# Patient Record
Sex: FEMALE | Race: WHITE | NOT HISPANIC OR LATINO | Employment: OTHER | URBAN - METROPOLITAN AREA
[De-identification: names, ages, dates, MRNs, and addresses within clinical notes are randomized per-mention and may not be internally consistent; named-entity substitution may affect disease eponyms.]

---

## 2017-01-11 ENCOUNTER — ALLSCRIPTS OFFICE VISIT (OUTPATIENT)
Dept: OTHER | Facility: OTHER | Age: 62
End: 2017-01-11

## 2017-01-23 ENCOUNTER — TRANSCRIBE ORDERS (OUTPATIENT)
Dept: ADMINISTRATIVE | Facility: HOSPITAL | Age: 62
End: 2017-01-23

## 2017-01-23 ENCOUNTER — APPOINTMENT (OUTPATIENT)
Dept: LAB | Facility: HOSPITAL | Age: 62
End: 2017-01-23
Attending: INTERNAL MEDICINE
Payer: MEDICARE

## 2017-01-23 DIAGNOSIS — D64.9 ANEMIA, UNSPECIFIED: ICD-10-CM

## 2017-01-23 DIAGNOSIS — Z12.31 SCREENING MAMMOGRAM FOR HIGH-RISK PATIENT: ICD-10-CM

## 2017-01-23 DIAGNOSIS — Z78.0 POSTMENOPAUSAL: Primary | ICD-10-CM

## 2017-01-23 DIAGNOSIS — M85.80 OSTEOPENIA: ICD-10-CM

## 2017-01-23 DIAGNOSIS — D64.9 ANEMIA, UNSPECIFIED: Primary | ICD-10-CM

## 2017-01-23 DIAGNOSIS — I10 ESSENTIAL HYPERTENSION, MALIGNANT: ICD-10-CM

## 2017-01-23 LAB
ALBUMIN SERPL BCP-MCNC: 3.6 G/DL (ref 3.5–5)
ALP SERPL-CCNC: 128 U/L (ref 46–116)
ALT SERPL W P-5'-P-CCNC: 56 U/L (ref 12–78)
ANION GAP SERPL CALCULATED.3IONS-SCNC: 11 MMOL/L (ref 4–13)
AST SERPL W P-5'-P-CCNC: 67 U/L (ref 5–45)
BASOPHILS # BLD AUTO: 0.1 THOUSANDS/ΜL (ref 0–0.1)
BASOPHILS NFR BLD AUTO: 1 % (ref 0–1)
BILIRUB SERPL-MCNC: 0.3 MG/DL (ref 0.2–1)
BUN SERPL-MCNC: 14 MG/DL (ref 5–25)
CALCIUM SERPL-MCNC: 9.5 MG/DL (ref 8.3–10.1)
CHLORIDE SERPL-SCNC: 101 MMOL/L (ref 100–108)
CO2 SERPL-SCNC: 25 MMOL/L (ref 21–32)
CREAT SERPL-MCNC: 0.83 MG/DL (ref 0.6–1.3)
EOSINOPHIL # BLD AUTO: 0.3 THOUSAND/ΜL (ref 0–0.61)
EOSINOPHIL NFR BLD AUTO: 3 % (ref 0–6)
ERYTHROCYTE [DISTWIDTH] IN BLOOD BY AUTOMATED COUNT: 17.6 % (ref 11.6–15.1)
GFR SERPL CREATININE-BSD FRML MDRD: >60 ML/MIN/1.73SQ M
GLUCOSE SERPL-MCNC: 116 MG/DL (ref 65–140)
HCT VFR BLD AUTO: 40.8 % (ref 37–47)
HGB BLD-MCNC: 12.7 G/DL (ref 12–16)
LYMPHOCYTES # BLD AUTO: 3.2 THOUSANDS/ΜL (ref 0.6–4.47)
LYMPHOCYTES NFR BLD AUTO: 31 % (ref 14–44)
MCH RBC QN AUTO: 23.3 PG (ref 27–31)
MCHC RBC AUTO-ENTMCNC: 31 G/DL (ref 31.4–37.4)
MCV RBC AUTO: 75 FL (ref 82–98)
MONOCYTES # BLD AUTO: 0.7 THOUSAND/ΜL (ref 0.17–1.22)
MONOCYTES NFR BLD AUTO: 7 % (ref 4–12)
NEUTROPHILS # BLD AUTO: 6.1 THOUSANDS/ΜL (ref 1.85–7.62)
NEUTS SEG NFR BLD AUTO: 59 % (ref 43–75)
NRBC BLD AUTO-RTO: 0 /100 WBCS
PLATELET # BLD AUTO: 347 THOUSANDS/UL (ref 130–400)
PMV BLD AUTO: 8.9 FL (ref 8.9–12.7)
POTASSIUM SERPL-SCNC: 4.7 MMOL/L (ref 3.5–5.3)
PROT SERPL-MCNC: 7.9 G/DL (ref 6.4–8.2)
RBC # BLD AUTO: 5.44 MILLION/UL (ref 4.2–5.4)
SODIUM SERPL-SCNC: 137 MMOL/L (ref 136–145)
WBC # BLD AUTO: 10.3 THOUSAND/UL (ref 4.8–10.8)

## 2017-01-23 PROCEDURE — 36415 COLL VENOUS BLD VENIPUNCTURE: CPT | Performed by: INTERNAL MEDICINE

## 2017-01-23 PROCEDURE — 85025 COMPLETE CBC W/AUTO DIFF WBC: CPT

## 2017-01-23 PROCEDURE — 80053 COMPREHEN METABOLIC PANEL: CPT | Performed by: INTERNAL MEDICINE

## 2017-01-30 ENCOUNTER — ALLSCRIPTS OFFICE VISIT (OUTPATIENT)
Dept: OTHER | Facility: OTHER | Age: 62
End: 2017-01-30

## 2017-01-30 DIAGNOSIS — L90.5 SCAR CONDITIONS AND FIBROSIS OF SKIN: ICD-10-CM

## 2017-01-30 DIAGNOSIS — M79.641 PAIN OF RIGHT HAND: ICD-10-CM

## 2017-02-02 ENCOUNTER — HOSPITAL ENCOUNTER (OUTPATIENT)
Dept: RADIOLOGY | Facility: HOSPITAL | Age: 62
Discharge: HOME | End: 2017-02-02
Attending: INTERNAL MEDICINE
Payer: MEDICARE

## 2017-02-02 DIAGNOSIS — Z78.0 POSTMENOPAUSAL: ICD-10-CM

## 2017-02-02 DIAGNOSIS — M85.80 OSTEOPENIA: ICD-10-CM

## 2017-02-02 PROCEDURE — 77080 DXA BONE DENSITY AXIAL: CPT

## 2017-02-07 ENCOUNTER — APPOINTMENT (OUTPATIENT)
Dept: PREADMISSION TESTING | Facility: HOSPITAL | Age: 62
End: 2017-02-07
Payer: MEDICARE

## 2017-02-07 ENCOUNTER — ANESTHESIA EVENT (OUTPATIENT)
Dept: GASTROENTEROLOGY | Facility: AMBULARY SURGERY CENTER | Age: 62
End: 2017-02-07
Payer: MEDICARE

## 2017-02-07 DIAGNOSIS — Z01.818 PRE-OP TESTING: Primary | ICD-10-CM

## 2017-02-07 LAB
ANION GAP SERPL CALCULATED.3IONS-SCNC: 7 MMOL/L (ref 4–13)
ATRIAL RATE: 61 BPM
BASOPHILS # BLD AUTO: 0 THOUSANDS/ΜL (ref 0–0.1)
BASOPHILS NFR BLD AUTO: 1 % (ref 0–1)
BUN SERPL-MCNC: 11 MG/DL (ref 5–25)
CALCIUM SERPL-MCNC: 9.1 MG/DL (ref 8.3–10.1)
CHLORIDE SERPL-SCNC: 104 MMOL/L (ref 100–108)
CO2 SERPL-SCNC: 28 MMOL/L (ref 21–32)
CREAT SERPL-MCNC: 0.82 MG/DL (ref 0.6–1.3)
EOSINOPHIL # BLD AUTO: 0.2 THOUSAND/ΜL (ref 0–0.61)
EOSINOPHIL NFR BLD AUTO: 3 % (ref 0–6)
ERYTHROCYTE [DISTWIDTH] IN BLOOD BY AUTOMATED COUNT: 17.3 % (ref 11.6–15.1)
GFR SERPL CREATININE-BSD FRML MDRD: >60 ML/MIN/1.73SQ M
GLUCOSE SERPL-MCNC: 105 MG/DL (ref 65–140)
HCT VFR BLD AUTO: 37.4 % (ref 37–47)
HGB BLD-MCNC: 12.1 G/DL (ref 12–16)
LYMPHOCYTES # BLD AUTO: 2.3 THOUSANDS/ΜL (ref 0.6–4.47)
LYMPHOCYTES NFR BLD AUTO: 30 % (ref 14–44)
MCH RBC QN AUTO: 23.7 PG (ref 27–31)
MCHC RBC AUTO-ENTMCNC: 32.5 G/DL (ref 31.4–37.4)
MCV RBC AUTO: 73 FL (ref 82–98)
MONOCYTES # BLD AUTO: 0.5 THOUSAND/ΜL (ref 0.17–1.22)
MONOCYTES NFR BLD AUTO: 7 % (ref 4–12)
NEUTROPHILS # BLD AUTO: 4.6 THOUSANDS/ΜL (ref 1.85–7.62)
NEUTS SEG NFR BLD AUTO: 60 % (ref 43–75)
NRBC BLD AUTO-RTO: 0 /100 WBCS
P AXIS: 71 DEGREES
PLATELET # BLD AUTO: 316 THOUSANDS/UL (ref 130–400)
PMV BLD AUTO: 8.3 FL (ref 8.9–12.7)
POTASSIUM SERPL-SCNC: 4.1 MMOL/L (ref 3.5–5.3)
PR INTERVAL: 116 MS
QRS AXIS: 34 DEGREES
QRSD INTERVAL: 80 MS
QT INTERVAL: 456 MS
QTC INTERVAL: 459 MS
RBC # BLD AUTO: 5.12 MILLION/UL (ref 4.2–5.4)
SODIUM SERPL-SCNC: 139 MMOL/L (ref 136–145)
T WAVE AXIS: 43 DEGREES
VENTRICULAR RATE: 61 BPM
WBC # BLD AUTO: 7.7 THOUSAND/UL (ref 4.8–10.8)

## 2017-02-07 PROCEDURE — 85025 COMPLETE CBC W/AUTO DIFF WBC: CPT

## 2017-02-07 PROCEDURE — 93005 ELECTROCARDIOGRAM TRACING: CPT

## 2017-02-07 PROCEDURE — 80048 BASIC METABOLIC PNL TOTAL CA: CPT

## 2017-02-07 PROCEDURE — 36415 COLL VENOUS BLD VENIPUNCTURE: CPT

## 2017-02-07 RX ORDER — FLUOXETINE HYDROCHLORIDE 40 MG/1
80 CAPSULE ORAL EVERY MORNING
COMMUNITY
End: 2019-04-09

## 2017-02-07 RX ORDER — OMEPRAZOLE 40 MG/1
40 CAPSULE, DELAYED RELEASE ORAL EVERY MORNING
COMMUNITY
End: 2018-09-26 | Stop reason: SDUPTHER

## 2017-02-08 ENCOUNTER — HOSPITAL ENCOUNTER (OUTPATIENT)
Facility: AMBULARY SURGERY CENTER | Age: 62
Setting detail: OUTPATIENT SURGERY
Discharge: HOME | End: 2017-02-08
Attending: INTERNAL MEDICINE | Admitting: INTERNAL MEDICINE
Payer: MEDICARE

## 2017-02-08 ENCOUNTER — GENERIC CONVERSION - ENCOUNTER (OUTPATIENT)
Dept: OTHER | Facility: OTHER | Age: 62
End: 2017-02-08

## 2017-02-08 ENCOUNTER — ANESTHESIA (OUTPATIENT)
Dept: GASTROENTEROLOGY | Facility: AMBULARY SURGERY CENTER | Age: 62
End: 2017-02-08
Payer: MEDICARE

## 2017-02-08 VITALS
HEART RATE: 64 BPM | OXYGEN SATURATION: 97 % | RESPIRATION RATE: 20 BRPM | TEMPERATURE: 98.6 F | DIASTOLIC BLOOD PRESSURE: 72 MMHG | SYSTOLIC BLOOD PRESSURE: 148 MMHG

## 2017-02-08 DIAGNOSIS — R19.5 OTHER FECAL ABNORMALITIES: ICD-10-CM

## 2017-02-08 DIAGNOSIS — D64.9 ANEMIA: ICD-10-CM

## 2017-02-08 DIAGNOSIS — K21.9 GASTRO-ESOPHAGEAL REFLUX DISEASE WITHOUT ESOPHAGITIS: ICD-10-CM

## 2017-02-08 LAB — GLUCOSE SERPL-MCNC: 95 MG/DL (ref 65–140)

## 2017-02-08 PROCEDURE — 88305 TISSUE EXAM BY PATHOLOGIST: CPT | Performed by: INTERNAL MEDICINE

## 2017-02-08 PROCEDURE — 82948 REAGENT STRIP/BLOOD GLUCOSE: CPT

## 2017-02-08 RX ORDER — SODIUM CHLORIDE, SODIUM LACTATE, POTASSIUM CHLORIDE, CALCIUM CHLORIDE 600; 310; 30; 20 MG/100ML; MG/100ML; MG/100ML; MG/100ML
125 INJECTION, SOLUTION INTRAVENOUS CONTINUOUS
Status: DISCONTINUED | OUTPATIENT
Start: 2017-02-08 | End: 2017-02-08 | Stop reason: HOSPADM

## 2017-02-08 RX ORDER — PROPOFOL 10 MG/ML
INJECTION, EMULSION INTRAVENOUS AS NEEDED
Status: DISCONTINUED | OUTPATIENT
Start: 2017-02-08 | End: 2017-02-08 | Stop reason: SURG

## 2017-02-08 RX ADMIN — PROPOFOL 50 MG: 10 INJECTION, EMULSION INTRAVENOUS at 10:48

## 2017-02-08 RX ADMIN — PROPOFOL 120 MG: 10 INJECTION, EMULSION INTRAVENOUS at 10:32

## 2017-02-08 RX ADMIN — LIDOCAINE HYDROCHLORIDE 40 MG: 20 INJECTION, SOLUTION INTRAVENOUS at 10:32

## 2017-02-08 RX ADMIN — SODIUM CHLORIDE, SODIUM LACTATE, POTASSIUM CHLORIDE, AND CALCIUM CHLORIDE: .6; .31; .03; .02 INJECTION, SOLUTION INTRAVENOUS at 10:25

## 2017-02-08 RX ADMIN — PROPOFOL 100 MG: 10 INJECTION, EMULSION INTRAVENOUS at 10:40

## 2017-02-10 ENCOUNTER — ANESTHESIA EVENT (OUTPATIENT)
Dept: PERIOP | Facility: AMBULARY SURGERY CENTER | Age: 62
End: 2017-02-10
Payer: MEDICARE

## 2017-02-13 ENCOUNTER — HOSPITAL ENCOUNTER (OUTPATIENT)
Facility: AMBULARY SURGERY CENTER | Age: 62
Setting detail: OUTPATIENT SURGERY
Discharge: HOME | End: 2017-02-13
Attending: ORTHOPAEDIC SURGERY | Admitting: ORTHOPAEDIC SURGERY
Payer: MEDICARE

## 2017-02-13 ENCOUNTER — ANESTHESIA (OUTPATIENT)
Dept: PERIOP | Facility: AMBULARY SURGERY CENTER | Age: 62
End: 2017-02-13
Payer: MEDICARE

## 2017-02-13 VITALS
DIASTOLIC BLOOD PRESSURE: 52 MMHG | SYSTOLIC BLOOD PRESSURE: 110 MMHG | OXYGEN SATURATION: 96 % | RESPIRATION RATE: 20 BRPM | TEMPERATURE: 98 F | HEART RATE: 76 BPM

## 2017-02-13 RX ORDER — FENTANYL CITRATE 50 UG/ML
INJECTION, SOLUTION INTRAMUSCULAR; INTRAVENOUS AS NEEDED
Status: DISCONTINUED | OUTPATIENT
Start: 2017-02-13 | End: 2017-02-13 | Stop reason: SURG

## 2017-02-13 RX ORDER — CLINDAMYCIN PHOSPHATE 900 MG/50ML
900 INJECTION INTRAVENOUS ONCE
Status: DISCONTINUED | OUTPATIENT
Start: 2017-02-13 | End: 2017-02-13 | Stop reason: HOSPADM

## 2017-02-13 RX ORDER — BUPIVACAINE HYDROCHLORIDE 2.5 MG/ML
INJECTION, SOLUTION INFILTRATION; PERINEURAL AS NEEDED
Status: DISCONTINUED | OUTPATIENT
Start: 2017-02-13 | End: 2017-02-13 | Stop reason: HOSPADM

## 2017-02-13 RX ORDER — MIDAZOLAM HYDROCHLORIDE 1 MG/ML
INJECTION INTRAMUSCULAR; INTRAVENOUS AS NEEDED
Status: DISCONTINUED | OUTPATIENT
Start: 2017-02-13 | End: 2017-02-13 | Stop reason: SURG

## 2017-02-13 RX ORDER — HYDROCODONE BITARTRATE AND ACETAMINOPHEN 5; 325 MG/1; MG/1
1 TABLET ORAL EVERY 8 HOURS PRN
Qty: 30 TABLET | Refills: 0 | Status: SHIPPED | OUTPATIENT
Start: 2017-02-13 | End: 2017-02-20

## 2017-02-13 RX ORDER — CLINDAMYCIN PHOSPHATE 150 MG/ML
INJECTION, SOLUTION INTRAVENOUS AS NEEDED
Status: DISCONTINUED | OUTPATIENT
Start: 2017-02-13 | End: 2017-02-13 | Stop reason: SURG

## 2017-02-13 RX ORDER — PROPOFOL 10 MG/ML
INJECTION, EMULSION INTRAVENOUS CONTINUOUS PRN
Status: DISCONTINUED | OUTPATIENT
Start: 2017-02-13 | End: 2017-02-13 | Stop reason: SURG

## 2017-02-13 RX ORDER — SODIUM CHLORIDE, SODIUM LACTATE, POTASSIUM CHLORIDE, CALCIUM CHLORIDE 600; 310; 30; 20 MG/100ML; MG/100ML; MG/100ML; MG/100ML
INJECTION, SOLUTION INTRAVENOUS CONTINUOUS PRN
Status: DISCONTINUED | OUTPATIENT
Start: 2017-02-13 | End: 2017-02-13 | Stop reason: SURG

## 2017-02-13 RX ORDER — PROPOFOL 10 MG/ML
INJECTION, EMULSION INTRAVENOUS AS NEEDED
Status: DISCONTINUED | OUTPATIENT
Start: 2017-02-13 | End: 2017-02-13 | Stop reason: SURG

## 2017-02-13 RX ORDER — QUETIAPINE FUMARATE 50 MG/1
50 TABLET, FILM COATED ORAL
COMMUNITY
End: 2019-04-09

## 2017-02-13 RX ORDER — SODIUM CHLORIDE, SODIUM LACTATE, POTASSIUM CHLORIDE, CALCIUM CHLORIDE 600; 310; 30; 20 MG/100ML; MG/100ML; MG/100ML; MG/100ML
75 INJECTION, SOLUTION INTRAVENOUS CONTINUOUS
Status: DISCONTINUED | OUTPATIENT
Start: 2017-02-13 | End: 2017-02-13 | Stop reason: HOSPADM

## 2017-02-13 RX ORDER — SODIUM CHLORIDE, SODIUM LACTATE, POTASSIUM CHLORIDE, CALCIUM CHLORIDE 600; 310; 30; 20 MG/100ML; MG/100ML; MG/100ML; MG/100ML
50 INJECTION, SOLUTION INTRAVENOUS CONTINUOUS
Status: DISCONTINUED | OUTPATIENT
Start: 2017-02-13 | End: 2017-02-13 | Stop reason: HOSPADM

## 2017-02-13 RX ORDER — FENTANYL CITRATE/PF 50 MCG/ML
25 SYRINGE (ML) INJECTION
Status: DISCONTINUED | OUTPATIENT
Start: 2017-02-13 | End: 2017-02-13 | Stop reason: HOSPADM

## 2017-02-13 RX ADMIN — PROPOFOL 30 MG: 10 INJECTION, EMULSION INTRAVENOUS at 08:40

## 2017-02-13 RX ADMIN — PROPOFOL 80 MG: 10 INJECTION, EMULSION INTRAVENOUS at 08:32

## 2017-02-13 RX ADMIN — SODIUM CHLORIDE, SODIUM LACTATE, POTASSIUM CHLORIDE, AND CALCIUM CHLORIDE 75 ML/HR: .6; .31; .03; .02 INJECTION, SOLUTION INTRAVENOUS at 08:15

## 2017-02-13 RX ADMIN — SODIUM CHLORIDE, SODIUM LACTATE, POTASSIUM CHLORIDE, AND CALCIUM CHLORIDE: .6; .31; .03; .02 INJECTION, SOLUTION INTRAVENOUS at 08:15

## 2017-02-13 RX ADMIN — FENTANYL CITRATE 50 MCG: 50 INJECTION, SOLUTION INTRAMUSCULAR; INTRAVENOUS at 08:32

## 2017-02-13 RX ADMIN — CLINDAMYCIN PHOSPHATE 900 MG: 150 INJECTION, SOLUTION INTRAMUSCULAR; INTRAVENOUS at 08:17

## 2017-02-13 RX ADMIN — FENTANYL CITRATE 25 MCG: 50 INJECTION, SOLUTION INTRAMUSCULAR; INTRAVENOUS at 08:28

## 2017-02-13 RX ADMIN — PROPOFOL 80 MCG/KG/MIN: 10 INJECTION, EMULSION INTRAVENOUS at 08:32

## 2017-02-13 RX ADMIN — MIDAZOLAM HYDROCHLORIDE 2 MG: 1 INJECTION, SOLUTION INTRAMUSCULAR; INTRAVENOUS at 08:28

## 2017-02-13 RX ADMIN — FENTANYL CITRATE 25 MCG: 50 INJECTION, SOLUTION INTRAMUSCULAR; INTRAVENOUS at 08:45

## 2017-02-14 ENCOUNTER — GENERIC CONVERSION - ENCOUNTER (OUTPATIENT)
Dept: OTHER | Facility: OTHER | Age: 62
End: 2017-02-14

## 2017-02-28 ENCOUNTER — ALLSCRIPTS OFFICE VISIT (OUTPATIENT)
Dept: OTHER | Facility: OTHER | Age: 62
End: 2017-02-28

## 2017-03-01 ENCOUNTER — HOSPITAL ENCOUNTER (OUTPATIENT)
Dept: RADIOLOGY | Facility: HOSPITAL | Age: 62
Discharge: HOME | End: 2017-03-01
Attending: INTERNAL MEDICINE
Payer: MEDICARE

## 2017-03-01 DIAGNOSIS — Z12.31 SCREENING MAMMOGRAM FOR HIGH-RISK PATIENT: ICD-10-CM

## 2017-03-01 PROCEDURE — G0202 SCR MAMMO BI INCL CAD: HCPCS

## 2017-03-06 ENCOUNTER — ALLSCRIPTS OFFICE VISIT (OUTPATIENT)
Dept: OTHER | Facility: OTHER | Age: 62
End: 2017-03-06

## 2017-03-06 DIAGNOSIS — Z47.89 ENCOUNTER FOR OTHER ORTHOPEDIC AFTERCARE: ICD-10-CM

## 2017-03-06 DIAGNOSIS — L90.5 SCAR CONDITIONS AND FIBROSIS OF SKIN: ICD-10-CM

## 2017-03-09 ENCOUNTER — APPOINTMENT (OUTPATIENT)
Dept: OCCUPATIONAL THERAPY | Facility: CLINIC | Age: 62
End: 2017-03-09
Payer: MEDICARE

## 2017-03-09 DIAGNOSIS — Z47.89 ENCOUNTER FOR OTHER ORTHOPEDIC AFTERCARE: ICD-10-CM

## 2017-03-09 PROCEDURE — 97760 ORTHOTIC MGMT&TRAING 1ST ENC: CPT

## 2017-03-09 PROCEDURE — G8984 CARRY CURRENT STATUS: HCPCS

## 2017-03-09 PROCEDURE — 97165 OT EVAL LOW COMPLEX 30 MIN: CPT

## 2017-03-09 PROCEDURE — G8985 CARRY GOAL STATUS: HCPCS

## 2017-03-15 ENCOUNTER — APPOINTMENT (EMERGENCY)
Dept: RADIOLOGY | Facility: HOSPITAL | Age: 62
End: 2017-03-15
Payer: MEDICARE

## 2017-03-15 ENCOUNTER — APPOINTMENT (OUTPATIENT)
Dept: OCCUPATIONAL THERAPY | Facility: CLINIC | Age: 62
End: 2017-03-15
Payer: MEDICARE

## 2017-03-15 ENCOUNTER — HOSPITAL ENCOUNTER (EMERGENCY)
Facility: HOSPITAL | Age: 62
Discharge: HOME | End: 2017-03-15
Attending: EMERGENCY MEDICINE | Admitting: EMERGENCY MEDICINE
Payer: MEDICARE

## 2017-03-15 VITALS
HEART RATE: 67 BPM | WEIGHT: 138 LBS | TEMPERATURE: 98.9 F | RESPIRATION RATE: 18 BRPM | DIASTOLIC BLOOD PRESSURE: 60 MMHG | OXYGEN SATURATION: 97 % | SYSTOLIC BLOOD PRESSURE: 121 MMHG

## 2017-03-15 DIAGNOSIS — S93.602A: Primary | ICD-10-CM

## 2017-03-15 PROCEDURE — 97140 MANUAL THERAPY 1/> REGIONS: CPT

## 2017-03-15 PROCEDURE — 73630 X-RAY EXAM OF FOOT: CPT

## 2017-03-15 PROCEDURE — 99283 EMERGENCY DEPT VISIT LOW MDM: CPT

## 2017-03-15 PROCEDURE — 97110 THERAPEUTIC EXERCISES: CPT

## 2017-03-15 RX ORDER — IBUPROFEN 400 MG/1
400 TABLET ORAL ONCE
Status: COMPLETED | OUTPATIENT
Start: 2017-03-15 | End: 2017-03-15

## 2017-03-15 RX ORDER — HYDROCODONE BITARTRATE AND ACETAMINOPHEN 5; 325 MG/1; MG/1
1 TABLET ORAL EVERY 6 HOURS PRN
Qty: 15 TABLET | Refills: 0 | Status: SHIPPED | OUTPATIENT
Start: 2017-03-15 | End: 2017-03-22

## 2017-03-15 RX ADMIN — IBUPROFEN 400 MG: 400 TABLET ORAL at 05:37

## 2017-03-17 ENCOUNTER — APPOINTMENT (OUTPATIENT)
Dept: OCCUPATIONAL THERAPY | Facility: CLINIC | Age: 62
End: 2017-03-17
Payer: MEDICARE

## 2017-03-17 PROCEDURE — 97110 THERAPEUTIC EXERCISES: CPT

## 2017-03-17 PROCEDURE — 97140 MANUAL THERAPY 1/> REGIONS: CPT

## 2017-03-21 ENCOUNTER — APPOINTMENT (OUTPATIENT)
Dept: OCCUPATIONAL THERAPY | Facility: CLINIC | Age: 62
End: 2017-03-21
Payer: MEDICARE

## 2017-03-21 PROCEDURE — 97110 THERAPEUTIC EXERCISES: CPT

## 2017-03-21 PROCEDURE — 97140 MANUAL THERAPY 1/> REGIONS: CPT

## 2017-03-24 ENCOUNTER — APPOINTMENT (OUTPATIENT)
Dept: OCCUPATIONAL THERAPY | Facility: CLINIC | Age: 62
End: 2017-03-24
Payer: MEDICARE

## 2017-03-24 PROCEDURE — 97140 MANUAL THERAPY 1/> REGIONS: CPT

## 2017-03-24 PROCEDURE — 97110 THERAPEUTIC EXERCISES: CPT

## 2017-03-27 ENCOUNTER — APPOINTMENT (OUTPATIENT)
Dept: OCCUPATIONAL THERAPY | Facility: CLINIC | Age: 62
End: 2017-03-27
Payer: MEDICARE

## 2017-03-27 PROCEDURE — 97110 THERAPEUTIC EXERCISES: CPT

## 2017-03-27 PROCEDURE — 97140 MANUAL THERAPY 1/> REGIONS: CPT

## 2017-03-29 ENCOUNTER — APPOINTMENT (OUTPATIENT)
Dept: OCCUPATIONAL THERAPY | Facility: CLINIC | Age: 62
End: 2017-03-29
Payer: MEDICARE

## 2017-03-29 PROCEDURE — 97110 THERAPEUTIC EXERCISES: CPT

## 2017-03-29 PROCEDURE — 97140 MANUAL THERAPY 1/> REGIONS: CPT

## 2017-03-29 PROCEDURE — G8984 CARRY CURRENT STATUS: HCPCS

## 2017-03-29 PROCEDURE — G8985 CARRY GOAL STATUS: HCPCS

## 2017-04-03 ENCOUNTER — ALLSCRIPTS OFFICE VISIT (OUTPATIENT)
Dept: OTHER | Facility: OTHER | Age: 62
End: 2017-04-03

## 2017-04-03 ENCOUNTER — APPOINTMENT (OUTPATIENT)
Dept: OCCUPATIONAL THERAPY | Facility: CLINIC | Age: 62
End: 2017-04-03
Payer: MEDICARE

## 2017-04-03 PROCEDURE — 97140 MANUAL THERAPY 1/> REGIONS: CPT

## 2017-04-03 PROCEDURE — 97110 THERAPEUTIC EXERCISES: CPT

## 2017-04-05 ENCOUNTER — APPOINTMENT (OUTPATIENT)
Dept: OCCUPATIONAL THERAPY | Facility: CLINIC | Age: 62
End: 2017-04-05
Payer: MEDICARE

## 2017-04-05 PROCEDURE — 97140 MANUAL THERAPY 1/> REGIONS: CPT

## 2017-04-05 PROCEDURE — 97110 THERAPEUTIC EXERCISES: CPT

## 2017-04-10 ENCOUNTER — APPOINTMENT (OUTPATIENT)
Dept: OCCUPATIONAL THERAPY | Facility: CLINIC | Age: 62
End: 2017-04-10
Payer: MEDICARE

## 2017-04-10 PROCEDURE — 97140 MANUAL THERAPY 1/> REGIONS: CPT

## 2017-04-10 PROCEDURE — 97110 THERAPEUTIC EXERCISES: CPT

## 2017-04-12 ENCOUNTER — APPOINTMENT (OUTPATIENT)
Dept: OCCUPATIONAL THERAPY | Facility: CLINIC | Age: 62
End: 2017-04-12
Payer: MEDICARE

## 2017-04-12 PROCEDURE — 97140 MANUAL THERAPY 1/> REGIONS: CPT

## 2017-04-12 PROCEDURE — 97110 THERAPEUTIC EXERCISES: CPT

## 2017-04-17 ENCOUNTER — APPOINTMENT (OUTPATIENT)
Dept: OCCUPATIONAL THERAPY | Facility: CLINIC | Age: 62
End: 2017-04-17
Payer: MEDICARE

## 2017-04-17 PROCEDURE — 97140 MANUAL THERAPY 1/> REGIONS: CPT

## 2017-04-17 PROCEDURE — 97110 THERAPEUTIC EXERCISES: CPT

## 2017-04-19 ENCOUNTER — APPOINTMENT (OUTPATIENT)
Dept: OCCUPATIONAL THERAPY | Facility: CLINIC | Age: 62
End: 2017-04-19
Payer: MEDICARE

## 2017-04-19 PROCEDURE — 97140 MANUAL THERAPY 1/> REGIONS: CPT

## 2017-04-19 PROCEDURE — 97110 THERAPEUTIC EXERCISES: CPT

## 2017-04-21 ENCOUNTER — APPOINTMENT (OUTPATIENT)
Dept: LAB | Facility: HOSPITAL | Age: 62
End: 2017-04-21
Attending: INTERNAL MEDICINE
Payer: MEDICARE

## 2017-04-21 ENCOUNTER — TRANSCRIBE ORDERS (OUTPATIENT)
Dept: ADMINISTRATIVE | Facility: HOSPITAL | Age: 62
End: 2017-04-21

## 2017-04-21 DIAGNOSIS — D64.9 ANEMIA, UNSPECIFIED: ICD-10-CM

## 2017-04-21 DIAGNOSIS — E11.9 DIABETES MELLITUS WITHOUT COMPLICATION (HCC): ICD-10-CM

## 2017-04-21 DIAGNOSIS — N39.0 URINARY TRACT INFECTION, SITE NOT SPECIFIED: ICD-10-CM

## 2017-04-21 DIAGNOSIS — D64.9 ANEMIA, UNSPECIFIED: Primary | ICD-10-CM

## 2017-04-21 DIAGNOSIS — D52.9 ANEMIA DUE TO FOLIC ACID DEFICIENCY, UNSPECIFIED DEFICIENCY TYPE: ICD-10-CM

## 2017-04-21 DIAGNOSIS — E03.9 UNSPECIFIED HYPOTHYROIDISM: ICD-10-CM

## 2017-04-21 DIAGNOSIS — D50.9 IRON DEFICIENCY ANEMIA, UNSPECIFIED: ICD-10-CM

## 2017-04-21 DIAGNOSIS — D51.9 ANEMIA DUE TO VITAMIN B12 DEFICIENCY, UNSPECIFIED B12 DEFICIENCY TYPE: ICD-10-CM

## 2017-04-21 DIAGNOSIS — E78.00 PURE HYPERCHOLESTEROLEMIA: ICD-10-CM

## 2017-04-21 DIAGNOSIS — I10 ESSENTIAL HYPERTENSION, MALIGNANT: ICD-10-CM

## 2017-04-21 DIAGNOSIS — J44.9 CHRONIC OBSTRUCTIVE PULMONARY DISEASE, UNSPECIFIED COPD TYPE (HCC): ICD-10-CM

## 2017-04-21 LAB
ALBUMIN SERPL BCP-MCNC: 3.4 G/DL (ref 3.5–5)
ALP SERPL-CCNC: 123 U/L (ref 46–116)
ALT SERPL W P-5'-P-CCNC: 73 U/L (ref 12–78)
ANION GAP SERPL CALCULATED.3IONS-SCNC: 12 MMOL/L (ref 4–13)
AST SERPL W P-5'-P-CCNC: 61 U/L (ref 5–45)
BACTERIA UR QL AUTO: ABNORMAL /HPF
BASOPHILS # BLD AUTO: 0 THOUSANDS/ΜL (ref 0–0.1)
BASOPHILS NFR BLD AUTO: 0 % (ref 0–1)
BILIRUB SERPL-MCNC: 0.3 MG/DL (ref 0.2–1)
BILIRUB UR QL STRIP: ABNORMAL
BUN SERPL-MCNC: 12 MG/DL (ref 5–25)
CALCIUM SERPL-MCNC: 9.2 MG/DL (ref 8.3–10.1)
CHLORIDE SERPL-SCNC: 104 MMOL/L (ref 100–108)
CHOLEST SERPL-MCNC: 179 MG/DL (ref 50–200)
CLARITY UR: CLEAR
CO2 SERPL-SCNC: 25 MMOL/L (ref 21–32)
COLOR UR: YELLOW
CREAT SERPL-MCNC: 0.76 MG/DL (ref 0.6–1.3)
EOSINOPHIL # BLD AUTO: 0.2 THOUSAND/ΜL (ref 0–0.61)
EOSINOPHIL NFR BLD AUTO: 2 % (ref 0–6)
ERYTHROCYTE [DISTWIDTH] IN BLOOD BY AUTOMATED COUNT: 18.2 % (ref 11.6–15.1)
EST. AVERAGE GLUCOSE BLD GHB EST-MCNC: 123 MG/DL
FERRITIN SERPL-MCNC: 23 NG/ML (ref 8–388)
GFR SERPL CREATININE-BSD FRML MDRD: >60 ML/MIN/1.73SQ M
GLUCOSE P FAST SERPL-MCNC: 104 MG/DL (ref 65–99)
GLUCOSE UR STRIP-MCNC: NEGATIVE MG/DL
HBA1C MFR BLD: 5.9 % (ref 4.2–6.3)
HCT VFR BLD AUTO: 38 % (ref 37–47)
HDLC SERPL-MCNC: 35 MG/DL (ref 40–60)
HGB BLD-MCNC: 12.2 G/DL (ref 12–16)
HGB UR QL STRIP.AUTO: NEGATIVE
IRON SATN MFR SERPL: 9 %
IRON SERPL-MCNC: 41 UG/DL (ref 50–170)
KETONES UR STRIP-MCNC: NEGATIVE MG/DL
LDLC SERPL CALC-MCNC: 118 MG/DL (ref 0–100)
LEUKOCYTE ESTERASE UR QL STRIP: NEGATIVE
LYMPHOCYTES # BLD AUTO: 2 THOUSANDS/ΜL (ref 0.6–4.47)
LYMPHOCYTES NFR BLD AUTO: 26 % (ref 14–44)
MCH RBC QN AUTO: 23.9 PG (ref 27–31)
MCHC RBC AUTO-ENTMCNC: 32.1 G/DL (ref 31.4–37.4)
MCV RBC AUTO: 74 FL (ref 82–98)
MONOCYTES # BLD AUTO: 0.6 THOUSAND/ΜL (ref 0.17–1.22)
MONOCYTES NFR BLD AUTO: 7 % (ref 4–12)
MUCOUS THREADS UR QL AUTO: ABNORMAL
NEUTROPHILS # BLD AUTO: 5.1 THOUSANDS/ΜL (ref 1.85–7.62)
NEUTS SEG NFR BLD AUTO: 64 % (ref 43–75)
NITRITE UR QL STRIP: NEGATIVE
NON-SQ EPI CELLS URNS QL MICRO: ABNORMAL /HPF
NRBC BLD AUTO-RTO: 0 /100 WBCS
PH UR STRIP.AUTO: 6 [PH] (ref 5–9)
PLATELET # BLD AUTO: 300 THOUSANDS/UL (ref 130–400)
PMV BLD AUTO: 9.1 FL (ref 8.9–12.7)
POTASSIUM SERPL-SCNC: 4 MMOL/L (ref 3.5–5.3)
PROT SERPL-MCNC: 6.8 G/DL (ref 6.4–8.2)
PROT UR STRIP-MCNC: ABNORMAL MG/DL
RBC # BLD AUTO: 5.1 MILLION/UL (ref 4.2–5.4)
RBC #/AREA URNS AUTO: ABNORMAL /HPF
SODIUM SERPL-SCNC: 141 MMOL/L (ref 136–145)
SP GR UR STRIP.AUTO: 1.02 (ref 1–1.03)
TIBC SERPL-MCNC: 480 UG/DL (ref 250–450)
TRIGL SERPL-MCNC: 128 MG/DL
TSH SERPL DL<=0.05 MIU/L-ACNC: 2.35 UIU/ML (ref 0.36–3.74)
UROBILINOGEN UR QL STRIP.AUTO: 1 E.U./DL
VIT B12 SERPL-MCNC: 569 PG/ML (ref 100–900)
WBC # BLD AUTO: 7.9 THOUSAND/UL (ref 4.8–10.8)
WBC #/AREA URNS AUTO: ABNORMAL /HPF

## 2017-04-21 PROCEDURE — 85025 COMPLETE CBC W/AUTO DIFF WBC: CPT

## 2017-04-21 PROCEDURE — 80053 COMPREHEN METABOLIC PANEL: CPT

## 2017-04-21 PROCEDURE — 80061 LIPID PANEL: CPT

## 2017-04-21 PROCEDURE — 83540 ASSAY OF IRON: CPT

## 2017-04-21 PROCEDURE — 81001 URINALYSIS AUTO W/SCOPE: CPT | Performed by: INTERNAL MEDICINE

## 2017-04-21 PROCEDURE — 82607 VITAMIN B-12: CPT

## 2017-04-21 PROCEDURE — 83550 IRON BINDING TEST: CPT

## 2017-04-21 PROCEDURE — 82728 ASSAY OF FERRITIN: CPT

## 2017-04-21 PROCEDURE — 83036 HEMOGLOBIN GLYCOSYLATED A1C: CPT

## 2017-04-21 PROCEDURE — 84443 ASSAY THYROID STIM HORMONE: CPT

## 2017-04-21 PROCEDURE — 36415 COLL VENOUS BLD VENIPUNCTURE: CPT

## 2017-04-24 ENCOUNTER — APPOINTMENT (OUTPATIENT)
Dept: OCCUPATIONAL THERAPY | Facility: CLINIC | Age: 62
End: 2017-04-24
Payer: MEDICARE

## 2017-04-24 PROCEDURE — 97110 THERAPEUTIC EXERCISES: CPT

## 2017-04-24 PROCEDURE — 97140 MANUAL THERAPY 1/> REGIONS: CPT

## 2017-04-26 ENCOUNTER — APPOINTMENT (OUTPATIENT)
Dept: OCCUPATIONAL THERAPY | Facility: CLINIC | Age: 62
End: 2017-04-26
Payer: MEDICARE

## 2017-04-26 PROCEDURE — 97140 MANUAL THERAPY 1/> REGIONS: CPT

## 2017-04-26 PROCEDURE — 97110 THERAPEUTIC EXERCISES: CPT

## 2017-05-01 ENCOUNTER — APPOINTMENT (OUTPATIENT)
Dept: OCCUPATIONAL THERAPY | Facility: CLINIC | Age: 62
End: 2017-05-01
Payer: MEDICARE

## 2017-05-01 PROCEDURE — 97110 THERAPEUTIC EXERCISES: CPT

## 2017-05-01 PROCEDURE — 97140 MANUAL THERAPY 1/> REGIONS: CPT

## 2017-05-03 ENCOUNTER — APPOINTMENT (OUTPATIENT)
Dept: OCCUPATIONAL THERAPY | Facility: CLINIC | Age: 62
End: 2017-05-03
Payer: MEDICARE

## 2017-05-03 PROCEDURE — 97140 MANUAL THERAPY 1/> REGIONS: CPT

## 2017-05-03 PROCEDURE — G8984 CARRY CURRENT STATUS: HCPCS

## 2017-05-03 PROCEDURE — 97110 THERAPEUTIC EXERCISES: CPT

## 2017-05-03 PROCEDURE — G8985 CARRY GOAL STATUS: HCPCS

## 2017-05-10 ENCOUNTER — HOSPITAL ENCOUNTER (OUTPATIENT)
Dept: RADIOLOGY | Facility: HOSPITAL | Age: 62
Discharge: HOME | End: 2017-05-10
Attending: INTERNAL MEDICINE
Payer: MEDICARE

## 2017-05-10 DIAGNOSIS — J44.9 CHRONIC OBSTRUCTIVE PULMONARY DISEASE, UNSPECIFIED COPD TYPE (HCC): ICD-10-CM

## 2017-05-10 PROCEDURE — 71020 HB CHEST X-RAY 2VW FRONTAL&LATL: CPT

## 2017-05-15 ENCOUNTER — ALLSCRIPTS OFFICE VISIT (OUTPATIENT)
Dept: OTHER | Facility: OTHER | Age: 62
End: 2017-05-15

## 2017-05-15 ENCOUNTER — HOSPITAL ENCOUNTER (OUTPATIENT)
Dept: RADIOLOGY | Facility: CLINIC | Age: 62
Discharge: HOME | End: 2017-05-15
Payer: MEDICARE

## 2017-05-15 DIAGNOSIS — M79.641 PAIN OF RIGHT HAND: ICD-10-CM

## 2017-05-15 DIAGNOSIS — M89.9 DISORDER OF BONE: ICD-10-CM

## 2017-05-15 PROCEDURE — 73130 X-RAY EXAM OF HAND: CPT

## 2017-05-22 ENCOUNTER — HOSPITAL ENCOUNTER (OUTPATIENT)
Dept: RADIOLOGY | Facility: HOSPITAL | Age: 62
Discharge: HOME | End: 2017-05-22
Attending: ORTHOPAEDIC SURGERY
Payer: MEDICARE

## 2017-05-22 ENCOUNTER — APPOINTMENT (OUTPATIENT)
Dept: OCCUPATIONAL THERAPY | Facility: CLINIC | Age: 62
End: 2017-05-22
Payer: MEDICARE

## 2017-05-22 DIAGNOSIS — M89.9 DISORDER OF BONE: ICD-10-CM

## 2017-05-22 PROCEDURE — A9585 GADOBUTROL INJECTION: HCPCS | Performed by: ORTHOPAEDIC SURGERY

## 2017-05-22 PROCEDURE — 73220 MRI UPPR EXTREMITY W/O&W/DYE: CPT

## 2017-05-22 RX ADMIN — GADOBUTROL 6 ML: 604.72 INJECTION INTRAVENOUS at 16:16

## 2017-05-24 ENCOUNTER — APPOINTMENT (OUTPATIENT)
Dept: OCCUPATIONAL THERAPY | Facility: CLINIC | Age: 62
End: 2017-05-24
Payer: MEDICARE

## 2017-05-24 PROCEDURE — 97110 THERAPEUTIC EXERCISES: CPT

## 2017-05-24 PROCEDURE — 97140 MANUAL THERAPY 1/> REGIONS: CPT

## 2017-05-31 ENCOUNTER — APPOINTMENT (OUTPATIENT)
Dept: OCCUPATIONAL THERAPY | Facility: CLINIC | Age: 62
End: 2017-05-31
Payer: MEDICARE

## 2017-05-31 PROCEDURE — 97140 MANUAL THERAPY 1/> REGIONS: CPT

## 2017-05-31 PROCEDURE — 97110 THERAPEUTIC EXERCISES: CPT

## 2017-06-05 ENCOUNTER — APPOINTMENT (OUTPATIENT)
Dept: OCCUPATIONAL THERAPY | Facility: CLINIC | Age: 62
End: 2017-06-05
Payer: MEDICARE

## 2017-06-05 PROCEDURE — 97110 THERAPEUTIC EXERCISES: CPT

## 2017-06-05 PROCEDURE — 97140 MANUAL THERAPY 1/> REGIONS: CPT

## 2017-06-07 ENCOUNTER — APPOINTMENT (OUTPATIENT)
Dept: OCCUPATIONAL THERAPY | Facility: CLINIC | Age: 62
End: 2017-06-07
Payer: MEDICARE

## 2017-06-07 PROCEDURE — G8986 CARRY D/C STATUS: HCPCS

## 2017-06-07 PROCEDURE — G8985 CARRY GOAL STATUS: HCPCS

## 2017-06-07 PROCEDURE — 97110 THERAPEUTIC EXERCISES: CPT

## 2017-06-07 PROCEDURE — 97140 MANUAL THERAPY 1/> REGIONS: CPT

## 2017-06-12 ENCOUNTER — ALLSCRIPTS OFFICE VISIT (OUTPATIENT)
Dept: OTHER | Facility: OTHER | Age: 62
End: 2017-06-12

## 2017-06-20 ENCOUNTER — HOSPITAL ENCOUNTER (EMERGENCY)
Facility: HOSPITAL | Age: 62
Discharge: HOME | End: 2017-06-20
Attending: EMERGENCY MEDICINE
Payer: MEDICARE

## 2017-06-20 ENCOUNTER — APPOINTMENT (EMERGENCY)
Dept: RADIOLOGY | Facility: HOSPITAL | Age: 62
End: 2017-06-20
Payer: MEDICARE

## 2017-06-20 VITALS
TEMPERATURE: 98.2 F | OXYGEN SATURATION: 97 % | HEIGHT: 65 IN | HEART RATE: 66 BPM | SYSTOLIC BLOOD PRESSURE: 113 MMHG | DIASTOLIC BLOOD PRESSURE: 55 MMHG | WEIGHT: 135 LBS | BODY MASS INDEX: 22.49 KG/M2 | RESPIRATION RATE: 20 BRPM

## 2017-06-20 DIAGNOSIS — S20.211A CONTUSION OF RIB ON RIGHT SIDE, INITIAL ENCOUNTER: Primary | ICD-10-CM

## 2017-06-20 PROCEDURE — 99283 EMERGENCY DEPT VISIT LOW MDM: CPT

## 2017-06-20 PROCEDURE — 71101 X-RAY EXAM UNILAT RIBS/CHEST: CPT

## 2017-09-20 ENCOUNTER — HOSPITAL ENCOUNTER (EMERGENCY)
Facility: HOSPITAL | Age: 62
Discharge: HOME | End: 2017-09-20
Attending: EMERGENCY MEDICINE
Payer: MEDICARE

## 2017-09-20 ENCOUNTER — APPOINTMENT (EMERGENCY)
Dept: RADIOLOGY | Facility: HOSPITAL | Age: 62
End: 2017-09-20
Payer: MEDICARE

## 2017-09-20 VITALS
RESPIRATION RATE: 18 BRPM | TEMPERATURE: 100 F | HEIGHT: 66 IN | BODY MASS INDEX: 21.69 KG/M2 | HEART RATE: 76 BPM | DIASTOLIC BLOOD PRESSURE: 58 MMHG | WEIGHT: 135 LBS | SYSTOLIC BLOOD PRESSURE: 126 MMHG | OXYGEN SATURATION: 98 %

## 2017-09-20 DIAGNOSIS — S50.00XA ELBOW CONTUSION: Primary | ICD-10-CM

## 2017-09-20 PROCEDURE — 73110 X-RAY EXAM OF WRIST: CPT

## 2017-09-20 PROCEDURE — 73080 X-RAY EXAM OF ELBOW: CPT

## 2017-09-20 PROCEDURE — 99283 EMERGENCY DEPT VISIT LOW MDM: CPT

## 2017-09-27 ENCOUNTER — ALLSCRIPTS OFFICE VISIT (OUTPATIENT)
Dept: OTHER | Facility: OTHER | Age: 62
End: 2017-09-27

## 2017-09-27 ENCOUNTER — APPOINTMENT (OUTPATIENT)
Dept: RADIOLOGY | Facility: CLINIC | Age: 62
End: 2017-09-27
Payer: MEDICARE

## 2017-09-27 DIAGNOSIS — M79.603 PAIN OF UPPER EXTREMITY: ICD-10-CM

## 2017-09-27 DIAGNOSIS — S52.134A CLOSED NONDISPLACED FRACTURE OF NECK OF RIGHT RADIUS: ICD-10-CM

## 2017-09-27 PROCEDURE — 73090 X-RAY EXAM OF FOREARM: CPT

## 2017-09-29 ENCOUNTER — APPOINTMENT (OUTPATIENT)
Dept: RADIOLOGY | Facility: CLINIC | Age: 62
End: 2017-09-29
Payer: MEDICARE

## 2017-09-29 ENCOUNTER — GENERIC CONVERSION - ENCOUNTER (OUTPATIENT)
Dept: OTHER | Facility: OTHER | Age: 62
End: 2017-09-29

## 2017-09-29 DIAGNOSIS — S52.134A CLOSED NONDISPLACED FRACTURE OF NECK OF RIGHT RADIUS: ICD-10-CM

## 2017-09-29 PROCEDURE — 73070 X-RAY EXAM OF ELBOW: CPT

## 2017-10-25 ENCOUNTER — GENERIC CONVERSION - ENCOUNTER (OUTPATIENT)
Dept: OTHER | Facility: OTHER | Age: 62
End: 2017-10-25

## 2017-10-25 ENCOUNTER — APPOINTMENT (OUTPATIENT)
Dept: RADIOLOGY | Facility: CLINIC | Age: 62
End: 2017-10-25
Payer: MEDICARE

## 2017-10-25 DIAGNOSIS — S52.134A CLOSED NONDISPLACED FRACTURE OF NECK OF RIGHT RADIUS: ICD-10-CM

## 2017-10-25 PROCEDURE — 73070 X-RAY EXAM OF ELBOW: CPT

## 2017-10-31 ENCOUNTER — APPOINTMENT (OUTPATIENT)
Dept: PREADMISSION TESTING | Facility: HOSPITAL | Age: 62
End: 2017-10-31
Payer: MEDICARE

## 2017-10-31 ENCOUNTER — TRANSCRIBE ORDERS (OUTPATIENT)
Dept: ADMINISTRATIVE | Facility: HOSPITAL | Age: 62
End: 2017-10-31

## 2017-10-31 ENCOUNTER — APPOINTMENT (OUTPATIENT)
Dept: LAB | Facility: HOSPITAL | Age: 62
End: 2017-10-31
Attending: ORTHOPAEDIC SURGERY
Payer: MEDICARE

## 2017-10-31 ENCOUNTER — LAB CONVERSION - ENCOUNTER (OUTPATIENT)
Dept: OTHER | Facility: OTHER | Age: 62
End: 2017-10-31

## 2017-10-31 VITALS — WEIGHT: 136 LBS | BODY MASS INDEX: 22.29 KG/M2

## 2017-10-31 DIAGNOSIS — Z01.818 PREOP EXAMINATION: Primary | ICD-10-CM

## 2017-10-31 DIAGNOSIS — Z01.818 PREOP EXAMINATION: ICD-10-CM

## 2017-10-31 LAB
ANION GAP SERPL CALCULATED.3IONS-SCNC: 11 MMOL/L (ref 4–13)
APTT PPP: 25 SECONDS (ref 24–33)
BASOPHILS # BLD AUTO: 0 THOUSANDS/ΜL (ref 0–0.1)
BASOPHILS NFR BLD AUTO: 0 % (ref 0–1)
BUN SERPL-MCNC: 15 MG/DL (ref 5–25)
CALCIUM SERPL-MCNC: 9.5 MG/DL (ref 8.3–10.1)
CHLORIDE SERPL-SCNC: 101 MMOL/L (ref 100–108)
CO2 SERPL-SCNC: 28 MMOL/L (ref 21–32)
CREAT SERPL-MCNC: 0.77 MG/DL (ref 0.6–1.3)
EOSINOPHIL # BLD AUTO: 0.1 THOUSAND/ΜL (ref 0–0.61)
EOSINOPHIL NFR BLD AUTO: 2 % (ref 0–6)
ERYTHROCYTE [DISTWIDTH] IN BLOOD BY AUTOMATED COUNT: 17.3 % (ref 11.6–15.1)
GFR SERPL CREATININE-BSD FRML MDRD: 84 ML/MIN/1.73SQ M
GLUCOSE P FAST SERPL-MCNC: 86 MG/DL (ref 65–99)
HCT VFR BLD AUTO: 35.7 % (ref 37–47)
HGB BLD-MCNC: 11.3 G/DL (ref 12–16)
INR PPP: 1.08 (ref 0.86–1.16)
LYMPHOCYTES # BLD AUTO: 2.3 THOUSANDS/ΜL (ref 0.6–4.47)
LYMPHOCYTES NFR BLD AUTO: 27 % (ref 14–44)
MCH RBC QN AUTO: 24.1 PG (ref 27–31)
MCHC RBC AUTO-ENTMCNC: 31.6 G/DL (ref 31.4–37.4)
MCV RBC AUTO: 76 FL (ref 82–98)
MONOCYTES # BLD AUTO: 0.6 THOUSAND/ΜL (ref 0.17–1.22)
MONOCYTES NFR BLD AUTO: 7 % (ref 4–12)
NEUTROPHILS # BLD AUTO: 5.3 THOUSANDS/ΜL (ref 1.85–7.62)
NEUTS SEG NFR BLD AUTO: 64 % (ref 43–75)
NRBC BLD AUTO-RTO: 0 /100 WBCS
PLATELET # BLD AUTO: 333 THOUSANDS/UL (ref 130–400)
PMV BLD AUTO: 8.5 FL (ref 8.9–12.7)
POTASSIUM SERPL-SCNC: 4 MMOL/L (ref 3.5–5.3)
PROTHROMBIN TIME: 11.4 SECONDS (ref 9.4–11.7)
RBC # BLD AUTO: 4.67 MILLION/UL (ref 4.2–5.4)
SODIUM SERPL-SCNC: 140 MMOL/L (ref 136–145)
WBC # BLD AUTO: 8.4 THOUSAND/UL (ref 4.8–10.8)

## 2017-10-31 PROCEDURE — 80048 BASIC METABOLIC PNL TOTAL CA: CPT

## 2017-10-31 PROCEDURE — 85025 COMPLETE CBC W/AUTO DIFF WBC: CPT | Performed by: ORTHOPAEDIC SURGERY

## 2017-10-31 PROCEDURE — 85610 PROTHROMBIN TIME: CPT

## 2017-10-31 PROCEDURE — 93005 ELECTROCARDIOGRAM TRACING: CPT

## 2017-10-31 PROCEDURE — 85730 THROMBOPLASTIN TIME PARTIAL: CPT

## 2017-10-31 PROCEDURE — 36415 COLL VENOUS BLD VENIPUNCTURE: CPT

## 2017-10-31 RX ORDER — MULTIVIT-MIN/IRON/FOLIC ACID/K 18-600-40
1000 CAPSULE ORAL EVERY MORNING
COMMUNITY
End: 2019-04-09

## 2017-10-31 NOTE — PRE-PROCEDURE INSTRUCTIONS
My Surgical Experience    The following information was developed to assist you to prepare for your operation  What do I need to do before coming to the hospital?   Arrange for a responsible person to drive you to and from the hospital    Arrange care for your children at home  Children are not allowed in the recovery areas of the hospital   Plan to wear clothing that is easy to put on and take off  If you are having shoulder surgery, wear a shirt that buttons or zippers in the front  Bathing  o Shower the evening before and the morning of your surgery with an antibacterial soap  Please refer to the Pre Op Showering Instructions for Surgery Patients Sheet   o Remove nail polish and all body piercing jewelry  o Do not shave any body part for at least 24 hours before surgery-this includes face, arms, legs and upper body  Food  o Nothing to eat or drink after midnight the night before your surgery  This includes candy and chewing gum  o Exception: If your surgery is after 12:00pm (noon), you may have clear liquids such as 7-Up®, ginger ale, apple or cranberry juice, Jell-O®, water, or clear broth until 8:00 am  o Do not drink milk or juice with pulp on the morning before surgery  o Do not drink alcohol 24 hours before surgery  Medicine  o Follow instructions you received from your surgeon about which medicines you may take on the day of surgery  o If instructed to take medicine on the morning of surgery, take pills with just a small sip of water  Call your prescribing doctor for specific infroamtion on what to do if you take insulin    What should I bring to the hospital?    Bring:  Brittaney Dimas or a walker, if you have them, for foot or knee surgery   A list of the daily medicines, vitamins, minerals, herbals and nutritional supplements you take   Include the dosages of medicines and the time you take them each day   Glasses, dentures or hearing aids   Minimal clothing; you will be wearing hospital sleepwear   Photo ID; required to verify your identity   If you have a Living Will or Power of , bring a copy of the documents   If you have an ostomy, bring an extra pouch and any supplies you use    Do not bring   Medicines or inhalers   Money, valuables or jewelry    What other information should I know about the day of surgery?  Notify your surgeons if you develop a cold, sore throat, cough, fever, rash or any other illness   Report to the Ambulatory Surgical/Same Day Surgery Unit   You will be instructed to stop at Registration only if you have not been pre-registered   Inform your  fi they do not stay that they will be asked by the staff to leave a phone number where they can be reached   Be available to be reached before surgery  In the event the operating room schedule changes, you may be asked to come in earlier or later than expected    *It is important to tell your doctor and others involved in your health care if you are taking or have been taking any non-prescription drugs, vitamins, minerals, herbals or other nutritional supplements  Any of these may interact with some food or medicines and cause a reaction      Pre-Surgery Instructions:   Medication Instructions    carbidopa-levodopa (SINEMET)  mg per tablet Instructed patient per Anesthesia Guidelines   Cholecalciferol (VITAMIN D) 2000 units CAPS Instructed patient per Anesthesia Guidelines   FLUoxetine (PROzac) 40 MG capsule Instructed patient per Anesthesia Guidelines   lamoTRIgine (LAMICTAL) 200 MG tablet Instructed patient per Anesthesia Guidelines   levothyroxine 75 mcg tablet Instructed patient per Anesthesia Guidelines   lisinopril (ZESTRIL) 2 5 mg tablet Instructed patient per Anesthesia Guidelines   metFORMIN (GLUCOPHAGE) 1000 MG tablet Instructed patient per Anesthesia Guidelines   metFORMIN (GLUCOPHAGE) 500 mg tablet Instructed patient per Anesthesia Guidelines      omeprazole (PriLOSEC) 40 MG capsule Instructed patient per Anesthesia Guidelines   pravastatin (PRAVACHOL) 40 mg tablet Instructed patient per Anesthesia Guidelines   QUEtiapine (SEROquel) 50 mg tablet Instructed patient per Anesthesia Guidelines  To take prozac, synthroid, prilosec, sinemet, and lisinopril a m   Of surgery

## 2017-11-01 ENCOUNTER — TRANSCRIBE ORDERS (OUTPATIENT)
Dept: ADMINISTRATIVE | Facility: HOSPITAL | Age: 62
End: 2017-11-01

## 2017-11-01 ENCOUNTER — HOSPITAL ENCOUNTER (OUTPATIENT)
Dept: RADIOLOGY | Facility: HOSPITAL | Age: 62
Discharge: HOME | End: 2017-11-01
Attending: INTERNAL MEDICINE
Payer: MEDICARE

## 2017-11-01 DIAGNOSIS — R05.9 COUGH: Primary | ICD-10-CM

## 2017-11-01 DIAGNOSIS — J44.9 CHRONIC OBSTRUCTIVE PULMONARY DISEASE, UNSPECIFIED COPD TYPE (HCC): ICD-10-CM

## 2017-11-01 DIAGNOSIS — J44.9 CHRONIC OBSTRUCTIVE PULMONARY DISEASE, UNSPECIFIED COPD TYPE (HCC): Primary | ICD-10-CM

## 2017-11-01 PROCEDURE — 71020 HB CHEST X-RAY 2VW FRONTAL&LATL: CPT

## 2017-11-02 LAB
ATRIAL RATE: 64 BPM
P AXIS: 70 DEGREES
PR INTERVAL: 118 MS
QRS AXIS: 28 DEGREES
QRSD INTERVAL: 82 MS
QT INTERVAL: 444 MS
QTC INTERVAL: 458 MS
T WAVE AXIS: 49 DEGREES
VENTRICULAR RATE: 64 BPM

## 2017-11-09 ENCOUNTER — GENERIC CONVERSION - ENCOUNTER (OUTPATIENT)
Dept: OTHER | Facility: OTHER | Age: 62
End: 2017-11-09

## 2017-11-09 ENCOUNTER — HOSPITAL ENCOUNTER (OUTPATIENT)
Dept: PULMONOLOGY | Facility: HOSPITAL | Age: 62
Discharge: HOME | End: 2017-11-09
Attending: INTERNAL MEDICINE
Payer: MEDICARE

## 2017-11-09 DIAGNOSIS — R05.9 COUGH: ICD-10-CM

## 2017-11-09 PROCEDURE — 94726 PLETHYSMOGRAPHY LUNG VOLUMES: CPT

## 2017-11-09 PROCEDURE — 94760 N-INVAS EAR/PLS OXIMETRY 1: CPT

## 2017-11-09 PROCEDURE — 94060 EVALUATION OF WHEEZING: CPT

## 2017-11-09 PROCEDURE — 94729 DIFFUSING CAPACITY: CPT

## 2017-11-09 RX ORDER — ALBUTEROL SULFATE 2.5 MG/3ML
2.5 SOLUTION RESPIRATORY (INHALATION) ONCE
Status: COMPLETED | OUTPATIENT
Start: 2017-11-09 | End: 2017-11-09

## 2017-11-09 RX ADMIN — ALBUTEROL SULFATE 2.5 MG: 2.5 SOLUTION RESPIRATORY (INHALATION) at 12:09

## 2017-11-15 ENCOUNTER — ANESTHESIA EVENT (OUTPATIENT)
Dept: PERIOP | Facility: AMBULARY SURGERY CENTER | Age: 62
End: 2017-11-15
Payer: MEDICARE

## 2017-11-15 NOTE — ANESTHESIA PREPROCEDURE EVALUATION
Review of Systems/Medical History  Patient summary reviewed  Chart reviewed  No history of anesthetic complications     Cardiovascular  Hyperlipidemia,    Pulmonary  Smoker cigarette smoker , COPD , ,        GI/Hepatic    GERD ,   Comment: S/p appy;  S/p chuy     Chronic kidney disease ,        Endo/Other  Diabetes , History of thyroid disease , hypothyroidism, Arthritis     GYN       Hematology    Coagulation disorder (DVT in 1992 right leg) ,    Musculoskeletal    Comment: S/p spinal fusion L5-S1      Neurology    Neuromuscular disease (parkinson's disease) ,    Psychology   Depression , bipolar disorder,                 Anesthesia Plan  ASA Score- 3       Anesthesia Type- general with ASA Monitors  Additional Monitors:   Airway Plan: LMA  Comment: May require ETT to stop tremor - clinical correlation required          Induction- intravenous  Informed Consent- Anesthetic plan and risks discussed with patient  I personally reviewed this patient with the CRNA  Discussed and agreed on the Anesthesia Plan with the CRNA  Keila Reardon

## 2017-11-16 ENCOUNTER — HOSPITAL ENCOUNTER (OUTPATIENT)
Dept: RADIOLOGY | Facility: HOSPITAL | Age: 62
Setting detail: OUTPATIENT SURGERY
Discharge: HOME | End: 2017-11-16
Payer: MEDICARE

## 2017-11-16 ENCOUNTER — HOSPITAL ENCOUNTER (OUTPATIENT)
Facility: AMBULARY SURGERY CENTER | Age: 62
Setting detail: OUTPATIENT SURGERY
Discharge: HOME | End: 2017-11-16
Attending: ORTHOPAEDIC SURGERY | Admitting: ORTHOPAEDIC SURGERY
Payer: MEDICARE

## 2017-11-16 ENCOUNTER — ANESTHESIA (OUTPATIENT)
Dept: PERIOP | Facility: AMBULARY SURGERY CENTER | Age: 62
End: 2017-11-16
Payer: MEDICARE

## 2017-11-16 VITALS
TEMPERATURE: 97.4 F | HEART RATE: 73 BPM | DIASTOLIC BLOOD PRESSURE: 68 MMHG | SYSTOLIC BLOOD PRESSURE: 144 MMHG | RESPIRATION RATE: 18 BRPM | OXYGEN SATURATION: 95 %

## 2017-11-16 DIAGNOSIS — S52.121P CLOSED DISPLACED FRACTURE OF HEAD OF RIGHT RADIUS WITH MALUNION, SUBSEQUENT ENCOUNTER: Primary | ICD-10-CM

## 2017-11-16 DIAGNOSIS — S52.121A CLOSED DISPLACED FRACTURE OF HEAD OF RIGHT RADIUS: ICD-10-CM

## 2017-11-16 PROCEDURE — 73080 X-RAY EXAM OF ELBOW: CPT

## 2017-11-16 RX ORDER — FENTANYL CITRATE 50 UG/ML
INJECTION, SOLUTION INTRAMUSCULAR; INTRAVENOUS AS NEEDED
Status: DISCONTINUED | OUTPATIENT
Start: 2017-11-16 | End: 2017-11-16 | Stop reason: SURG

## 2017-11-16 RX ORDER — SODIUM CHLORIDE 9 MG/ML
50 INJECTION, SOLUTION INTRAVENOUS CONTINUOUS
Status: CANCELLED | OUTPATIENT
Start: 2017-11-16

## 2017-11-16 RX ORDER — LIDOCAINE HYDROCHLORIDE 10 MG/ML
INJECTION, SOLUTION INFILTRATION; PERINEURAL AS NEEDED
Status: DISCONTINUED | OUTPATIENT
Start: 2017-11-16 | End: 2017-11-16 | Stop reason: SURG

## 2017-11-16 RX ORDER — PROPOFOL 10 MG/ML
INJECTION, EMULSION INTRAVENOUS AS NEEDED
Status: DISCONTINUED | OUTPATIENT
Start: 2017-11-16 | End: 2017-11-16 | Stop reason: SURG

## 2017-11-16 RX ORDER — GLYCOPYRROLATE 0.2 MG/ML
INJECTION INTRAMUSCULAR; INTRAVENOUS AS NEEDED
Status: DISCONTINUED | OUTPATIENT
Start: 2017-11-16 | End: 2017-11-16 | Stop reason: SURG

## 2017-11-16 RX ORDER — CLINDAMYCIN PHOSPHATE 900 MG/50ML
900 INJECTION INTRAVENOUS ONCE
Status: COMPLETED | OUTPATIENT
Start: 2017-11-16 | End: 2017-11-16

## 2017-11-16 RX ORDER — SODIUM CHLORIDE 9 MG/ML
75 INJECTION, SOLUTION INTRAVENOUS CONTINUOUS
Status: DISCONTINUED | OUTPATIENT
Start: 2017-11-16 | End: 2017-11-16 | Stop reason: HOSPADM

## 2017-11-16 RX ORDER — ONDANSETRON 2 MG/ML
INJECTION INTRAMUSCULAR; INTRAVENOUS AS NEEDED
Status: DISCONTINUED | OUTPATIENT
Start: 2017-11-16 | End: 2017-11-16 | Stop reason: SURG

## 2017-11-16 RX ORDER — MAGNESIUM HYDROXIDE 1200 MG/15ML
LIQUID ORAL AS NEEDED
Status: DISCONTINUED | OUTPATIENT
Start: 2017-11-16 | End: 2017-11-16 | Stop reason: HOSPADM

## 2017-11-16 RX ORDER — EPHEDRINE SULFATE 50 MG/ML
INJECTION, SOLUTION INTRAVENOUS AS NEEDED
Status: DISCONTINUED | OUTPATIENT
Start: 2017-11-16 | End: 2017-11-16 | Stop reason: SURG

## 2017-11-16 RX ORDER — BUPIVACAINE HYDROCHLORIDE AND EPINEPHRINE 5; 5 MG/ML; UG/ML
INJECTION, SOLUTION EPIDURAL; INTRACAUDAL; PERINEURAL AS NEEDED
Status: DISCONTINUED | OUTPATIENT
Start: 2017-11-16 | End: 2017-11-16 | Stop reason: HOSPADM

## 2017-11-16 RX ORDER — FENTANYL CITRATE/PF 50 MCG/ML
25 SYRINGE (ML) INJECTION
Status: CANCELLED | OUTPATIENT
Start: 2017-11-16

## 2017-11-16 RX ADMIN — CLINDAMYCIN PHOSPHATE 900 MG: 18 INJECTION, SOLUTION INTRAMUSCULAR; INTRAVENOUS at 13:55

## 2017-11-16 RX ADMIN — FENTANYL CITRATE 50 MCG: 50 INJECTION, SOLUTION INTRAMUSCULAR; INTRAVENOUS at 14:35

## 2017-11-16 RX ADMIN — PROPOFOL 200 MG: 10 INJECTION, EMULSION INTRAVENOUS at 13:56

## 2017-11-16 RX ADMIN — GLYCOPYRROLATE 0.2 MG: 0.2 INJECTION, SOLUTION INTRAMUSCULAR; INTRAVENOUS at 13:56

## 2017-11-16 RX ADMIN — LIDOCAINE HYDROCHLORIDE 50 MG: 10 INJECTION, SOLUTION INFILTRATION; PERINEURAL at 13:56

## 2017-11-16 RX ADMIN — PROPOFOL 40 MG: 10 INJECTION, EMULSION INTRAVENOUS at 14:17

## 2017-11-16 RX ADMIN — EPHEDRINE SULFATE 20 MG: 50 INJECTION, SOLUTION INTRAMUSCULAR; INTRAVENOUS; SUBCUTANEOUS at 14:05

## 2017-11-16 RX ADMIN — FENTANYL CITRATE 100 MCG: 50 INJECTION, SOLUTION INTRAMUSCULAR; INTRAVENOUS at 13:56

## 2017-11-16 RX ADMIN — SODIUM CHLORIDE 75 ML/HR: 0.9 INJECTION, SOLUTION INTRAVENOUS at 12:41

## 2017-11-16 RX ADMIN — ONDANSETRON 4 MG: 2 INJECTION INTRAMUSCULAR; INTRAVENOUS at 14:02

## 2017-11-16 RX ADMIN — FENTANYL CITRATE 50 MCG: 50 INJECTION, SOLUTION INTRAMUSCULAR; INTRAVENOUS at 14:15

## 2017-11-16 NOTE — ANESTHESIA POSTPROCEDURE EVALUATION
Post-Op Assessment Note      CV Status:  Stable    Mental Status:  Alert and awake    Hydration Status:  Euvolemic and stable    PONV Controlled:  Controlled    Airway Patency:  Patent and adequate    Post Op Vitals Reviewed: Yes          Staff: CRNA           BP      Temp      Pulse     Resp      SpO2

## 2017-11-16 NOTE — DISCHARGE INSTRUCTIONS
INSTRUCTIONS FOLLOWING YOUR ELBOW SURGERY     First follow-up visit after surgery   Call the office the day after your surgery & make an appointment for 7 days after surgery to see Dr Esther Bradford  At that appointment, your wound will be evaluated for appropriate healing  Dressing & Wound Care   Keep the original dressing on until you are seen by Dr Esther Bradford  Keep your dressings as clean and dry as possible  Do not get your dressing wet  You may take a bath by covering your arm in a garbage bag or other waterproof bag, tie it securely with rubber bands and duct tape  Swelling and Discoloration   You will notice some swelling of your hand - this is normal  Elevate your hand above the level of your heart as much as possible for the first week  When you sleep, place your arm on several pillows  You may notice a bluish discoloration of your fingers  This is also normal  This discoloration may change from blue to purple to green to yellow, then will slowly go away over a few weeks time  Discomfort   You will experience some pain and discomfort after surgery  By the third day, this pain usually decreases significantly  You will be given a prescription for pain medication  Take the medication as prescribed  If the discomfort is mild, you can take 1 or 2 Tylenol every 4 - 6 hours as needed, INSTEAD of the prescribed pain medication  Temperature   A temperature of up to 101  0ºF is common for the first 2 days after surgery  If your temperature is elevated above 101 5º F, call the office  Exercise   You may gently open and close your fingers  Do not perform any exercises that cause you to sweat  Sweating may increase complications with your incision  We hope this answers many of your questions regarding your surgery  These are only guidelines however  If you have any other concerns or questions at any time, do not hesitate to call the office [phone]

## 2017-11-16 NOTE — OP NOTE
OPERATIVE REPORT  PATIENT NAME: Nanette Santiago    :  1955  MRN: [id number]  Pt Location: [facility] OR ROOM 02    SURGERY DATE: 2017    Surgeon(s) and Role:     * Sudarshan Lopez MD - Primary     * Luna Olmstead PA-C - Assisting necessary for the procedure for assistance with safe exposure and excision of the radial head    Preop Diagnosis:  Closed displaced fracture of head of right radius [S52 121A]    Post-Op Diagnosis Codes:     * Closed displaced fracture of head of right radius [S52 121A]    Procedure(s) (LRB):  RADIAL HEAD EXCISION, ELBOW (Right)    Specimen(s):  * No specimens in log *    Estimated Blood Loss:   Minimal    Drains:       Anesthesia Type:   General/LMA    Operative Indications:  Closed displaced fracture of head of right radius Mireya Hilton is a 52-year-old female who has been suffering with right elbow pain after she suffered a right elbow radial neck fracture  It was extra-articular but unfortunately was unstable and continued to move as time progressed  Her range of motion was impeded by the pain and due to the fracture and she had clicking occurring and significant pain and thus would like to have a right elbow radial head excision  She had no wrist pain and no obvious instability of the distal radial ulnar joint and thus a radial head replacement was not appropriate  She understood the risks and benefits of this procedure wished to go ahead  The risks are inclusive of but not limited to infection, stiffness, failure of the operation, nerve injury causing numbness pain weakness, persistent pain, worsening of symptoms, and need for further surgery  Operative Findings:  Right elbow with a radial neck fracture that showed very little signs of healing  It was actually very easy to simply place a Reese elevator at the radial neck fracture and remove the radial head piece  Thus, it did not appear to be uniting and there was malpositioning of the radial head    It was extra-articular and no obvious articular injury was found in the radial head or in the capitellum  We did have a very nice stable appearance to the elbow with full range of motion from 0-130 degrees the end of the procedure as well as full forearm pronation supination  We did not have to use significant retraction as we had very nice exposure in this patient with a rather thin arm  Complications:   None    Procedure and Technique:  Stephanie Peng is taken to the operating room and placed supine on the OR table  She was given preoperative IV antibiotics  She had safe induction of general anesthesia  The right upper extremity was prepped and draped in the usual sterile fashion  A surgical time-out was taken  We did exsanguinate and inflated the tourniquet  Total tourniquet time was less than 45 minutes  We did utilized loupe magnification for the procedure and Gallo Reed longitudinal incision that did extend from just distal to the lateral epicondyle to over the radial head and neck region  This was just through skin  A portion of the previous lateral extensor tendon repair incision was utilized for this operation  We did not have to disrupt the repair however as we did not go significantly proximal into that wound area  We were able to then utilized a deep 15 blade for a longitudinal split of the common extensor tendon  We readily came upon the radial head and did demonstrate very little healing that had occurred at the radial neck fracture  It also appeared to be laterally displaced  We thus utilized a [place] elevator and easily freed up the radial head and removed it  We found very little articular cartilage damage on the radial head and capitellum  We then irrigated thoroughly and did utilize a rongeur for some small bony pieces that were present  We took x-rays in flexion and extension and found no signs of instability  The wrist also appeared to be stable   We then irrigated further and closed with 0 Vicryl the tendon layer and then 2-0 Vicryl and 4-0 Vicryl and skin glue for the skin  She tolerated the procedure well as local anesthetic was placed into the wound and the joint  We had performed very little retraction in order to get good exposure of the radial head  Certainly we did not put retractors around the radial neck so as to decrease the risk for any injury to the posterior interosseous nerve  Dry, sterile dressings were applied and the tourniquet was let down  She tolerated the procedure well and transferred to recovery room in stable condition  She will follow up with me in 1 week for wound check and can be range of motion as tolerated     I was present for the entire procedure and A qualified resident physician was not available    Patient Disposition:  PACU     SIGNATURE: Aubrey Gonsalez MD  DATE: November 16, 2017  TIME: 2:43 PM

## 2017-11-27 ENCOUNTER — APPOINTMENT (OUTPATIENT)
Dept: RADIOLOGY | Facility: CLINIC | Age: 62
End: 2017-11-27
Payer: MEDICARE

## 2017-11-27 ENCOUNTER — ALLSCRIPTS OFFICE VISIT (OUTPATIENT)
Dept: OTHER | Facility: OTHER | Age: 62
End: 2017-11-27

## 2017-11-27 DIAGNOSIS — S52.134A CLOSED NONDISPLACED FRACTURE OF NECK OF RIGHT RADIUS: ICD-10-CM

## 2017-11-27 DIAGNOSIS — S52.121A CLOSED DISPLACED FRACTURE OF HEAD OF RIGHT RADIUS: ICD-10-CM

## 2017-11-27 PROCEDURE — 73070 X-RAY EXAM OF ELBOW: CPT

## 2017-11-28 NOTE — PROGRESS NOTES
Plan  Closed nondisplaced fracture of neck of right radius, initial encounter    · XR ELBOW 2 VIEW RIGHT; Status:Active - Retrospective By Protocol Authorization; Requested for:27Nov2017;     Chief Complaint    1  Elbow Pain    Post-Op  Post-Op Elbow [facility]:  Milady Hernandez is status post of the right elbow  Radial head excision  HPI: The patient reports no excessive pain,-- no swelling,-- no fever-- and-- no shortness of breath  PE: The surgical incision site was clean, dry and intact  The elbow demonstrates no warmth,-- no induration,-- no erythema,-- no ecchymosis,-- no swelling-- and-- no tenderness  ROM as expected given post-op status  The patient is progressing well with ROM  Strength as expected given post-op status  The patient is progressing well with strength  Peripheral neurovascular exam reveals intact sensation to light touch-- and-- intact gross motor function  Assessment: Post-op, the patient is doing well,-- has excellent pain control-- and-- is showing no signs of infection  Plan: Activity Restrictions: advance as tolerated  Done this visit: xray  PT Referral:  I hereby certify that these services are medically necessary  Follow up: 4 weeks  Active Problems    1  Abnormal glucose (790 29) (R73 09)  2  Aftercare following other surgery of musculoskeletal system (V58 78) (Z47 89)  3  Allergic rhinitis (477 9) (J30 9)  4  Anemia (285 9) (D64 9)  5  Arm pain (729 5) (M79 603)  6  Arthritis (716 90) (M19 90)  7  Blood in the stool (578 1) (K92 1)  8  Bone lesion (733 90) (M89 9)  9  Bursitis, subacromial, right (726 19) (M75 51)  10  Cardiomegaly (429 3) (I51 7)  11  Change in bowel habits (787 99) (R19 4)  12  Chronic obstructive pulmonary disease (496) (J44 9)  13  Closed nondisplaced fracture of neck of right radius, initial encounter (813 06)  (S52 134A)  14  Constipation (564 00) (K59 00)  15  Cough (786 2) (R05)  16  Depression with anxiety (300 4) (F41 8)  17   Diarrhea (787 91) (R19 7)  18  Encounter for screening colonoscopy (V76 51) (Z12 11)  19  Encounter for screening mammogram for malignant neoplasm of breast (V76 12)  (Z12 31)  20  Esophageal reflux (530 81) (K21 9)  21  Fatigue (780 79) (R53 83)  22  Fecal occult blood test positive (792 1) (R19 5)  23  Hemorrhoids (455 6) (K64 9)  24  History of colon polyps (V12 72) (Z86 010)  25  History of falling (V15 88) (Z91 81)  26  Hypercholesterolemia (272 0) (E78 00)  27  Hyperlipidemia (272 4) (E78 5)  28  Hypomagnesemia (275 2) (E83 42)  29  Hypothyroidism (244 9) (E03 9)  30  Injury Of The Ulnar Nerve (955 2)  31  Injury Of The Ulnar Nerve At Wrist And Hand Level (955 2)  32  Limb pain (729 5) (M79 609)  33  Limb swelling (729 81) (M79 89)  34  Nicotine dependence (305 1) (F17 200)  35  Pain of right hand (729 5) (M79 641)  36  Post-operative state (V45 89) (Z98 890)  37  Recent weight loss (783 21) (R63 4)  38  Right radial head fracture (813 05) (S52 121A)  39  Right shoulder pain (719 41) (M25 511)  40  Scar contracture (709 2) (L90 5)  41  Thrombocytosis (238 71) (D47 3)  42  Traumatic rupture of tendon of elbow or forearm (841 9) (S56 919A)  43  Trigger finger (727 03) (M65 30)  44  Type 2 diabetes mellitus (250 00) (E11 9)  45  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)    Social History     · Denied: History of Alcohol Use (History)   · Current Every Day Smoker (305 1)   · Has smoked one pack of cigars per day for 35 years   · Daily Cola Consumption (___ Cans/Day)   · Daily Tea Consumption (___ Cups/Day)   · No alcohol use    Current Meds  1  Benzonatate 100 MG Oral Capsule; TAKE 1 CAPSULE 3 TIMES DAILY AS NEEDED; Therapy: 92PXK1501 to (Evaluate:10Oct2017)  Requested for: 30PSZ9872; Last Rx:03Oct2017 Ordered  2  Benzonatate 100 MG Oral Capsule; TAKE 1 CAPSULE 3 TIMES DAILY AS NEEDED;  Therapy: 44KNN7092 to (Evaluate:19Www8654)  Requested for: 88RPT0742; Last Rx:24Jan2017; Status[facility] Or[facility]  3  FLUoxetine HCl TABS; Therapy: (Recorded:54Hil1222) to Recorded  4  LaMICtal 200 MG Oral Tablet (LamoTRIgine); TAKE 2 TABLETS DAILY; Therapy: (Recorded:01Rfp9190) to Recorded  5  Levothyroxine Sodium 75 MCG Oral Tablet; Take 1 tablet daily Recorded  6  Lisinopril 2 5 MG Oral Tablet; TAKE 1 TABLET DAILY; Therapy: 86Zej9539 to Recorded  7  MetFORMIN HCl - 500 MG Oral Tablet; Therapy: (Recorded:41Pwb4152) to Recorded  8  MetFORMIN HCl ER (MOD) 1000 MG Oral Tablet Extended Release 24 Hour; Therapy: (Recorded:82Noa6310) to Recorded  9  NexIUM 40 MG Oral Capsule Delayed Release (Esomeprazole Magnesium); one daily; Therapy: 02HJA1367 to  Requested for: 88GGI7961 Recorded  10  Pravachol 80 MG Oral Tablet (Pravastatin Sodium); TAKE 1 TABLET DAILY; Therapy: 10WDN5893 to Recorded  11  ProAir  (90 Base) MCG/ACT Inhalation Aerosol Solution; INHALE 2 PUFFS  EVERY 4 HOURS AS NEEDED; Therapy: 31Dja2507 to (Last Rx:17Sep2015)  Requested for: 45Wca4413 Ordered  12  SEROquel 100 MG Oral Tablet (QUEtiapine Fumarate); Therapy: (Recorded:07Jlc4854) to Recorded  13  Sinemet  MG Oral Tablet (Carbidopa-Levodopa); 5 tabs; Therapy: 90IVI3544 to Recorded  14  Suprep Bowel Prep Kit 17 5-3 13-1 6 GM/180ML Oral Solution; USE AS DIRECTED; Therapy: 85VRX0324 to (Last Rx:11Jan2017)  Requested for: 21VZQ0226; Status: ACTIVE  - Transmit to [facility] Verification Ordered  15  Vitamin D 1000 UNIT Oral Tablet; TWO  A  DAY; Therapy: 38DJO4853 to  Requested for: 66JBH5595 Recorded    Allergies  1  Bactrim TABS  2  CIPRO  3  Codeine Derivatives  4  DEPO-Medrol SUSP  5  Latex Gloves MISC  6  Lipitor TABS  7  Medrol (Umesh) TABS  8  Penicillins  9  Percocet TABS  10  TEGretol TABS  11  Vioxx TABS  12  Wellbutrin SR TB12  13  Zetia TABS  14  Zocor TABS    15  ARISTOCORT  16  Latex    Results/Data  I personally reviewed the films/images/results in the office today  My interpretation follows  [address]  [facility]  Right elbow x-ray demonstrates a radial head excision that appears very nice on both AP and lateral views  Some very small bony particulate matter noted but nothing that would appear to articulate with the capitellum  1         1 Amended By: Michelle Gore; Nov 27 2017 12:31 PM EST    Future Appointments    Date/Time Provider Specialty Site   01/04/2018 03:00 PM BIJU Soriano   Pulmonary Medicine [facility]       Signatures   Electronically signed by : Mikael Fabry, M D ; Nov 27 2017 12:31PM EST                       (Author)

## 2017-12-06 ENCOUNTER — APPOINTMENT (OUTPATIENT)
Dept: OCCUPATIONAL THERAPY | Facility: CLINIC | Age: 62
End: 2017-12-06
Payer: MEDICARE

## 2017-12-06 ENCOUNTER — GENERIC CONVERSION - ENCOUNTER (OUTPATIENT)
Dept: OTHER | Facility: OTHER | Age: 62
End: 2017-12-06

## 2017-12-06 PROCEDURE — 97165 OT EVAL LOW COMPLEX 30 MIN: CPT

## 2017-12-06 PROCEDURE — G8985 CARRY GOAL STATUS: HCPCS

## 2017-12-06 PROCEDURE — G8984 CARRY CURRENT STATUS: HCPCS

## 2017-12-11 ENCOUNTER — APPOINTMENT (OUTPATIENT)
Dept: OCCUPATIONAL THERAPY | Facility: CLINIC | Age: 62
End: 2017-12-11
Payer: MEDICARE

## 2017-12-11 PROCEDURE — 97110 THERAPEUTIC EXERCISES: CPT

## 2017-12-11 PROCEDURE — 97140 MANUAL THERAPY 1/> REGIONS: CPT

## 2017-12-13 ENCOUNTER — APPOINTMENT (OUTPATIENT)
Dept: OCCUPATIONAL THERAPY | Facility: CLINIC | Age: 62
End: 2017-12-13
Payer: MEDICARE

## 2017-12-13 PROCEDURE — 97140 MANUAL THERAPY 1/> REGIONS: CPT

## 2017-12-13 PROCEDURE — 97110 THERAPEUTIC EXERCISES: CPT

## 2017-12-20 ENCOUNTER — ALLSCRIPTS OFFICE VISIT (OUTPATIENT)
Dept: OTHER | Facility: OTHER | Age: 62
End: 2017-12-20

## 2017-12-20 ENCOUNTER — APPOINTMENT (OUTPATIENT)
Dept: RADIOLOGY | Facility: CLINIC | Age: 62
End: 2017-12-20
Payer: MEDICARE

## 2017-12-20 DIAGNOSIS — S52.134A CLOSED NONDISPLACED FRACTURE OF NECK OF RIGHT RADIUS: ICD-10-CM

## 2017-12-20 PROCEDURE — 73070 X-RAY EXAM OF ELBOW: CPT

## 2017-12-27 ENCOUNTER — APPOINTMENT (OUTPATIENT)
Dept: OCCUPATIONAL THERAPY | Facility: CLINIC | Age: 62
End: 2017-12-27
Payer: MEDICARE

## 2018-01-12 VITALS
BODY MASS INDEX: 22.74 KG/M2 | HEART RATE: 66 BPM | WEIGHT: 136.5 LBS | HEIGHT: 65 IN | SYSTOLIC BLOOD PRESSURE: 145 MMHG | DIASTOLIC BLOOD PRESSURE: 86 MMHG

## 2018-01-12 VITALS
HEART RATE: 74 BPM | DIASTOLIC BLOOD PRESSURE: 79 MMHG | BODY MASS INDEX: 23.2 KG/M2 | HEIGHT: 65 IN | WEIGHT: 139.25 LBS | SYSTOLIC BLOOD PRESSURE: 149 MMHG

## 2018-01-12 VITALS
HEIGHT: 65 IN | SYSTOLIC BLOOD PRESSURE: 110 MMHG | HEART RATE: 73 BPM | BODY MASS INDEX: 22.66 KG/M2 | WEIGHT: 136 LBS | DIASTOLIC BLOOD PRESSURE: 71 MMHG

## 2018-01-13 VITALS
HEART RATE: 66 BPM | HEIGHT: 65 IN | BODY MASS INDEX: 23.06 KG/M2 | DIASTOLIC BLOOD PRESSURE: 77 MMHG | WEIGHT: 138.38 LBS | SYSTOLIC BLOOD PRESSURE: 154 MMHG

## 2018-01-13 VITALS
BODY MASS INDEX: 22.74 KG/M2 | DIASTOLIC BLOOD PRESSURE: 66 MMHG | HEIGHT: 65 IN | WEIGHT: 136.5 LBS | HEART RATE: 76 BPM | SYSTOLIC BLOOD PRESSURE: 110 MMHG

## 2018-01-13 VITALS
WEIGHT: 143 LBS | TEMPERATURE: 98.3 F | RESPIRATION RATE: 16 BRPM | SYSTOLIC BLOOD PRESSURE: 130 MMHG | OXYGEN SATURATION: 98 % | HEIGHT: 65 IN | DIASTOLIC BLOOD PRESSURE: 80 MMHG | BODY MASS INDEX: 23.82 KG/M2 | HEART RATE: 59 BPM

## 2018-01-13 NOTE — CONSULTS
Future Appointments    Signatures   Electronically signed by : PORFIRIO Corado ; Nov 27 2017 12:30PM EST                       (Author)    Electronically signed by : PORFIRIO Corado ; Nov 27 2017 12:31PM EST                       (Author)

## 2018-01-14 VITALS
WEIGHT: 135.38 LBS | HEART RATE: 68 BPM | DIASTOLIC BLOOD PRESSURE: 70 MMHG | BODY MASS INDEX: 22.56 KG/M2 | HEIGHT: 65 IN | SYSTOLIC BLOOD PRESSURE: 116 MMHG

## 2018-01-14 VITALS
SYSTOLIC BLOOD PRESSURE: 108 MMHG | HEART RATE: 84 BPM | WEIGHT: 133 LBS | DIASTOLIC BLOOD PRESSURE: 70 MMHG | BODY MASS INDEX: 22.16 KG/M2 | HEIGHT: 65 IN

## 2018-01-14 NOTE — PROGRESS NOTES
Assessment    1  Bursitis, subacromial, right (221 60) (J66 20)    Plan   Wayne Osborne has a right shoulder with subacromial bursitis and tendinosis of the rotator cuff  She is not a candidate for surgery at this point  She had previous right shoulder decompression surgery by another surgeon in number of years ago  I did give her an injection today which she tolerated well  This is her second injection for this shoulder  I told her that I would only give her 1 more injection if this were to fail  She was offered physical therapy but would rather not  She will do home exercises  I will see her back as needed  Discussion/Summary  The patient was counseled regarding diagnostic results, instructions for management, prognosis, patient and family education, impressions, risks and benefits of treatment options, importance of compliance with treatment  Chief Complaint    1  Shoulder Pain    History of Present Illness   Wayne Osborne has a right shoulder which is still bothering her  Her pain is sharp and moderate and intermittent and worse with abduction and somewhat better with rest  She had an MRI demonstrating bursitis and tendinosis but no signs of rotator cuff tear  Her pain does radiate down the right arm  Review of Systems    Constitutional: No fever, no chills, feels well, no tiredness, no recent weight gain or loss  Eyes: No complaints of eyesight problems, no red eyes  ENT: no loss of hearing, no nosebleeds, no sore throat  Cardiovascular: No complaints of chest pain, no palpitations, no leg claudication or lower extremity edema  Respiratory: no compliants of shortness of breath, no wheezing, no cough  Gastrointestinal: no complaints of abdominal pain, no constipation, no nausea or diarrhea, no vomiting, no bloody stools  Genitourinary: no complaints of dysuria, no incontinence  Musculoskeletal: as noted in HPI     Integumentary: no complaints of skin rash or lesion, no itching or dry skin, no skin wounds  Neurological: no complaints of headache, no confusion, no numbness or tingling, no dizziness  Endocrine: No complaints of muscle weakness, no feelings of weakness, no frequent urination, no excessive thirst    Psychiatric: bipolar  Active Problems    1  Abnormal glucose (790 29) (R73 09)   2  Acute bronchitis (466 0) (J20 9)   3  Allergic rhinitis (477 9) (J30 9)   4  Anemia (285 9) (D64 9)   5  Arthritis (716 90) (M19 90)   6  Cardiomegaly (429 3) (I51 7)   7  Chest pain (786 50) (R07 9)   8  Chronic obstructive pulmonary disease (496) (J44 9)   9  Closed Colles' Fracture Of The Left Wrist (813 41)   10  Closed Fracture Of One Rib (807 01)   11  Closed Fracture Of The Distal End Of The Radius And Ulna (813 44)   12  Constipation (564 00) (K59 00)   13  Cough (786 2) (R05)   14  Depression with anxiety (300 4) (F41 8)   15  Encounter for screening mammogram for malignant neoplasm of breast (V76 12)    (Z12 31)   16  Esophageal reflux (530 81) (K21 9)   17  Fatigue (780 79) (R53 83)   18  History of falling (V15 88) (Z91 81)   19  Hypercholesterolemia (272 0) (E78 0)   20  Hyperlipidemia (272 4) (E78 5)   21  Hypomagnesemia (275 2) (E83 42)   22  Hypothyroidism (244 9) (E03 9)   23  Injury Of The Ulnar Nerve (955 2)   24  Injury Of The Ulnar Nerve At Wrist And Hand Level (955 2)   25  Limb pain (729 5) (M79 609)   26  Limb swelling (729 81) (M79 89)   27  Nicotine dependence (305 1) (F17 200)   28  Right shoulder pain (719 41) (M25 511)   29  Thrombocytosis (238 71) (D47 3)   30  Traumatic rupture of tendon of elbow or forearm (841 9) (S56 919A)   31  Trigger finger (727 03) (M65 30)   32  Type 2 diabetes mellitus (250 00) (E11 9)   33  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)    Past Medical History    The active problems and past medical history were reviewed and updated today  Surgical History    · History of Treatment Of Wrist Fracture    The surgical history was reviewed and updated today  Family History    · Family history of dementia (V17 2) (Z81 8)   · Family history of diabetes mellitus (V18 0) (Z83 3)   · Family history of Parkinson's disease (V17 2) (Z82 0)    · Family history of Arthritis (V17 7)   · Family history of Malignant Lymphoma (V16 7)   · Family history of Osteoporosis (V17 81)    The family history was reviewed and updated today  Social History    · Denied: History of Alcohol Use (History)   · Current Every Day Smoker (305 1)   · Daily Cola Consumption (___ Cans/Day)   · Daily Tea Consumption (___ Cups/Day)  The social history was reviewed and updated today  Current Meds   1  Arnuity Ellipta 200 MCG/ACT Inhalation Aerosol Powder Breath Activated; USE 1   INHALATION ONCE DAILY; Therapy: 53Hfw4753 to (Evaluate:16Nov2015); Last Rx:98Loi3860 Ordered   2  Azithromycin 250 MG Oral Tablet; TAKE 2 TABLETS ON DAY 1 THEN TAKE 1 TABLET A   DAY FOR 4 DAYS; Therapy: 20Vnf1958 to (Evaluate:02Wtr7617)  Requested for: 84Xgq9178; Last   Rx:64Zkd6774 Ordered   3  Benzonatate 100 MG Oral Capsule; TAKE 1 CAPSULE 3 TIMES DAILY AS NEEDED; Therapy: 59Kzj9567 to (Evaluate:17Oct2015)  Requested for: 32Xrv7758; Last   Rx:29Gzf2487 Ordered   4  Doxycycline Hyclate 100 MG Oral Capsule; TAKE 1 CAPSULE EVERY 12 HOURS DAILY    Requested for: 78TRO1973; Last Rx:50Hlo5252 Ordered   5  FLUoxetine HCl TABS; Therapy: (Recorded:97Yba3738) to Recorded   6  Ipratropium-Albuterol 0 5-2 5 (3) MG/3ML Inhalation Solution; USE 1 UNIT DOSE IN   NEBULIZER 4 TIMES DAILY; Therapy: 26Ild9608 to (Evaluate:15Mar2016)  Requested for: 33Xwb1475; Last   Rx:54Zbj5621 Ordered   7  LaMICtal 200 MG Oral Tablet (LamoTRIgine); TAKE 2 TABLETS DAILY; Therapy: (Recorded:70Hxs4482) to Recorded   8  Levothyroxine Sodium 75 MCG Oral Tablet; Take 1 tablet daily Recorded   9  Lisinopril 2 5 MG Oral Tablet; TAKE 1 TABLET DAILY; Therapy: 33Ruo4687 to Recorded   10  MetFORMIN HCl - 500 MG Oral Tablet;     Therapy: (Recorded:06Onn3965) to Recorded   11  MetFORMIN HCl 1000 MG (MOD) TB24;    Therapy: (Recorded:26Dno3760) to Recorded   12  NexIUM 40 MG Oral Capsule Delayed Release (Esomeprazole Magnesium); one daily; Therapy: 17IRM3084 to  Requested for: 94NTH1253 Recorded   13  Nitrofurantoin Macrocrystal 100 MG Oral Capsule; 1 Every Morning; Therapy: 71LET3464 to  Requested for: 57KFL9012 Recorded   14  Pravachol 40 MG Oral Tablet (Pravastatin Sodium); Therapy: (Recorded:63Zku4322) to Recorded   15  PredniSONE 10 MG Oral Tablet; 3 tab daily x 3 days then 2 tabs daily x 2 days and then    1 tab daiy x 2 days; Therapy: 34Nry2077 to (Evaluate:75Wec6700)  Requested for: 91Iup6234; Last    Rx:42Pnp4125 Ordered   16  PredniSONE 10 MG Oral Tablet; 4 tabs daily x 4 days then 3 tabs daily x 4 days then 2    tabs daily x 4 days then 1 tab daily x 4 days then 1/2 tab daily x 4 days; Therapy: 13XZM8049 to (Evaluate:69Kpl8542)  Requested for: 01OJB4604; Last    Rx:88Odc5759 Ordered   17  ProAir  (90 Base) MCG/ACT Inhalation Aerosol Solution; INHALE 2 PUFFS    EVERY 4 HOURS AS NEEDED; Therapy: 41Bak9165 to (Last Rx:78Tlq7754)  Requested for: 52Mzm6592 Ordered   18  SEROquel 100 MG Oral Tablet (QUEtiapine Fumarate); Therapy: (Recorded:58Nmv9884) to Recorded   19  Sinemet  MG Oral Tablet (Carbidopa-Levodopa); 5 tabs; Therapy: 99GIR5091 to Recorded   20  Vitamin D 1000 UNIT Oral Tablet; TWO  A  DAY; Therapy: 58QMW9405 to  Requested for: 34WDH1408 Recorded    The medication list was reviewed and updated today  Allergies    1  Bactrim TABS   2  CIPRO   3  Codeine Derivatives   4  Depo-Medrol SUSP   5  Latex Gloves MISC   6  Lipitor TABS   7  Medrol (Umesh) TABS   8  Penicillins   9  Percocet TABS   10  TEGretol TABS   11  Vioxx TABS   12  Wellbutrin SR TB12   13  Zetia TABS   14  Zocor TABS    15  ARISTOCORT   16  Latex    Physical Exam    Right Shoulder: Appearance: Normal  Tenderness: None   Motor: 4/5 abduction, but 5/5 forward flexion, 5/5 internal rotation and 5/5 external rotation  Forward flexion: painful normal AROM  Abduction: painful normal AROM  Internal rotation: painful normal AROM  Special Tests: positive Painful Arc, positive Neer test and positive Empty Can test, but negative Laird test, negative Drop Arm test and negative Belly Press test    Constitutional - General appearance: Normal    Musculoskeletal - Gait and station: Normal  Digits and nails: Normal  Muscle strength/tone: Normal  Upper extremity compartments: Normal    Cardiovascular - Pulses: Normal  Examination of extremities for edema and/or varicosities: Normal    Lymphatic - Palpation of lymph nodes in other areas: Normal  no right epitrochlear node enlargement  Skin - Skin and subcutaneous tissue: Normal    Neurologic - Sensation: Normal  Upper extremity peripheral neuro exam: Normal    Psychiatric - Orientation to person, place, and time: Normal  Mood and affect: Normal    Eyes   Conjunctiva and lids: Normal     Pupils and irises: Normal        Results/Data  I personally reviewed the films/images/results in the office today  My interpretation follows  MRI Review right shoulder with subacromial bursitis and tendinosis of supraspinatus without tear  Procedure    Procedure: Injection of the right subacromial bursa  Indication:  inflammation  Risk, benefits and alternatives were discussed with the patient  Verbal consent was obtained prior to the procedure  Betadine was used to prep the area  ethyl chloride spray was used as a topical anesthetic  Using sterile technique, the aspiration/injection needle was then directed from a posterior aspect  A 22-gauge was used to inject 4cc of 1% Lidocaine and 1mL of 4 mg/mL dexamethasone  A bandage was applied  the patient tolerated the procedure well  Complications: none        Future Appointments    Date/Time Provider Specialty Site   03/17/2016 03:30 PM BIJU Marshall  Pulmonary Medicine [facility] [address]   03/15/2016 01:00 PM PORFIRIO Koehler   Neurology [facility]UR[facility]   Electronically signed by : PORFIRIO Sidhu ; Jan 29 2016  1:35PM EST                       (Author)

## 2018-01-15 ENCOUNTER — APPOINTMENT (OUTPATIENT)
Dept: LAB | Facility: HOSPITAL | Age: 63
End: 2018-01-15
Payer: MEDICARE

## 2018-01-15 ENCOUNTER — TRANSCRIBE ORDERS (OUTPATIENT)
Dept: ADMINISTRATIVE | Facility: HOSPITAL | Age: 63
End: 2018-01-15

## 2018-01-15 ENCOUNTER — APPOINTMENT (OUTPATIENT)
Dept: LAB | Facility: HOSPITAL | Age: 63
End: 2018-01-15
Attending: INTERNAL MEDICINE
Payer: MEDICARE

## 2018-01-15 DIAGNOSIS — D51.9 ANEMIA DUE TO VITAMIN B12 DEFICIENCY, UNSPECIFIED B12 DEFICIENCY TYPE: ICD-10-CM

## 2018-01-15 DIAGNOSIS — D50.9 NORMOCYTIC HYPOCHROMIC ANEMIA: ICD-10-CM

## 2018-01-15 DIAGNOSIS — F31.32 MODERATE DEPRESSED BIPOLAR I DISORDER (HCC): ICD-10-CM

## 2018-01-15 DIAGNOSIS — I10 ESSENTIAL HYPERTENSION, MALIGNANT: ICD-10-CM

## 2018-01-15 DIAGNOSIS — N39.0 URINARY TRACT INFECTION WITHOUT HEMATURIA, SITE UNSPECIFIED: ICD-10-CM

## 2018-01-15 DIAGNOSIS — F31.32 MODERATE DEPRESSED BIPOLAR I DISORDER (HCC): Primary | ICD-10-CM

## 2018-01-15 DIAGNOSIS — E78.00 PURE HYPERCHOLESTEROLEMIA: ICD-10-CM

## 2018-01-15 DIAGNOSIS — E13.8 DIABETES MELLITUS OF OTHER TYPE WITH COMPLICATION, UNSPECIFIED LONG TERM INSULIN USE STATUS: ICD-10-CM

## 2018-01-15 DIAGNOSIS — Z79.899 DRUG THERAPY: ICD-10-CM

## 2018-01-15 DIAGNOSIS — D64.9 ANEMIA, UNSPECIFIED TYPE: Primary | ICD-10-CM

## 2018-01-15 DIAGNOSIS — D64.9 ANEMIA, UNSPECIFIED TYPE: ICD-10-CM

## 2018-01-15 DIAGNOSIS — E55.9 VITAMIN D DEFICIENCY DISEASE: ICD-10-CM

## 2018-01-15 LAB
25(OH)D3 SERPL-MCNC: 44.3 NG/ML (ref 30–100)
ALBUMIN SERPL BCP-MCNC: 3.2 G/DL (ref 3.5–5)
ALP SERPL-CCNC: 135 U/L (ref 46–116)
ALT SERPL W P-5'-P-CCNC: 31 U/L (ref 12–78)
ANION GAP SERPL CALCULATED.3IONS-SCNC: 8 MMOL/L (ref 4–13)
AST SERPL W P-5'-P-CCNC: 72 U/L (ref 5–45)
BACTERIA UR QL AUTO: ABNORMAL /HPF
BASOPHILS # BLD AUTO: 0 THOUSANDS/ΜL (ref 0–0.1)
BASOPHILS NFR BLD AUTO: 0 % (ref 0–1)
BILIRUB SERPL-MCNC: 0.2 MG/DL (ref 0.2–1)
BILIRUB UR QL STRIP: NEGATIVE
BUN SERPL-MCNC: 10 MG/DL (ref 5–25)
CALCIUM SERPL-MCNC: 9.5 MG/DL (ref 8.3–10.1)
CHLORIDE SERPL-SCNC: 104 MMOL/L (ref 100–108)
CHOLEST SERPL-MCNC: 198 MG/DL (ref 50–200)
CLARITY UR: CLEAR
CO2 SERPL-SCNC: 30 MMOL/L (ref 21–32)
COLOR UR: YELLOW
CREAT SERPL-MCNC: 0.62 MG/DL (ref 0.6–1.3)
EOSINOPHIL # BLD AUTO: 0.2 THOUSAND/ΜL (ref 0–0.61)
EOSINOPHIL NFR BLD AUTO: 2 % (ref 0–6)
ERYTHROCYTE [DISTWIDTH] IN BLOOD BY AUTOMATED COUNT: 16.2 % (ref 11.6–15.1)
EST. AVERAGE GLUCOSE BLD GHB EST-MCNC: 126 MG/DL
FERRITIN SERPL-MCNC: 22 NG/ML (ref 8–388)
GFR SERPL CREATININE-BSD FRML MDRD: 98 ML/MIN/1.73SQ M
GLUCOSE P FAST SERPL-MCNC: 102 MG/DL (ref 65–99)
GLUCOSE UR STRIP-MCNC: NEGATIVE MG/DL
HBA1C MFR BLD: 6 % (ref 4.2–6.3)
HCT VFR BLD AUTO: 35.9 % (ref 37–47)
HDLC SERPL-MCNC: 38 MG/DL (ref 40–60)
HGB BLD-MCNC: 11.4 G/DL (ref 12–16)
HGB UR QL STRIP.AUTO: NEGATIVE
IRON SATN MFR SERPL: 6 %
IRON SERPL-MCNC: 28 UG/DL (ref 50–170)
KETONES UR STRIP-MCNC: NEGATIVE MG/DL
LDLC SERPL CALC-MCNC: 135 MG/DL (ref 0–100)
LEUKOCYTE ESTERASE UR QL STRIP: NEGATIVE
LYMPHOCYTES # BLD AUTO: 2.2 THOUSANDS/ΜL (ref 0.6–4.47)
LYMPHOCYTES NFR BLD AUTO: 29 % (ref 14–44)
MCH RBC QN AUTO: 23.4 PG (ref 27–31)
MCHC RBC AUTO-ENTMCNC: 31.9 G/DL (ref 31.4–37.4)
MCV RBC AUTO: 73 FL (ref 82–98)
MONOCYTES # BLD AUTO: 0.4 THOUSAND/ΜL (ref 0.17–1.22)
MONOCYTES NFR BLD AUTO: 6 % (ref 4–12)
MUCOUS THREADS UR QL AUTO: ABNORMAL
NEUTROPHILS # BLD AUTO: 4.7 THOUSANDS/ΜL (ref 1.85–7.62)
NEUTS SEG NFR BLD AUTO: 63 % (ref 43–75)
NITRITE UR QL STRIP: NEGATIVE
NON-SQ EPI CELLS URNS QL MICRO: ABNORMAL /HPF
NRBC BLD AUTO-RTO: 0 /100 WBCS
PH UR STRIP.AUTO: 7 [PH] (ref 5–9)
PLATELET # BLD AUTO: 295 THOUSANDS/UL (ref 130–400)
PMV BLD AUTO: 8.8 FL (ref 8.9–12.7)
POTASSIUM SERPL-SCNC: 4.1 MMOL/L (ref 3.5–5.3)
PROT SERPL-MCNC: 7.1 G/DL (ref 6.4–8.2)
PROT UR STRIP-MCNC: ABNORMAL MG/DL
RBC # BLD AUTO: 4.89 MILLION/UL (ref 4.2–5.4)
RBC #/AREA URNS AUTO: ABNORMAL /HPF
SODIUM SERPL-SCNC: 142 MMOL/L (ref 136–145)
SP GR UR STRIP.AUTO: 1.02 (ref 1–1.03)
TIBC SERPL-MCNC: 505 UG/DL (ref 250–450)
TRIGL SERPL-MCNC: 124 MG/DL
TSH SERPL DL<=0.05 MIU/L-ACNC: 4.46 UIU/ML (ref 0.36–3.74)
UROBILINOGEN UR QL STRIP.AUTO: 0.2 E.U./DL
VIT B12 SERPL-MCNC: 512 PG/ML (ref 100–900)
WBC # BLD AUTO: 7.5 THOUSAND/UL (ref 4.8–10.8)
WBC #/AREA URNS AUTO: ABNORMAL /HPF

## 2018-01-15 PROCEDURE — 36415 COLL VENOUS BLD VENIPUNCTURE: CPT

## 2018-01-15 PROCEDURE — 82306 VITAMIN D 25 HYDROXY: CPT

## 2018-01-15 PROCEDURE — 81001 URINALYSIS AUTO W/SCOPE: CPT | Performed by: INTERNAL MEDICINE

## 2018-01-15 PROCEDURE — 84443 ASSAY THYROID STIM HORMONE: CPT

## 2018-01-15 PROCEDURE — 80061 LIPID PANEL: CPT

## 2018-01-15 PROCEDURE — 83540 ASSAY OF IRON: CPT

## 2018-01-15 PROCEDURE — 80053 COMPREHEN METABOLIC PANEL: CPT

## 2018-01-15 PROCEDURE — 82728 ASSAY OF FERRITIN: CPT

## 2018-01-15 PROCEDURE — 83036 HEMOGLOBIN GLYCOSYLATED A1C: CPT

## 2018-01-15 PROCEDURE — 83550 IRON BINDING TEST: CPT

## 2018-01-15 PROCEDURE — 82607 VITAMIN B-12: CPT

## 2018-01-15 PROCEDURE — 85025 COMPLETE CBC W/AUTO DIFF WBC: CPT

## 2018-01-15 PROCEDURE — 87086 URINE CULTURE/COLONY COUNT: CPT

## 2018-01-16 LAB — BACTERIA UR CULT: NORMAL

## 2018-01-18 NOTE — RESULT NOTES
Message      Colon polyps removed came back as precancerous tubular adenoma  Next colonoscopy in 2 years  Patient to call our office for any GI symptoms  Left message to call back  CS    Pt called back, discussed results  Reminder set  CS             Verified Results  (1) TISSUE EXAM [id number] 10:36AM Dallin Andrews     Test Name Result Flag Reference   LAB AP CASE REPORT (Report)     Surgical Pathology Report             Case: [id number]                   Authorizing Provider: Fortunato Lucas MD     Collected:      02/08/2017 1036        Ordering Location:   [facility]  Received:      02/08/2017 [address]                                     Pathologist:      Thalia Marcano MD                                Specimens:  A) - Stomach, Gastric body cold bx                                   B) - Large Intestine, Cecum, Cecal polyp hot snare, ileocecal valve                  C) - Large Intestine, Splenic Flexure, Splenic flexure hot snare polyp                 D) - Large Intestine, Sigmoid Colon, Cold snare polyp                         E) - Rectum, Rectal cold bx polyps x2   LAB AP FINAL DIAGNOSIS (Report)     A  Stomach, Gastric body cold Biopsy[de-identified]  Benign gastric- body type mucosa with no histopathological changes   -No evidence of Paneth cell metaplasia or atrophic gastritis   -No evidence of dysplasia or malignancy  B  Large Intestine, Cecum, Cecal polyp hot snare, ileocecal valve:  -Fragments of tubular adenoma   -No evidence of high grade dysplasia or malignancy    C  Large Intestine, Splenic Flexure, Splenic flexure hot snare polyp:  -Tubular adenoma  -No evidence of high grade dysplasia or malignancy seen  D  Large Intestine, Sigmoid Colon, Cold snare polyp:  -Tubular adenoma  -No evidence of high grade dysplasia or malignancy seen  E  Rectum, Rectal cold bx polyps x2:  -Hyperplastic polyps  -No evidence of adenomatous change or malignancy      Electronically signed by Benito Ennis MD on 2/10/2017 at 11:33 AM   LAB AP SURGICAL ADDITIONAL INFORMATION (Report)     These tests were developed and their performance characteristics   determined by [facility] or [facility]  They may not be cleared or approved by the [place]  Food and   Drug Administration  The FDA has determined that such clearance or   approval is not necessary  These tests are used for clinical purposes  They should not be regarded as investigational or for research  This   laboratory has been approved by [facility], designated as a high-complexity   laboratory and is qualified to perform these tests  LAB AP GROSS DESCRIPTION (Report)     A  The specimen is received in formalin, labeled with the patient's name   and hospital number, and is designated gastric body biopsy  The   specimen consists of 2 tan soft tissue fragments measuring 0 3 and 0 6 cm  Entirely submitted  One cassette  Note: The estimated total formalin fixation time based upon information   provided by the submitting clinician and the standard processing schedule   is 18 0 hours  B  The specimen is received in formalin, labeled with the patient's name   and hospital number, and is designated cecal polyp  The specimen   consists of multiple tan soft tissue fragments measuring in aggregate 0 5   x 0 5 x 0 1 cm  Entirely submitted  One cassette  C  The specimen is received in formalin, labeled with the patient's name   and hospital number, and is designated splenic flexure polyp  The   specimen consists of a single tan-pink soft tissue fragment measuring 0 5   cm  Entirely submitted  One cassette  Note: The estimated total formalin fixation time based upon information   provided by the submitting clinician and the standard processing schedule   is 17 75 hours  D  The specimen is received in formalin, labeled with the patient's name   and hospital number, and is designated sigmoid colon polyp  The   specimen consists of a single tan soft tissue fragment measuring 0 5 cm  Entirely submitted  One cassette  E  The specimen is received in formalin, labeled with the patient's name   and hospital number, and is designated rectal biopsy polyps ? ?2  The   specimen consists of 2 tan soft tissue fragments measuring 0 4 and 0 5 cm  Entirely submitted  One cassette  Note: The estimated total formalin fixation time based upon information   provided by the submitting clinician and the standard processing schedule   is 17 5 hours      MAC

## 2018-01-22 VITALS
HEIGHT: 65 IN | WEIGHT: 135 LBS | HEART RATE: 68 BPM | BODY MASS INDEX: 22.49 KG/M2 | SYSTOLIC BLOOD PRESSURE: 112 MMHG | DIASTOLIC BLOOD PRESSURE: 80 MMHG

## 2018-01-22 VITALS — BODY MASS INDEX: 22.91 KG/M2 | HEIGHT: 65 IN | WEIGHT: 137.5 LBS

## 2018-01-23 NOTE — CONSULTS
Future Appointments    Signatures   Electronically signed by : PORFIRIO Priest ; Dec 26 2017  7:08AM EST                       (Author)

## 2018-03-18 ENCOUNTER — APPOINTMENT (EMERGENCY)
Dept: RADIOLOGY | Facility: HOSPITAL | Age: 63
End: 2018-03-18
Payer: MEDICARE

## 2018-03-18 ENCOUNTER — HOSPITAL ENCOUNTER (EMERGENCY)
Facility: HOSPITAL | Age: 63
Discharge: HOME | End: 2018-03-18
Attending: EMERGENCY MEDICINE | Admitting: EMERGENCY MEDICINE
Payer: MEDICARE

## 2018-03-18 VITALS
WEIGHT: 136 LBS | TEMPERATURE: 99.3 F | HEART RATE: 78 BPM | SYSTOLIC BLOOD PRESSURE: 102 MMHG | OXYGEN SATURATION: 95 % | RESPIRATION RATE: 20 BRPM | DIASTOLIC BLOOD PRESSURE: 58 MMHG | HEIGHT: 65 IN | BODY MASS INDEX: 22.66 KG/M2

## 2018-03-18 DIAGNOSIS — J40 BRONCHITIS: Primary | ICD-10-CM

## 2018-03-18 PROCEDURE — 94640 AIRWAY INHALATION TREATMENT: CPT

## 2018-03-18 PROCEDURE — 99284 EMERGENCY DEPT VISIT MOD MDM: CPT

## 2018-03-18 PROCEDURE — 71045 X-RAY EXAM CHEST 1 VIEW: CPT

## 2018-03-18 RX ORDER — IPRATROPIUM BROMIDE AND ALBUTEROL SULFATE 2.5; .5 MG/3ML; MG/3ML
3 SOLUTION RESPIRATORY (INHALATION) ONCE
Status: COMPLETED | OUTPATIENT
Start: 2018-03-18 | End: 2018-03-18

## 2018-03-18 RX ORDER — ALBUTEROL SULFATE 90 UG/1
2 AEROSOL, METERED RESPIRATORY (INHALATION) EVERY 4 HOURS PRN
Qty: 1 INHALER | Refills: 0 | Status: SHIPPED | OUTPATIENT
Start: 2018-03-18 | End: 2018-07-26

## 2018-03-18 RX ORDER — PREDNISONE 20 MG/1
40 TABLET ORAL DAILY
Qty: 10 TABLET | Refills: 0 | Status: SHIPPED | OUTPATIENT
Start: 2018-03-18 | End: 2018-03-23

## 2018-03-18 RX ORDER — AZITHROMYCIN 250 MG/1
250 TABLET, FILM COATED ORAL DAILY
Qty: 6 TABLET | Refills: 0 | Status: SHIPPED | OUTPATIENT
Start: 2018-03-18 | End: 2018-03-23

## 2018-03-18 RX ADMIN — IPRATROPIUM BROMIDE AND ALBUTEROL SULFATE 3 ML: .5; 3 SOLUTION RESPIRATORY (INHALATION) at 04:07

## 2018-03-18 NOTE — DISCHARGE INSTRUCTIONS
Acute Bronchitis   WHAT YOU NEED TO KNOW:   Acute bronchitis is swelling and irritation in the air passages of your lungs  This irritation may cause you to cough or have other breathing problems  Acute bronchitis often starts because of another illness, such as a cold or the flu  The illness spreads from your nose and throat to your windpipe and airways  Bronchitis is often called a chest cold  Acute bronchitis lasts about 3 to 6 weeks and is usually not a serious illness  Your cough can last for several weeks  DISCHARGE INSTRUCTIONS:   Return to the emergency department if:   · You cough up blood  · Your lips or fingernails turn blue  · You feel like you are not getting enough air when you breathe  Contact your healthcare provider if:   · You have a fever  · Your breathing problems do not go away or get worse  · Your cough does not get better within 4 weeks  · You have questions or concerns about your condition or care  Self-care:   · Get more rest   Rest helps your body to heal  Slowly start to do more each day  Rest when you feel it is needed  · Avoid irritants in the air  Avoid chemicals, fumes, and dust  Wear a face mask if you must work around dust or fumes  Stay inside on days when air pollution levels are high  If you have allergies, stay inside when pollen counts are high  Do not use aerosol products, such as spray-on deodorant, bug spray, and hair spray  · Do not smoke or be around others who smoke  Nicotine and other chemicals in cigarettes and cigars damages the cilia that move mucus out of your lungs  Ask your healthcare provider for information if you currently smoke and need help to quit  E-cigarettes or smokeless tobacco still contain nicotine  Talk to your healthcare provider before you use these products  · Drink liquids as directed  Liquids help keep your air passages moist and help you cough up mucus   You may need to drink more liquids when you have acute bronchitis  Ask how much liquid to drink each day and which liquids are best for you  · Use a humidifier or vaporizer  Use a cool mist humidifier or a vaporizer to increase air moisture in your home  This may make it easier for you to breathe and help decrease your cough  Decrease risk for acute bronchitis:   · Get the vaccinations you need  Ask your healthcare provider if you should get vaccinated against the flu or pneumonia  · Prevent the spread of germs  You can decrease your risk of acute bronchitis and other illnesses by doing the following:     [facility] AUTHORITY your hands often with soap and water  Carry germ-killing hand lotion or gel with you  You can use the lotion or gel to clean your hands when soap and water are not available  ¨ Do not touch your eyes, nose, or mouth unless you have washed your hands first     ¨ Always cover your mouth when you cough to prevent the spread of germs  It is best to cough into a tissue or your shirt sleeve instead of into your hand  Ask those around you cover their mouths when they cough  ¨ Try to avoid people who have a cold or the flu  If you are sick, stay away from others as much as possible  Medicines: Your healthcare provider may  give you any of the following:  · Ibuprofen or acetaminophen  are medicines that help lower your fever  They are available without a doctor's order  Ask your healthcare provider which medicine is right for you  Ask how much to take and how often to take it  Follow directions  These medicines can cause stomach bleeding if not taken correctly  Ibuprofen can cause kidney damage  Do not take ibuprofen if you have kidney disease, an ulcer, or allergies to aspirin  Acetaminophen can cause liver damage  Do not take more than 4,000 milligrams in 24 hours  · Decongestants  help loosen mucus in your lungs and make it easier to cough up  This can help you breathe easier  · Cough suppressants  decrease your urge to cough   If your cough produces mucus, do not take a cough suppressant unless your healthcare provider tells you to  Your healthcare provider may suggest that you take a cough suppressant at night so you can rest     · Inhalers  may be given  Your healthcare provider may give you one or more inhalers to help you breathe easier and cough less  An inhaler gives your medicine to open your airways  Ask your healthcare provider to show you how to use your inhaler correctly  · Take your medicine as directed  Contact your healthcare provider if you think your medicine is not helping or if you have side effects  Tell him of her if you are allergic to any medicine  Keep a list of the medicines, vitamins, and herbs you take  Include the amounts, and when and why you take them  Bring the list or the pill bottles to follow-up visits  Carry your medicine list with you in case of an emergency  Follow up with your healthcare provider as directed:  Write down questions you have so you will remember to ask them during your follow-up visits  © 2017 [employer] Information is for End User's use only and may not be sold, redistributed or otherwise used for commercial purposes  All illustrations and images included in CareNotes® are the copyrighted property of [employer]  or [employer]ron Astudillo  The above information is an  only  It is not intended as medical advice for individual conditions or treatments  Talk to your doctor, nurse or pharmacist before following any medical regimen to see if it is safe and effective for you

## 2018-03-19 NOTE — ED PROVIDER NOTES
History  Chief Complaint   Patient presents with    Cough     Pt c/o SOB, cough for weeks and fever with SOB increasing over past few days   Shortness of Breath     Patient presents for evaluation of cough and shortness of breath  Patient states for 1-2 weeks, but started with fever yesterday and today 101 at home  Took Ibuprofen prior to arrival          History provided by:  Patient   used: No    Cough   Associated symptoms: fever and shortness of breath    Associated symptoms: no chest pain    Shortness of Breath   Associated symptoms: cough and fever    Associated symptoms: no chest pain        Prior to Admission Medications   Prescriptions Last Dose Informant Patient Reported? Taking? Cholecalciferol (VITAMIN D) 2000 units CAPS   Yes No   Sig: Take 1,000 Units by mouth every morning   FLUoxetine (PROzac) 40 MG capsule   Yes No   Sig: Take 80 mg by mouth every morning Takes two tabs = 80mg each a m dose     QUEtiapine (SEROquel) 50 mg tablet  Self Yes No   Sig: Take 50 mg by mouth daily at bedtime   carbidopa-levodopa (SINEMET)  mg per tablet   Yes No   Sig: Take 1 tablet by mouth 2 (two) times a day   lamoTRIgine (LAMICTAL) 200 MG tablet   Yes No   Sig: LaMICtal 200 MG Oral Tablet  TAKE 2 TABLETS DAILY  Refills: 0    Active   levothyroxine 75 mcg tablet   Yes No   Sig: Levothyroxine Sodium 75 MCG Oral Tablet  Take 1 tablet daily   Quantity: 0;  Refills: 0       Brandy Red ; Active   lisinopril (ZESTRIL) 2 5 mg tablet   Yes No   Sig: Lisinopril 2 5 MG Oral Tablet  TAKE 1 TABLET DAILY     Refills: 0     Started 20-Feb-2014  Active   metFORMIN (GLUCOPHAGE) 1000 MG tablet  Self Yes No   Sig: MetFORMIN HCl 1000 MG (MOD) TB24   Refills: 0    Active   takes 500mg a m  and 1000 mg with dinner   metFORMIN (GLUCOPHAGE) 500 mg tablet   Yes No   Sig: MetFORMIN HCl - 500 MG Oral Tablet   Refills: 0    Active   omeprazole (PriLOSEC) 40 MG capsule   Yes No   Sig: Take 40 mg by mouth every morning   pravastatin (PRAVACHOL) 40 mg tablet   Yes No   Si mg every evening        Facility-Administered Medications: None       Past Medical History:   Diagnosis Date    Arthritis     osteo    Bipolar disorder ([place] [address] )     Chronic kidney disease     COPD (chronic obstructive pulmonary disease) ([place] [address] )     Depression     Diabetes mellitus ([place] [address] )     Disease of thyroid gland     DVT (deep vein thrombosis) in pregnancy ([place] [address] )     right leg    Elbow disorder     fx right elbow    Enlarged heart     GERD (gastroesophageal reflux disease)     Hyperlipidemia     Parkinson disease ([place] [address] )     Parkinsons disease ([place] [address] )     Pneumonia     x3    Psychiatric disorder     Wears partial dentures     upper       Past Surgical History:   Procedure Laterality Date    ABDOMINAL SURGERY      adhesions x3 surgeries    APPENDECTOMY      BACK SURGERY      fusion L5-S1 with hardware    BLADDER SUSPENSION      BREAST SURGERY Bilateral     reduction    CHOLECYSTECTOMY      lap    COLONOSCOPY N/A 2017    Procedure: COLONOSCOPY;  Surgeon: Margaret Vega MD;  Location: [place] [address] GI LAB; Service:     ELBOW ARTHROSCOPY Right     tendon repair    ESOPHAGOGASTRODUODENOSCOPY N/A 2017    Procedure: ESOPHAGOGASTRODUODENOSCOPY (EGD); Surgeon: Margaret Vega MD;  Location: [facility]; Service:     FASCIECTOMY Right 2017    Procedure:  MIDDLE FINGER SCAR REVISION AND Z-PLASTY;  Surgeon: Sangeetha Chong MD;  Location: [facility] MAIN OR;  Service:     FOOT SURGERY Right     metatarsal repair    FOOT SURGERY Left     metatarsal repair, bunionectomy    FRACTURE SURGERY Left     post trauma, fusion, hardware    HAND SURGERY Right     ganglion    HAND SURGERY Right     trigger finger    HAND SURGERY Right     tendon release/dupuytrens    KNEE ARTHROSCOPY W/ DEBRIDEMENT Left     meniscus repair    NASAL SEPTUM SURGERY      NASAL SEPTUM SURGERY      [place] REMOVAL OF HEAD OF RADIUS Right 2017 Procedure: RADIAL HEAD EXCISION, ELBOW;  Surgeon: Paras Ross MD;  Location: [facility] MAIN OR;  Service: Orthopedics    SHOULDER ARTHROSCOPY Right     SPINAL FUSION         Family History   Problem Relation Age of Onset    Cancer Mother      non hodgkins lymphoma    Heart disease Father     Cancer Brother      colon    Cancer Maternal Grandmother      breast    Cancer Maternal Aunt      breast     I have reviewed and agree with the history as documented  Social History   Substance Use Topics    Smoking status: Current Every Day Smoker     Packs/day: 1 00     Years: 39 00    Smokeless tobacco: Never Used    Alcohol use No        Review of Systems   Constitutional: Positive for fever  Respiratory: Positive for cough and shortness of breath  Cardiovascular: Negative for chest pain  All other systems reviewed and are negative  Physical Exam  ED Triage Vitals [03/18/18 0352]   Temperature Pulse Respirations Blood Pressure SpO2   97 6 °F (36 4 °C) 77 20 135/63 95 %      Temp Source Heart Rate Source Patient Position - Orthostatic VS BP Location FiO2 (%)   Tympanic Monitor Sitting Right arm --      Pain Score       No Pain           Orthostatic Vital Signs  Vitals:    03/18/18 0352 03/18/18 0459 03/18/18 0513   BP: 135/63  102/58   Pulse: 77 74 78   Patient Position - Orthostatic VS: Sitting  Sitting       Physical Exam   Constitutional: She is oriented to person, place, and time  No distress  HENT:   Mouth/Throat: Oropharynx is clear and moist    Eyes: Pupils are equal, round, and reactive to light  Neck: Normal range of motion  Cardiovascular: Normal rate, regular rhythm and intact distal pulses  Pulmonary/Chest: No respiratory distress  She has wheezes  Abdominal: Soft  There is no tenderness  Musculoskeletal: Normal range of motion  Neurological: She is alert and oriented to person, place, and time  Skin: Capillary refill takes less than 2 seconds  She is not diaphoretic  Nursing note and vitals reviewed  ED Medications  Medications   ipratropium-albuterol (DUO-NEB) 0 5-2 5 mg/3 mL inhalation solution 3 mL (3 mL Nebulization Given 3/18/18 0407)   ipratropium-albuterol (DUO-NEB) 0 5-2 5 mg/3 mL inhalation solution 3 mL (3 mL Nebulization Given 3/18/18 0407)       Diagnostic Studies  Results Reviewed     None                 XR chest 1 view portable   Final Result by Kiko Gonzalez MD (03/18 1016)      No acute cardiopulmonary disease, stable  Workstation performed: [id number]                    Procedures  Procedures       Phone Contacts  ED Phone Contact    ED Course  ED Course                                MDM  Number of Diagnoses or Management Options  Bronchitis:   Diagnosis management comments: Pulse ox 95% on RA indicating adequate oxygenation  CXR: NAD as read by me    Patient felt better after breathing treatment  Amount and/or Complexity of Data Reviewed  Tests in the radiology section of CPT®: ordered and reviewed  Decide to obtain previous medical records or to obtain history from someone other than the patient: yes  Review and summarize past medical records: yes  Independent visualization of images, tracings, or specimens: yes    Patient Progress  Patient progress: improved    CritCare Time    Disposition  Final diagnoses:   Bronchitis     Time reflects when diagnosis was documented in both MDM as applicable and the Disposition within this note     Time User Action Codes Description Comment    3/18/2018  5:10 AM Parvez, [address] [place] Bronchitis       ED Disposition     ED Disposition Condition Comment    Discharge  [place] discharge to home/self care      Condition at discharge: stable        Follow-up Information     Follow up With Specialties Details Why Contact Info    Francisco Weir MD Internal Medicine In 2 days  [address] [facility]  [phone]          Discharge Medication List as of 3/18/2018  5:11 AM      START taking these medications    Details   albuterol (PROVENTIL HFA,VENTOLIN HFA) 90 mcg/act inhaler Inhale 2 puffs every 4 (four) hours as needed for wheezing or shortness of breath, Starting Sun 3/18/2018, Print      azithromycin (ZITHROMAX) 250 mg tablet Take 1 tablet (250 mg total) by mouth daily for 5 days Take first 2 tablets together, then 1 every day until finished , Starting Sun 3/18/2018, Until Fri 3/23/2018, Print         CONTINUE these medications which have NOT CHANGED    Details   carbidopa-levodopa (SINEMET)  mg per tablet Take 1 tablet by mouth 2 (two) times a day, Historical Med      Cholecalciferol (VITAMIN D) 2000 units CAPS Take 1,000 Units by mouth every morning, Historical Med      FLUoxetine (PROzac) 40 MG capsule Take 80 mg by mouth every morning Takes two tabs = 80mg each a m dose  , Historical Med      lamoTRIgine (LAMICTAL) 200 MG tablet LaMICtal 200 MG Oral Tablet  TAKE 2 TABLETS DAILY  Refills: 0    Active, Historical Med      levothyroxine 75 mcg tablet Levothyroxine Sodium 75 MCG Oral Tablet  Take 1 tablet daily   Quantity: 0;  Refills: 0       Ace García ; Active, Historical Med      lisinopril (ZESTRIL) 2 5 mg tablet Lisinopril 2 5 MG Oral Tablet  TAKE 1 TABLET DAILY     Refills: 0     Started 20-Feb-2014  Active, Historical Med      !! metFORMIN (GLUCOPHAGE) 1000 MG tablet MetFORMIN HCl 1000 MG (MOD) TB24   Refills: 0    Active   takes 500mg a m  and 1000 mg with dinner, Historical Med      !! metFORMIN (GLUCOPHAGE) 500 mg tablet MetFORMIN HCl - 500 MG Oral Tablet   Refills: 0    Active, Historical Med      omeprazole (PriLOSEC) 40 MG capsule Take 40 mg by mouth every morning, Until Discontinued, Historical Med      pravastatin (PRAVACHOL) 40 mg tablet 80 mg every evening  , Until Discontinued, Historical Med      QUEtiapine (SEROquel) 50 mg tablet Take 50 mg by mouth daily at bedtime, Until Discontinued, Historical Med       !! - Potential duplicate medications found  Please discuss with provider  No discharge procedures on file      ED Provider  Electronically Signed by           Miguel Gallagher DO  03/19/18 8861

## 2018-03-22 ENCOUNTER — APPOINTMENT (OUTPATIENT)
Dept: LAB | Facility: HOSPITAL | Age: 63
End: 2018-03-22
Attending: INTERNAL MEDICINE
Payer: MEDICARE

## 2018-03-22 ENCOUNTER — TRANSCRIBE ORDERS (OUTPATIENT)
Dept: ADMINISTRATIVE | Facility: HOSPITAL | Age: 63
End: 2018-03-22

## 2018-03-22 DIAGNOSIS — D50.8 IRON DEFICIENCY ANEMIA SECONDARY TO INADEQUATE DIETARY IRON INTAKE: ICD-10-CM

## 2018-03-22 DIAGNOSIS — D51.8 OTHER VITAMIN B12 DEFICIENCY ANEMIA: ICD-10-CM

## 2018-03-22 DIAGNOSIS — E03.9 MYXEDEMA HEART DISEASE: ICD-10-CM

## 2018-03-22 DIAGNOSIS — I51.9 MYXEDEMA HEART DISEASE: ICD-10-CM

## 2018-03-22 DIAGNOSIS — D50.9 IRON DEFICIENCY ANEMIA, UNSPECIFIED IRON DEFICIENCY ANEMIA TYPE: ICD-10-CM

## 2018-03-22 DIAGNOSIS — D50.9 IRON DEFICIENCY ANEMIA, UNSPECIFIED IRON DEFICIENCY ANEMIA TYPE: Primary | ICD-10-CM

## 2018-03-22 LAB
BASOPHILS # BLD AUTO: 0 THOUSANDS/ΜL (ref 0–0.1)
BASOPHILS NFR BLD AUTO: 0 % (ref 0–1)
EOSINOPHIL # BLD AUTO: 0 THOUSAND/ΜL (ref 0–0.61)
EOSINOPHIL NFR BLD AUTO: 0 % (ref 0–6)
ERYTHROCYTE [DISTWIDTH] IN BLOOD BY AUTOMATED COUNT: 19.9 % (ref 11.6–15.1)
HCT VFR BLD AUTO: 39.8 % (ref 37–47)
HGB BLD-MCNC: 12.5 G/DL (ref 12–16)
IRON SERPL-MCNC: 32 UG/DL (ref 50–170)
LYMPHOCYTES # BLD AUTO: 2.4 THOUSANDS/ΜL (ref 0.6–4.47)
LYMPHOCYTES NFR BLD AUTO: 20 % (ref 14–44)
MCH RBC QN AUTO: 24.1 PG (ref 27–31)
MCHC RBC AUTO-ENTMCNC: 31.4 G/DL (ref 31.4–37.4)
MCV RBC AUTO: 77 FL (ref 82–98)
MONOCYTES # BLD AUTO: 0.6 THOUSAND/ΜL (ref 0.17–1.22)
MONOCYTES NFR BLD AUTO: 5 % (ref 4–12)
NEUTROPHILS # BLD AUTO: 8.7 THOUSANDS/ΜL (ref 1.85–7.62)
NEUTS SEG NFR BLD AUTO: 74 % (ref 43–75)
NRBC BLD AUTO-RTO: 0 /100 WBCS
PLATELET # BLD AUTO: 430 THOUSANDS/UL (ref 130–400)
PMV BLD AUTO: 8.3 FL (ref 8.9–12.7)
RBC # BLD AUTO: 5.19 MILLION/UL (ref 4.2–5.4)
TIBC SERPL-MCNC: 382 UG/DL (ref 250–450)
TSH SERPL DL<=0.05 MIU/L-ACNC: 0.98 UIU/ML (ref 0.36–3.74)
WBC # BLD AUTO: 11.7 THOUSAND/UL (ref 4.8–10.8)

## 2018-03-22 PROCEDURE — 83540 ASSAY OF IRON: CPT

## 2018-03-22 PROCEDURE — 36415 COLL VENOUS BLD VENIPUNCTURE: CPT | Performed by: INTERNAL MEDICINE

## 2018-03-22 PROCEDURE — 83550 IRON BINDING TEST: CPT

## 2018-03-22 PROCEDURE — 84443 ASSAY THYROID STIM HORMONE: CPT

## 2018-03-22 PROCEDURE — 85025 COMPLETE CBC W/AUTO DIFF WBC: CPT | Performed by: INTERNAL MEDICINE

## 2018-05-23 ENCOUNTER — TRANSCRIBE ORDERS (OUTPATIENT)
Dept: ADMINISTRATIVE | Facility: HOSPITAL | Age: 63
End: 2018-05-23

## 2018-05-23 DIAGNOSIS — Z12.31 ENCOUNTER FOR MAMMOGRAM TO ESTABLISH BASELINE MAMMOGRAM: Primary | ICD-10-CM

## 2018-05-29 ENCOUNTER — HOSPITAL ENCOUNTER (OUTPATIENT)
Dept: RADIOLOGY | Facility: HOSPITAL | Age: 63
Discharge: HOME | End: 2018-05-29
Attending: INTERNAL MEDICINE
Payer: MEDICARE

## 2018-05-29 DIAGNOSIS — Z12.31 ENCOUNTER FOR MAMMOGRAM TO ESTABLISH BASELINE MAMMOGRAM: ICD-10-CM

## 2018-05-29 PROCEDURE — 77067 SCR MAMMO BI INCL CAD: CPT

## 2018-05-31 ENCOUNTER — HOSPITAL ENCOUNTER (OUTPATIENT)
Dept: RADIOLOGY | Facility: HOSPITAL | Age: 63
Discharge: HOME | End: 2018-05-31
Attending: INTERNAL MEDICINE
Payer: MEDICARE

## 2018-05-31 ENCOUNTER — TRANSCRIBE ORDERS (OUTPATIENT)
Dept: ADMINISTRATIVE | Facility: HOSPITAL | Age: 63
End: 2018-05-31

## 2018-05-31 DIAGNOSIS — IMO0002 LUMP: Primary | ICD-10-CM

## 2018-05-31 DIAGNOSIS — IMO0002 LUMP: ICD-10-CM

## 2018-05-31 PROCEDURE — 70260 X-RAY EXAM OF SKULL: CPT

## 2018-06-13 ENCOUNTER — OFFICE VISIT (OUTPATIENT)
Dept: NEUROLOGY | Facility: CLINIC | Age: 63
End: 2018-06-13
Payer: MEDICARE

## 2018-06-13 VITALS
SYSTOLIC BLOOD PRESSURE: 128 MMHG | HEIGHT: 65 IN | DIASTOLIC BLOOD PRESSURE: 70 MMHG | WEIGHT: 137 LBS | BODY MASS INDEX: 22.82 KG/M2 | HEART RATE: 64 BPM

## 2018-06-13 DIAGNOSIS — G25.0 TREMOR, ESSENTIAL: Primary | ICD-10-CM

## 2018-06-13 PROBLEM — R25.1 TREMOR: Status: ACTIVE | Noted: 2018-06-13

## 2018-06-13 PROBLEM — R25.1 TREMOR: Status: RESOLVED | Noted: 2018-06-13 | Resolved: 2018-06-13

## 2018-06-13 PROCEDURE — 99214 OFFICE O/P EST MOD 30 MIN: CPT | Performed by: PSYCHIATRY & NEUROLOGY

## 2018-06-13 RX ORDER — PRIMIDONE 50 MG/1
TABLET ORAL
Qty: 60 TABLET | Refills: 2 | Status: SHIPPED | OUTPATIENT
Start: 2018-06-13 | End: 2018-06-13 | Stop reason: SDUPTHER

## 2018-06-13 RX ORDER — PRIMIDONE 50 MG/1
TABLET ORAL
Qty: 60 TABLET | Refills: 2 | Status: SHIPPED | OUTPATIENT
Start: 2018-06-13 | End: 2018-09-26 | Stop reason: SDUPTHER

## 2018-06-13 NOTE — PROGRESS NOTES
Patient ID: Bahman Acharya is a 65 y o  female  Assessment/Plan:    Essential tremor    Plan I have started patient on primidone from 25 mg a week and increase by 25 mg weekly patient reach 100 mg at bedtime having see the patient in 3 months  Subjective:    32-year-old female was seen by me in the past and treated for Parkinson disease essential tremor and restless leg syndrome however patient  Taking the restless leg medication as it is subsided and she was not even taking any Parkinson medicine which she was doing great  Patient was last seen in the office was March 8, 2017  Patient reported that her tremor has started for past 1 year both sometime at rest and sometimes when she is using the hands  Patient cannot determine whether tremors are more upon using or more at rest   She does lose her balance especially she claims that when she bent forward she will fall forward  She does not have heaviness or stiffness in the legs upon walking and she also denies any slowness in daily activity  Patient denies any loss of sense of smell, constipation and smaller handwriting  Patient denies any creepy crawling sensation in lower extremity, denies restlessness and urge to move her legs constantly  Denies any numbness tingling or itchiness or burning pain in lower extremities  Pt denies double vision, blurred vision, transient monocular blindness, vertigo, tinnitus, hearing loss, slurred speech, difficulty expressing and understanding, dysphasia, and focal weakness, numbness, imbalance and incoordination  Pt denies bladder and bowel urgency, frequency and incontinence  Pt denies double vision, blurred vision, transient monocular blindness, vertigo, tinnitus, hearing loss, slurred speech, difficulty expressing and understanding, dysphasia, and focal weakness, numbness, imbalance and incoordination  Pt denies bladder and bowel urgency, frequency and incontinence         Past Medical History:   Diagnosis Date    Arthritis     osteo    Bipolar disorder ([place] [address] )     Chronic kidney disease     COPD (chronic obstructive pulmonary disease) ([place] [address] )     Depression     Diabetes mellitus ([place] [address] )     Disease of thyroid gland     DVT (deep vein thrombosis) in pregnancy ([place] [address] ) 1992    right leg    Elbow disorder     fx right elbow    Enlarged heart     Essential tremor     GERD (gastroesophageal reflux disease)     Hyperlipidemia     Parkinson disease ([place] [address] )     Parkinsons disease ([place] [address] )     Pneumonia     x3    Psychiatric disorder     RLS (restless legs syndrome)     Wears partial dentures     upper       Family History   Problem Relation Age of Onset    Cancer Mother      non hodgkins lymphoma    Heart disease Father     Cancer Brother      colon    Cancer Maternal Grandmother      breast    Cancer Maternal Aunt      breast   ,    Social History     Social History    Marital status: Single     Spouse name: N/A    Number of children: N/A    Years of education: N/A     Occupational History    Not on file  Social History Main Topics    Smoking status: Current Every Day Smoker     Packs/day: 1 00     Years: 39 00    Smokeless tobacco: Never Used    Alcohol use No    Drug use: No    Sexual activity: Not on file     Other Topics Concern    Not on file     Social History Narrative    No narrative on file       Current Outpatient Prescriptions on File Prior to Visit   Medication Sig Dispense Refill    albuterol (PROVENTIL HFA,VENTOLIN HFA) 90 mcg/act inhaler Inhale 2 puffs every 4 (four) hours as needed for wheezing or shortness of breath 1 Inhaler 0    Cholecalciferol (VITAMIN D) 2000 units CAPS Take 1,000 Units by mouth every morning      FLUoxetine (PROzac) 40 MG capsule Take 80 mg by mouth every morning Takes two tabs = 80mg each a m dose        lamoTRIgine (LAMICTAL) 200 MG tablet LaMICtal 200 MG Oral Tablet  TAKE 2 TABLETS DAILY     Refills: 0    Active      levothyroxine 88 mcg tablet Levothyroxine Sodium 75 MCG Oral Tablet  Take 1 tablet daily   Quantity: 0;  Refills: 0       Adam Gallegos ; Active      lisinopril (ZESTRIL) 2 5 mg tablet Lisinopril 2 5 MG Oral Tablet  TAKE 1 TABLET DAILY  Refills: 0     Started 20-Feb-2014  Active      metFORMIN (GLUCOPHAGE) 1000 MG tablet MetFORMIN HCl 1000 MG (MOD) TB24   Refills: 0    Active   takes 500mg a m  and 1000 mg with dinner      metFORMIN (GLUCOPHAGE) 500 mg tablet MetFORMIN HCl - 500 MG Oral Tablet   Refills: 0    Active      omeprazole (PriLOSEC) 40 MG capsule Take 40 mg by mouth every morning      pravastatin (PRAVACHOL) 80 mg tablet 80 mg every evening        QUEtiapine (SEROquel) 50 mg tablet Take 50 mg by mouth daily at bedtime      carbidopa-levodopa (SINEMET)  mg per tablet Take 1 tablet by mouth 2 (two) times a day       No current facility-administered medications on file prior to visit  Objective:    Blood pressure 128/70, pulse 64, height 5' 5" (1 651 m), weight 62 1 kg (137 lb), not currently breastfeeding  Physical Exam   Eyes: EOM are normal  Pupils are equal, round, and reactive to light  Neck: Normal carotid pulses present  Carotid bruit is not present  Neurological: She has normal reflexes  Gait normal    Psychiatric: Her speech is normal        Neurological Exam    Mental Status  The patient is and oriented to person, place, time, and situation  Her recent and remote memory are normal  She has no visuospatial neglect  Her speech is normal  Her language is fluent with no aphasia  She follows multi-step commands  She has a normal fund of knowledge      Cranial Nerves    CN II: The patient's visual acuity and visual fields are normal   CN III, IV, VI: The patient's pupils are equally round and reactive to light and ocular movements are normal   CN V: The patient has normal facial sensation  CN VII:  The patient has symmetric facial movement  CN VIII:  The patient's hearing is normal   CN IX, X: The patient has symmetric palate movement and normal gag reflex  CN XI: The patient's shoulder shrug strength is normal   CN XII: The patient's tongue is midline without atrophy or fasciculations  Motor  The patient has normal muscle bulk throughout  Her overall muscle tone is normal throughout  Her strength is 5/5 in all four extremities except as noted  Rapid movement and rapid alternating movement is normal in both upper extremities  Patient does have a mild amplitude tremor on the outstretched did come and also holding the canister see as a mild amplitude tremor bilaterally  Patient does have a terminal action tremor on finger-to-nose on the right and minimum on the left  Sensory  The patient's sensation is normal in all four extremities to light touch, pinprick and proprioception  She has normal cortical sensation    Reflexes  Deep tendon reflexes are 2+ and symmetric in all four extremities with downgoing toes bilaterally  Gait and Coordination  The patient has normal gait and station and normal casual, toe, heel, and tandem gait  She has normal right heel to shin and normal left heel to shin coordination  She has normal right finger to nose and normal left finger to nose coordination            ROS:    Review of Systems

## 2018-06-25 ENCOUNTER — HOSPITAL ENCOUNTER (EMERGENCY)
Facility: HOSPITAL | Age: 63
Discharge: HOME | End: 2018-06-26
Attending: EMERGENCY MEDICINE | Admitting: EMERGENCY MEDICINE
Payer: MEDICARE

## 2018-06-25 VITALS
OXYGEN SATURATION: 99 % | DIASTOLIC BLOOD PRESSURE: 81 MMHG | SYSTOLIC BLOOD PRESSURE: 176 MMHG | HEART RATE: 67 BPM | RESPIRATION RATE: 18 BRPM

## 2018-06-25 DIAGNOSIS — S92.344A NONDISPLACED FRACTURE OF FOURTH METATARSAL BONE, RIGHT FOOT, INITIAL ENCOUNTER FOR CLOSED FRACTURE: Primary | ICD-10-CM

## 2018-06-26 ENCOUNTER — APPOINTMENT (EMERGENCY)
Dept: RADIOLOGY | Facility: HOSPITAL | Age: 63
End: 2018-06-26
Payer: MEDICARE

## 2018-06-26 PROCEDURE — 99283 EMERGENCY DEPT VISIT LOW MDM: CPT

## 2018-06-26 PROCEDURE — 73630 X-RAY EXAM OF FOOT: CPT

## 2018-06-26 NOTE — DISCHARGE INSTRUCTIONS
Foot Fracture in Adults   WHAT YOU NEED TO KNOW:   A foot fracture is a break in one or more of the bones in your foot  Foot fractures are commonly caused by trauma, falls, or repeated stress injuries  DISCHARGE INSTRUCTIONS:   Medicines:   · Antibiotics: This medicine is given to help treat or prevent an infection caused by bacteria  · NSAIDs:  These medicines decrease swelling and pain  NSAIDs are available without a doctor's order  Ask which medicine is right for you  Ask how much to take and when to take it  Take as directed  NSAIDs can cause stomach bleeding and kidney problems if not taken correctly  · Pain medicine: You may be given a prescription medicine to decrease pain  Do not wait until the pain is severe before you take this medicine  · Take your medicine as directed  Contact your healthcare provider if you think your medicine is not helping or if you have side effects  Tell him of her if you are allergic to any medicine  Keep a list of the medicines, vitamins, and herbs you take  Include the amounts, and when and why you take them  Bring the list or the pill bottles to follow-up visits  Carry your medicine list with you in case of an emergency  Follow up with your healthcare provider or bone specialist as directed: You may need to return to have your splint or stitches removed  You may also need to return for tests to make sure your foot is healing  Write down your questions so you remember to ask them during your visits  Wound care:  Carefully wash the wound with soap and water  Dry the area and put on new, clean bandages as directed  Change your bandages when they get wet or dirty  Self-care:   · Rest:  You may need to rest your foot and avoid activities that cause pain  For stress fractures, you will need to avoid the activity that caused the fracture until it heals  Ask when you can return to your normal activities such as work and sports      · Ice:  Ice helps decrease swelling and pain  Ice may also help prevent tissue damage  Use an ice pack or put crushed ice in a plastic bag  Cover it with a towel, and place it on your foot for 15 to 20 minutes every hour as directed  · Elevate your foot:  Raise your foot at or above the level of your heart as often as you can  This will help decrease swelling and pain  Prop your foot on pillows or blankets to keep it elevated comfortably  · Physical therapy: Once your foot has healed, a physical therapist can teach you exercises to help improve movement and strength, and to decrease pain  Splint care:   · Check the skin around your splint daily for any redness or open areas  · Do not use a sharp or pointed object to scratch your skin under the splint  · Do not remove your splint unless your healthcare provider or orthopedic surgeon says it is okay  Bathing with a splint:  Do not let your splint get wet  Before bathing, cover the splint with a plastic bag  Tape the bag to your skin above the splint to seal out the water  Keep your foot out of the water in case the bag leaks  Ask when it is okay to take a bath or shower  Assistive devices: You may be given a hard-soled shoe to wear while your foot is healing  You also may need to use crutches to help you walk while your foot heals  It is important to use your crutches correctly  Ask for more information about how to use crutches  Contact your healthcare provider or bone specialist if:   · You have a fever  · You have new sores around your boot or splint  · You have new or worsening trouble moving your foot  · You notice a foul smell coming from under your splint  · Your boot or splint gets damaged  · You have questions or concerns about your condition or care  Return to the emergency department if:   · The pain in your injured foot gets worse even after you rest and take pain medicine      · The skin or toes of your foot become numb, swollen, cold, white, or blue     · You have more pain or swelling than you did before the splint was put on  · Your wound is draining fluid or pus  · Blood soaks through your bandage  · Your leg feels warm, tender, and painful  It may look swollen and red  · You suddenly feel lightheaded and short of breath  · You have chest pain when you take a deep breath or cough  You may cough up blood  © 2017 [employer]  Information is for End User's use only and may not be sold, redistributed or otherwise used for commercial purposes  All illustrations and images included in CareNotes® are the copyrighted property of A [employer]  or [employer] Astud[employer]  The above information is an  only  It is not intended as medical advice for individual conditions or treatments  Talk to your doctor, nurse or pharmacist before following any medical regimen to see if it is safe and effective for you

## 2018-06-26 NOTE — ED PROVIDER NOTES
History  Chief Complaint   Patient presents with    Foot Injury     Pt states she kicked door stop and injured R foot  Pt in ER with c/o pain in right foot that began after she kicked a door stop 1wk ago  Pt with persistent pain and swelling since  Pt with a hx of osteopenia  Prior to Admission Medications   Prescriptions Last Dose Informant Patient Reported? Taking? Cholecalciferol (VITAMIN D) 2000 units CAPS   Yes No   Sig: Take 1,000 Units by mouth every morning   FLUoxetine (PROzac) 40 MG capsule   Yes No   Sig: Take 80 mg by mouth every morning Takes two tabs = 80mg each a m dose     QUEtiapine (SEROquel) 50 mg tablet  Self Yes No   Sig: Take 50 mg by mouth daily at bedtime   albuterol (PROVENTIL HFA,VENTOLIN HFA) 90 mcg/act inhaler   No No   Sig: Inhale 2 puffs every 4 (four) hours as needed for wheezing or shortness of breath   carbidopa-levodopa (SINEMET)  mg per tablet   Yes No   Sig: Take 1 tablet by mouth 2 (two) times a day   lamoTRIgine (LAMICTAL) 200 MG tablet   Yes No   Sig: LaMICtal 200 MG Oral Tablet  TAKE 2 TABLETS DAILY  Refills: 0    Active   levothyroxine 88 mcg tablet  Self Yes No   Sig: Levothyroxine Sodium 75 MCG Oral Tablet  Take 1 tablet daily   Quantity: 0;  Refills: 0       Leanord Bon ; Active   lisinopril (ZESTRIL) 2 5 mg tablet   Yes No   Sig: Lisinopril 2 5 MG Oral Tablet  TAKE 1 TABLET DAILY     Refills: 0     Started 2014  Active   metFORMIN (GLUCOPHAGE) 1000 MG tablet  Self Yes No   Sig: MetFORMIN HCl 1000 MG (MOD) TB24   Refills: 0    Active   takes 500mg a m  and 1000 mg with dinner   metFORMIN (GLUCOPHAGE) 500 mg tablet   Yes No   Sig: MetFORMIN HCl - 500 MG Oral Tablet   Refills: 0    Active   omeprazole (PriLOSEC) 40 MG capsule   Yes No   Sig: Take 40 mg by mouth every morning   pravastatin (PRAVACHOL) 80 mg tablet  Self Yes No   Si mg every evening     primidone (MYSOLINE) 50 mg tablet   No No   Sig: Half a tablet at bedtime for a week, 1 tablet at bedtime for a week, 1 and half tablet at bedtime for a week, and then 2 tablets at bedtime to continue      Facility-Administered Medications: None       Past Medical History:   Diagnosis Date    Arthritis     osteo    Bipolar disorder ([place] [address] )     Chronic kidney disease     COPD (chronic obstructive pulmonary disease) ([place] [address] )     Depression     Diabetes mellitus ([place] [address] )     Disease of thyroid gland     DVT (deep vein thrombosis) in pregnancy ([place] [address] ) 1992    right leg    Elbow disorder     fx right elbow    Enlarged heart     Essential tremor     GERD (gastroesophageal reflux disease)     Hyperlipidemia     Parkinson disease ([place] [address] )     Parkinsons disease ([place] [address] )     Pneumonia     x3    Psychiatric disorder     RLS (restless legs syndrome)     Wears partial dentures     upper       Past Surgical History:   Procedure Laterality Date    ABDOMINAL SURGERY      adhesions x3 surgeries    APPENDECTOMY      BACK SURGERY      fusion L5-S1 with hardware    BLADDER SUSPENSION      BREAST SURGERY Bilateral     reduction    CHOLECYSTECTOMY      lap    COLONOSCOPY N/A 2/8/2017    Procedure: COLONOSCOPY;  Surgeon: Christopher Dior MD;  Location: [place] [address] GI LAB; Service:     ELBOW ARTHROSCOPY Right     tendon repair    ESOPHAGOGASTRODUODENOSCOPY N/A 2/8/2017    Procedure: ESOPHAGOGASTRODUODENOSCOPY (EGD); Surgeon: Christopher Dior MD;  Location: [facility] GI LAB; Service:     FASCIECTOMY Right 2/13/2017    Procedure:  MIDDLE FINGER SCAR REVISION AND Z-PLASTY;  Surgeon: Samm Calvillo MD;  Location: [facility] MAIN OR;  Service:     FOOT SURGERY Right 1980    metatarsal repair    FOOT SURGERY Left 1985    metatarsal repair, bunionectomy    FRACTURE SURGERY Left     post trauma, fusion, hardware    HAND SURGERY Right     ganglion    HAND SURGERY Right     trigger finger    HAND SURGERY Right     tendon release/dupuytrens    KNEE ARTHROSCOPY W/ DEBRIDEMENT Left     meniscus repair    NASAL SEPTUM SURGERY      NASAL SEPTUM SURGERY      DC REMOVAL OF HEAD OF RADIUS Right 11/16/2017    Procedure: RADIAL HEAD EXCISION, ELBOW;  Surgeon: Juanpablo Haines MD;  Location: [facility] MAIN OR;  Service: Orthopedics    SHOULDER ARTHROSCOPY Right     SPINAL FUSION         Family History   Problem Relation Age of Onset    Cancer Mother         non hodgkins lymphoma    Heart disease Father     Cancer Brother         colon    Cancer Maternal Grandmother         breast    Cancer Maternal Aunt         breast     I have reviewed and agree with the history as documented  Social History   Substance Use Topics    Smoking status: Current Every Day Smoker     Packs/day: 1 00     Years: 39 00    Smokeless tobacco: Never Used    Alcohol use No        Review of Systems   Constitutional: Negative for chills and fever  Musculoskeletal: Positive for gait problem and joint swelling  All other systems reviewed and are negative  Physical Exam  Physical Exam   Constitutional: She appears well-developed and well-nourished  HENT:   Head: Normocephalic and atraumatic  Musculoskeletal: She exhibits tenderness  She exhibits no deformity  Right shoulder: She exhibits tenderness, bony tenderness and swelling  Right ankle: Normal         Right foot: There is decreased range of motion  Left foot: Normal         Feet:    Nursing note and vitals reviewed        Vital Signs  ED Triage Vitals [06/25/18 2351]   Temp Pulse Respirations Blood Pressure SpO2   -- 67 18 (!) 176/81 99 %      Temp src Heart Rate Source Patient Position - Orthostatic VS BP Location FiO2 (%)   -- Monitor Lying Right arm --      Pain Score       4           Vitals:    06/25/18 2351   BP: (!) 176/81   Pulse: 67   Patient Position - Orthostatic VS: Lying       Visual Acuity      ED Medications  Medications - No data to display    Diagnostic Studies  Results Reviewed     None                 XR foot 3+ views RIGHT   ED Interpretation by Enrique Alonso DO (06/26 0128)   4th MT fracture   ? fx of distal 2nd and 4th phalanges                 Procedures  Procedures       Phone Contacts  ED Phone Contact    ED Course                               MDM  Number of Diagnoses or Management Options  Nondisplaced fracture of fourth metatarsal bone, right foot, initial encounter for closed fracture:   Diagnosis management comments: Pt placed in surgical shoe and crutches with pods f/u       Amount and/or Complexity of Data Reviewed  Tests in the radiology section of CPT®: ordered and reviewed      CritCare Time    Disposition  Final diagnoses:   Nondisplaced fracture of fourth metatarsal bone, right foot, initial encounter for closed fracture     Time reflects when diagnosis was documented in both MDM as applicable and the Disposition within this note     Time User Action Codes Description Comment    6/26/2018  1:25 AM Howard Pac Add [Y50 999A] Nondisplaced fracture of fourth metatarsal bone, right foot, initial encounter for closed fracture       ED Disposition     ED Disposition Condition Comment    Discharge  Skip Prophet discharge to home/self care  Condition at discharge: Stable        Follow-up Information     Follow up With Specialties Details Why [address], [facility] Schedule an appointment as soon as possible for a visit in 1 day for follow up [facility] [place] [address] [phone]            Patient's Medications   Discharge Prescriptions    No medications on file     No discharge procedures on file      ED Provider  Electronically Signed by           Enrique Alonso DO  06/26/18 8416

## 2018-07-12 ENCOUNTER — OFFICE VISIT (OUTPATIENT)
Dept: PODIATRY | Facility: CLINIC | Age: 63
End: 2018-07-12
Payer: MEDICARE

## 2018-07-12 VITALS
DIASTOLIC BLOOD PRESSURE: 72 MMHG | HEART RATE: 64 BPM | WEIGHT: 137 LBS | SYSTOLIC BLOOD PRESSURE: 138 MMHG | HEIGHT: 65 IN | RESPIRATION RATE: 16 BRPM | BODY MASS INDEX: 22.82 KG/M2

## 2018-07-12 DIAGNOSIS — M79.672 PAIN IN BOTH FEET: ICD-10-CM

## 2018-07-12 DIAGNOSIS — M21.961 ACQUIRED DEFORMITY OF RIGHT FOOT: ICD-10-CM

## 2018-07-12 DIAGNOSIS — S92.344A CLOSED NONDISPLACED FRACTURE OF FOURTH METATARSAL BONE OF RIGHT FOOT, INITIAL ENCOUNTER: Primary | ICD-10-CM

## 2018-07-12 DIAGNOSIS — M25.572 ARTHRALGIA OF LEFT FOOT: ICD-10-CM

## 2018-07-12 DIAGNOSIS — M79.671 PAIN IN BOTH FEET: ICD-10-CM

## 2018-07-12 DIAGNOSIS — M21.962 ACQUIRED DEFORMITY OF LEFT FOOT: ICD-10-CM

## 2018-07-12 PROCEDURE — 99203 OFFICE O/P NEW LOW 30 MIN: CPT | Performed by: PODIATRIST

## 2018-07-12 PROCEDURE — 29540 STRAPPING ANKLE &/FOOT: CPT | Performed by: PODIATRIST

## 2018-07-12 PROCEDURE — 73630 X-RAY EXAM OF FOOT: CPT | Performed by: PODIATRIST

## 2018-07-12 NOTE — PROGRESS NOTES
Assessment/Plan:  Metatarsal fracture right foot  Hyperkeratosis  Toe deformity left foot  Plan  X-rays taken  Patient's right arch and foot were bound a low dye arch strapping fashion  Plantar lesion debrided  X-rays had been taken and reviewed with patient  Patient may need surgery for 2nd left toe  She return for follow-up  No problem-specific Assessment & Plan notes found for this encounter  There are no diagnoses linked to this encounter  Subjective:  Patient is seen on referral from the emergency room  Patient has been diagnosed with metatarsal fracture  She kicked an item at home  This cause fracture forefoot right foot  Patient was seen in the emergency room  No treatment rendered  Patient has pain  Patient is also concerned with 2nd right toe  She noticed the toe is deviated over time  It gets caught on her sock  Past Medical History:   Diagnosis Date    Arthritis     osteo    [facility]     Chronic kidney disease     COPD [facility]ch[facility] obstructive [facility]     Depression     Diabetes mellitus [facility]     Disease of thyroid gland     DVT (deep vein thrombosis) in [facility] ) 1992    right leg    Elbow disorder     fx right elbow    Enlarged heart     Essential tremor     GERD (gastroesophageal reflux disease)     Hyperlipidemia     Parkinson disease [facility]     Parkinsons disease ([facility]     Pneumonia     x3    Psychiatric disorder     RLS (restless legs syndrome)     Wears partial dentures     upper       Past Surgical History:   Procedure Laterality Date    ABDOMINAL SURGERY      adhesions x3 surgeries    APPENDECTOMY      BACK SURGERY      fusion L5-S1 with hardware    BLADDER SUSPENSION      BREAST SURGERY Bilateral     reduction    CHOLECYSTECTOMY      lap    COLONOSCOPY N/A 2/8/2017    Procedure: COLONOSCOPY;  Surgeon: Consuelo Garnica MD;  Location: [facility] GI LAB;   Service:     ELBOW ARTHROSCOPY Right     tendon repair   Ursula Rivera ESOPHAGOGASTRODUODENOSCOPY N/A 2/8/2017    Procedure: ESOPHAGOGASTRODUODENOSCOPY (EGD); Surgeon: Orlin Anderson MD;  Location: [facility] GI LAB; Service:     FASCIECTOMY Right 2/13/2017    Procedure: MIDDLE FINGER SCAR REVISION AND Z-PLASTY;  Surgeon: Rohan Masters MD;  Location: [facility] MAIN OR;  Service:     FOOT SURGERY Right 1980    metatarsal repair    FOOT SURGERY Left 1985    metatarsal repair, bunionectomy    FRACTURE SURGERY Left     post trauma, fusion, hardware    HAND SURGERY Right     ganglion    HAND SURGERY Right     trigger finger    HAND SURGERY Right     tendon release/dupuytrens    KNEE ARTHROSCOPY W/ DEBRIDEMENT Left     meniscus repair    NASAL SEPTUM SURGERY      NASAL SEPTUM SURGERY      AK REMOVAL OF HEAD OF RADIUS Right 11/16/2017    Procedure: RADIAL HEAD EXCISION, ELBOW;  Surgeon: Ileana Olsen MD;  [facility] MAIN OR;  Service: Orthopedics    SHOULDER ARTHROSCOPY Right     SPINAL FUSION         Allergies   Allergen Reactions    Aristocort [Triamcinolone] Hives    Bactrim [Sulfamethoxazole-Trimethoprim] Itching    Bupropion Itching    Ciprofloxacin Hives    Codeine GI Intolerance    Latex      Sensitive / rash    Methylprednisolone Hives    Penicillins Hives    Percocet [Oxycodone-Acetaminophen] GI Intolerance    Vioxx [Rofecoxib] Itching    Carbamazepine Rash    Ezetimibe Rash    Lipitor [Atorvastatin] Rash    Zocor [Simvastatin] Rash         Current Outpatient Prescriptions:     carbidopa-levodopa (SINEMET)  mg per tablet, Take 1 tablet by mouth 2 (two) times a day, Disp: , Rfl:     Cholecalciferol (VITAMIN D) 2000 units CAPS, Take 1,000 Units by mouth every morning, Disp: , Rfl:     FLUoxetine (PROzac) 40 MG capsule, Take 80 mg by mouth every morning Takes two tabs = 80mg each a m dose  , Disp: , Rfl:     lamoTRIgine (LAMICTAL) 200 MG tablet, LaMICtal 200 MG Oral Tablet TAKE 2 TABLETS DAILY    Refills: 0  Active, Disp: , Rfl:    levothyroxine 88 mcg tablet, Levothyroxine Sodium 75 MCG Oral Tablet Take 1 tablet daily  Quantity: 0;  Refills: 0    Emily Mckinley ; Active, Disp: , Rfl:     lisinopril (ZESTRIL) 2 5 mg tablet, Lisinopril 2 5 MG Oral Tablet TAKE 1 TABLET DAILY  Refills: 0   Started 20-Feb-2014 Active, Disp: , Rfl:     metFORMIN (GLUCOPHAGE) 1000 MG tablet, MetFORMIN HCl 1000 MG (MOD) TB24  Refills: 0  Active   takes 500mg a m  and 1000 mg with dinner, Disp: , Rfl:     metFORMIN (GLUCOPHAGE) 500 mg tablet, MetFORMIN HCl - 500 MG Oral Tablet  Refills: 0  Active, Disp: , Rfl:     omeprazole (PriLOSEC) 40 MG capsule, Take 40 mg by mouth every morning, Disp: , Rfl:     pravastatin (PRAVACHOL) 80 mg tablet, 80 mg every evening  , Disp: , Rfl:     primidone (MYSOLINE) 50 mg tablet, Half a tablet at bedtime for a week, 1 tablet at bedtime for a week, 1 and half tablet at bedtime for a week, and then 2 tablets at bedtime to continue, Disp: 60 tablet, Rfl: 2    albuterol (PROVENTIL HFA,VENTOLIN HFA) 90 mcg/act inhaler, Inhale 2 puffs every 4 (four) hours as needed for wheezing or shortness of breath, Disp: 1 Inhaler, Rfl: 0    QUEtiapine (SEROquel) 50 mg tablet, Take 50 mg by mouth daily at bedtime, Disp: , Rfl:     Patient Active Problem List   Diagnosis   (none) - all problems resolved or deleted          Patient ID: Jadon Mahmood is a 65 y o  female  HPI    The following portions of the patient's history were reviewed and updated as appropriate: allergies, current medications, past family history, past medical history, past social history, past surgical history and problem list     Review of Systems      Objective:      Foot Exam    General  General Appearance: appears stated age and healthy   Orientation: alert and oriented to person, place, and time   Affect: appropriate   Gait: antalgic       Right Foot/Ankle     Inspection and Palpation  Ecchymosis: dorsum  Tenderness: metatarsals   Swelling: metatarsals   Arch: pes planus  Hammertoes: fifth toe  Hallux valgus: no  Skin Exam: dry skin; Left Foot/Ankle      Inspection and Palpation  Ecchymosis: none  Tenderness: metatarsals   Swelling: metatarsals   Arch: pes planus  Hammertoes: fifth toe  Skin Exam: dry skin; Physical Exam   Constitutional: She appears well-developed  Cardiovascular: Normal rate and regular rhythm  Musculoskeletal:   Pain with palpation 4th metatarsal   Right foot demonstrates dorsal edema and erythema  2nd left toe demonstrates dorsiflexion at the PIPJ  There is skin contracture noted  X-ray demonstrates fracture through the neck of the 4th right metatarsal   It appears to be compression fracture   Feet:   Right Foot:   Skin Integrity: Positive for dry skin  Left Foot:   Skin Integrity: Positive for dry skin

## 2018-07-26 ENCOUNTER — OFFICE VISIT (OUTPATIENT)
Dept: PODIATRY | Facility: CLINIC | Age: 63
End: 2018-07-26
Payer: MEDICARE

## 2018-07-26 ENCOUNTER — HOSPITAL ENCOUNTER (EMERGENCY)
Facility: HOSPITAL | Age: 63
Discharge: HOME | End: 2018-07-26
Attending: EMERGENCY MEDICINE | Admitting: EMERGENCY MEDICINE
Payer: MEDICARE

## 2018-07-26 VITALS
HEART RATE: 73 BPM | HEIGHT: 65 IN | SYSTOLIC BLOOD PRESSURE: 136 MMHG | DIASTOLIC BLOOD PRESSURE: 80 MMHG | BODY MASS INDEX: 22.82 KG/M2 | RESPIRATION RATE: 16 BRPM | WEIGHT: 137 LBS

## 2018-07-26 VITALS
DIASTOLIC BLOOD PRESSURE: 64 MMHG | RESPIRATION RATE: 18 BRPM | BODY MASS INDEX: 22.8 KG/M2 | TEMPERATURE: 97.9 F | SYSTOLIC BLOOD PRESSURE: 143 MMHG | WEIGHT: 137 LBS | OXYGEN SATURATION: 98 % | HEART RATE: 76 BPM

## 2018-07-26 DIAGNOSIS — M79.671 RIGHT FOOT PAIN: ICD-10-CM

## 2018-07-26 DIAGNOSIS — S61.219A FINGER LACERATION: Primary | ICD-10-CM

## 2018-07-26 DIAGNOSIS — S92.344D CLOSED NONDISPLACED FRACTURE OF FOURTH METATARSAL BONE OF RIGHT FOOT WITH ROUTINE HEALING, SUBSEQUENT ENCOUNTER: Primary | ICD-10-CM

## 2018-07-26 DIAGNOSIS — B07.0 PLANTAR WARTS: ICD-10-CM

## 2018-07-26 PROCEDURE — 99282 EMERGENCY DEPT VISIT SF MDM: CPT

## 2018-07-26 PROCEDURE — 99211 OFF/OP EST MAY X REQ PHY/QHP: CPT | Performed by: PODIATRIST

## 2018-07-26 PROCEDURE — 90715 TDAP VACCINE 7 YRS/> IM: CPT | Performed by: PHYSICIAN ASSISTANT

## 2018-07-26 PROCEDURE — 90471 IMMUNIZATION ADMIN: CPT

## 2018-07-26 PROCEDURE — 17110 DESTRUCTION B9 LES UP TO 14: CPT | Performed by: PODIATRIST

## 2018-07-26 RX ADMIN — TETANUS TOXOID, REDUCED DIPHTHERIA TOXOID AND ACELLULAR PERTUSSIS VACCINE, ADSORBED 0.5 ML: 5; 2.5; 8; 8; 2.5 SUSPENSION INTRAMUSCULAR at 17:02

## 2018-07-26 NOTE — PROGRESS NOTES
Assessment/Plan:      Healing fracture of the 4th right metatarsal   Verruca  Pain  Plan  Patient will continue strapping as directed  We will treat verruca today with Cantharone  Lesion debrided  Cantharone applied  There are no diagnoses linked to this encounter  Subjective:  Patient fracture is feeling much better  Her biggest concern now is a spot on the bottom of the right foot  Patient ID: Yani Rodriguez is a 65 y o  female  Past Medical History:   Diagnosis Date    Arthritis     osteo    [facility]     Chronic kidney disease     [facility]     Depression     Diabetes mellitus [facility]     Disease of thyroid gland     DVT (deep vein thrombosis) in pregnancy ([facility] ) 1992    right leg    Elbow disorder     fx right elbow    Enlarged heart     Essential tremor     GERD (gastroesophageal reflux disease)     Hyperlipidemia     Parkinson disease ([facility]     Parkinsons disease ([facility] )     Pneumonia     x3    Psychiatric disorder     RLS (restless legs syndrome)     Wears partial dentures     upper       Past Surgical History:   Procedure Laterality Date    ABDOMINAL SURGERY      adhesions x3 surgeries    APPENDECTOMY      BACK SURGERY      fusion L5-S1 with hardware    BLADDER SUSPENSION      BREAST SURGERY Bilateral     reduction    CHOLECYSTECTOMY      lap    COLONOSCOPY N/A 2/8/2017    Procedure: COLONOSCOPY;  Surgeon: David Spicer MD;  Location: [facility] GI LAB; Service:     ELBOW ARTHROSCOPY Right     tendon repair    ESOPHAGOGASTRODUODENOSCOPY N/A 2/8/2017    Procedure: ESOPHAGOGASTRODUODENOSCOPY (EGD); Surgeon: David Spicer MD;  Location: [facility] GI LAB; Service:     FASCIECTOMY Right 2/13/2017    Procedure:  MIDDLE FINGER SCAR REVISION AND Z-PLASTY;  Surgeon: Katy Weinstein MD;  Location: [facility] MAIN OR;  Service:     FOOT SURGERY Right 1980    metatarsal repair    FOOT SURGERY Left 1985    metatarsal repair, bunionectomy    FRACTURE SURGERY Left     post trauma, fusion, hardware    HAND SURGERY Right     ganglion    HAND SURGERY Right     trigger finger    HAND SURGERY Right     tendon release/dupuytrens    KNEE ARTHROSCOPY W/ DEBRIDEMENT Left     meniscus repair    NASAL SEPTUM SURGERY      NASAL SEPTUM SURGERY      AR REMOVAL OF HEAD OF RADIUS Right 11/16/2017    Procedure: RADIAL HEAD EXCISION, ELBOW;  Surgeon: Nury Harrison MD;  Location: [facility] MAIN OR;  Service: Orthopedics    SHOULDER ARTHROSCOPY Right     SPINAL FUSION         Allergies   Allergen Reactions    Aristocort [Triamcinolone] Hives    Bactrim [Sulfamethoxazole-Trimethoprim] Itching    Bupropion Itching    Ciprofloxacin Hives    Codeine GI Intolerance    Latex      Sensitive / rash    Methylprednisolone Hives    Penicillins Hives    Percocet [Oxycodone-Acetaminophen] GI Intolerance    Vioxx [Rofecoxib] Itching    Carbamazepine Rash    Ezetimibe Rash    Lipitor [Atorvastatin] Rash    Zocor [Simvastatin] Rash         Current Outpatient Prescriptions:     albuterol (PROVENTIL HFA,VENTOLIN HFA) 90 mcg/act inhaler, Inhale 2 puffs every 4 (four) hours as needed for wheezing or shortness of breath, Disp: 1 Inhaler, Rfl: 0    carbidopa-levodopa (SINEMET)  mg per tablet, Take 1 tablet by mouth 2 (two) times a day, Disp: , Rfl:     Cholecalciferol (VITAMIN D) 2000 units CAPS, Take 1,000 Units by mouth every morning, Disp: , Rfl:     FLUoxetine (PROzac) 40 MG capsule, Take 80 mg by mouth every morning Takes two tabs = 80mg each a m dose  , Disp: , Rfl:     lamoTRIgine (LAMICTAL) 200 MG tablet, LaMICtal 200 MG Oral Tablet TAKE 2 TABLETS DAILY  Refills: 0  Active, Disp: , Rfl:     levothyroxine 88 mcg tablet, Levothyroxine Sodium 75 MCG Oral Tablet Take 1 tablet daily  Quantity: 0;  Refills: 0    Javier Carrillo ; Active, Disp: , Rfl:     lisinopril (ZESTRIL) 2 5 mg tablet, Lisinopril 2 5 MG Oral Tablet TAKE 1 TABLET DAILY  Refills: 0   Started 20-Feb-2014 Active, Disp: , Rfl:     metFORMIN (GLUCOPHAGE) 1000 MG tablet, MetFORMIN HCl 1000 MG (MOD) TB24  Refills: 0  Active   takes 500mg a m  and 1000 mg with dinner, Disp: , Rfl:     metFORMIN (GLUCOPHAGE) 500 mg tablet, MetFORMIN HCl - 500 MG Oral Tablet  Refills: 0  Active, Disp: , Rfl:     omeprazole (PriLOSEC) 40 MG capsule, Take 40 mg by mouth every morning, Disp: , Rfl:     pravastatin (PRAVACHOL) 80 mg tablet, 80 mg every evening  , Disp: , Rfl:     primidone (MYSOLINE) 50 mg tablet, Half a tablet at bedtime for a week, 1 tablet at bedtime for a week, 1 and half tablet at bedtime for a week, and then 2 tablets at bedtime to continue, Disp: 60 tablet, Rfl: 2    QUEtiapine (SEROquel) 50 mg tablet, Take 50 mg by mouth daily at bedtime, Disp: , Rfl:     Patient Active Problem List   Diagnosis    Closed nondisplaced fracture of fourth metatarsal bone of right foot    Acquired deformity of left foot    Arthralgia of left foot    Pain in both feet       HPI    The following portions of the patient's history were reviewed and updated as appropriate: allergies, current medications, past family history, past medical history, past social history, past surgical history and problem list     Review of Systems      Historical Information   Past Medical History:   Diagnosis Date    Arthritis     osteo    [facility]     Chronic kidney disease     COPD [facility]chronic obstructive pulmonary disease) ([facility]     Depression     Diabetes mellitus ([facility]     Disease of thyroid gland     DVT (deep vein thrombosis) in pregnancy ([facility] ) 1992    right leg    Elbow disorder     fx right elbow    Enlarged heart     Essential tremor     GERD (gastroesophageal reflux disease)     Hyperlipidemia     Parkinson disease ([facility]     Parkinsons disease ([facility] )     Pneumonia     x3    Psychiatric disorder     RLS (restless legs syndrome)     Wears partial dentures     upper     Past Surgical History:   Procedure Laterality Date    ABDOMINAL SURGERY      adhesions x3 surgeries    APPENDECTOMY      BACK SURGERY      fusion L5-S1 with hardware    BLADDER SUSPENSION      BREAST SURGERY Bilateral     reduction    CHOLECYSTECTOMY      lap    COLONOSCOPY N/A 2/8/2017    Procedure: COLONOSCOPY;  Surgeon: Jac Sotelo MD;  Location: [facility] GI LAB; Service:     ELBOW ARTHROSCOPY Right     tendon repair    ESOPHAGOGASTRODUODENOSCOPY N/A 2/8/2017    Procedure: ESOPHAGOGASTRODUODENOSCOPY (EGD); Surgeon: Jac Sotelo MD;  Location: [facility] GI LAB; Service:     FASCIECTOMY Right 2/13/2017    Procedure:  MIDDLE FINGER SCAR REVISION AND Z-PLASTY;  Surgeon: Steve Sanz MD;  Location: [facility] MAIN OR;  Service:     FOOT SURGERY Right 1980    metatarsal repair    FOOT SURGERY Left 1985    metatarsal repair, bunionectomy    FRACTURE SURGERY Left     post trauma, fusion, hardware    HAND SURGERY Right     ganglion    HAND SURGERY Right     trigger finger    HAND SURGERY Right     tendon release/dupuytrens    KNEE ARTHROSCOPY W/ DEBRIDEMENT Left     meniscus repair    NASAL SEPTUM SURGERY      NASAL SEPTUM SURGERY      AR REMOVAL OF HEAD OF RADIUS Right 11/16/2017    Procedure: RADIAL HEAD EXCISION, ELBOW;  Surgeon: Lino Romo MD;  [facility]IN OR;  Service: Orthopedics    SHOULDER ARTHROSCOPY Right     SPINAL FUSION       Social History   History   Alcohol Use No     History   Drug Use No     Family History:   Family History   Problem Relation Age of Onset    Cancer Mother         non hodgkins lymphoma    Heart disease Father     Cancer Brother         colon    Cancer Maternal Grandmother         breast    Cancer Maternal Aunt         breast       Meds/Allergies   Allergies   Allergen Reactions    Aristocort [Triamcinolone] Hives    Bactrim [Sulfamethoxazole-Trimethoprim] Itching    Bupropion Itching    Ciprofloxacin Hives    Codeine GI Intolerance    Latex Sensitive / rash    Methylprednisolone Hives    Penicillins Hives    Percocet [Oxycodone-Acetaminophen] GI Intolerance    Vioxx [Rofecoxib] Itching    Carbamazepine Rash    Ezetimibe Rash    Lipitor [Atorvastatin] Rash    Zocor [Simvastatin] Rash       Current Outpatient Prescriptions on File Prior to Visit   Medication Sig Dispense Refill    albuterol (PROVENTIL HFA,VENTOLIN HFA) 90 mcg/act inhaler Inhale 2 puffs every 4 (four) hours as needed for wheezing or shortness of breath 1 Inhaler 0    carbidopa-levodopa (SINEMET)  mg per tablet Take 1 tablet by mouth 2 (two) times a day      Cholecalciferol (VITAMIN D) 2000 units CAPS Take 1,000 Units by mouth every morning      FLUoxetine (PROzac) 40 MG capsule Take 80 mg by mouth every morning Takes two tabs = 80mg each a m dose        lamoTRIgine (LAMICTAL) 200 MG tablet LaMICtal 200 MG Oral Tablet  TAKE 2 TABLETS DAILY  Refills: 0    Active      levothyroxine 88 mcg tablet Levothyroxine Sodium 75 MCG Oral Tablet  Take 1 tablet daily   Quantity: 0;  Refills: 0       Dominik Diego ; Active      lisinopril (ZESTRIL) 2 5 mg tablet Lisinopril 2 5 MG Oral Tablet  TAKE 1 TABLET DAILY  Refills: 0     Started 20-Feb-2014  Active      metFORMIN (GLUCOPHAGE) 1000 MG tablet MetFORMIN HCl 1000 MG (MOD) TB24   Refills: 0    Active   takes 500mg a m  and 1000 mg with dinner      metFORMIN (GLUCOPHAGE) 500 mg tablet MetFORMIN HCl - 500 MG Oral Tablet   Refills: 0    Active      omeprazole (PriLOSEC) 40 MG capsule Take 40 mg by mouth every morning      pravastatin (PRAVACHOL) 80 mg tablet 80 mg every evening        primidone (MYSOLINE) 50 mg tablet Half a tablet at bedtime for a week, 1 tablet at bedtime for a week, 1 and half tablet at bedtime for a week, and then 2 tablets at bedtime to continue 60 tablet 2    QUEtiapine (SEROquel) 50 mg tablet Take 50 mg by mouth daily at bedtime       No current facility-administered medications on file prior to visit  Objective:  Patient's shoes and socks removed     Foot ExamPhysical Exam      Objective:      Foot Exam     General  General Appearance: appears stated age and healthy   Orientation: alert and oriented to person, place, and time   Affect: appropriate   Gait: antalgic         Right Foot/Ankle      Inspection and Palpation  Ecchymosis: dorsum  Tenderness: metatarsals   Swelling: metatarsals   Arch: pes planus  Hammertoes: fifth toe  Hallux valgus: no  Skin Exam: dry skin; submetatarsal 3 demonstrates a small lesion consistent with verruca         Left Foot/Ankle       Inspection and Palpation  Ecchymosis: none  Tenderness: metatarsals   Swelling: metatarsals   Arch: pes planus  Hammertoes: fifth toe  Skin Exam: dry skin;

## 2018-07-26 NOTE — ED PROVIDER NOTES
History  Chief Complaint   Patient presents with    Finger Laceration      cut her left 5th finger   unnown tetanus     78-year-old female presenting today with a left 5th pinky laceration that occurred about an hour ago while she was cutting a tomato  Does not recall her last tetanus vaccination  Patient is diabetic  Bleeding controlled  Denies numbness, paresthesias, weakness  Prior to Admission Medications   Prescriptions Last Dose Informant Patient Reported? Taking? Cholecalciferol (VITAMIN D) 2000 units CAPS 2018 at Unknown time  Yes Yes   Sig: Take 1,000 Units by mouth every morning   FLUoxetine (PROzac) 40 MG capsule 2018 at Unknown time  Yes Yes   Sig: Take 80 mg by mouth every morning Takes two tabs = 80mg each a m dose     QUEtiapine (SEROquel) 50 mg tablet 2018 at Unknown time Self Yes Yes   Sig: Take 50 mg by mouth daily at bedtime   lamoTRIgine (LAMICTAL) 200 MG tablet 2018 at Unknown time  Yes Yes   Sig: LaMICtal 200 MG Oral Tablet  TAKE 2 TABLETS DAILY  Refills: 0    Active   levothyroxine 88 mcg tablet 2018 at Unknown time Self Yes Yes   Sig: Levothyroxine Sodium 75 MCG Oral Tablet  Take 1 tablet daily   Quantity: 0;  Refills: 0       Dominik Diego ; Active   lisinopril (ZESTRIL) 2 5 mg tablet 2018 at Unknown time  Yes Yes   Sig: Lisinopril 2 5 MG Oral Tablet  TAKE 1 TABLET DAILY     Refills: 0     Started 2014  Active   metFORMIN (GLUCOPHAGE) 1000 MG tablet 2018 at Unknown time Self Yes Yes   Sig: MetFORMIN HCl 1000 MG (MOD) TB24   Refills: 0    Active   takes 500mg a m  and 1000 mg with dinner   metFORMIN (GLUCOPHAGE) 500 mg tablet 2018 at Unknown time  Yes Yes   Sig: MetFORMIN HCl - 500 MG Oral Tablet   Refills: 0    Active   omeprazole (PriLOSEC) 40 MG capsule 2018 at Unknown time  Yes Yes   Sig: Take 40 mg by mouth every morning   pravastatin (PRAVACHOL) 80 mg tablet 2018 at Unknown time Self Yes Yes   Si mg every evening     primidone (MYSOLINE) 50 mg tablet 7/26/2018 at Unknown time  No Yes   Sig: Half a tablet at bedtime for a week, 1 tablet at bedtime for a week, 1 and half tablet at bedtime for a week, and then 2 tablets at bedtime to continue      Facility-Administered Medications: None       Past Medical History:   Diagnosis Date    Arthritis     osteo    [facility]     Chronic kidney disease     COPD (chronic obstructive pulmonary disease) ([place] [address] )     Depression     Diabetes mellitus ([place] [address] )     Disease of thyroid gland     DVT (deep vein thrombosis) in pregnancy ([place] [address] ) 1992    right leg    Elbow disorder     fx right elbow    Enlarged heart     Essential tremor     GERD (gastroesophageal reflux disease)     Hyperlipidemia     Parkinson disease ([place] [address] )     Parkinsons disease ([place] [address] )     Pneumonia     x3    Psychiatric disorder     RLS (restless legs syndrome)     Wears partial dentures     upper       Past Surgical History:   Procedure Laterality Date    ABDOMINAL SURGERY      adhesions x3 surgeries    APPENDECTOMY      BACK SURGERY      fusion L5-S1 with hardware    BLADDER SUSPENSION      BREAST SURGERY Bilateral     reduction    CHOLECYSTECTOMY      lap    COLONOSCOPY N/A 2/8/2017    Procedure: COLONOSCOPY;  Surgeon: Jeimy Castle MD;  Location: [facility] GI LAB; Service:     ELBOW ARTHROSCOPY Right     tendon repair    ESOPHAGOGASTRODUODENOSCOPY N/A 2/8/2017    Procedure: ESOPHAGOGASTRODUODENOSCOPY (EGD); Surgeon: Jeimy Castle MD;  Location: [facility] GI LAB; Service:     FASCIECTOMY Right 2/13/2017    Procedure:  MIDDLE FINGER SCAR REVISION AND Z-PLASTY;  Surgeon: Mark Shrestha MD;  Location: [facility] MAIN OR;  Service:     FOOT SURGERY Right 1980    metatarsal repair    FOOT SURGERY Left 1985    metatarsal repair, bunionectomy    FRACTURE SURGERY Left     post trauma, fusion, hardware    HAND SURGERY Right     ganglion    HAND SURGERY Right     trigger finger    HAND SURGERY Right     tendon release/dupuytrens    KNEE ARTHROSCOPY W/ DEBRIDEMENT Left     meniscus repair    NASAL SEPTUM SURGERY      NASAL SEPTUM SURGERY      MA REMOVAL OF HEAD OF RADIUS Right 11/16/2017    Procedure: RADIAL HEAD EXCISION, ELBOW;  Surgeon: Antonio Moore MD;  Location: [place] [address] MAIN OR;  Service: Orthopedics    SHOULDER ARTHROSCOPY Right     SPINAL FUSION         Family History   Problem Relation Age of Onset    Cancer Mother         non hodgkins lymphoma    Heart disease Father     Cancer Brother         colon    Cancer Maternal Grandmother         breast    Cancer Maternal Aunt         breast     I have reviewed and agree with the history as documented  Social History   Substance Use Topics    Smoking status: Current Every Day Smoker     Packs/day: 1 00     Years: 39 00    Smokeless tobacco: Never Used    Alcohol use No        Review of Systems   Constitutional: Negative  HENT: Negative  Eyes: Negative  Respiratory: Negative  Cardiovascular: Negative  Gastrointestinal: Negative  Genitourinary: Negative  Musculoskeletal: Negative  Skin: Positive for wound  Negative for color change, pallor and rash  Neurological: Negative  All other systems reviewed and are negative  Physical Exam  Physical Exam   Constitutional: She is oriented to person, place, and time  She appears well-developed and well-nourished  HENT:   Head: Normocephalic  Cardiovascular: Normal rate  Pulmonary/Chest: Effort normal    spo2 is 98% indicating adequate oxygenation  Musculoskeletal: Normal range of motion  Neurological: She is alert and oriented to person, place, and time  Skin: Skin is warm and dry  Capillary refill takes less than 2 seconds  Nursing note and vitals reviewed        Vital Signs  ED Triage Vitals [07/26/18 1614]   Temperature Pulse Respirations Blood Pressure SpO2   97 9 °F (36 6 °C) 76 18 143/64 98 %      Temp src Heart Rate Source Patient Position - Orthostatic VS BP Location FiO2 (%)   -- -- -- -- --      Pain Score       4           Vitals:    07/26/18 1614   BP: 143/64   Pulse: 76       Visual Acuity      ED Medications  Medications   tetanus-diphtheria-acellular pertussis (BOOSTRIX) IM injection 0 5 mL (not administered)       Diagnostic Studies  Results Reviewed     None                 No orders to display              Procedures  Lac Repair  Date/Time: 7/26/2018 4:50 PM  Performed by: Amira Garcia by: Lenin Negron   Consent: Verbal consent obtained  Risks and benefits: risks, benefits and alternatives were discussed  Consent given by: patient  Patient understanding: patient states understanding of the procedure being performed  Patient consent: the patient's understanding of the procedure matches consent given  Procedure consent: procedure consent matches procedure scheduled  Relevant documents: relevant documents present and verified  Test results: test results available and properly labeled  Site marked: the operative site was marked  Radiology Images displayed and confirmed  If images not available, report reviewed: imaging studies available  Required items: required blood products, implants, devices, and special equipment available  Patient identity confirmed: verbally with patient  Time out: Immediately prior to procedure a "time out" was called to verify the correct patient, procedure, equipment, support staff and site/side marked as required  Body area: upper extremity  Location details: left small finger  Laceration length: 0 5 cm  Foreign bodies: no foreign bodies  Tendon involvement: none  Nerve involvement: none  Vascular damage: no    Sedation:  Patient sedated: no    Wound Dehiscence:  Superficial Wound Dehiscence: simple closure      Procedure Details:  Skin closure: Steri-Strips  Number of sutures: 1 steri strip    Approximation: close  Approximation difficulty: simple  Dressing: 4x4 sterile gauze and gauze roll  Patient tolerance: Patient tolerated the procedure well with no immediate complications             Phone Contacts  ED Phone Contact    ED Course                               MDM  Number of Diagnoses or Management Options  Finger laceration:   Diagnosis management comments: Updated on tetanus  Overall very superficial wound  Given education regarding diabetic poor wound healing and she return should any symptoms worsen or signs of infection  Patient verbalizes understanding and agrees with the above assessment and plan  Amount and/or Complexity of Data Reviewed  Review and summarize past medical records: yes  Independent visualization of images, tracings, or specimens: yes      CritCare Time    Disposition  Final diagnoses:   Finger laceration     Time reflects when diagnosis was documented in both MDM as applicable and the Disposition within this note     Time User Action Codes Description Comment    7/26/2018  4:51 PM Cristal Latif [Z31 010L] Finger laceration       ED Disposition     ED Disposition Condition Comment    Discharge  Yani Rodriguez discharge to home/self care  Condition at discharge: Good        Follow-up Information     Follow up With Specialties Details Why Contact Info Additional [address] Emergency Department Emergency Medicine Go to If symptoms worsen [address] [place] [address]  [phone] [facility] ED, Erin Guerrero Shruthi, [address], MD Internal Medicine Schedule an appointment as soon as possible for a visit As needed [address]  [phone]             Patient's Medications   Discharge Prescriptions    No medications on file     No discharge procedures on file      ED Provider  Electronically Signed by           Sultana Ko PA-C  07/26/18 0055

## 2018-07-28 ENCOUNTER — HOSPITAL ENCOUNTER (EMERGENCY)
Facility: HOSPITAL | Age: 63
Discharge: HOME | End: 2018-07-28
Attending: EMERGENCY MEDICINE | Admitting: EMERGENCY MEDICINE
Payer: MEDICARE

## 2018-07-28 VITALS
WEIGHT: 138 LBS | SYSTOLIC BLOOD PRESSURE: 175 MMHG | TEMPERATURE: 98.6 F | OXYGEN SATURATION: 98 % | BODY MASS INDEX: 22.96 KG/M2 | HEART RATE: 63 BPM | DIASTOLIC BLOOD PRESSURE: 94 MMHG | RESPIRATION RATE: 16 BRPM

## 2018-07-28 DIAGNOSIS — S61.219A FINGER LACERATION: Primary | ICD-10-CM

## 2018-07-28 PROCEDURE — 99282 EMERGENCY DEPT VISIT SF MDM: CPT

## 2018-07-28 NOTE — ED PROVIDER NOTES
History  Chief Complaint   Patient presents with    Laceration     Was here on 7/26/18 for a laceration on left 5th finger, came to the ED had glue and steri stripes applied but it opened up today while she was cleaning     Patient has superficial laceration of the pad of her 5th finger repaired here 2 days ago with skin glue and Steri-Strips  The patient initially did quite well and has been returned to performing all her usual activities including washing dishes and working in the yd  She noticed that the Steri-Strips have come off and that the wound opened  She has had no fever no redness and no drainage from the wound  There is no bleeding  There is no new injury            Prior to Admission Medications   Prescriptions Last Dose Informant Patient Reported? Taking? Cholecalciferol (VITAMIN D) 2000 units CAPS   Yes No   Sig: Take 1,000 Units by mouth every morning   FLUoxetine (PROzac) 40 MG capsule   Yes No   Sig: Take 80 mg by mouth every morning Takes two tabs = 80mg each a m dose     QUEtiapine (SEROquel) 50 mg tablet  Self Yes No   Sig: Take 50 mg by mouth daily at bedtime   lamoTRIgine (LAMICTAL) 200 MG tablet   Yes No   Sig: LaMICtal 200 MG Oral Tablet  TAKE 2 TABLETS DAILY  Refills: 0    Active   levothyroxine 88 mcg tablet  Self Yes No   Sig: Levothyroxine Sodium 75 MCG Oral Tablet  Take 1 tablet daily   Quantity: 0;  Refills: 0       Maisha Overcast ; Active   lisinopril (ZESTRIL) 2 5 mg tablet   Yes No   Sig: Lisinopril 2 5 MG Oral Tablet  TAKE 1 TABLET DAILY     Refills: 0     Started 20-Feb-2014  Active   metFORMIN (GLUCOPHAGE) 1000 MG tablet  Self Yes No   Sig: MetFORMIN HCl 1000 MG (MOD) TB24   Refills: 0    Active   takes 500mg a m  and 1000 mg with dinner   metFORMIN (GLUCOPHAGE) 500 mg tablet   Yes No   Sig: MetFORMIN HCl - 500 MG Oral Tablet   Refills: 0    Active   omeprazole (PriLOSEC) 40 MG capsule   Yes No   Sig: Take 40 mg by mouth every morning   pravastatin (PRAVACHOL) 80 mg tablet  Self Yes No   Si mg every evening     primidone (MYSOLINE) 50 mg tablet   No No   Sig: Half a tablet at bedtime for a week, 1 tablet at bedtime for a week, 1 and half tablet at bedtime for a week, and then 2 tablets at bedtime to continue      Facility-Administered Medications: None       Past Medical History:   Diagnosis Date    Arthritis     osteo    Bipolar disorder ([place] [address] )     Chronic kidney disease     COPD (chronic obstructive pulmonary disease) ([place] [address] )     Depression     Diabetes mellitus ([place] [address] )     Disease of thyroid gland     DVT (deep vein thrombosis) in pregnancy ([place] [address] )     right leg    Elbow disorder     fx right elbow    Enlarged heart     Essential tremor     GERD (gastroesophageal reflux disease)     Hyperlipidemia     Parkinson disease ([place] [address] )     Parkinsons disease ([place] [address] )     Pneumonia     x3    Psychiatric disorder     RLS (restless legs syndrome)     Wears partial dentures     upper       Past Surgical History:   Procedure Laterality Date    ABDOMINAL SURGERY      adhesions x3 surgeries    APPENDECTOMY      BACK SURGERY      fusion L5-S1 with hardware    BLADDER SUSPENSION      BREAST SURGERY Bilateral     reduction    CHOLECYSTECTOMY      lap    COLONOSCOPY N/A 2017    Procedure: COLONOSCOPY;  Surgeon: Demetrius Romano MD;  Location: [place] [address] GI LAB; Service:     ELBOW ARTHROSCOPY Right     tendon repair    ESOPHAGOGASTRODUODENOSCOPY N/A 2017    Procedure: ESOPHAGOGASTRODUODENOSCOPY (EGD); Surgeon: Demetrius Romano MD;  Location: [facility] GI LAB; Service:     FASCIECTOMY Right 2017    Procedure:  MIDDLE FINGER SCAR REVISION AND Z-PLASTY;  Surgeon: Trena Trujillo MD;  Location: [facility] MAIN OR;  Service:     FOOT SURGERY Right     metatarsal repair    FOOT SURGERY Left     metatarsal repair, bunionectomy    FRACTURE SURGERY Left     post trauma, fusion, hardware    HAND SURGERY Right     ganglion    HAND SURGERY Right     trigger finger    HAND SURGERY Right     tendon release/dupuytrens    KNEE ARTHROSCOPY W/ DEBRIDEMENT Left     meniscus repair    NASAL SEPTUM SURGERY      NASAL SEPTUM SURGERY      [place] REMOVAL OF HEAD OF RADIUS Right 11/16/2017    Procedure: RADIAL HEAD EXCISION, ELBOW;  Surgeon: Mikael Fabry, MD;  Location: [facility] MAIN OR;  Service: Orthopedics    SHOULDER ARTHROSCOPY Right     SPINAL FUSION         Family History   Problem Relation Age of Onset    Cancer Mother         non hodgkins lymphoma    Heart disease Father     Cancer Brother         colon    Cancer Maternal Grandmother         breast    Cancer Maternal Aunt         breast     I have reviewed and agree with the history as documented  Social History   Substance Use Topics    Smoking status: Current Every Day Smoker     Packs/day: 1 00     Years: 39 00    Smokeless tobacco: Never Used    Alcohol use No        Review of Systems   Constitutional: Negative for chills and fever  Cardiovascular: Negative for chest pain  Gastrointestinal: Negative for abdominal pain  Musculoskeletal: Negative for arthralgias  Skin: Positive for wound  Negative for color change and rash  All other systems reviewed and are negative  Physical Exam  Physical Exam   Constitutional: She appears well-developed and well-nourished  HENT:   Head: Normocephalic and atraumatic  Eyes: Conjunctivae are normal    Neck: Normal range of motion  Cardiovascular: Normal rate and intact distal pulses  Pulmonary/Chest: Effort normal    Abdominal: Soft  There is no tenderness  Musculoskeletal: Normal range of motion  She exhibits no tenderness  Neurological: She is alert  Skin: Skin is warm and dry  There is a superficial flap laceration to the pad of the right 5th digit  There is no sign of infection  No bleeding   Psychiatric: She has a normal mood and affect  Her behavior is normal    Nursing note and vitals reviewed        Vital Signs  ED Triage Vitals [07/28/18 1518]   Temperature Pulse Respirations Blood Pressure SpO2   98 6 °F (37 °C) 63 16 (!) 175/94 98 %      Temp Source Heart Rate Source Patient Position - Orthostatic VS BP Location FiO2 (%)   Tympanic Monitor Sitting Right arm --      Pain Score       2           Vitals:    07/28/18 1518   BP: (!) 175/94   Pulse: 63   Patient Position - Orthostatic VS: Sitting       Visual Acuity      ED Medications  Medications - No data to display    Diagnostic Studies  Results Reviewed     None                 No orders to display              Procedures  Procedures       Phone Contacts  ED Phone Contact    ED Course                               MDM  Number of Diagnoses or Management Options  Finger laceration:   Diagnosis management comments: 2 days after skin closure of wound which has reopened prohibits simple suturing  I prepped the wound and use benzoin and Steri-Strips to close the wound and allow it to heal by 2nd intention  CritCare Time    Disposition  Final diagnoses:   Finger laceration     Time reflects when diagnosis was documented in both MDM as applicable and the Disposition within this note     Time User Action Codes Description Comment    7/28/2018  3:34 PM Monae Ronquillo Add [O94 121D] Finger laceration       ED Disposition     ED Disposition Condition Comment    Discharge  Khoi Salter discharge to home/self care  Condition at discharge: Stable        Follow-up Information     Follow up With Specialties Details Why Contact Info    Nedra Mckinnon MD Internal Medicine Schedule an appointment as soon as possible for a visit As needed [phone] e  [phone]            Patient's Medications   Discharge Prescriptions    No medications on file     No discharge procedures on file      ED Provider  Electronically Signed by           Ladarius Goodman MD  07/28/18 9539

## 2018-07-28 NOTE — DISCHARGE INSTRUCTIONS
Finger Laceration   WHAT YOU NEED TO KNOW:   A finger laceration is a deep cut in your skin  It is often caused by a sharp object, such as a knife, or blunt force to your finger  Your blood vessels, bones, joints, tendons, or nerves may also be injured  DISCHARGE INSTRUCTIONS:   Return to the emergency department if:   · Your wound comes apart  · Blood soaks through your bandage  · You have severe pain in your finger or hand  · Your finger is pale and cold  · You have sudden trouble moving your finger  · Your swelling suddenly gets worse  · You have red streaks on your skin coming from your wound  Contact your healthcare provider or hand specialist if:   · You have new numbness or tingling  · Your finger feels warm, looks swollen or red, and is draining pus  · You have a fever  · You have questions or concerns about your condition or care  Medicines: You may  need any of the following:  · Antibiotics  help prevent a bacterial infection  · Acetaminophen  decreases pain and fever  It is available without a doctor's order  Ask how much to take and how often to take it  Follow directions  Read the labels of all other medicines you are using to see if they also contain acetaminophen, or ask your doctor or pharmacist  Acetaminophen can cause liver damage if not taken correctly  Do not use more than 4 grams (4,000 milligrams) total of acetaminophen in one day  · Prescription pain medicine  may be given  Ask your healthcare provider how to take this medicine safely  Some prescription pain medicines contain acetaminophen  Do not take other medicines that contain acetaminophen without talking to your healthcare provider  Too much acetaminophen may cause liver damage  Prescription pain medicine may cause constipation  Ask your healthcare provider how to prevent or treat constipation  · Take your medicine as directed    Contact your healthcare provider if you think your medicine is not helping or if you have side effects  Tell him or her if you are allergic to any medicine  Keep a list of the medicines, vitamins, and herbs you take  Include the amounts, and when and why you take them  Bring the list or the pill bottles to follow-up visits  Carry your medicine list with you in case of an emergency  Self-care:   · Apply ice  on your finger for 15 to 20 minutes every hour or as directed  Use an ice pack, or put crushed ice in a plastic bag  Cover it with a towel before you apply it to your skin  Ice helps prevent tissue damage and decreases swelling and pain  · Elevate  your hand above the level of your heart as often as you can  This will help decrease swelling and pain  Prop your hand on pillows or blankets to keep it elevated comfortably  · Wear your splint as directed  A splint will decrease movement and stress on your wound  The splint may help your wound heal faster  Ask your healthcare provider how to apply and remove a splint  · Apply ointments to decrease scarring  Do not apply ointments until your healthcare provider says it is okay  You may need to wait until your wound is healed  Ask which ointment to buy and how often to use it  Wound care:   · Do not get your wound wet until your healthcare provider says it is okay  Do not soak your hand in water  Do not go swimming until your healthcare provider says it is okay  When your healthcare provider says it is okay, carefully wash around the wound with soap and water  Let soap and water run over your wound  Gently pat the area dry or allow it to air dry  · Change your bandages when they get wet, dirty, or after washing  Apply new, clean bandages as directed  Do not apply elastic bandages or tape too tightly  Do not put powders or lotions on your wound  · Apply antibiotic ointment as directed  Your healthcare provider may give you antibiotic ointment to put over your wound if you have stitches   If you have Strips-Strips over your wound, let them dry up and fall off on their own  If they do not fall off within 14 days, gently remove them  If you have glue over your wound, do not remove or pick at it  If your glue comes off, do not replace it with glue that you have at home  · Check your wound every day for signs of infection  Signs of infection include swelling, redness, or pus  Follow up with your healthcare provider or hand specialist in 2 days:  Write down your questions so you remember to ask them during your visits  © 2017 [employer]  Information is for End User's use only and may not be sold, redistributed or otherwise used for commercial purposes  All illustrations and images included in CareNotes® are the copyrighted property of [employer]  or Barron Astudillo  The above information is an  only  It is not intended as medical advice for individual conditions or treatments  Talk to your doctor, nurse or pharmacist before following any medical regimen to see if it is safe and effective for you

## 2018-08-09 ENCOUNTER — OFFICE VISIT (OUTPATIENT)
Dept: PODIATRY | Facility: CLINIC | Age: 63
End: 2018-08-09
Payer: MEDICARE

## 2018-08-09 VITALS
SYSTOLIC BLOOD PRESSURE: 152 MMHG | HEART RATE: 71 BPM | HEIGHT: 65 IN | RESPIRATION RATE: 17 BRPM | BODY MASS INDEX: 22.99 KG/M2 | WEIGHT: 138 LBS | DIASTOLIC BLOOD PRESSURE: 92 MMHG

## 2018-08-09 DIAGNOSIS — M21.962 ACQUIRED DEFORMITY OF LEFT FOOT: Primary | ICD-10-CM

## 2018-08-09 DIAGNOSIS — M79.671 PAIN IN BOTH FEET: ICD-10-CM

## 2018-08-09 DIAGNOSIS — M25.572 ARTHRALGIA OF LEFT FOOT: ICD-10-CM

## 2018-08-09 DIAGNOSIS — M79.672 PAIN IN BOTH FEET: ICD-10-CM

## 2018-08-09 PROCEDURE — 73620 X-RAY EXAM OF FOOT: CPT | Performed by: PODIATRIST

## 2018-08-09 PROCEDURE — 99213 OFFICE O/P EST LOW 20 MIN: CPT | Performed by: PODIATRIST

## 2018-08-09 NOTE — PROGRESS NOTES
Assessment/Plan:  Hyperkeratosis of right foot  Deformed 2nd left toe  Metatarsalgia  Plan  Right lesion debrided  X-rays taken  Patient will consider osteotomy of metatarsal right foot  My recommendation for 2nd left toe would be amputation  There are no diagnoses linked to this encounter  Subjective:  Patient presents for follow-up  She is not using splinting as directed  She still has pain in the left foot  Right foot does feel better   Patient ID: Jadon Mahmood is a 65 y o  female  Past Medical History:   Diagnosis Date    Arthritis     osteo    [facility] )     Chronic kidney disease     [facility]     Depression     [facility]     Disease of thyroid gland     DVT (deep vein thrombosis) in pregnancy ([place] [address] ) 1992    right leg    Elbow disorder     fx right elbow    Enlarged heart     Essential tremor     GERD (gastroesophageal reflux disease)     Hyperlipidemia     Parkinson disease ([place] [address] )     Parkinsons disease ([place] [address] )     Pneumonia     x3    Psychiatric disorder     RLS (restless legs syndrome)     Wears partial dentures     upper       Past Surgical History:   Procedure Laterality Date    ABDOMINAL SURGERY      adhesions x3 surgeries    APPENDECTOMY      BACK SURGERY      fusion L5-S1 with hardware    BLADDER SUSPENSION      BREAST SURGERY Bilateral     reduction    CHOLECYSTECTOMY      lap    COLONOSCOPY N/A 2/8/2017    Procedure: COLONOSCOPY;  Surgeon: Reynaldo Egan MD;  Location: [facility] GI LAB; Service:     ELBOW ARTHROSCOPY Right     tendon repair    ESOPHAGOGASTRODUODENOSCOPY N/A 2/8/2017    Procedure: ESOPHAGOGASTRODUODENOSCOPY (EGD); Surgeon: Reynaldo Egan MD;  Location: [facility] GI LAB; Service:     FASCIECTOMY Right 2/13/2017    Procedure:  MIDDLE FINGER SCAR REVISION AND Z-PLASTY;  Surgeon: Quita Vigli MD;  Location: [facility] MAIN OR;  Service:     FOOT SURGERY Right 1980    metatarsal repair    FOOT SURGERY Left 1985    metatarsal repair, bunionectomy    FRACTURE SURGERY Left     post trauma, fusion, hardware    HAND SURGERY Right     ganglion    HAND SURGERY Right     trigger finger    HAND SURGERY Right     tendon release/dupuytrens    KNEE ARTHROSCOPY W/ DEBRIDEMENT Left     meniscus repair    NASAL SEPTUM SURGERY      NASAL SEPTUM SURGERY      [place] REMOVAL OF HEAD OF RADIUS Right 11/16/2017    Procedure: RADIAL HEAD EXCISION, ELBOW;  Surgeon: Mikala Jansen MD;  [facility] OR;  Service: Orthopedics    SHOULDER ARTHROSCOPY Right     SPINAL FUSION         Allergies   Allergen Reactions    Aristocort [Triamcinolone] Hives    Bactrim [Sulfamethoxazole-Trimethoprim] Itching    Bupropion Itching    Ciprofloxacin Hives    Codeine GI Intolerance    Latex      Sensitive / rash    Methylprednisolone Hives    Penicillins Hives    Percocet [Oxycodone-Acetaminophen] GI Intolerance    Vioxx [Rofecoxib] Itching    Carbamazepine Rash    Ezetimibe Rash    Lipitor [Atorvastatin] Rash    Zocor [Simvastatin] Rash         Current Outpatient Prescriptions:     Cholecalciferol (VITAMIN D) 2000 units CAPS, Take 1,000 Units by mouth every morning, Disp: , Rfl:     FLUoxetine (PROzac) 40 MG capsule, Take 80 mg by mouth every morning Takes two tabs = 80mg each a m dose  , Disp: , Rfl:     lamoTRIgine (LAMICTAL) 200 MG tablet, LaMICtal 200 MG Oral Tablet TAKE 2 TABLETS DAILY  Refills: 0  Active, Disp: , Rfl:     levothyroxine 88 mcg tablet, Levothyroxine Sodium 75 MCG Oral Tablet Take 1 tablet daily  Quantity: 0;  Refills: 0    Vivek Garcia ; Active, Disp: , Rfl:     lisinopril (ZESTRIL) 2 5 mg tablet, Lisinopril 2 5 MG Oral Tablet TAKE 1 TABLET DAILY    Refills: 0   Started 20-Feb-2014 Active, Disp: , Rfl:     metFORMIN (GLUCOPHAGE) 1000 MG tablet, MetFORMIN HCl 1000 MG (MOD) TB24  Refills: 0  Active   takes 500mg a m  and 1000 mg with dinner, Disp: , Rfl:     metFORMIN (GLUCOPHAGE) 500 mg tablet, MetFORMIN HCl - 500 MG Oral Tablet  Refills: 0  Active, Disp: , Rfl:     omeprazole (PriLOSEC) 40 MG capsule, Take 40 mg by mouth every morning, Disp: , Rfl:     pravastatin (PRAVACHOL) 80 mg tablet, 80 mg every evening  , Disp: , Rfl:     primidone (MYSOLINE) 50 mg tablet, Half a tablet at bedtime for a week, 1 tablet at bedtime for a week, 1 and half tablet at bedtime for a week, and then 2 tablets at bedtime to continue, Disp: 60 tablet, Rfl: 2    QUEtiapine (SEROquel) 50 mg tablet, Take 50 mg by mouth daily at bedtime, Disp: , Rfl:     Patient Active Problem List   Diagnosis    Closed nondisplaced fracture of fourth metatarsal bone of right foot    Acquired deformity of left foot    Arthralgia of left foot    Pain in both feet    Plantar warts    Right foot pain       HPI    The following portions of the patient's history were reviewed and updated as appropriate: allergies, current medications, past family history, past medical history, past social history, past surgical history and problem list     Review of Systems      Historical Information   Past Medical History:   Diagnosis Date    Arthritis     osteo    [facility]     Chronic kidney disease     COPD [facility]chronic obstructive pulmonary disease) [facility] )     Depression     Diabetes mellitus ([facility] )     Disease of thyroid gland     DVT (deep vein thrombosis) in pregnancy ([facility] ) 1992    right leg    Elbow disorder     fx right elbow    Enlarged heart     Essential tremor     GERD (gastroesophageal reflux disease)     Hyperlipidemia     Parkinson disease ([facility] )     Parkinsons disease ([facility] )     Pneumonia     x3    Psychiatric disorder     RLS (restless legs syndrome)     Wears partial dentures     upper     Past Surgical History:   Procedure Laterality Date    ABDOMINAL SURGERY      adhesions x3 surgeries    APPENDECTOMY      BACK SURGERY      fusion L5-S1 with hardware    BLADDER SUSPENSION      BREAST SURGERY Bilateral     reduction    CHOLECYSTECTOMY      lap    COLONOSCOPY N/A 2/8/2017    Procedure: COLONOSCOPY;  Surgeon: Pk Villalta MD;  Location: [facility] GI LAB; Service:     ELBOW ARTHROSCOPY Right     tendon repair    ESOPHAGOGASTRODUODENOSCOPY N/A 2/8/2017    Procedure: ESOPHAGOGASTRODUODENOSCOPY (EGD); Surgeon: Pk Villalta MD;  Location: [facility] GI LAB; Service:     FASCIECTOMY Right 2/13/2017    Procedure:  MIDDLE FINGER SCAR REVISION AND Z-PLASTY;  Surgeon: Nunu Montiel MD;  Location: [facility] MAIN OR;  Service:     FOOT SURGERY Right 1980    metatarsal repair    FOOT SURGERY Left 1985    metatarsal repair, bunionectomy    FRACTURE SURGERY Left     post trauma, fusion, hardware    HAND SURGERY Right     ganglion    HAND SURGERY Right     trigger finger    HAND SURGERY Right     tendon release/dupuytrens    KNEE ARTHROSCOPY W/ DEBRIDEMENT Left     meniscus repair    NASAL SEPTUM SURGERY      NASAL SEPTUM SURGERY      [place] REMOVAL OF HEAD OF RADIUS Right 11/16/2017    Procedure: RADIAL HEAD EXCISION, ELBOW;  Surgeon: Jeannine Smith MD;  Location: [facility] MAIN OR;  Service: Orthopedics    SHOULDER ARTHROSCOPY Right     SPINAL FUSION       Social History   History   Alcohol Use No     History   Drug Use No     Family History:   Family History   Problem Relation Age of Onset    Cancer Mother         non hodgkins lymphoma    Heart disease Father     Cancer Brother         colon    Cancer Maternal Grandmother         breast    Cancer Maternal Aunt         breast       Meds/Allergies   Allergies   Allergen Reactions    Aristocort [Triamcinolone] Hives    Bactrim [Sulfamethoxazole-Trimethoprim] Itching    Bupropion Itching    Ciprofloxacin Hives    Codeine GI Intolerance    Latex      Sensitive / rash    Methylprednisolone Hives    Penicillins Hives    Percocet [Oxycodone-Acetaminophen] GI Intolerance    Vioxx [Rofecoxib] Itching    Carbamazepine Rash    Ezetimibe Rash    Lipitor [Atorvastatin] Rash    Zocor [Simvastatin] Rash       Current Outpatient Prescriptions on File Prior to Visit   Medication Sig Dispense Refill    Cholecalciferol (VITAMIN D) 2000 units CAPS Take 1,000 Units by mouth every morning      FLUoxetine (PROzac) 40 MG capsule Take 80 mg by mouth every morning Takes two tabs = 80mg each a m dose        lamoTRIgine (LAMICTAL) 200 MG tablet LaMICtal 200 MG Oral Tablet  TAKE 2 TABLETS DAILY  Refills: 0    Active      levothyroxine 88 mcg tablet Levothyroxine Sodium 75 MCG Oral Tablet  Take 1 tablet daily   Quantity: 0;  Refills: 0       Asael Lyon ; Active      lisinopril (ZESTRIL) 2 5 mg tablet Lisinopril 2 5 MG Oral Tablet  TAKE 1 TABLET DAILY  Refills: 0     Started 20-Feb-2014  Active      metFORMIN (GLUCOPHAGE) 1000 MG tablet MetFORMIN HCl 1000 MG (MOD) TB24   Refills: 0    Active   takes 500mg a m  and 1000 mg with dinner      metFORMIN (GLUCOPHAGE) 500 mg tablet MetFORMIN HCl - 500 MG Oral Tablet   Refills: 0    Active      omeprazole (PriLOSEC) 40 MG capsule Take 40 mg by mouth every morning      pravastatin (PRAVACHOL) 80 mg tablet 80 mg every evening        primidone (MYSOLINE) 50 mg tablet Half a tablet at bedtime for a week, 1 tablet at bedtime for a week, 1 and half tablet at bedtime for a week, and then 2 tablets at bedtime to continue 60 tablet 2    QUEtiapine (SEROquel) 50 mg tablet Take 50 mg by mouth daily at bedtime       No current facility-administered medications on file prior to visit  Objective:  Patient's shoes and socks removed     Foot ExamPhysical Exam           Objective:      Foot Exam     General  General Appearance: appears stated age and healthy   Orientation: alert and oriented to person, place, and time   Affect: appropriate   Gait: antalgic         Right Foot/Ankle      Inspection and Palpation  Ecchymosis: dorsum  Tenderness: metatarsals   Swelling: metatarsals   Arch: pes planus  Hammertoes: fifth toe  Hallux valgus: no  Skin Exam: dry skin; submetatarsal 3 demonstrates a small lesion consistent with verruca         Left Foot/Ankle       Inspection and Palpation  Ecchymosis: none  Tenderness: metatarsals   Swelling: metatarsals   Arch: pes planus  Hammertoes: fifth toe  Skin Exam: dry skin;

## 2018-09-26 ENCOUNTER — OFFICE VISIT (OUTPATIENT)
Dept: NEUROLOGY | Facility: CLINIC | Age: 63
End: 2018-09-26
Payer: MEDICARE

## 2018-09-26 VITALS
HEIGHT: 65 IN | HEART RATE: 70 BPM | DIASTOLIC BLOOD PRESSURE: 78 MMHG | SYSTOLIC BLOOD PRESSURE: 133 MMHG | WEIGHT: 140 LBS | BODY MASS INDEX: 23.32 KG/M2

## 2018-09-26 DIAGNOSIS — G25.0 TREMOR, ESSENTIAL: ICD-10-CM

## 2018-09-26 DIAGNOSIS — G25.81 RESTLESS LEG SYNDROME: Primary | ICD-10-CM

## 2018-09-26 PROCEDURE — 99214 OFFICE O/P EST MOD 30 MIN: CPT | Performed by: PSYCHIATRY & NEUROLOGY

## 2018-09-26 RX ORDER — PRAMIPEXOLE DIHYDROCHLORIDE 0.25 MG/1
0.25 TABLET ORAL 2 TIMES DAILY
Qty: 60 TABLET | Refills: 3 | Status: SHIPPED | OUTPATIENT
Start: 2018-09-26 | End: 2019-04-09

## 2018-09-26 RX ORDER — PRIMIDONE 50 MG/1
TABLET ORAL
Qty: 60 TABLET | Refills: 3 | Status: SHIPPED | OUTPATIENT
Start: 2018-09-26 | End: 2019-04-09

## 2018-09-26 RX ORDER — OMEPRAZOLE 20 MG/1
1 CAPSULE, DELAYED RELEASE ORAL 2 TIMES DAILY
COMMUNITY
Start: 2018-09-26 | End: 2019-04-09

## 2018-09-26 NOTE — PROGRESS NOTES
Patient ID: Nanette Santiago is a 65 y o  female  Assessment/Plan:    Essential tremor    Her restless leg syndrome    Plan continue primidone 2 tablets at bedtime in the [employer] is headed 0 25 mg tablet 1 at 6:00 p m  and 11:00 p m  Dianna Taylor Return to office visit in 4 months both prescription has been sent to her pharmacy with refills thank you    Subjective:    58-year-old female followed in the office for essential tremor and restless leg syndrome  Patient reported having no trouble eating and drinking however she does have a trouble writing  Occasionally her hand may shake while drinking  And also when she is working with the hands i e  using the screwdriver  Patient can drink out of the full cup and see can eat soup with a spoon  She does have a restlessness in the legs within 15-30 minutes after she sits down  And it can happen throughout the day  However predominantly starts at night and then when patient goes to sleep  Patient the urge to move the leg along with the creepy crawling sensation  Patient denies any other neurological symptoms such as resting tremor shuffling gait loss of balance  Pt denies double vision, blurred vision, transient monocular blindness, vertigo, tinnitus, hearing loss, slurred speech, difficulty expressing and understanding, dysphasia, and focal weakness, numbness, imbalance and incoordination  Pt denies bladder and bowel urgency, frequency and incontinence  Pt denies double vision, blurred vision, transient monocular blindness, vertigo, tinnitus, hearing loss, slurred speech, difficulty expressing and understanding, dysphasia, and focal weakness, numbness, imbalance and incoordination  Pt denies bladder and bowel urgency, frequency and incontinence         Past Medical History:   Diagnosis Date    Arthritis     osteo    [facility] disorder ([facility] [address] )     Chronic kidney disease     [facility])     Depression     Diabetes mellitus ([place] [id number] )     Disease of thyroid gland     DVT (deep vein thrombosis) in pregnancy ([place] [address] ) 1992    right leg    Elbow disorder     fx right elbow    Enlarged heart     Essential tremor     GERD (gastroesophageal reflux disease)     Hyperlipidemia     Parkinson disease (HCC)     Parkinsons disease ([place] [id number] )     Pneumonia     x3    Psychiatric disorder     RLS (restless legs syndrome)     Wears partial dentures     upper       Family History   Problem Relation Age of Onset    Cancer Mother         non hodgkins lymphoma    Parkinsonism Mother     Heart disease Father     Cancer Brother         colon    Cancer Maternal Grandmother         breast    Cancer Maternal Aunt         breast    Aneurysm Sister    ,    Social History     Social History    Marital status: Single     Spouse name: N/A    Number of children: N/A    Years of education: N/A     Occupational History    Not on file  Social History Main Topics    Smoking status: Current Every Day Smoker     Packs/day: 1 00     Years: 39 00    Smokeless tobacco: Never Used    Alcohol use No    Drug use: No    Sexual activity: Not Currently     Other Topics Concern    Not on file     Social History Narrative    No narrative on file       Current Outpatient Prescriptions on File Prior to Visit   Medication Sig Dispense Refill    Cholecalciferol (VITAMIN D) 2000 units CAPS Take 1,000 Units by mouth every morning      FLUoxetine (PROzac) 40 MG capsule Take 80 mg by mouth every morning Takes two tabs = 80mg each a m dose        lamoTRIgine (LAMICTAL) 200 MG tablet LaMICtal 200 MG Oral Tablet  TAKE 2 TABLETS DAILY  Refills: 0    Active      levothyroxine 88 mcg tablet Levothyroxine Sodium 75 MCG Oral Tablet  Take 1 tablet daily   Quantity: 0;  Refills: 0       Flakita Castrejon ; Active      lisinopril (ZESTRIL) 2 5 mg tablet Lisinopril 2 5 MG Oral Tablet  TAKE 1 TABLET DAILY     Refills: 0     Started 20-Feb-2014  Active      metFORMIN (GLUCOPHAGE) 1000 MG tablet MetFORMIN HCl 1000 MG (MOD) TB24   Refills: 0    Active   takes 500mg a m  and 1000 mg with dinner      metFORMIN (GLUCOPHAGE) 500 mg tablet MetFORMIN HCl - 500 MG Oral Tablet   Refills: 0    Active      pravastatin (PRAVACHOL) 80 mg tablet 80 mg every evening        [DISCONTINUED] primidone (MYSOLINE) 50 mg tablet Half a tablet at bedtime for a week, 1 tablet at bedtime for a week, 1 and half tablet at bedtime for a week, and then 2 tablets at bedtime to continue 60 tablet 2    QUEtiapine (SEROquel) 50 mg tablet Take 50 mg by mouth daily at bedtime      [DISCONTINUED] omeprazole (PriLOSEC) 40 MG capsule Take 40 mg by mouth every morning       No current facility-administered medications on file prior to visit  Objective:    Blood pressure 133/78, pulse 70, height 5' 5" (1 651 m), weight 63 5 kg (140 lb), not currently breastfeeding  Physical Exam   Eyes: EOM are normal  Pupils are equal, round, and reactive to light  Neck: Normal carotid pulses present  Carotid bruit is not present  Neurological: She has normal reflexes  Gait normal    Psychiatric: Her speech is normal        Neurological Exam    Mental Status  The patient is and oriented to person, place, time, and situation  Her recent and remote memory are normal  She has no visuospatial neglect  Her speech is normal  Her language is fluent with no aphasia  She follows multi-step commands  She has a normal fund of knowledge  Cranial Nerves    CN II: The patient's visual acuity and visual fields are normal   CN III, IV, VI: The patient's pupils are equally round and reactive to light and ocular movements are normal   CN V: The patient has normal facial sensation  CN VII:  The patient has symmetric facial movement  CN VIII:  The patient's hearing is normal   CN IX, X: The patient has symmetric palate movement and normal gag reflex    CN XI: The patient's shoulder shrug strength is normal   CN XII: The patient's tongue is midline without atrophy or fasciculations  Motor  The patient has normal muscle bulk throughout  Her overall muscle tone is normal throughout  Her strength is 5/5 in all four extremities except as noted  Rapid movement and rapid alternating movement is normal  Patient is a no cogwheel rigidity or masklike faces  On the outstretched a dime patient has a minimal amplitude tremor however no terminal action tremor on finger-to-nose on either side  Sensory  The patient's sensation is normal in all four extremities to light touch, pinprick and proprioception  She has normal cortical sensation    Reflexes  Deep tendon reflexes are 2+ and symmetric in all four extremities with downgoing toes bilaterally  Gait and Coordination  The patient has normal gait and station and normal casual, toe, heel, and tandem gait  She has normal right heel to shin and normal left heel to shin coordination  She has normal right finger to nose and normal left finger to nose coordination  ROS:    Review of Systems   Constitutional: Negative  HENT: Negative  Eyes: Negative  Respiratory: Negative  Cardiovascular: Negative  Gastrointestinal: Negative  Endocrine: Negative  Genitourinary: Negative  Musculoskeletal: Negative  Skin: Negative  Allergic/Immunologic: Negative  Neurological: Negative  Hematological: Negative  Psychiatric/Behavioral: Negative

## 2018-10-19 ENCOUNTER — TRANSCRIBE ORDERS (OUTPATIENT)
Dept: ADMINISTRATIVE | Facility: HOSPITAL | Age: 63
End: 2018-10-19

## 2018-10-19 ENCOUNTER — HOSPITAL ENCOUNTER (OUTPATIENT)
Dept: RADIOLOGY | Facility: HOSPITAL | Age: 63
Discharge: HOME | End: 2018-10-19
Attending: INTERNAL MEDICINE
Payer: MEDICARE

## 2018-10-19 DIAGNOSIS — R52 PAIN: ICD-10-CM

## 2018-10-19 DIAGNOSIS — R60.9 SWELLING: ICD-10-CM

## 2018-10-19 DIAGNOSIS — R09.81 NASAL SINUS CONGESTION: Primary | ICD-10-CM

## 2018-10-19 PROCEDURE — 93970 EXTREMITY STUDY: CPT

## 2018-10-20 ENCOUNTER — TRANSCRIBE ORDERS (OUTPATIENT)
Dept: ADMINISTRATIVE | Facility: HOSPITAL | Age: 63
End: 2018-10-20

## 2018-10-20 ENCOUNTER — APPOINTMENT (OUTPATIENT)
Dept: LAB | Facility: HOSPITAL | Age: 63
End: 2018-10-20
Attending: INTERNAL MEDICINE
Payer: MEDICARE

## 2018-10-20 DIAGNOSIS — M06.9 RHEUMATOID ARTHRITIS, INVOLVING UNSPECIFIED SITE, UNSPECIFIED RHEUMATOID FACTOR PRESENCE: ICD-10-CM

## 2018-10-20 DIAGNOSIS — M10.9 GOUT, UNSPECIFIED CAUSE, UNSPECIFIED CHRONICITY, UNSPECIFIED SITE: ICD-10-CM

## 2018-10-20 DIAGNOSIS — E78.00 PURE HYPERCHOLESTEROLEMIA: ICD-10-CM

## 2018-10-20 DIAGNOSIS — E55.9 VITAMIN D INSUFFICIENCY: ICD-10-CM

## 2018-10-20 DIAGNOSIS — I10 ESSENTIAL HYPERTENSION, MALIGNANT: ICD-10-CM

## 2018-10-20 DIAGNOSIS — E03.9 HYPOTHYROIDISM, UNSPECIFIED TYPE: ICD-10-CM

## 2018-10-20 DIAGNOSIS — D64.9 ANEMIA, UNSPECIFIED TYPE: ICD-10-CM

## 2018-10-20 DIAGNOSIS — E11.9 DIABETES MELLITUS WITHOUT COMPLICATION (HCC): ICD-10-CM

## 2018-10-20 DIAGNOSIS — N39.0 URINARY TRACT INFECTION WITHOUT HEMATURIA, SITE UNSPECIFIED: ICD-10-CM

## 2018-10-20 DIAGNOSIS — D64.9 ANEMIA, UNSPECIFIED TYPE: Primary | ICD-10-CM

## 2018-10-20 LAB
25(OH)D3 SERPL-MCNC: 53.2 NG/ML (ref 30–100)
ALBUMIN SERPL BCP-MCNC: 3.2 G/DL (ref 3.5–5)
ALP SERPL-CCNC: 118 U/L (ref 46–116)
ALT SERPL W P-5'-P-CCNC: 47 U/L (ref 12–78)
ANION GAP SERPL CALCULATED.3IONS-SCNC: 11 MMOL/L (ref 4–13)
AST SERPL W P-5'-P-CCNC: 43 U/L (ref 5–45)
BACTERIA UR QL AUTO: ABNORMAL /HPF
BASOPHILS # BLD AUTO: 0.05 THOUSANDS/ΜL (ref 0–0.1)
BASOPHILS NFR BLD AUTO: 1 % (ref 0–1)
BILIRUB SERPL-MCNC: 0.2 MG/DL (ref 0.2–1)
BILIRUB UR QL STRIP: NEGATIVE
BUN SERPL-MCNC: 11 MG/DL (ref 5–25)
CALCIUM SERPL-MCNC: 8.7 MG/DL (ref 8.3–10.1)
CAOX CRY URNS QL MICRO: ABNORMAL /HPF
CHLORIDE SERPL-SCNC: 97 MMOL/L (ref 100–108)
CHOLEST SERPL-MCNC: 180 MG/DL (ref 50–200)
CLARITY UR: CLEAR
CO2 SERPL-SCNC: 30 MMOL/L (ref 21–32)
COLOR UR: ABNORMAL
CREAT SERPL-MCNC: 0.71 MG/DL (ref 0.6–1.3)
EOSINOPHIL # BLD AUTO: 0.09 THOUSAND/ΜL (ref 0–0.61)
EOSINOPHIL NFR BLD AUTO: 1 % (ref 0–6)
ERYTHROCYTE [DISTWIDTH] IN BLOOD BY AUTOMATED COUNT: 14.9 % (ref 11.6–15.1)
EST. AVERAGE GLUCOSE BLD GHB EST-MCNC: 120 MG/DL
GFR SERPL CREATININE-BSD FRML MDRD: 92 ML/MIN/1.73SQ M
GLUCOSE P FAST SERPL-MCNC: 171 MG/DL (ref 65–99)
GLUCOSE UR STRIP-MCNC: NEGATIVE MG/DL
HBA1C MFR BLD: 5.8 % (ref 4.2–6.3)
HCT VFR BLD AUTO: 41.2 % (ref 34.8–46.1)
HDLC SERPL-MCNC: 36 MG/DL (ref 40–60)
HGB BLD-MCNC: 12.5 G/DL (ref 11.5–15.4)
HGB UR QL STRIP.AUTO: NEGATIVE
IMM GRANULOCYTES # BLD AUTO: 0.04 THOUSAND/UL (ref 0–0.2)
IMM GRANULOCYTES NFR BLD AUTO: 1 % (ref 0–2)
KETONES UR STRIP-MCNC: NEGATIVE MG/DL
LDLC SERPL CALC-MCNC: 126 MG/DL (ref 0–100)
LEUKOCYTE ESTERASE UR QL STRIP: NEGATIVE
LYMPHOCYTES # BLD AUTO: 1.65 THOUSANDS/ΜL (ref 0.6–4.47)
LYMPHOCYTES NFR BLD AUTO: 22 % (ref 14–44)
MCH RBC QN AUTO: 25 PG (ref 26.8–34.3)
MCHC RBC AUTO-ENTMCNC: 30.3 G/DL (ref 31.4–37.4)
MCV RBC AUTO: 82 FL (ref 82–98)
MONOCYTES # BLD AUTO: 0.47 THOUSAND/ΜL (ref 0.17–1.22)
MONOCYTES NFR BLD AUTO: 6 % (ref 4–12)
MUCOUS THREADS UR QL AUTO: ABNORMAL
NEUTROPHILS # BLD AUTO: 5.33 THOUSANDS/ΜL (ref 1.85–7.62)
NEUTS SEG NFR BLD AUTO: 69 % (ref 43–75)
NITRITE UR QL STRIP: NEGATIVE
NON-SQ EPI CELLS URNS QL MICRO: ABNORMAL /HPF
NONHDLC SERPL-MCNC: 144 MG/DL
NRBC BLD AUTO-RTO: 0 /100 WBCS
PH UR STRIP.AUTO: 7 [PH] (ref 5–9)
PLATELET # BLD AUTO: 316 THOUSANDS/UL (ref 149–390)
PMV BLD AUTO: 10.6 FL (ref 8.9–12.7)
POTASSIUM SERPL-SCNC: 3.4 MMOL/L (ref 3.5–5.3)
PROT SERPL-MCNC: 6.7 G/DL (ref 6.4–8.2)
PROT UR STRIP-MCNC: ABNORMAL MG/DL
RBC # BLD AUTO: 5 MILLION/UL (ref 3.81–5.12)
RBC #/AREA URNS AUTO: ABNORMAL /HPF
SODIUM SERPL-SCNC: 138 MMOL/L (ref 136–145)
SP GR UR STRIP.AUTO: 1.02 (ref 1–1.03)
TRIGL SERPL-MCNC: 91 MG/DL
TSH SERPL DL<=0.05 MIU/L-ACNC: 2.11 UIU/ML (ref 0.36–3.74)
UROBILINOGEN UR QL STRIP.AUTO: 1 E.U./DL
WBC # BLD AUTO: 7.63 THOUSAND/UL (ref 4.31–10.16)
WBC #/AREA URNS AUTO: ABNORMAL /HPF

## 2018-10-20 PROCEDURE — 36415 COLL VENOUS BLD VENIPUNCTURE: CPT

## 2018-10-20 PROCEDURE — 83036 HEMOGLOBIN GLYCOSYLATED A1C: CPT | Performed by: INTERNAL MEDICINE

## 2018-10-20 PROCEDURE — 81001 URINALYSIS AUTO W/SCOPE: CPT | Performed by: INTERNAL MEDICINE

## 2018-10-20 PROCEDURE — 80053 COMPREHEN METABOLIC PANEL: CPT | Performed by: INTERNAL MEDICINE

## 2018-10-20 PROCEDURE — 82306 VITAMIN D 25 HYDROXY: CPT

## 2018-10-20 PROCEDURE — 84443 ASSAY THYROID STIM HORMONE: CPT

## 2018-10-20 PROCEDURE — 85025 COMPLETE CBC W/AUTO DIFF WBC: CPT | Performed by: INTERNAL MEDICINE

## 2018-10-20 PROCEDURE — 80061 LIPID PANEL: CPT | Performed by: INTERNAL MEDICINE

## 2018-10-21 PROCEDURE — 93970 EXTREMITY STUDY: CPT | Performed by: SURGERY

## 2018-10-25 ENCOUNTER — HOSPITAL ENCOUNTER (OUTPATIENT)
Dept: RADIOLOGY | Facility: HOSPITAL | Age: 63
Discharge: HOME | End: 2018-10-25
Attending: INTERNAL MEDICINE
Payer: MEDICARE

## 2018-10-25 DIAGNOSIS — R09.81 NASAL SINUS CONGESTION: ICD-10-CM

## 2018-10-25 PROCEDURE — 70486 CT MAXILLOFACIAL W/O DYE: CPT

## 2018-10-25 PROCEDURE — 70450 CT HEAD/BRAIN W/O DYE: CPT

## 2018-11-19 ENCOUNTER — TRANSCRIBE ORDERS (OUTPATIENT)
Dept: ADMINISTRATIVE | Facility: HOSPITAL | Age: 63
End: 2018-11-19

## 2018-11-19 DIAGNOSIS — I10 HYPERTENSION, UNSPECIFIED TYPE: Primary | ICD-10-CM

## 2018-11-19 DIAGNOSIS — R60.0 EDEMA OF BOTH LEGS: ICD-10-CM

## 2018-11-20 ENCOUNTER — TRANSCRIBE ORDERS (OUTPATIENT)
Dept: ADMINISTRATIVE | Facility: HOSPITAL | Age: 63
End: 2018-11-20

## 2018-11-20 ENCOUNTER — APPOINTMENT (OUTPATIENT)
Dept: LAB | Facility: HOSPITAL | Age: 63
End: 2018-11-20
Attending: INTERNAL MEDICINE
Payer: MEDICARE

## 2018-11-20 ENCOUNTER — HOSPITAL ENCOUNTER (OUTPATIENT)
Dept: NON INVASIVE DIAGNOSTICS | Facility: HOSPITAL | Age: 63
Discharge: HOME | End: 2018-11-20
Attending: INTERNAL MEDICINE
Payer: MEDICARE

## 2018-11-20 DIAGNOSIS — E03.9 HYPOTHYROIDISM, UNSPECIFIED TYPE: ICD-10-CM

## 2018-11-20 DIAGNOSIS — E78.00 PURE HYPERCHOLESTEROLEMIA: ICD-10-CM

## 2018-11-20 DIAGNOSIS — D64.9 ANEMIA, UNSPECIFIED TYPE: ICD-10-CM

## 2018-11-20 DIAGNOSIS — E87.8 DISORDER OF FLUID OR ELECTROLYTE: ICD-10-CM

## 2018-11-20 DIAGNOSIS — E11.9 DIABETES MELLITUS WITHOUT COMPLICATION (HCC): ICD-10-CM

## 2018-11-20 DIAGNOSIS — D64.9 ANEMIA, UNSPECIFIED TYPE: Primary | ICD-10-CM

## 2018-11-20 DIAGNOSIS — I10 ESSENTIAL HYPERTENSION, MALIGNANT: ICD-10-CM

## 2018-11-20 DIAGNOSIS — R60.0 EDEMA OF BOTH LEGS: ICD-10-CM

## 2018-11-20 DIAGNOSIS — I10 HYPERTENSION, UNSPECIFIED TYPE: ICD-10-CM

## 2018-11-20 LAB
ALBUMIN SERPL BCP-MCNC: 3.4 G/DL (ref 3.5–5)
ALP SERPL-CCNC: 133 U/L (ref 46–116)
ALT SERPL W P-5'-P-CCNC: 68 U/L (ref 12–78)
ANION GAP SERPL CALCULATED.3IONS-SCNC: 6 MMOL/L (ref 4–13)
AST SERPL W P-5'-P-CCNC: 83 U/L (ref 5–45)
BACTERIA UR QL AUTO: ABNORMAL /HPF
BILIRUB SERPL-MCNC: 0.3 MG/DL (ref 0.2–1)
BILIRUB UR QL STRIP: NEGATIVE
BUN SERPL-MCNC: 8 MG/DL (ref 5–25)
CALCIUM SERPL-MCNC: 9.7 MG/DL (ref 8.3–10.1)
CHLORIDE SERPL-SCNC: 99 MMOL/L (ref 100–108)
CLARITY UR: CLEAR
CO2 SERPL-SCNC: 32 MMOL/L (ref 21–32)
COLOR UR: YELLOW
CREAT SERPL-MCNC: 0.7 MG/DL (ref 0.6–1.3)
GFR SERPL CREATININE-BSD FRML MDRD: 93 ML/MIN/1.73SQ M
GLUCOSE P FAST SERPL-MCNC: 107 MG/DL (ref 65–99)
GLUCOSE UR STRIP-MCNC: NEGATIVE MG/DL
HGB UR QL STRIP.AUTO: NEGATIVE
KETONES UR STRIP-MCNC: ABNORMAL MG/DL
LEUKOCYTE ESTERASE UR QL STRIP: NEGATIVE
MAGNESIUM SERPL-MCNC: 1.6 MG/DL (ref 1.6–2.6)
NITRITE UR QL STRIP: NEGATIVE
NON-SQ EPI CELLS URNS QL MICRO: ABNORMAL /HPF
PH UR STRIP.AUTO: 7 [PH] (ref 5–9)
POTASSIUM SERPL-SCNC: 4.9 MMOL/L (ref 3.5–5.3)
PROT SERPL-MCNC: 7.2 G/DL (ref 6.4–8.2)
PROT UR STRIP-MCNC: ABNORMAL MG/DL
RBC #/AREA URNS AUTO: ABNORMAL /HPF
SODIUM SERPL-SCNC: 137 MMOL/L (ref 136–145)
SP GR UR STRIP.AUTO: 1.01 (ref 1–1.03)
URINE COMMENT: ABNORMAL
UROBILINOGEN UR QL STRIP.AUTO: 2 E.U./DL
WBC #/AREA URNS AUTO: ABNORMAL /HPF

## 2018-11-20 PROCEDURE — 80053 COMPREHEN METABOLIC PANEL: CPT | Performed by: INTERNAL MEDICINE

## 2018-11-20 PROCEDURE — 36415 COLL VENOUS BLD VENIPUNCTURE: CPT | Performed by: INTERNAL MEDICINE

## 2018-11-20 PROCEDURE — 93306 TTE W/DOPPLER COMPLETE: CPT

## 2018-11-20 PROCEDURE — 93306 TTE W/DOPPLER COMPLETE: CPT | Performed by: INTERNAL MEDICINE

## 2018-11-20 PROCEDURE — 81001 URINALYSIS AUTO W/SCOPE: CPT | Performed by: INTERNAL MEDICINE

## 2018-11-20 PROCEDURE — 87086 URINE CULTURE/COLONY COUNT: CPT

## 2018-11-20 PROCEDURE — 83735 ASSAY OF MAGNESIUM: CPT

## 2018-11-21 LAB — BACTERIA UR CULT: NORMAL

## 2018-12-12 ENCOUNTER — TRANSCRIBE ORDERS (OUTPATIENT)
Dept: ADMINISTRATIVE | Facility: HOSPITAL | Age: 63
End: 2018-12-12

## 2018-12-12 DIAGNOSIS — R79.89 ABNORMAL LFTS: Primary | ICD-10-CM

## 2018-12-12 DIAGNOSIS — R10.9 FLANK PAIN: ICD-10-CM

## 2018-12-14 ENCOUNTER — HOSPITAL ENCOUNTER (EMERGENCY)
Facility: HOSPITAL | Age: 63
Discharge: HOME | End: 2018-12-14
Attending: EMERGENCY MEDICINE | Admitting: EMERGENCY MEDICINE
Payer: MEDICARE

## 2018-12-14 ENCOUNTER — APPOINTMENT (EMERGENCY)
Dept: RADIOLOGY | Facility: HOSPITAL | Age: 63
End: 2018-12-14
Payer: MEDICARE

## 2018-12-14 VITALS
SYSTOLIC BLOOD PRESSURE: 129 MMHG | WEIGHT: 130 LBS | HEART RATE: 72 BPM | TEMPERATURE: 97.8 F | RESPIRATION RATE: 18 BRPM | OXYGEN SATURATION: 98 % | BODY MASS INDEX: 21.66 KG/M2 | DIASTOLIC BLOOD PRESSURE: 56 MMHG | HEIGHT: 65 IN

## 2018-12-14 DIAGNOSIS — M54.9 BACK PAIN: Primary | ICD-10-CM

## 2018-12-14 PROCEDURE — 99283 EMERGENCY DEPT VISIT LOW MDM: CPT

## 2018-12-14 PROCEDURE — 72100 X-RAY EXAM L-S SPINE 2/3 VWS: CPT

## 2018-12-14 PROCEDURE — 72070 X-RAY EXAM THORAC SPINE 2VWS: CPT

## 2018-12-14 PROCEDURE — 96372 THER/PROPH/DIAG INJ SC/IM: CPT

## 2018-12-14 RX ORDER — KETOROLAC TROMETHAMINE 30 MG/ML
30 INJECTION, SOLUTION INTRAMUSCULAR; INTRAVENOUS ONCE
Status: COMPLETED | OUTPATIENT
Start: 2018-12-14 | End: 2018-12-14

## 2018-12-14 RX ORDER — HYDROCODONE BITARTRATE AND ACETAMINOPHEN 5; 325 MG/1; MG/1
1 TABLET ORAL EVERY 6 HOURS PRN
Qty: 12 TABLET | Refills: 0 | Status: SHIPPED | OUTPATIENT
Start: 2018-12-14 | End: 2018-12-17

## 2018-12-14 RX ADMIN — KETOROLAC TROMETHAMINE 30 MG: 30 INJECTION, SOLUTION INTRAMUSCULAR at 19:34

## 2018-12-15 NOTE — DISCHARGE INSTRUCTIONS
Back Pain   WHAT YOU NEED TO KNOW:   Back pain is common  It can be caused by many conditions, such as arthritis or the breakdown of spinal discs  Your risk for back pain is increased by injuries, lack of activity, or repeated bending and twisting  You may feel sore or stiff on one or both sides of your back  The pain may spread to your buttocks or thighs  DISCHARGE INSTRUCTIONS:   Medicines:   · NSAIDs  help decrease swelling and pain  This medicine is available with or without a doctor's order  NSAIDs can cause stomach bleeding or kidney problems in certain people  If you take blood thinner medicine, always ask your healthcare provider if NSAIDs are safe for you  Always read the medicine label and follow directions  · Acetaminophen  decreases pain  It is available without a doctor's order  Ask how much to take and how often to take it  Follow directions  Acetaminophen can cause liver damage if not taken correctly  · Prescription pain medicine  may be given  Ask your healthcare provider how to take this medicine safely  · Take your medicine as directed  Contact your healthcare provider if you think your medicine is not helping or if you have side effects  Tell him or her if you are allergic to any medicine  Keep a list of the medicines, vitamins, and herbs you take  Include the amounts, and when and why you take them  Bring the list or the pill bottles to follow-up visits  Carry your medicine list with you in case of an emergency  Follow up with your healthcare provider in 2 weeks, or as directed:  Write down your questions so you remember to ask them during your visits  How to manage your back pain:   · Apply ice  on your back or affected area for 15 to 20 minutes every hour or as directed  Use an ice pack, or put crushed ice in a plastic bag  Cover it with a towel  Ice helps prevent tissue damage and decreases pain      · Apply heat  on your back or affected area for 20 to 30 minutes every 2 hours for as many days as directed  Heat helps decrease pain and muscle spasms  · Stay active  as much as you can without causing more pain  Bed rest could make your back pain worse  Avoid heavy lifting until your pain is gone  Return to the emergency department if:   · You have pain, numbness, or weakness in one or both legs  · Your pain becomes so severe that you cannot walk  · You cannot control your urine or bowel movements  · You have severe back pain with chest pain  · You have severe back pain, nausea, and vomiting  · You have severe back pain that spreads to your side or genital area  Contact your healthcare provider if:   · You have back pain that does not get better with rest and pain medicine  · You have a fever  · You have pain that worsens when you are on your back or when you rest     · You have pain that worsens when you cough or sneeze  · You lose weight without trying  · You have questions or concerns about your condition or care  © 2017 [employer] Information is for End User's use only and may not be sold, redistributed or otherwise used for commercial purposes  All illustrations and images included in CareNotes® are the copyrighted property of [employer] , [employer]  or [employer] A[employer]  The above information is an  only  It is not intended as medical advice for individual conditions or treatments  Talk to your doctor, nurse or pharmacist before following any medical regimen to see if it is safe and effective for you

## 2018-12-17 ENCOUNTER — HOSPITAL ENCOUNTER (OUTPATIENT)
Dept: RADIOLOGY | Facility: HOSPITAL | Age: 63
Discharge: HOME | End: 2018-12-17
Attending: INTERNAL MEDICINE
Payer: MEDICARE

## 2018-12-17 DIAGNOSIS — R79.89 ABNORMAL LFTS: ICD-10-CM

## 2018-12-17 DIAGNOSIS — R10.9 FLANK PAIN: ICD-10-CM

## 2018-12-17 PROCEDURE — 76700 US EXAM ABDOM COMPLETE: CPT

## 2018-12-18 ENCOUNTER — APPOINTMENT (INPATIENT)
Dept: RADIOLOGY | Facility: HOSPITAL | Age: 63
DRG: 003 | End: 2018-12-18
Payer: MEDICARE

## 2018-12-18 ENCOUNTER — HOSPITAL ENCOUNTER (INPATIENT)
Facility: HOSPITAL | Age: 63
LOS: 16 days | Discharge: NURSING, REHAB OR LONG-TERM CARE | DRG: 003 | End: 2019-01-03
Attending: SURGERY | Admitting: SURGERY
Payer: MEDICARE

## 2018-12-18 DIAGNOSIS — I61.9 INTRAPARENCHYMAL HEMORRHAGE OF BRAIN (HCC): ICD-10-CM

## 2018-12-18 DIAGNOSIS — Z98.890 STATUS POST CRANIECTOMY: ICD-10-CM

## 2018-12-18 DIAGNOSIS — S06.349A INTRAPARENCHYMAL HEMATOMA OF BRAIN, RIGHT, WITH LOSS OF CONSCIOUSNESS, INITIAL ENCOUNTER (HCC): ICD-10-CM

## 2018-12-18 DIAGNOSIS — I60.9 SUBARACHNOID HEMORRHAGE (HCC): ICD-10-CM

## 2018-12-18 DIAGNOSIS — S42.141A GLENOID FRACTURE OF SHOULDER, RIGHT, CLOSED, INITIAL ENCOUNTER: ICD-10-CM

## 2018-12-18 DIAGNOSIS — S27.0XXA TRAUMATIC PNEUMOTHORAX, INITIAL ENCOUNTER: ICD-10-CM

## 2018-12-18 DIAGNOSIS — S92.912A CLOSED NONDISPLACED FRACTURE OF PROXIMAL PHALANX OF TOE OF LEFT FOOT: ICD-10-CM

## 2018-12-18 DIAGNOSIS — S06.9X9A TRAUMATIC BRAIN INJURY WITH LOSS OF CONSCIOUSNESS, INITIAL ENCOUNTER (HCC): ICD-10-CM

## 2018-12-18 DIAGNOSIS — J96.00 ACUTE RESPIRATORY FAILURE (HCC): Primary | ICD-10-CM

## 2018-12-18 DIAGNOSIS — J96.01 ACUTE RESPIRATORY FAILURE WITH HYPOXIA (HCC): ICD-10-CM

## 2018-12-18 DIAGNOSIS — S06.5X9A SUBDURAL HEMATOMA (HCC): ICD-10-CM

## 2018-12-18 DIAGNOSIS — S42.151A GLENOID FRACTURE OF SHOULDER, RIGHT, CLOSED, INITIAL ENCOUNTER: ICD-10-CM

## 2018-12-18 DIAGNOSIS — S22.43XA MULTIPLE FRACTURES OF RIBS, BILATERAL, INITIAL ENCOUNTER FOR CLOSED FRACTURE: ICD-10-CM

## 2018-12-18 DIAGNOSIS — E46 PROTEIN-CALORIE MALNUTRITION, UNSPECIFIED SEVERITY (HCC): ICD-10-CM

## 2018-12-18 LAB
ABO GROUP BLD: NORMAL
ALBUMIN SERPL BCP-MCNC: 3.4 G/DL (ref 3.5–5)
ALP SERPL-CCNC: 138 U/L (ref 46–116)
ALT SERPL W P-5'-P-CCNC: 96 U/L (ref 12–78)
ANION GAP SERPL CALCULATED.3IONS-SCNC: 6 MMOL/L (ref 4–13)
ANION GAP SERPL CALCULATED.3IONS-SCNC: 8 MMOL/L (ref 4–13)
APTT PPP: 25 SECONDS (ref 26–38)
AST SERPL W P-5'-P-CCNC: 130 U/L (ref 5–45)
BASE EXCESS BLDA CALC-SCNC: -0.6 MMOL/L
BASOPHILS # BLD AUTO: 0.02 THOUSANDS/ΜL (ref 0–0.1)
BASOPHILS NFR BLD AUTO: 0 % (ref 0–1)
BILIRUB SERPL-MCNC: 0.42 MG/DL (ref 0.2–1)
BLD GP AB SCN SERPL QL: NEGATIVE
BUN SERPL-MCNC: 7 MG/DL (ref 5–25)
BUN SERPL-MCNC: 9 MG/DL (ref 5–25)
CALCIUM SERPL-MCNC: 7.5 MG/DL (ref 8.3–10.1)
CALCIUM SERPL-MCNC: 8.7 MG/DL (ref 8.3–10.1)
CHLORIDE SERPL-SCNC: 105 MMOL/L (ref 100–108)
CHLORIDE SERPL-SCNC: 88 MMOL/L (ref 100–108)
CK MB SERPL-MCNC: 1.3 % (ref 0–2.5)
CK MB SERPL-MCNC: 9 NG/ML (ref 0–5)
CK SERPL-CCNC: 685 U/L (ref 26–192)
CO2 SERPL-SCNC: 25 MMOL/L (ref 21–32)
CO2 SERPL-SCNC: 26 MMOL/L (ref 21–32)
CREAT SERPL-MCNC: 0.42 MG/DL (ref 0.6–1.3)
CREAT SERPL-MCNC: 0.49 MG/DL (ref 0.6–1.3)
EOSINOPHIL # BLD AUTO: 0.01 THOUSAND/ΜL (ref 0–0.61)
EOSINOPHIL NFR BLD AUTO: 0 % (ref 0–6)
ERYTHROCYTE [DISTWIDTH] IN BLOOD BY AUTOMATED COUNT: 14.7 % (ref 11.6–15.1)
GFR SERPL CREATININE-BSD FRML MDRD: 105 ML/MIN/1.73SQ M
GFR SERPL CREATININE-BSD FRML MDRD: 59 ML/MIN/1.73SQ M
GLUCOSE SERPL-MCNC: 141 MG/DL (ref 65–140)
GLUCOSE SERPL-MCNC: 166 MG/DL (ref 65–140)
HCO3 BLDA-SCNC: 24.2 MMOL/L (ref 22–28)
HCT VFR BLD AUTO: 35.3 % (ref 34.8–46.1)
HGB BLD-MCNC: 11.6 G/DL (ref 11.5–15.4)
IMM GRANULOCYTES # BLD AUTO: 0.07 THOUSAND/UL (ref 0–0.2)
IMM GRANULOCYTES NFR BLD AUTO: 1 % (ref 0–2)
INR PPP: 1.01 (ref 0.86–1.17)
LYMPHOCYTES # BLD AUTO: 0.57 THOUSANDS/ΜL (ref 0.6–4.47)
LYMPHOCYTES NFR BLD AUTO: 4 % (ref 14–44)
MCH RBC QN AUTO: 25.6 PG (ref 26.8–34.3)
MCHC RBC AUTO-ENTMCNC: 32.9 G/DL (ref 31.4–37.4)
MCV RBC AUTO: 78 FL (ref 82–98)
MONOCYTES # BLD AUTO: 0.79 THOUSAND/ΜL (ref 0.17–1.22)
MONOCYTES NFR BLD AUTO: 6 % (ref 4–12)
NEUTROPHILS # BLD AUTO: 11.62 THOUSANDS/ΜL (ref 1.85–7.62)
NEUTS SEG NFR BLD AUTO: 89 % (ref 43–75)
NRBC BLD AUTO-RTO: 0 /100 WBCS
O2 CT BLDA-SCNC: 15.7 ML/DL (ref 16–23)
OXYHGB MFR BLDA: 95.5 % (ref 94–97)
PCO2 BLDA: 40.4 MM HG (ref 36–44)
PH BLDA: 7.4 [PH] (ref 7.35–7.45)
PLATELET # BLD AUTO: 273 THOUSANDS/UL (ref 149–390)
PMV BLD AUTO: 11.4 FL (ref 8.9–12.7)
PO2 BLDA: 90.6 MM HG (ref 75–129)
POTASSIUM SERPL-SCNC: 3.6 MMOL/L (ref 3.5–5.3)
POTASSIUM SERPL-SCNC: 3.7 MMOL/L (ref 3.5–5.3)
PROT SERPL-MCNC: 7 G/DL (ref 6.4–8.2)
PROTHROMBIN TIME: 13.4 SECONDS (ref 11.8–14.2)
RBC # BLD AUTO: 4.54 MILLION/UL (ref 3.81–5.12)
RH BLD: NEGATIVE
SODIUM SERPL-SCNC: 122 MMOL/L (ref 136–145)
SODIUM SERPL-SCNC: 136 MMOL/L (ref 136–145)
SPECIMEN EXPIRATION DATE: NORMAL
SPECIMEN SOURCE: ABNORMAL
WBC # BLD AUTO: 13.08 THOUSAND/UL (ref 4.31–10.16)

## 2018-12-18 PROCEDURE — 80053 COMPREHEN METABOLIC PANEL: CPT | Performed by: SURGERY

## 2018-12-18 PROCEDURE — G0390 TRAUMA RESPONS W/HOSP CRITI: HCPCS

## 2018-12-18 PROCEDURE — 96374 THER/PROPH/DIAG INJ IV PUSH: CPT

## 2018-12-18 PROCEDURE — 85730 THROMBOPLASTIN TIME PARTIAL: CPT | Performed by: SURGERY

## 2018-12-18 PROCEDURE — 85025 COMPLETE CBC W/AUTO DIFF WBC: CPT | Performed by: SURGERY

## 2018-12-18 PROCEDURE — 80048 BASIC METABOLIC PNL TOTAL CA: CPT | Performed by: SURGERY

## 2018-12-18 PROCEDURE — 86850 RBC ANTIBODY SCREEN: CPT | Performed by: SURGERY

## 2018-12-18 PROCEDURE — 82550 ASSAY OF CK (CPK): CPT | Performed by: SURGERY

## 2018-12-18 PROCEDURE — 86920 COMPATIBILITY TEST SPIN: CPT

## 2018-12-18 PROCEDURE — 5A1955Z RESPIRATORY VENTILATION, GREATER THAN 96 CONSECUTIVE HOURS: ICD-10-PCS | Performed by: EMERGENCY MEDICINE

## 2018-12-18 PROCEDURE — 0BH18EZ INSERTION OF ENDOTRACHEAL AIRWAY INTO TRACHEA, VIA NATURAL OR ARTIFICIAL OPENING ENDOSCOPIC: ICD-10-PCS | Performed by: EMERGENCY MEDICINE

## 2018-12-18 PROCEDURE — 36620 INSERTION CATHETER ARTERY: CPT

## 2018-12-18 PROCEDURE — 94760 N-INVAS EAR/PLS OXIMETRY 1: CPT

## 2018-12-18 PROCEDURE — 99291 CRITICAL CARE FIRST HOUR: CPT

## 2018-12-18 PROCEDURE — 36415 COLL VENOUS BLD VENIPUNCTURE: CPT

## 2018-12-18 PROCEDURE — 85610 PROTHROMBIN TIME: CPT | Performed by: SURGERY

## 2018-12-18 PROCEDURE — 82553 CREATINE MB FRACTION: CPT | Performed by: SURGERY

## 2018-12-18 PROCEDURE — 86901 BLOOD TYPING SEROLOGIC RH(D): CPT | Performed by: SURGERY

## 2018-12-18 PROCEDURE — 94640 AIRWAY INHALATION TREATMENT: CPT

## 2018-12-18 PROCEDURE — 82948 REAGENT STRIP/BLOOD GLUCOSE: CPT

## 2018-12-18 PROCEDURE — 84100 ASSAY OF PHOSPHORUS: CPT | Performed by: EMERGENCY MEDICINE

## 2018-12-18 PROCEDURE — 94002 VENT MGMT INPAT INIT DAY: CPT

## 2018-12-18 PROCEDURE — 83735 ASSAY OF MAGNESIUM: CPT | Performed by: EMERGENCY MEDICINE

## 2018-12-18 PROCEDURE — 86900 BLOOD TYPING SEROLOGIC ABO: CPT | Performed by: SURGERY

## 2018-12-18 PROCEDURE — 82805 BLOOD GASES W/O2 SATURATION: CPT | Performed by: EMERGENCY MEDICINE

## 2018-12-18 PROCEDURE — 71045 X-RAY EXAM CHEST 1 VIEW: CPT

## 2018-12-18 RX ORDER — QUETIAPINE FUMARATE 25 MG/1
50 TABLET, FILM COATED ORAL
COMMUNITY

## 2018-12-18 RX ORDER — OMEGA-3S/DHA/EPA/FISH OIL/D3 300MG-1000
400 CAPSULE ORAL DAILY
COMMUNITY
End: 2018-12-18

## 2018-12-18 RX ORDER — CHLORHEXIDINE GLUCONATE 0.12 MG/ML
15 RINSE ORAL EVERY 12 HOURS SCHEDULED
Status: DISCONTINUED | OUTPATIENT
Start: 2018-12-18 | End: 2019-01-03 | Stop reason: HOSPADM

## 2018-12-18 RX ORDER — PROPOFOL 10 MG/ML
5-50 INJECTION, EMULSION INTRAVENOUS
Status: DISCONTINUED | OUTPATIENT
Start: 2018-12-18 | End: 2018-12-26

## 2018-12-18 RX ORDER — FENTANYL CITRATE 50 UG/ML
INJECTION, SOLUTION INTRAMUSCULAR; INTRAVENOUS
Status: COMPLETED
Start: 2018-12-18 | End: 2018-12-18

## 2018-12-18 RX ORDER — SODIUM CHLORIDE 3 G/100ML
250 INJECTION, SOLUTION INTRAVENOUS ONCE
Status: COMPLETED | OUTPATIENT
Start: 2018-12-18 | End: 2018-12-18

## 2018-12-18 RX ORDER — PRAMIPEXOLE DIHYDROCHLORIDE 0.25 MG/1
0.25 TABLET ORAL 3 TIMES DAILY
COMMUNITY

## 2018-12-18 RX ORDER — AMLODIPINE BESYLATE 5 MG/1
5 TABLET ORAL DAILY
COMMUNITY
End: 2018-12-18

## 2018-12-18 RX ORDER — ALBUTEROL SULFATE 2.5 MG/3ML
SOLUTION RESPIRATORY (INHALATION)
Status: COMPLETED
Start: 2018-12-18 | End: 2018-12-18

## 2018-12-18 RX ORDER — DIPHENOXYLATE HYDROCHLORIDE AND ATROPINE SULFATE 2.5; .025 MG/1; MG/1
1 TABLET ORAL DAILY
COMMUNITY
End: 2018-12-18

## 2018-12-18 RX ORDER — SODIUM CHLORIDE, SODIUM GLUCONATE, SODIUM ACETATE, POTASSIUM CHLORIDE, MAGNESIUM CHLORIDE, SODIUM PHOSPHATE, DIBASIC, AND POTASSIUM PHOSPHATE .53; .5; .37; .037; .03; .012; .00082 G/100ML; G/100ML; G/100ML; G/100ML; G/100ML; G/100ML; G/100ML
INJECTION, SOLUTION INTRAVENOUS
Status: COMPLETED | OUTPATIENT
Start: 2018-12-18 | End: 2018-12-18

## 2018-12-18 RX ORDER — POLYETHYLENE GLYCOL 3350 17 G/17G
17 POWDER, FOR SOLUTION ORAL DAILY PRN
COMMUNITY
End: 2018-12-18

## 2018-12-18 RX ORDER — FENTANYL CITRATE 50 UG/ML
50 INJECTION, SOLUTION INTRAMUSCULAR; INTRAVENOUS ONCE
Status: COMPLETED | OUTPATIENT
Start: 2018-12-18 | End: 2018-12-18

## 2018-12-18 RX ORDER — 3% SODIUM CHLORIDE 3 G/100ML
30 INJECTION, SOLUTION INTRAVENOUS CONTINUOUS
Status: DISCONTINUED | OUTPATIENT
Start: 2018-12-18 | End: 2018-12-18

## 2018-12-18 RX ORDER — METFORMIN HYDROCHLORIDE EXTENDED-RELEASE TABLETS 1000 MG/1
1000 TABLET, FILM COATED, EXTENDED RELEASE ORAL
COMMUNITY
End: 2019-04-09

## 2018-12-18 RX ORDER — WARFARIN SODIUM 2.5 MG/1
TABLET ORAL
COMMUNITY
End: 2018-12-18

## 2018-12-18 RX ORDER — SODIUM CHLORIDE, SODIUM GLUCONATE, SODIUM ACETATE, POTASSIUM CHLORIDE, MAGNESIUM CHLORIDE, SODIUM PHOSPHATE, DIBASIC, AND POTASSIUM PHOSPHATE .53; .5; .37; .037; .03; .012; .00082 G/100ML; G/100ML; G/100ML; G/100ML; G/100ML; G/100ML; G/100ML
100 INJECTION, SOLUTION INTRAVENOUS CONTINUOUS
Status: DISCONTINUED | OUTPATIENT
Start: 2018-12-18 | End: 2018-12-19

## 2018-12-18 RX ADMIN — FENTANYL CITRATE 100 MCG/HR: 50 INJECTION, SOLUTION INTRAMUSCULAR; INTRAVENOUS at 21:25

## 2018-12-18 RX ADMIN — FENTANYL CITRATE 50 MCG: 50 INJECTION, SOLUTION INTRAMUSCULAR; INTRAVENOUS at 21:30

## 2018-12-18 RX ADMIN — SODIUM CHLORIDE, SODIUM GLUCONATE, SODIUM ACETATE, POTASSIUM CHLORIDE, MAGNESIUM CHLORIDE, SODIUM PHOSPHATE, DIBASIC, AND POTASSIUM PHOSPHATE 500 ML: .53; .5; .37; .037; .03; .012; .00082 INJECTION, SOLUTION INTRAVENOUS at 20:25

## 2018-12-18 RX ADMIN — SODIUM CHLORIDE, SODIUM GLUCONATE, SODIUM ACETATE, POTASSIUM CHLORIDE, MAGNESIUM CHLORIDE, SODIUM PHOSPHATE, DIBASIC, AND POTASSIUM PHOSPHATE 100 ML/HR: .53; .5; .37; .037; .03; .012; .00082 INJECTION, SOLUTION INTRAVENOUS at 23:10

## 2018-12-18 RX ADMIN — SODIUM CHLORIDE 50 ML/HR: 3 INJECTION, SOLUTION INTRAVENOUS at 21:13

## 2018-12-18 RX ADMIN — FAMOTIDINE 20 MG: 10 INJECTION, SOLUTION INTRAVENOUS at 23:10

## 2018-12-18 RX ADMIN — SODIUM CHLORIDE 250 ML: 3 INJECTION, SOLUTION INTRAVENOUS at 20:45

## 2018-12-18 RX ADMIN — IOHEXOL 100 ML: 350 INJECTION, SOLUTION INTRAVENOUS at 20:52

## 2018-12-18 RX ADMIN — ALBUTEROL SULFATE 2.5 MG: 2.5 SOLUTION RESPIRATORY (INHALATION) at 23:58

## 2018-12-18 RX ADMIN — PROPOFOL 20 MCG/KG/MIN: 10 INJECTION, EMULSION INTRAVENOUS at 20:50

## 2018-12-19 ENCOUNTER — APPOINTMENT (INPATIENT)
Dept: RADIOLOGY | Facility: HOSPITAL | Age: 63
DRG: 003 | End: 2018-12-19
Payer: MEDICARE

## 2018-12-19 PROBLEM — S42.141A GLENOID FRACTURE OF SHOULDER, RIGHT, CLOSED, INITIAL ENCOUNTER: Status: ACTIVE | Noted: 2018-12-19

## 2018-12-19 PROBLEM — S06.5XAA SUBDURAL HEMATOMA: Status: ACTIVE | Noted: 2018-12-19

## 2018-12-19 PROBLEM — S22.009A FRACTURE OF THORACIC TRANSVERSE PROCESS, CLOSED, INITIAL ENCOUNTER (HCC): Status: ACTIVE | Noted: 2018-12-19

## 2018-12-19 PROBLEM — S42.151A GLENOID FRACTURE OF SHOULDER, RIGHT, CLOSED, INITIAL ENCOUNTER: Status: ACTIVE | Noted: 2018-12-19

## 2018-12-19 PROBLEM — S06.5X9A SUBDURAL HEMATOMA (HCC): Status: ACTIVE | Noted: 2018-12-19

## 2018-12-19 PROBLEM — S06.9X9A TRAUMATIC BRAIN INJURY (HCC): Status: ACTIVE | Noted: 2018-12-19

## 2018-12-19 PROBLEM — S22.5XXA FLAIL CHEST, INITIAL ENCOUNTER FOR CLOSED FRACTURE: Status: ACTIVE | Noted: 2018-12-19

## 2018-12-19 PROBLEM — E11.9 DIABETES MELLITUS, TYPE II (HCC): Chronic | Status: ACTIVE | Noted: 2018-12-19

## 2018-12-19 PROBLEM — S27.0XXA TRAUMATIC PNEUMOTHORAX: Status: ACTIVE | Noted: 2018-12-19

## 2018-12-19 PROBLEM — E87.1 HYPONATREMIA: Status: ACTIVE | Noted: 2018-12-19

## 2018-12-19 PROBLEM — S22.43XA MULTIPLE FRACTURES OF RIBS, BILATERAL, INITIAL ENCOUNTER FOR CLOSED FRACTURE: Status: ACTIVE | Noted: 2018-12-19

## 2018-12-19 PROBLEM — S22.41XA MULTIPLE FRACTURES OF RIBS, RIGHT SIDE, INITIAL ENCOUNTER FOR CLOSED FRACTURE: Status: ACTIVE | Noted: 2018-12-19

## 2018-12-19 PROBLEM — I60.9 SUBARACHNOID HEMORRHAGE (HCC): Status: ACTIVE | Noted: 2018-12-19

## 2018-12-19 PROBLEM — S06.9XAA TRAUMATIC BRAIN INJURY: Status: ACTIVE | Noted: 2018-12-19

## 2018-12-19 PROBLEM — I61.9 INTRAPARENCHYMAL HEMORRHAGE OF BRAIN (HCC): Status: ACTIVE | Noted: 2018-12-19

## 2018-12-19 LAB
ANION GAP SERPL CALCULATED.3IONS-SCNC: 5 MMOL/L (ref 4–13)
ANION GAP SERPL CALCULATED.3IONS-SCNC: 6 MMOL/L (ref 4–13)
ANION GAP SERPL CALCULATED.3IONS-SCNC: 7 MMOL/L (ref 4–13)
ANION GAP SERPL CALCULATED.3IONS-SCNC: 7 MMOL/L (ref 4–13)
ANION GAP SERPL CALCULATED.3IONS-SCNC: 8 MMOL/L (ref 4–13)
ANION GAP SERPL CALCULATED.3IONS-SCNC: 8 MMOL/L (ref 4–13)
BASE EXCESS BLDA CALC-SCNC: 0.6 MMOL/L
BASOPHILS # BLD AUTO: 0.01 THOUSANDS/ΜL (ref 0–0.1)
BASOPHILS NFR BLD AUTO: 0 % (ref 0–1)
BUN SERPL-MCNC: 6 MG/DL (ref 5–25)
BUN SERPL-MCNC: 6 MG/DL (ref 5–25)
BUN SERPL-MCNC: 7 MG/DL (ref 5–25)
CA-I BLD-SCNC: 1.07 MMOL/L (ref 1.12–1.32)
CALCIUM SERPL-MCNC: 7.6 MG/DL (ref 8.3–10.1)
CALCIUM SERPL-MCNC: 7.9 MG/DL (ref 8.3–10.1)
CALCIUM SERPL-MCNC: 8 MG/DL (ref 8.3–10.1)
CHLORIDE SERPL-SCNC: 94 MMOL/L (ref 100–108)
CHLORIDE SERPL-SCNC: 95 MMOL/L (ref 100–108)
CHLORIDE SERPL-SCNC: 97 MMOL/L (ref 100–108)
CHLORIDE SERPL-SCNC: 98 MMOL/L (ref 100–108)
CHLORIDE SERPL-SCNC: 98 MMOL/L (ref 100–108)
CHLORIDE SERPL-SCNC: 99 MMOL/L (ref 100–108)
CK MB SERPL-MCNC: 5.1 NG/ML (ref 0–5)
CK MB SERPL-MCNC: <1 % (ref 0–2.5)
CK SERPL-CCNC: 812 U/L (ref 26–192)
CO2 SERPL-SCNC: 24 MMOL/L (ref 21–32)
CO2 SERPL-SCNC: 25 MMOL/L (ref 21–32)
CO2 SERPL-SCNC: 26 MMOL/L (ref 21–32)
CREAT SERPL-MCNC: 0.27 MG/DL (ref 0.6–1.3)
CREAT SERPL-MCNC: 0.3 MG/DL (ref 0.6–1.3)
CREAT SERPL-MCNC: 0.31 MG/DL (ref 0.6–1.3)
CREAT SERPL-MCNC: 0.32 MG/DL (ref 0.6–1.3)
CREAT SERPL-MCNC: 0.32 MG/DL (ref 0.6–1.3)
CREAT SERPL-MCNC: 0.34 MG/DL (ref 0.6–1.3)
EOSINOPHIL # BLD AUTO: 0 THOUSAND/ΜL (ref 0–0.61)
EOSINOPHIL NFR BLD AUTO: 0 % (ref 0–6)
ERYTHROCYTE [DISTWIDTH] IN BLOOD BY AUTOMATED COUNT: 14.8 % (ref 11.6–15.1)
GFR SERPL CREATININE-BSD FRML MDRD: 118 ML/MIN/1.73SQ M
GFR SERPL CREATININE-BSD FRML MDRD: 121 ML/MIN/1.73SQ M
GFR SERPL CREATININE-BSD FRML MDRD: 121 ML/MIN/1.73SQ M
GFR SERPL CREATININE-BSD FRML MDRD: 122 ML/MIN/1.73SQ M
GFR SERPL CREATININE-BSD FRML MDRD: 123 ML/MIN/1.73SQ M
GFR SERPL CREATININE-BSD FRML MDRD: 127 ML/MIN/1.73SQ M
GLUCOSE SERPL-MCNC: 100 MG/DL (ref 65–140)
GLUCOSE SERPL-MCNC: 101 MG/DL (ref 65–140)
GLUCOSE SERPL-MCNC: 104 MG/DL (ref 65–140)
GLUCOSE SERPL-MCNC: 109 MG/DL (ref 65–140)
GLUCOSE SERPL-MCNC: 132 MG/DL (ref 65–140)
GLUCOSE SERPL-MCNC: 92 MG/DL (ref 65–140)
GLUCOSE SERPL-MCNC: 94 MG/DL (ref 65–140)
GLUCOSE SERPL-MCNC: 99 MG/DL (ref 65–140)
GLUCOSE SERPL-MCNC: 99 MG/DL (ref 65–140)
HCO3 BLDA-SCNC: 24.8 MMOL/L (ref 22–28)
HCT VFR BLD AUTO: 30.2 % (ref 34.8–46.1)
HGB BLD-MCNC: 10.2 G/DL (ref 11.5–15.4)
HOROWITZ INDEX BLDA+IHG-RTO: 40 MM[HG]
IMM GRANULOCYTES # BLD AUTO: 0.02 THOUSAND/UL (ref 0–0.2)
IMM GRANULOCYTES NFR BLD AUTO: 0 % (ref 0–2)
LACTATE SERPL-SCNC: 0.7 MMOL/L (ref 0.5–2)
LYMPHOCYTES # BLD AUTO: 1.06 THOUSANDS/ΜL (ref 0.6–4.47)
LYMPHOCYTES NFR BLD AUTO: 14 % (ref 14–44)
MAGNESIUM SERPL-MCNC: 1.4 MG/DL (ref 1.6–2.6)
MAGNESIUM SERPL-MCNC: 2.3 MG/DL (ref 1.6–2.6)
MCH RBC QN AUTO: 26 PG (ref 26.8–34.3)
MCHC RBC AUTO-ENTMCNC: 33.8 G/DL (ref 31.4–37.4)
MCV RBC AUTO: 77 FL (ref 82–98)
MONOCYTES # BLD AUTO: 0.73 THOUSAND/ΜL (ref 0.17–1.22)
MONOCYTES NFR BLD AUTO: 10 % (ref 4–12)
NEUTROPHILS # BLD AUTO: 5.88 THOUSANDS/ΜL (ref 1.85–7.62)
NEUTS SEG NFR BLD AUTO: 76 % (ref 43–75)
NRBC BLD AUTO-RTO: 0 /100 WBCS
O2 CT BLDA-SCNC: 15 ML/DL (ref 16–23)
OXYHGB MFR BLDA: 97 % (ref 94–97)
PCO2 BLDA: 38 MM HG (ref 36–44)
PEEP RESPIRATORY: 5 CM[H2O]
PH BLDA: 7.43 [PH] (ref 7.35–7.45)
PHOSPHATE SERPL-MCNC: 3.2 MG/DL (ref 2.3–4.1)
PHOSPHATE SERPL-MCNC: 3.5 MG/DL (ref 2.3–4.1)
PLATELET # BLD AUTO: 262 THOUSANDS/UL (ref 149–390)
PMV BLD AUTO: 10.6 FL (ref 8.9–12.7)
PO2 BLDA: 105.4 MM HG (ref 75–129)
POTASSIUM SERPL-SCNC: 3.4 MMOL/L (ref 3.5–5.3)
POTASSIUM SERPL-SCNC: 3.8 MMOL/L (ref 3.5–5.3)
POTASSIUM SERPL-SCNC: 3.9 MMOL/L (ref 3.5–5.3)
POTASSIUM SERPL-SCNC: 4 MMOL/L (ref 3.5–5.3)
POTASSIUM SERPL-SCNC: 4.2 MMOL/L (ref 3.5–5.3)
POTASSIUM SERPL-SCNC: 4.2 MMOL/L (ref 3.5–5.3)
RBC # BLD AUTO: 3.92 MILLION/UL (ref 3.81–5.12)
SODIUM SERPL-SCNC: 128 MMOL/L (ref 136–145)
SODIUM SERPL-SCNC: 130 MMOL/L (ref 136–145)
SODIUM SERPL-SCNC: 130 MMOL/L (ref 136–145)
SPECIMEN SOURCE: ABNORMAL
VENT AC: 14
VENT- AC: AC
VT SETTING VENT: 400 ML
WBC # BLD AUTO: 7.7 THOUSAND/UL (ref 4.31–10.16)

## 2018-12-19 PROCEDURE — 82550 ASSAY OF CK (CPK): CPT | Performed by: NURSE PRACTITIONER

## 2018-12-19 PROCEDURE — 82948 REAGENT STRIP/BLOOD GLUCOSE: CPT

## 2018-12-19 PROCEDURE — 73030 X-RAY EXAM OF SHOULDER: CPT

## 2018-12-19 PROCEDURE — 99223 1ST HOSP IP/OBS HIGH 75: CPT | Performed by: NEUROLOGICAL SURGERY

## 2018-12-19 PROCEDURE — 80048 BASIC METABOLIC PNL TOTAL CA: CPT | Performed by: SURGERY

## 2018-12-19 PROCEDURE — 84100 ASSAY OF PHOSPHORUS: CPT | Performed by: SURGERY

## 2018-12-19 PROCEDURE — 82330 ASSAY OF CALCIUM: CPT | Performed by: SURGERY

## 2018-12-19 PROCEDURE — 99254 IP/OBS CNSLTJ NEW/EST MOD 60: CPT | Performed by: ORTHOPAEDIC SURGERY

## 2018-12-19 PROCEDURE — 94760 N-INVAS EAR/PLS OXIMETRY 1: CPT

## 2018-12-19 PROCEDURE — 71045 X-RAY EXAM CHEST 1 VIEW: CPT

## 2018-12-19 PROCEDURE — 94003 VENT MGMT INPAT SUBQ DAY: CPT

## 2018-12-19 PROCEDURE — 82805 BLOOD GASES W/O2 SATURATION: CPT | Performed by: SURGERY

## 2018-12-19 PROCEDURE — 70450 CT HEAD/BRAIN W/O DYE: CPT

## 2018-12-19 PROCEDURE — 83735 ASSAY OF MAGNESIUM: CPT | Performed by: SURGERY

## 2018-12-19 PROCEDURE — 23570 CLTX SCAPULAR FX W/O MNPJ: CPT | Performed by: ORTHOPAEDIC SURGERY

## 2018-12-19 PROCEDURE — 99223 1ST HOSP IP/OBS HIGH 75: CPT | Performed by: SURGERY

## 2018-12-19 PROCEDURE — 85025 COMPLETE CBC W/AUTO DIFF WBC: CPT | Performed by: EMERGENCY MEDICINE

## 2018-12-19 PROCEDURE — 83605 ASSAY OF LACTIC ACID: CPT | Performed by: SURGERY

## 2018-12-19 PROCEDURE — 82553 CREATINE MB FRACTION: CPT | Performed by: NURSE PRACTITIONER

## 2018-12-19 RX ORDER — LABETALOL HYDROCHLORIDE 5 MG/ML
5 INJECTION, SOLUTION INTRAVENOUS EVERY 4 HOURS PRN
Status: DISCONTINUED | OUTPATIENT
Start: 2018-12-19 | End: 2018-12-19

## 2018-12-19 RX ORDER — FLUOXETINE HYDROCHLORIDE 40 MG/1
40 CAPSULE ORAL DAILY
COMMUNITY

## 2018-12-19 RX ORDER — QUETIAPINE FUMARATE 25 MG/1
25 TABLET, FILM COATED ORAL
Status: DISCONTINUED | OUTPATIENT
Start: 2018-12-19 | End: 2018-12-25

## 2018-12-19 RX ORDER — SODIUM CHLORIDE, SODIUM GLUCONATE, SODIUM ACETATE, POTASSIUM CHLORIDE, MAGNESIUM CHLORIDE, SODIUM PHOSPHATE, DIBASIC, AND POTASSIUM PHOSPHATE .53; .5; .37; .037; .03; .012; .00082 G/100ML; G/100ML; G/100ML; G/100ML; G/100ML; G/100ML; G/100ML
500 INJECTION, SOLUTION INTRAVENOUS ONCE
Status: COMPLETED | OUTPATIENT
Start: 2018-12-19 | End: 2018-12-19

## 2018-12-19 RX ORDER — ALBUTEROL SULFATE 2.5 MG/3ML
2.5 SOLUTION RESPIRATORY (INHALATION) EVERY 4 HOURS PRN
Status: DISCONTINUED | OUTPATIENT
Start: 2018-12-19 | End: 2019-01-03 | Stop reason: HOSPADM

## 2018-12-19 RX ORDER — PRAMIPEXOLE DIHYDROCHLORIDE 0.25 MG/1
0.25 TABLET ORAL 2 TIMES DAILY
Status: DISCONTINUED | OUTPATIENT
Start: 2018-12-19 | End: 2019-01-03 | Stop reason: HOSPADM

## 2018-12-19 RX ORDER — POTASSIUM CHLORIDE 14.9 MG/ML
20 INJECTION INTRAVENOUS
Status: COMPLETED | OUTPATIENT
Start: 2018-12-19 | End: 2018-12-19

## 2018-12-19 RX ORDER — OMEPRAZOLE 20 MG/1
20 CAPSULE, DELAYED RELEASE ORAL DAILY
COMMUNITY

## 2018-12-19 RX ORDER — LABETALOL HYDROCHLORIDE 5 MG/ML
5 INJECTION, SOLUTION INTRAVENOUS EVERY 4 HOURS PRN
Status: DISCONTINUED | OUTPATIENT
Start: 2018-12-19 | End: 2018-12-27

## 2018-12-19 RX ORDER — PRIMIDONE 50 MG/1
TABLET ORAL EVERY 12 HOURS SCHEDULED
COMMUNITY

## 2018-12-19 RX ORDER — LISINOPRIL AND HYDROCHLOROTHIAZIDE 12.5; 1 MG/1; MG/1
1 TABLET ORAL DAILY
COMMUNITY
End: 2019-04-09

## 2018-12-19 RX ORDER — LEVOTHYROXINE SODIUM 0.1 MG/1
88 TABLET ORAL DAILY
COMMUNITY

## 2018-12-19 RX ORDER — SODIUM CHLORIDE, SODIUM GLUCONATE, SODIUM ACETATE, POTASSIUM CHLORIDE, MAGNESIUM CHLORIDE, SODIUM PHOSPHATE, DIBASIC, AND POTASSIUM PHOSPHATE .53; .5; .37; .037; .03; .012; .00082 G/100ML; G/100ML; G/100ML; G/100ML; G/100ML; G/100ML; G/100ML
1000 INJECTION, SOLUTION INTRAVENOUS ONCE
Status: DISCONTINUED | OUTPATIENT
Start: 2018-12-19 | End: 2018-12-19

## 2018-12-19 RX ORDER — SODIUM CHLORIDE, SODIUM GLUCONATE, SODIUM ACETATE, POTASSIUM CHLORIDE, MAGNESIUM CHLORIDE, SODIUM PHOSPHATE, DIBASIC, AND POTASSIUM PHOSPHATE .53; .5; .37; .037; .03; .012; .00082 G/100ML; G/100ML; G/100ML; G/100ML; G/100ML; G/100ML; G/100ML
100 INJECTION, SOLUTION INTRAVENOUS CONTINUOUS
Status: DISCONTINUED | OUTPATIENT
Start: 2018-12-19 | End: 2018-12-22

## 2018-12-19 RX ORDER — POTASSIUM CHLORIDE 29.8 MG/ML
40 INJECTION INTRAVENOUS ONCE
Status: DISCONTINUED | OUTPATIENT
Start: 2018-12-19 | End: 2018-12-19

## 2018-12-19 RX ORDER — MAGNESIUM SULFATE HEPTAHYDRATE 40 MG/ML
2 INJECTION, SOLUTION INTRAVENOUS ONCE
Status: COMPLETED | OUTPATIENT
Start: 2018-12-19 | End: 2018-12-19

## 2018-12-19 RX ORDER — LAMOTRIGINE 200 MG/1
200 TABLET ORAL DAILY
COMMUNITY

## 2018-12-19 RX ORDER — ALBUTEROL SULFATE 2.5 MG/3ML
SOLUTION RESPIRATORY (INHALATION)
Status: DISPENSED
Start: 2018-12-19 | End: 2018-12-20

## 2018-12-19 RX ORDER — LEVETIRACETAM 100 MG/ML
500 SOLUTION ORAL EVERY 12 HOURS SCHEDULED
Status: DISCONTINUED | OUTPATIENT
Start: 2018-12-19 | End: 2018-12-25

## 2018-12-19 RX ADMIN — SODIUM CHLORIDE, SODIUM GLUCONATE, SODIUM ACETATE, POTASSIUM CHLORIDE, MAGNESIUM CHLORIDE, SODIUM PHOSPHATE, DIBASIC, AND POTASSIUM PHOSPHATE 100 ML/HR: .53; .5; .37; .037; .03; .012; .00082 INJECTION, SOLUTION INTRAVENOUS at 03:04

## 2018-12-19 RX ADMIN — FAMOTIDINE 20 MG: 10 INJECTION, SOLUTION INTRAVENOUS at 08:33

## 2018-12-19 RX ADMIN — SODIUM CHLORIDE, SODIUM GLUCONATE, SODIUM ACETATE, POTASSIUM CHLORIDE, MAGNESIUM CHLORIDE, SODIUM PHOSPHATE, DIBASIC, AND POTASSIUM PHOSPHATE 100 ML/HR: .53; .5; .37; .037; .03; .012; .00082 INJECTION, SOLUTION INTRAVENOUS at 17:56

## 2018-12-19 RX ADMIN — SODIUM CHLORIDE, SODIUM GLUCONATE, SODIUM ACETATE, POTASSIUM CHLORIDE, MAGNESIUM CHLORIDE, SODIUM PHOSPHATE, DIBASIC, AND POTASSIUM PHOSPHATE 500 ML: .53; .5; .37; .037; .03; .012; .00082 INJECTION, SOLUTION INTRAVENOUS at 06:21

## 2018-12-19 RX ADMIN — POTASSIUM CHLORIDE 20 MEQ: 200 INJECTION, SOLUTION INTRAVENOUS at 04:58

## 2018-12-19 RX ADMIN — PRAMIPEXOLE DIHYDROCHLORIDE 0.25 MG: 0.25 TABLET ORAL at 18:35

## 2018-12-19 RX ADMIN — SODIUM CHLORIDE 500 MG: 9 INJECTION, SOLUTION INTRAVENOUS at 08:33

## 2018-12-19 RX ADMIN — CHLORHEXIDINE GLUCONATE 0.12% ORAL RINSE 15 ML: 1.2 LIQUID ORAL at 22:00

## 2018-12-19 RX ADMIN — CALCIUM GLUCONATE 1 G: 98 INJECTION, SOLUTION INTRAVENOUS at 07:38

## 2018-12-19 RX ADMIN — SODIUM CHLORIDE, SODIUM GLUCONATE, SODIUM ACETATE, POTASSIUM CHLORIDE, MAGNESIUM CHLORIDE, SODIUM PHOSPHATE, DIBASIC, AND POTASSIUM PHOSPHATE 100 ML/HR: .53; .5; .37; .037; .03; .012; .00082 INJECTION, SOLUTION INTRAVENOUS at 07:36

## 2018-12-19 RX ADMIN — PROPOFOL 5 MCG/KG/MIN: 10 INJECTION, EMULSION INTRAVENOUS at 18:35

## 2018-12-19 RX ADMIN — SODIUM CHLORIDE 500 MG: 9 INJECTION, SOLUTION INTRAVENOUS at 00:42

## 2018-12-19 RX ADMIN — MAGNESIUM SULFATE IN WATER 2 G: 40 INJECTION, SOLUTION INTRAVENOUS at 03:12

## 2018-12-19 RX ADMIN — POTASSIUM CHLORIDE 20 MEQ: 200 INJECTION, SOLUTION INTRAVENOUS at 03:10

## 2018-12-19 RX ADMIN — ALBUTEROL SULFATE 2.5 MG: 2.5 SOLUTION RESPIRATORY (INHALATION) at 20:05

## 2018-12-19 RX ADMIN — FENTANYL CITRATE 100 MCG/HR: 50 INJECTION, SOLUTION INTRAMUSCULAR; INTRAVENOUS at 23:01

## 2018-12-19 RX ADMIN — PROPOFOL 30 MCG/KG/MIN: 10 INJECTION, EMULSION INTRAVENOUS at 01:32

## 2018-12-19 RX ADMIN — FENTANYL CITRATE 100 MCG/HR: 50 INJECTION, SOLUTION INTRAMUSCULAR; INTRAVENOUS at 09:23

## 2018-12-19 RX ADMIN — LEVETIRACETAM 500 MG: 100 SOLUTION ORAL at 22:00

## 2018-12-19 NOTE — PROGRESS NOTES
Progress Note - Critical Care   Harris Salinas 59 y o  female MRN: [id number]  Unit/Bed#: MICU 02 Encounter: [id number]    Attending Physician: Chinedu Schroeder DO      ______________________________________________________________________  Assessment and Plan: Active Problems:    * No active hospital problems  *  Resolved Problems:    * No resolved hospital problems   *        NEURO:  -GCS: 10T (E: 3, V: 1, M: 6)  -SEDATIVES: Propofol @15  -ANALGESIA: Fentanyl @100  -SEIZURE PROPHYLAXIS: Keppra 500mg BID  -MEDICATIONS: Pramipexole (home med--held), Quetiapine (home med--held)  -PLAN:   Hx of Parkinson's Disease, Bipolar Disorder  Follows commands in LUE, LLE, RLE; no movement in RUE  q1h neuro checks  F/u AM [facility]  Neurosurgery consulted, appreciate recs  Wean sedation as tolerated    CV:   Temp:  [100 4 °F (38 °C)-100 8 °F (38 2 °C)] 100 4 °F (38 °C)  HR:  [68-93] 68  Resp:  [13-18] 13  BP: (129-197)/(62-97) 152/67  SpO2:  [99 %-100 %] 100 %    Results from last 7 days  Lab Units 12/19/18  0133   LACTIC ACID mmol/L 0 7     -MEDICATIONS: Labetalol 5mg q4h PRN  -PLAN:   Keep SBP <140 per NSGY recs  Stable, continue to monitor  F/u AM labs    PULM:   Temp:  [100 4 °F (38 °C)-100 8 °F (38 2 °C)] 100 4 °F (38 °C)  HR:  [68-93] 68  Resp:  [13-18] 13  BP: (129-197)/(62-97) 152/67  SpO2:  [99 %-100 %] 100 %   Vent Mode: AC/VC  FiO2 (%):  [40] 40  -ABG: ABG:  Lab Results   Component Value Date    PHART 7 396 12/18/2018    [id number] 40 4 12/18/2018    PO2ART 90 6 12/18/2018    OMU7FQG 24 2 12/18/2018    BEART -0 6 12/18/2018    SOURCE Line, Arterial 12/18/2018     VBG:  Results from last 7 days  Lab Units 12/18/18  2229   ABG SOURCE  Line, Arterial         Respiratory    Lab Data (Last 4 hours)    None         O2/Vent Data (Last 4 hours)      12/19 0417           Vent Mode AC/VC       Resp Rate (BPM) (BPM) 14       Vt (mL) (mL) 400       FIO2 (%) (%) 40       PEEP (cmH2O) (cmH2O) 5       MV 5 83 -PLAN:   Hx of COPD, s/p multiple R sided rib fx, flail chest, w/ tiny R apical PTX  Stable, continue to monitor respiratory status closely  Continue ventilator management  Wean ventilator settings as tolerated  F/u 6am ABG    GI:  MEDICATIONS: Pepcid 20mg BID  GI ppx: n/a  -PLAN:   Stable, continue to monitor    :     Results from last 7 days  Lab Units 18   BUN mg/dL 7 7 9   CREATININE mg/dL 0 32* 0 49* 0 42*     COLÓN: yes  I/O:     I/O        07 -  07 -  07    I V  (mL/kg)  325 3 (5 6)    IV Piggyback  100    Total Intake(mL/kg)  425 3 (7 4)    Urine (mL/kg/hr)  1060    Total Output   1060    Net   -634 7                UOP: 1 cc/kg/hr    -PLAN:  Monitor UOP  Trend Creatinine    F/E/N:   FLUIDS: [place] @100  ELECTROLYTES:    Results from last 7 days  Lab Units 18   SODIUM mmol/L 128*  --  136 122*   POTASSIUM mmol/L 3 4*  --  3 6 3 7   CHLORIDE mmol/L 94*  --  105 88*   CO2 mmol/L 26  --  25 26   ANION GAP mmol/L 8  --  6 8   CALCIUM mg/dL 7 9*  --  7 5* 8 7   MAGNESIUM mg/dL  --  1 4*  --   --    PHOSPHORUS mg/dL  --  3 5  --   --      NUTRITION: Diet NPO     -PLAN:  Sodium 128 from 136  Continue to monitor w/ q4h BMPs  Consider renal consult    ID:     Results from last 7 days  Lab Units 18   WBC Thousand/uL 13 08*     CULTURES:   n/a  ANTIBIOTICS: n/a  -PLAN:   No acute infectious concerns    HEME:    Results from last 7 days  Lab Units 18   HEMOGLOBIN g/dL 11 6   HEMATOCRIT % 35 3   PLATELETS Thousands/uL 273       Results from last 7 days  Lab Units 18   INR  1 01   PTT seconds 25*     DVT ppx: Pharmacologic held d/t acute IPH/SDH/SAH, mechanical: b/l SCDs  -PLAN:   F/u AM labs  Stable  Transfuse for Hgb <7    ENDO:   -POC-->141        -MEDICATIONS: SSI#2    -PLAN:  Hx of T2DM, hypothyroidism  Continue SSI  Continue glucose checks    MSK/SKIN:   S/p multiple R sided rib fx, flail chest, R 2nd-11th rib fx at costovertebral junctions, 4th-8th fx laterally, R anterior 2nd rib angulated fx, fx of anterior R glenoid, acromion    -PLAN:  F/U Orthopedic Surgery c/s  PT/OT  Routine skin care  Frequent offloading     LDA:  Invasive Devices     Peripheral Intravenous Line            Peripheral IV 12/18/18 Left Forearm less than 1 day    Peripheral IV 12/18/18 Right Forearm less than 1 day    Peripheral IV 12/19/18 Right Forearm less than 1 day          Arterial Line            Arterial Line 12/18/18 Left Radial less than 1 day          Drain            NG/OG/Enteral Tube Orogastric 16 Fr Left mouth less than 1 day    Urethral Catheter 16 Fr  less than 1 day          Airway            ETT  Cuffed 8 mm less than 1 day                Disposition: Continue ICU Care    Code Status: Level 1 - Full Code    Counseling / Coordination of Care  Total Critical Care time spent 30 minutes excluding procedures, teaching and family updates  ______________________________________________________________________    Chief Complaint: Found down at home, traumatic brain injury, multiple traumatic musculoskeletal injuries    24 Hour Events:     No acute events overnight  Sedation weaned, and pt tolerated well  Sodium decreased to 128 from 136  Pt's neuro exam improved from previous, now following commands in RLE  Review of Systems   Unable to perform ROS: Intubated     ______________________________________________________________________    Physical Exam:     Physical Exam   Constitutional: She appears well-developed and well-nourished  She is intubated  HENT:   Head: Normocephalic and atraumatic    + R periorbital ecchymoses     Eyes: Pupils are equal, round, and reactive to light  Neck: Neck supple  Cardiovascular: Normal rate and regular rhythm  Pulses:       Radial pulses are 2+ on the right side, and 2+ on the left side          Femoral pulses are 2+ on the right side, and 2+ on the left side  Dorsalis pedis pulses are 2+ on the right side, and 2+ on the left side  Posterior tibial pulses are 2+ on the right side, and 2+ on the left side  Pulmonary/Chest: Effort normal  She is intubated  Abdominal: She exhibits distension  She exhibits no mass  No hernia  Genitourinary:   Genitourinary Comments: +kenny in place   Musculoskeletal: She exhibits no edema or deformity  +R knee hematoma   Neurological: No cranial nerve deficit  GCS eye subscore is 3  GCS verbal subscore is 1  GCS motor subscore is 6  Skin:   + R shoulder ecchymosis  + b/l flank ecchymoses  + b/l knee abrasions       ______________________________________________________________________  Vitals:    18 0217 18 0222 18 0300 18 0417   BP:       BP Location:       Pulse: 68 68 68    Resp: 14 14 13    Temp: 100 4 °F (38 °C) 100 4 °F (38 °C) 100 4 °F (38 °C)    TempSrc: Probe      SpO2: 100% 100% 100% 100%   Weight:           Temperature:   Temp (24hrs), Av 5 °F (38 1 °C), Min:100 4 °F (38 °C), Max:100 8 °F (38 2 °C)    Current Temperature: 100 4 °F (38 °C)  Weights:   IBW: -92 5 kg    There is no height or weight on file to calculate BMI    Weight (last 2 days)     Date/Time   Weight    18 2210  57 6 (126 99)    18 20:20:17  58 (127 87)            Hemodynamic Monitoring:  N/A     Non-Invasive/Invasive Ventilation Settings:  Respiratory    Lab Data (Last 4 hours)    None         O2/Vent Data (Last 4 hours)      417           Vent Mode AC/VC       Resp Rate (BPM) (BPM) 14       Vt (mL) (mL) 400       FIO2 (%) (%) 40       PEEP (cmH2O) (cmH2O) 5       MV 5 83                 Lab Results   Component Value Date    PHART 7 396 2018    UVZ0VXA 40 4 2018    PO2ART 90 6 2018    SVO1HTT 24 2 2018    BEART -0 6 2018    SOURCE Line, Arterial 2018     SpO2: SpO2: 99 %  Intake and Outputs:  I/O 12/17 0701 - 12/18 0700 12/18 0701 - 12/19 0700    I V  (mL/kg)  325 3 (5 6)    IV Piggyback  100    Total Intake(mL/kg)  425 3 (7 4)    Urine (mL/kg/hr)  1060    Total Output   1060    Net   -634 7              UOP: 1 0 ml/hr   Nutrition:        Diet Orders            Start     Ordered    12/18/18 2108  Diet NPO  Diet effective now     Question Answer Comment   Diet Type NPO    RD to adjust diet per protocol? Yes        12/18/18 2124        Labs:     Results from last 7 days  Lab Units 12/18/18 2028   WBC Thousand/uL 13 08*   HEMOGLOBIN g/dL 11 6   HEMATOCRIT % 35 3   PLATELETS Thousands/uL 273   NEUTROS PCT % 89*   MONOS PCT % 6       Results from last 7 days  Lab Units 12/19/18  0133 12/18/18  2154 12/18/18  2030   SODIUM mmol/L 128* 136 122*   POTASSIUM mmol/L 3 4* 3 6 3 7   CHLORIDE mmol/L 94* 105 88*   CO2 mmol/L 26 25 26   ANION GAP mmol/L 8 6 8   BUN mg/dL 7 7 9   CREATININE mg/dL 0 32* 0 49* 0 42*   CALCIUM mg/dL 7 9* 7 5* 8 7   ALT U/L  --   --  96*   AST U/L  --   --  130*   ALK PHOS U/L  --   --  138*   ALBUMIN g/dL  --   --  3 4*   TOTAL BILIRUBIN mg/dL  --   --  0 42       Results from last 7 days  Lab Units 12/18/18  2229   MAGNESIUM mg/dL 1 4*   PHOSPHORUS mg/dL 3 5        Results from last 7 days  Lab Units 12/18/18  2030   INR  1 01   PTT seconds 25*           Results from last 7 days  Lab Units 12/19/18  0133   LACTIC ACID mmol/L 0 7     ABG:  Lab Results   Component Value Date    PHART 7 396 12/18/2018    [id number] 40 4 12/18/2018    PO2ART 90 6 12/18/2018    OUY4SLP 24 2 12/18/2018    BEART -0 6 12/18/2018    SOURCE Line, Arterial 12/18/2018     VBG:  Results from last 7 days  Lab Units 12/18/18 2229   ABG SOURCE  Line, Arterial         No results found for: [facility]   Imaging:     CT head wo contrast   Final Result by Lila Perry MD (12/19 9989)      1    Stable size of large intraparenchymal hemorrhage centered in the left temporal lobe with surrounding mass effect and edema and adjacent left temporal to frontal subdural hemorrhage  Stable midline shift from left to right  2   Increased conspicuity of bilateral posterior subarachnoid and intraventricular blood products are likely due to redistribution  Workstation performed: [id number]         TRAUMA - CT chest abdomen pelvis w contrast   Final Result by Macario Peng MD (12/18 2132)      1  Multiple right-sided rib fractures with tiny right apical pneumothorax  Fractures extend from the second through eleventh ribs at the costovertebral junctions and fourth through eighth ribs laterally  This is consistent with flail chest  A right    anterior second rib fracture is severely displaced/angulated  2   Fracture of the anterior right glenoid, acromium, and first through fourth thoracic transverse processes on the right  3   Focal irregular opacity in the right upper lobe with adjacent 4 mm nodule  While contusion is possible given overlying trauma, the appearance of the lesion is most suspicious for a primary lung cancer and follow-up PET/CT is recommended  4   Borderline upper abdominal lymph nodes with density/stranding at the base of the small bowel mesentery  While mesenteric contusion can have this appearance in the setting of trauma  There are calcifications which suggest chronicity  Lymphoproliferative disorder cannot be excluded and follow-up and 3-6 months is recommended  5   Chronic interstitial lung disease of uncertain etiology  Given esophageal dilatation, this could represent NSIP in the setting of scleroderma  I personally discussed this study with Ebenezer Orellana on 12/18/2018 at 8:45 PM                   Workstation performed: [id number]         CTA head and neck w wo contrast   Final Result by Spencer Farley MD (12/18 2132)         1  Atherosclerotic plaque at the right carotid bifurcation resulting in moderate (50-69%) stenosis    No evidence of carotid or vertebral artery dissection or pseudoaneurysm to suggest acute vascular injury  2   Displacement of left MCA branches by left temporal parietal hematoma  No evidence of aneurysm or vascular malformation  No significant stenosis in the major vessels of the [place]  3   Patent dural venous sinuses  Workstation performed: [id number]         TRAUMA - CT spine cervical wo contrast   Final Result by Estephanie Umanzor MD (12/18 2108)      1  No cervical spine fracture or traumatic malalignment  2   Small right apical pneumothorax  3   Nondisplaced fractures of the right T1 and T2 transverse processes  Fracture of the posterior right second rib  I personally discussed this study with Eliceo Larose on 12/18/2018 at 8:45 PM                          Workstation performed: [id number]         TRAUMA - CT head wo contrast   Final Result by Estephanie Umanzor MD (12/18 2101)      1  Large parenchymal hemorrhage on the left with associated left-sided subdural hematoma and bilateral subarachnoid hemorrhage including intraventricular hemorrhage  2   Mass effect and midline shift to the right measuring 4 mm  I personally discussed this study with Eliceo Larose on 12/18/2018 at 8:45 PM                      Workstation performed: [id number]         XR trauma multiple    (Results Pending)   XR chest 1 view    (Results Pending)   XR chest portable    (Results Pending)   XR shoulder 2+ vw right    (Results Pending)       Micro:       Allergies: Not on File  Medications:   Scheduled Meds:  Current Facility-Administered Medications:  chlorhexidine 15 mL [employer] Q12H [address] Maria Eugenia Goodman MD    famotidine 20 mg Intravenous BID Maria Eugenia Goodman MD    fentaNYL 100 mcg/hr Intravenous Continuous Maria Eugenia Goodman MD Last Rate: 100 mcg/hr (12/18/18 2125)   insulin lispro 1-5 Units Subcutaneous TID [facility] Nicki Santiago MD    insulin lispro 1-5 Units Subcutaneous HS Nicki Santiago MD    labetalol 5 mg Intravenous Q4H PRN Binh Rivas MD    levETIRAcetam 500 mg Intravenous Q12H [address] Binh Rivas MD Last Rate: Stopped (12/19/18 0057)   magnesium sulfate 2 g Intravenous Once Binh Rivas MD Last Rate: 2 g (12/19/18 6430)   multi-electrolyte 100 mL/hr Intravenous Continuous Binh Rivas MD Last Rate: 100 mL/hr (12/19/18 0304)   potassium chloride 20 mEq Intravenous Q2H Binh Rivas MD Last Rate: 20 mEq (12/19/18 0310)   propofol 5-50 mcg/kg/min Intravenous Titrated Annalisa Dalton DO Last Rate: 25 mcg/kg/min (12/19/18 0218)     Continuous Infusions:  fentaNYL 100 mcg/hr Last Rate: 100 mcg/hr (12/18/18 2125)   multi-electrolyte 100 mL/hr Last Rate: 100 mL/hr (12/19/18 0304)   propofol 5-50 mcg/kg/min Last Rate: 25 mcg/kg/min (12/19/18 0218)     PRN Meds:    labetalol 5 mg Q4H PRN     VTE Pharmacologic Prophylaxis: Pharmacologic VTE Prophylaxis contraindicated due to Acute IPH/SAH/SDH  VTE Mechanical Prophylaxis: sequential compression device  Invasive lines and devices: Invasive Devices     Peripheral Intravenous Line            Peripheral IV 12/18/18 Left Forearm less than 1 day    Peripheral IV 12/18/18 Right Forearm less than 1 day    Peripheral IV 12/19/18 Right Forearm less than 1 day          Arterial Line            Arterial Line 12/18/18 Left Radial less than 1 day          Drain            NG/OG/Enteral Tube Orogastric 16 Fr Left mouth less than 1 day    Urethral Catheter 16 Fr  less than 1 day          Airway            ETT  Cuffed 8 mm less than 1 day                     Portions of the record may have been created with voice recognition software  Occasional wrong word or "sound a like" substitutions may have occurred due to the inherent limitations of voice recognition software  Read the chart carefully and recognize, using context, where substitutions have occurred      Juan Gutierres MD

## 2018-12-19 NOTE — NUTRITION
In light of Propofol @ 5 2 ml/hr rec start Jevity 1 2 @ 10 ml/hr & incr by 10 ml q 4 hrs as david to goal of 53 ml/hr to = qd: 1272 ml,1526 Kcal,71 gm PRO,215 gm CHO,50 gm Fat,1027 ml Free H2O,2 6 mg CHO/kg/min  Consider check Fe+ studies & Ionized Ca

## 2018-12-19 NOTE — SOCIAL WORK
Pt intubated/sedated  Pt sister, Eliecer Collier [phone] at bedside  CM introduced self and role with dcp  Pt resides a lone in a 2nd floor apartment with elevator access  Eliecer Collier reports she lives in an apartment in the same building as pt  She reports pt independent with ADLs and ambulation PTA  Pt does not work but able to drive  Eliecer Collier reports she does not work and able to help pt at home  Pt has no hx of VNA or STR  No hx of drug/alcohol abuse  As per Eliecer Collier pt has a psychiatrist Dr Dario Kehr but no hx of IP MH tx  As per Eliecer Nando pt pharmacy is [facility] in [place]  As per Eliecer Ego she does not know pt SS# but will look in her apartment  Eliecer Rocklalita reports pt does not have a secondary insurance  As per Eliecer Collier pt does not have a POA  Pt is not  and no children  Pt parents are both   As per Eliecer Ego pt also has 4 brothers, Dana Nicolas, and Kaern Perez  As per Eliecer Ego they are all very close and she is the spokes person for the siblings  CM to follow for dcp  CM reviewed d/c planning process including the following: identifying help at home, patient preference for d/c planning needs, Discharge Lounge, [facility] Meds to Bed program, availability of treatment team to discuss questions or concerns patient and/or family may have regarding understanding medications and recognizing signs and symptoms once discharged  CM also encouraged patient to follow up with all recommended appointments after discharge  Patient advised of importance for patient and family to participate in managing patients medical well being

## 2018-12-19 NOTE — RESPIRATORY THERAPY NOTE
RT Protocol Note  Elane Caden 59 y o  female MRN: [id number]  Unit/Bed#: MICU 02 Encounter: [id number]    Assessment    Active Problems:    * No active hospital problems  *      Home Pulmonary Medications:         No past medical history on file  Social History     Social History    Marital status: N/A     Spouse name: N/A    Number of children: N/A    Years of education: N/A     Social History Main Topics    Smoking status: Not on file    Smokeless tobacco: Not on file    Alcohol use Not on file    Drug use: Unknown    Sexual activity: Not on file     Other Topics Concern    Not on file     Social History Narrative    No narrative on file       Subjective         Objective    Physical Exam:   Assessment Type: Assess only  General Appearance: Sedated  Respiratory Pattern: Assisted  Chest Assessment: Chest expansion symmetrical  Bilateral Breath Sounds: Coarse  L Breath Sounds: Inspiratory wheezes  O2 Device: Ventilator    Vitals:  Blood pressure 152/67, pulse 68, temperature 100 4 °F (38 °C), resp  rate 13, weight 57 6 kg (126 lb 15 8 oz), SpO2 100 %  Results from last 7 days  Lab Units 12/18/18  2229   PH ART  7 396   PCO2 ART mm Hg 40 4   PO2 ART mm Hg 90 6   HCO3 ART mmol/L 24 2   BASE EXC ART mmol/L -0 6   O2 CONTENT ART mL/dL 15 7*   O2 HGB, ARTERIAL % 95 5   ABG SOURCE  Line, Arterial       Imaging and other studies: I have personally reviewed pertinent reports  O2 Device: Ventilator     Plan    Respiratory Plan: Vent/NIV/HFNC  Airway Clearance Plan: Discontinue Protocol     Resp Comments: (P) Pt  found down and currently intubated on ACVC vent settings  Pt  CT shows chronic ILD and right sided rib fractures  She has no documented pulmonary history or meds per chart  Slight inspiratory wheeze noted and prn udn given which cleared post  Will continue udn prn at this time  No other therapy indicated  Will continue to monitor per protocol

## 2018-12-19 NOTE — CONSULTS
Consultation - Neurosurgery   Porsche Pace 59 y o  female MRN: [id number]  Unit/Bed#: MICU 02 Encounter: [id number]      Inpatient consult to Neurosurgery  Consult performed by: Salima Garcia ordered by: Adrienne Rubi      Patient discussed during morning rounds at 7 30am  Pt personally accessed at 9 30am    Assessment/Plan     Assessment:  1  Acute Traumatic Large Left Temporal IPH  2  S/p Fall (Unwitnessed)  3  Acute Traumatic Left Temporal to frontal SDH  4  Acute Traumatic Bilateral posterior SAH  5  Acute Traumatic IVH  6  Acute Traumatic Nondisplaced Fractures  Right Transverse processes T1 - T4  7  Acute Traumatic Multiple rib fractures on the right   8  Acute Traumatic Right Glenoid fracture  9  Acute Traumatic Right Acromion fracture  10  Small right Apical Pneumothorax  11  RUL lung nodule  12  Diabetes Mellitus Type II    Plan:  · Exam revealed GCS 11T E3 V1T M6 Opens eyes to verbal stimuli, pt intubated/sedated  Spontaneously moving all extremities, decreased movement in right arm/forearm  Strength RUE: EF 2/5 SF 0/5 LUE: EF 3/5 SF 2/5  BLE: HF 1/5 KF 4/5 PF/DF 3/5  Reflexes 1+ throughout  No clonus  · Imaging reviewed personally and by attending  Results listed below  · Cta Head And Neck W Wo Contrast, Result Date: 12/18/2018: 1  Atherosclerotic plaque at the right carotid bifurcation resulting in moderate (50-69%) stenosis  No evidence of carotid or vertebral artery dissection or pseudoaneurysm to suggest acute vascular injury  2   Displacement of left MCA branches by left temporal parietal hematoma  No evidence of aneurysm or vascular malformation  · Ct Head Wo Contrast, Result Date: 12/19/2018: 1  Stable size of large intraparenchymal hemorrhage centered in the left temporal lobe with surrounding mass effect and edema and adjacent left temporal to frontal subdural hemorrhage  Stable midline shift from left to right   2   Increased conspicuity of bilateral posterior subarachnoid and intraventricular blood products are likely due to redistribution  · Trauma - Ct Head Wo Contrast, Result Date: 12/18/2018: 1  Large parenchymal hemorrhage on the left with associated left-sided subdural hematoma and bilateral subarachnoid hemorrhage including intraventricular hemorrhage  2   Mass effect and midline shift to the right measuring 4 mm  · Trauma - Ct Spine Cervical Wo Contrast, Result Date: 12/18/2018:  Nondisplaced fractures of the right T1 and T2 transverse processes  Fracture of the posterior right second rib  · Trauma - Ct Chest Abdomen Pelvis W Contrast, Result Date: 12/18/2018:  Fracture of the anterior right glenoid, acromium, and first through fourth thoracic transverse processes on the right  · Ongoing medical management per primary team - Critical Care service  · PT Intubated, on ventillator and sedated with Propofol 1000mg in 100 ml infusion and Fentanyl 1250 mcg in NaCl 0 9% 125 ml drip at 100 mcg/hr  · Labs:  · INR 12/18/18 wnl at 1 01   · Na 12/19/18 low at 128- Hypertonic NaCl bolus given, repeat BMP at 1 37 pm at 130  · K 12/19/18 low at 3 4 -  KCL given, improved to 4 2   · Sae 12/18/18 low at 7 5 - Sae Gluconate given  Improved to 7 9 on 12 19/18  · Hgb 12/19/18  low at 10 2 , Microcytic anemia with MCV low at 77  · CK elevated at 812 - Fluid resuscitation with Isolyte  · Glucose was elevated - Insulin sliding scale ordered  · PT/ OT Evaluation and mobilization when able to   · DVT ppx: SCDs, Pharm DVT held due to acute IPH, SAH, SDH  · Continue regular neurological checks  · STAT CT of head wo contrast if neurological decline GCS > 2pts/hr  · Keppra 500 MG Q 12 Hrs x 1 week for seizure ppx  · Ongoing discussion about neurosurgical intervention  Neurosurgery will continue to follow  Please call with any questions or concerns  History of Present Illness   History, ROS and PFSH unobtainable from patient due to intubation/sedation/altered mental status    Hx obtained from ED report  HPI: Russel Nolen is a 59 y o   female with unknown PMH who presented s/p unwitnessed fall possibly about 2 days ago  She was transferred from [facility] to [facility]  Per EMS it seemed pt had been down for likely approximately 48 hrs which is when she was last seen normal   Per ED report, pt was found down by her sister at her house  Apparently, blood was on the walls in the house  On arrival to the ED GCS was 11 E2, V2, M6  She was not moving the right side of her body and had multiple bruises on her body  Today pt is intubated and sedated  Her sedation was held for 2 hrs prior to this exam and was intermittently responsive to verbal commands  Per family pt has a hx of DM II and takes various medications  Her sister Chase Pederson reported that she does not take any asprin or there AC/AP  Her sister reports that she has been having falls recently  She fell a few weeks ago and had a vertebral fracture at that time  Her sister reports that prior to her fall she was well and was not acutely ill  Review of Systems   Reason unable to perform ROS: Pt intubated/sedated/Altered mental status  Historical Information   Past Medical History:   Diagnosis Date    Diabetes mellitus, type II ([facility] [address] )      No past surgical history on file    History   Alcohol use Not on file     History   Drug use: Unknown     History   Smoking Status    Not on file   Smokeless Tobacco    Not on file     Family History   Problem Relation Age of Onset    Cerebral aneurysm Sister        Meds/Allergies   all current active meds have been reviewed, current meds:   Current Facility-Administered Medications   Medication Dose Route Frequency    chlorhexidine (PERIDEX) 0 12 % oral rinse 15 mL  15 mL Swish & Spit Q12H [facility]    fentaNYL 1250 mcg in sodium chloride 0 9% 125mL drip  100 mcg/hr Intravenous Continuous    insulin lispro (HumaLOG) 100 units/mL subcutaneous injection 1-5 Units  1-5 Units Subcutaneous TID AC    insulin lispro (HumaLOG) 100 units/mL subcutaneous injection 1-5 Units  1-5 Units Subcutaneous HS    labetalol (NORMODYNE) injection 5 mg  5 mg Intravenous Q4H PRN    levETIRAcetam (KEPPRA) oral solution 500 mg  500 mg Oral Q12H [address]    multi-electrolyte (ISOLYTE-S PH 7 4 equivalent) IV solution  100 mL/hr Intravenous Continuous    pramipexole (MIRAPEX) tablet 0 25 mg  0 25 mg Oral BID    propofol (DIPRIVAN) 1000 mg in 100 mL infusion (premix)  5-50 mcg/kg/min Intravenous Titrated    QUEtiapine (SEROquel) tablet 25 mg  25 mg Oral HS    and PTA meds:   Prior to Admission Medications   Prescriptions Last Dose Informant Patient Reported? Taking? QUEtiapine (SEROquel) 25 mg tablet   Yes Yes   Sig: Take 25 mg by mouth daily at bedtime   metFORMIN (FORTAMET) 1000 MG (OSM) 24 hr tablet   Yes Yes   Sig: Take 500 mg by mouth 2 (two) times daily after meals   pramipexole (MIRAPEX) 0 25 mg tablet   Yes Yes   Sig: Take 0 25 mg by mouth 2 (two) times a day      Facility-Administered Medications: None     Not on File    Objective   I/O       12/17 0701 - 12/18 0700 12/18 0701 - 12/19 0700 12/19 0701 - 12/20 0700    I V  (mL/kg)  693 7 (12) 553 4 (9 6)    IV Piggyback  350 200    Total Intake(mL/kg)  1043 7 (18 1) 753 4 (13 1)    Urine (mL/kg/hr)  1145     Total Output   1145      Net   -101 4 +753 4                 Physical Exam   Constitutional: She appears well-developed and well-nourished  No distress  HENT:   Right scalp abrasion, right scalp hematoma/swelling  Eyes: Pupils are equal, round, and reactive to light  EOM are normal    Neck: Neck supple  No tracheal deviation present  Cardiovascular: Normal rate  Pulmonary/Chest: Effort normal    Intubated on ventillator   Abdominal: Soft  There is no tenderness  There is no guarding  Musculoskeletal:   Spontaneously moving all extremities, decreased movement in RUE      Neurological: Finger-nose-finger test: tried on the LUE but did not reach nose  Did not move RUE toward nose  2/2 to R  shoudler fx  Reflex Scores:       Tricep reflexes are 1+ on the right side and 1+ on the left side  Bicep reflexes are 1+ on the right side and 1+ on the left side  Brachioradialis reflexes are 1+ on the right side and 1+ on the left side  Patellar reflexes are 1+ on the right side and 1+ on the left side  Achilles reflexes are 1+ on the right side and 1+ on the left side  GCS 11T E3 V1T M6 Opens eyes to verbal stimuli, Intubated, on ventillator and Sedated  Skin: Skin is warm and dry  No rash noted  No pallor  Abrasion on the right scalp, right shoulder ecchymosis, bilat Knee ecchymosis, small ecchymosis on right eye  Psychiatric:   Unable to access secondary to intubation/sedation  Neurologic Exam     Mental Status   Follows commands: Follows motor commands in BLE, LUE, minimal on RUE  Attention: decreased  Concentration: decreased (Sedated  )  Speech: (Intubated )  Level of consciousness: drowsy ,  arousable by verbal stimuli (Sedated)    Cranial Nerves     CN III, IV, VI   Pupils are equal, round, and reactive to light  Extraocular motions are normal    Right pupil: Size: 3 mm  Shape: regular  Reactivity: sluggish  Consensual response: intact  Left pupil: Size: 3 mm  Shape: regular  Reactivity: sluggish  Consensual response: intact  CN III: no CN III palsy  CN VI: no CN VI palsy  Nystagmus: none   Abnormal upgaze: Pt had an upward gaze bilaterally, did not gaze downward  CN VIII   Hearing impaired: Responsive to verbal stimuli    Limited due to sedation/intubation     Motor Exam   Muscle bulk: normal  Overall muscle tone: normal  Right arm tone: normal  Left arm tone: normal  Right leg tone: normal  Left leg tone: normal    Strength   Right deltoid: 0/5  Left deltoid: 2/5  Right biceps: 2/5  Left biceps: 3/5  Right triceps: 2/5  Left triceps: 3/5  Right wrist flexion: 2/5  Left wrist flexion: 3/5  Right wrist extension: 2/5  Left wrist extension: 3/5  Right interossei: 2/5  Left interossei: 2/5    Sensory Exam   Did not withdraw the RUE to painful stimuli  Sensation exam limited secondary to intubation  Gait, Coordination, and Reflexes     Coordination   Finger-nose-finger test: tried on the LUE but did not reach nose  Did not move RUE toward nose  2/2 to R  shoudler fx  Reflexes   Right brachioradialis: 1+  Left brachioradialis: 1+  Right biceps: 1+  Left biceps: 1+  Right triceps: 1+  Left triceps: 1+  Right patellar: 1+  Left patellar: 1+  Right achilles: 1+  Left achilles: 1+  Right plantar: equivocal  Left plantar: upgoing  Right ankle clonus: absent  Left ankle clonus: absent      Vitals:Blood pressure 152/67, pulse 60, temperature 99 °F (37 2 °C), resp  rate 14, weight 57 6 kg (126 lb 15 8 oz), SpO2 99 %  ,There is no height or weight on file to calculate BMI  Lab Results:     Results from last 7 days  Lab Units 12/19/18  0455 12/18/18  2028   WBC Thousand/uL 7 70 13 08*   HEMOGLOBIN g/dL 10 2* 11 6   HEMATOCRIT % 30 2* 35 3   PLATELETS Thousands/uL 262 273   NEUTROS PCT % 76* 89*   MONOS PCT % 10 6       Results from last 7 days  Lab Units 12/19/18  0929 12/19/18  0455 12/19/18  0133  12/18/18  2030   POTASSIUM mmol/L 4 2 4 2 3 4*  < > 3 7   CHLORIDE mmol/L 98* 95* 94*  < > 88*   CO2 mmol/L 25 25 26  < > 26   BUN mg/dL 6 7 7  < > 9   CREATININE mg/dL 0 31* 0 34* 0 32*  < > 0 42*   CALCIUM mg/dL 7 9* 7 9* 7 9*  < > 8 7   ALK PHOS U/L  --   --   --   --  138*   ALT U/L  --   --   --   --  96*   AST U/L  --   --   --   --  130*   < > = values in this interval not displayed      Results from last 7 days  Lab Units 12/19/18  0455 12/18/18  2229   MAGNESIUM mg/dL 2 3 1 4*       Results from last 7 days  Lab Units 12/19/18  0455 12/18/18  2229   PHOSPHORUS mg/dL 3 2 3 5       Results from last 7 days  Lab Units 12/18/18  2030   INR  1 01   PTT seconds 25*     No results found for: TROPONINT  ABG:  Lab Results Component Value Date    PHART 7 432 12/19/2018    FCG6DLQ 38 0 12/19/2018    PO2ART 105 4 12/19/2018    FLU7SQC 24 8 12/19/2018    BEART 0 6 12/19/2018    SOURCE Line, Arterial 12/19/2018       Imaging Studies: I have personally reviewed pertinent reports  and I have personally reviewed pertinent films in PACS  Pertinent  Reports and imaging in PACS reviewed by attending  Cta Head And Neck W Wo Contrast    Result Date: 12/18/2018  Impression: 1  Atherosclerotic plaque at the right carotid bifurcation resulting in moderate (50-69%) stenosis  No evidence of carotid or vertebral artery dissection or pseudoaneurysm to suggest acute vascular injury  2   Displacement of left MCA branches by left temporal parietal hematoma  No evidence of aneurysm or vascular malformation  No significant stenosis in the major vessels of the [place]  3   Patent dural venous sinuses  Workstation performed: [id number]     Xr Chest Portable    Result Date: 12/19/2018  Impression: Satisfactory intubation  Bibasilar subsegmental atelectasis  No pneumothorax  Workstation performed: [id number]     Xr Shoulder 2+ Vw Right    Result Date: 12/19/2018  Impression: Right-sided rib fractures again noted  Possible acromion fracture  The small glenoid fracture seen on CT scan of the chest is not clearly appreciated on this exam  Workstation performed: [id number]     Ct Head Wo Contrast    Result Date: 12/19/2018  Impression: 1  Stable size of large intraparenchymal hemorrhage centered in the left temporal lobe with surrounding mass effect and edema and adjacent left temporal to frontal subdural hemorrhage  Stable midline shift from left to right  2   Increased conspicuity of bilateral posterior subarachnoid and intraventricular blood products are likely due to redistribution  Workstation performed: [id number]     Trauma - Ct Head Wo Contrast    Result Date: 12/18/2018  Impression: 1    Large parenchymal hemorrhage on the left with associated left-sided subdural hematoma and bilateral subarachnoid hemorrhage including intraventricular hemorrhage  2   Mass effect and midline shift to the right measuring 4 mm  I personally discussed this study with Sandra Chun on 12/18/2018 at 8:45 PM  Workstation performed: [id number]     Trauma - Ct Spine Cervical Wo Contrast    Result Date: 12/18/2018  Impression: 1  No cervical spine fracture or traumatic malalignment  2   Small right apical pneumothorax  3   Nondisplaced fractures of the right T1 and T2 transverse processes  Fracture of the posterior right second rib  I personally discussed this study with Sandra Chun on 12/18/2018 at 8:45 PM   Workstation performed: [id number]     Trauma - Ct Chest Abdomen Pelvis W Contrast    Result Date: 12/18/2018  Impression: 1  Multiple right-sided rib fractures with tiny right apical pneumothorax  Fractures extend from the second through eleventh ribs at the costovertebral junctions and fourth through eighth ribs laterally  This is consistent with flail chest  A right anterior second rib fracture is severely displaced/angulated  2   Fracture of the anterior right glenoid, acromium, and first through fourth thoracic transverse processes on the right  3   Focal irregular opacity in the right upper lobe with adjacent 4 mm nodule  While contusion is possible given overlying trauma, the appearance of the lesion is most suspicious for a primary lung cancer and follow-up PET/CT is recommended  4   Borderline upper abdominal lymph nodes with density/stranding at the base of the small bowel mesentery  While mesenteric contusion can have this appearance in the setting of trauma  There are calcifications which suggest chronicity  Lymphoproliferative disorder cannot be excluded and follow-up and 3-6 months is recommended  5   Chronic interstitial lung disease of uncertain etiology   Given esophageal dilatation, this could represent NSIP in the setting of scleroderma  I personally discussed this study with Juliette Abdullahi on 12/18/2018 at 8:45 PM  Workstation performed: [id number]     Xr Trauma Multiple    Result Date: 12/19/2018  Impression: Multiple right-sided rib fractures  Consolidation at the base may represent atelectasis or pneumonia  Workstation performed: [id number]       EKG, Pathology, and Other Studies: I have personally reviewed pertinent reports  VTE Prophylaxis: Sequential compression device (Venodyne)  and Reason for no pharmacologic prophylaxis Acute SAH, IPH, SDH    Code Status: Level 1 - Full Code  Advance Directive and Living Will:      Power of :    POLST:      Counseling / Coordination of Care  I spent 30 minutes with the patient

## 2018-12-19 NOTE — ED PROVIDER NOTES
Emergency Department Airway Evaluation and Management Form    History  Obtained from: EMS      Chief complaint  Unable- semi responsive/ aphasia    HPI  Report of unwitnessed fall at home, possible 2 days ago    No past medical history on file  No past surgical history on file  No family history on file  Social History   Substance Use Topics    Smoking status: Not on file    Smokeless tobacco: Not on file    Alcohol use Not on file     I have reviewed and agree with the history as documented  Review of Systems  Unable to provide      Physical Exam  There were no vitals taken for this visit  Physical Exam  Patient somnolent but responsive to verbal stimuli and does follow commands  Flaccidity of right upper extremity and right lower extremity  Also note ecchymosis to the right shoulder  Airway patent, lung sounds clear auscultation bilaterally,  Peripheral pulses intact        ED Medications  Medications - No data to display    Intubation  no    Notes  Possible CVA    CritCare Time      ED Provider  Electronically Signed by       Isela Nevarez DO  12/18/18 2024

## 2018-12-19 NOTE — ED PROCEDURE NOTE
Procedure  Intubation  Date/Time: 12/18/2018 9:07 PM  Performed by: Chava Lin by: Rome Bosworth     Consent:     Consent obtained:  Emergent situation  Universal protocol:     Patient identity confirmed:  Verbally with patient and arm band  Pre-procedure details:     Patient status:  Altered mental status    Mallampati score:  2    Pretreatment medications:  Etomidate    Paralytics:  Succinylcholine  Indications:     Indications for intubation: respiratory distress and airway protection    Procedure details:     Preoxygenation:  Nasal cannula    CPR in progress: no      Intubation method:  Oral    Oral intubation technique:  Glidescope    Laryngoscope blade: Mac 4    Tube size (mm):  8 0    Tube type:  Cuffed    Tube visualized through cords: yes    Placement assessment:     ETT to lip:  23    Tube secured with:  ETT tirado    Breath sounds:  Equal    Placement verification: chest rise, condensation, direct visualization, equal breath sounds, ETCO2 detector and tube exhalation    Post-procedure details:     Patient tolerance of procedure:   Tolerated well, no immediate complications                     Janelle Noriega MD  12/18/18 [id number]

## 2018-12-19 NOTE — H&P
H&P Exam - Trauma   Marianne Neil 81 y o  female MRN: [id number]  Unit/Bed#:  Encounter: [id number]    Assessment/Plan   Trauma Alert: Level A  Model of Arrival: Ambulance  Trauma Team: Attending Dr Skip Pinedo and Residents Dr Karen Piedra  Consultants: Neurosurgery: Regarding intracranial hemorrhage  Returned call: Yes      Trauma Active Problems:     Left intraparenchymal hemorrhage with 4 mm midline shift  Left subdural hematoma  Bilateral subarachnoid hemorrhage with intraventricular extension  Right apical pneumothorax  T1-T4 transverse process fractures  Right 2nd-11th posterior rib fracture  Right upper lung nodule  Acute encephalopathy  Acute respiratory failure    Trauma Plan:     -admit to SICU  -intubated for airway protection  -propofol, fentanyl GTT for sedation and analgesia  -neurosurgery consulted - this case was discussed with the neurosurgeon on-call by the attending physician  -hypertonic saline bolus and drip started due to concern for increased ICP  -elevate head of bed  -check BMP, CBC, coags  -repeat chest x-ray status post intubation  -hold pharmacologic DVT prophylaxis  -further management by ICU team    Chief Complaint:  Unwitnessed fall    History of Present Illness   HPI:  Marianne Neil is a 81 y o  female who presents after being found down by her sister  Apparently there was blood on the walls in the house  Upon arrival her GCS was 11 (E3, V2, M6)  She was not moving her right side of her body  She had multiple bruises on her body  Further history is limited due to altered mental status  EMS noted that the patient had been down for likely approximately 48 hr, which is when she was last seen normal       Mechanism:Fall    Review of Systems   Unable to perform ROS: Patient nonverbal       Historical Information   History is unobtainable from the patient due to nonverbal   Efforts to obtain history included the following sources: other medical personnel    No past medical history on file    No past surgical history on file  Social History   History   Alcohol use Not on file     History   Drug use: Unknown     History   Smoking Status    Not on file   Smokeless Tobacco    Not on file       There is no immunization history on file for this patient  Last Tetanus:  Unknown  Family History: Non-contributory  Unable to obtain/limited by nonverbal      Meds/Allergies   all current active meds have been reviewed    Allergies not on file      PHYSICAL EXAM    PE limited by:  Nonverbal, right-sided flaccidity    Objective   Vitals:   First set: Temperature: 100 5 °F (38 1 °C) (12/18/18 2014)  Pulse: 90 (12/18/18 2014)  Respirations: 18 (12/18/18 2014)  Blood Pressure: (!) 192/91 (12/18/18 2014)    Primary Survey:   (A) Airway:  Patent, intact  (B) Breathing:  Mild bradypnea, some accessory muscle use with breathing  Breath sounds clear and equal  (C) Circulation: Pulses:   carotid  2/4, pedal  2/4 and radial  2/4  (D) Disabliity:    GCS is 10  (E) Expose:  Completed    Secondary Survey: (Click on Physical Exam tab above)  Physical Exam   Constitutional: No distress  HENT:   Ecchymosis to right eye  Abrasions to right scalp  Oropharynx dry   Eyes:   Pupils 4 and reactive   Neck: No JVD present  No tracheal deviation present  Cardiovascular: Normal rate and intact distal pulses  Pulmonary/Chest: Effort normal  No respiratory distress  Abdominal: Soft  There is no tenderness  There is no rebound and no guarding  FAST negative   Musculoskeletal: She exhibits no edema  Right shoulder ecchymosis  B/l knee ecchymosis   Neurological:   Not moving right side  Follows command and squeezes with left hand   Skin: No rash noted  She is not diaphoretic  No pallor  Psychiatric:   Minimally responsive          Invasive Devices     Peripheral Intravenous Line            Peripheral IV 12/18/18 Left Forearm less than 1 day    Peripheral IV 12/18/18 Right Forearm less than 1 day                Lab Results: Results: I have personally reviewed pertinent reports  Imaging/EKG Studies: Results: I have personally reviewed pertinent reports      Other Studies:     Code Status: No Order  Advance Directive and Living Will:      Power of :    POLST:

## 2018-12-19 NOTE — RESPIRATORY THERAPY NOTE
RT Ventilator Management Note  Eh Boy 59 y o  female MRN: [id number]  Unit/Bed#: [facility] 02 Encounter: [id number]      Daily Screen     No data found  Physical Exam:   Assessment Type: Assess only  General Appearance: Sedated  Respiratory Pattern: Assisted  Chest Assessment: Chest expansion symmetrical  Bilateral Breath Sounds: Diminished, Coarse  L Breath Sounds: Inspiratory wheezes  Suction: ET Tube  O2 Device: Ventilator      Resp Comments: (P) pt transported to ct scan and back to micu on ac vent settings w/out incident  Pt continues on ac 14/400/40/5  Pt tolerating well at this time  Will continue to monitor

## 2018-12-19 NOTE — RESPIRATORY THERAPY NOTE
RT Ventilator Management Note  Kayode Rubi 59 y o  female MRN: [id number]  Unit/Bed#: [facility] 02 Encounter: [id number]      Daily Screen     No data found  Physical Exam:   Assessment Type: Assess only  General Appearance: Sedated  Respiratory Pattern: Spontaneous, Symmetrical, Assisted  Chest Assessment: Chest expansion symmetrical  Bilateral Breath Sounds: Clear      Resp Comments: pt arrived from ed intubated on ac vent settings 14/400/40/5 pt tolerating well at this time will continue to monitor

## 2018-12-19 NOTE — CONSULTS
Orthopedics   Elane Caden 59 y o  female MRN: [id number]  [facility]      Chief Complaint:   Right Shoulder Pain    HPI: History provided from chart review as patient currently intubated and sedated  59 y  o female who had an unwitnessed fall at home possibly two days ago who was found down by her sister  Consulted for R acromion and glenoid fractures  Patient also noted to have significant facial trauma with several associated areas bleeding in the brain  Patient unable to comply with history or review of symptoms at this time however initial exams documented findings of R sided flaccidity  Patient will need formal interview upon extubation  Review Of Systems:   · Skin: Normal  · Neuro: See HPI  · Musculoskeletal: See HPI  · 14 point review of systems unobtainable due to pts condition    Past Medical History:   No past medical history on file  Past Surgical History:   No past surgical history on file  Family History:  Family history reviewed and non-contributory  No family history on file      Social History:  Social History     Social History    Marital status: N/A     Spouse name: N/A    Number of children: N/A    Years of education: N/A     Social History Main Topics    Smoking status: Not on file    Smokeless tobacco: Not on file    Alcohol use Not on file    Drug use: Unknown    Sexual activity: Not on file     Other Topics Concern    Not on file     Social History Narrative    No narrative on file       Allergies:   Not on File        Labs:    0  Lab Value Date/Time   HCT 35 3 12/18/2018 2028   HGB 11 6 12/18/2018 2028   INR 1 01 12/18/2018 2030   WBC 13 08 (H) 12/18/2018 2028       Meds:    Current Facility-Administered Medications:     chlorhexidine (PERIDEX) 0 12 % oral rinse 15 mL, 15 mL, [employer], Q12H [address], Epi Contreras MD    famotidine (PEPCID) injection 20 mg, 20 mg, Intravenous, BID, Epi Contreras MD, 20 mg at 12/18/18 2310    fentaNYL 1250 mcg in sodium chloride 0 9% 125mL drip, 100 mcg/hr, Intravenous, Continuous, Kj Khan MD, Last Rate: 10 mL/hr at 12/18/18 2125, 100 mcg/hr at 12/18/18 2125    insulin lispro (HumaLOG) 100 units/mL subcutaneous injection 1-5 Units, 1-5 Units, Subcutaneous, TID AC **AND** Fingerstick Glucose (POCT), , , TID AC, Jerson Whitley MD    insulin lispro (HumaLOG) 100 units/mL subcutaneous injection 1-5 Units, 1-5 Units, Subcutaneous, HS, Jerson Whitley MD    labetalol (NORMODYNE) injection 5 mg, 5 mg, Intravenous, Q4H PRN, Jerson Whitley MD    levETIRAcetam (KEPPRA) 500 mg in sodium chloride 0 9 % 100 mL IVPB, 500 mg, Intravenous, Q12H [address], Jerson Whitley MD, Last Rate: 400 mL/hr at 12/19/18 0042, 500 mg at 12/19/18 0042    propofol (DIPRIVAN) 1000 mg in 100 mL infusion (premix), 5-50 mcg/kg/min, Intravenous, Titrated, Elva Dalton DO, Last Rate: 13 9 mL/hr at 12/18/18 2300, 40 mcg/kg/min at 12/18/18 2300    Blood Culture:   No results found for: BLOODCX    Wound Culture:   No results found for: WOUNDCULT    Ins and Outs:  I/O last 24 hours: In: 56 3 [I V :56 3]  Out: 800 [Urine:800]          Physical Exam:   /67   Pulse 68   Temp 100 4 °F (38 °C)   Resp 13   Wt 57 6 kg (126 lb 15 8 oz)   SpO2 100%   Gen: patient intubated and sedated  HEENT: several head lacerations noted, unable to assess hearing  Respiratory: Bilateral chest rise  Patient on ventilator  Cardiovascular: Regular Rate and Rhythm  Abdomen: soft nontender/nondistended  Musculoskeletal: right upper extremity  · Skin bruising noted over the R shoulder  · Full passive ROM at the shoulder without evidence of instability  · Unable to comply with sensory exam  · Unable to comply with motor exam  · 2+ rad pulse        Tertiary: no areas of obvious deformity or instability appreciated    Radiology:   I personally reviewed the films    R anterior glenoid fracture and R acromion fracture     _*_*_*_*_*_*_*_*_*_*_*_*_*_*_*_*_*_*_*_*_*_*_*_*_*_*_*_*_*_*_*_*_*_*_*_*_*_*_*_*_*    Assessment:  59 y  o female sp fall with R glenoid and acromion fracture     Plan:   · NWB RUE sling for comfort when extubated  · Will discuss definitive management with ortho team in am  · FU shoulder imaging  · Patient will need formal tertiary exam when extubated and off sedation  · Dispo: Ortho will follow      Deny Srivastava MD

## 2018-12-19 NOTE — PHYSICAL THERAPY NOTE
Physical Therapy Cancellation Note    PT orders received, chart review performed  Pt is currently intubated/sedated and not appropriate for skilled PT services at this time  PT to follow up when pt is medically and clinically appropriate       Kely Flores, PT, DPT

## 2018-12-19 NOTE — NURSING NOTE
Pt taken to and from CT scan on telemetry with RN and Respiratory  Tolerated well   No change in VS

## 2018-12-19 NOTE — CONSULTS
Consult Note - Critical Care   Kwame Anderson 59 y o  female MRN: [id number]  Unit/Bed#: [facility] 02 Encounter: [id number]    Attending Physician: Jenae Santiago, DO      ______________________________________________________________________  Assessment and Plan: Active Problems:    * No active hospital problems  *  Resolved Problems:    * No resolved hospital problems   *        NEURO:  -GCS: 10T (E: 3, V: 1, M: 6)  -SEDATIVES: Propofol @50  -ANALGESIA: Fentanyl @100  -SEIZURE PROPHYLAXIS: Keppra 500mg BID  -MEDICATIONS: Pramipexole (home med--held), Quetiapine (home med--held)   -PLAN:   Hx of Parkinson's Disease, Bipolar Disorder  Follows commands on LUE, withdraws to pain in LLE, no movement in RUE/RLE  q1h neuro checks  Neurosurgery consulted, appreciate recs  Repeat CTH in AM  Wean sedation as tolerated    CV:   Temp:  [100 4 °F (38 °C)-100 8 °F (38 2 °C)] 100 4 °F (38 °C)  HR:  [68-93] 68  Resp:  [13-18] 13  BP: (129-197)/(62-97) 152/67  SpO2:  [99 %-100 %] 100 %      -MEDICATIONS: Labetalol 5mg q4h PRN  -PLAN:   Keep SBP <140 per NSGY recs  Stable, continue to monitor  F/u AM labs    PULM:   Temp:  [100 4 °F (38 °C)-100 8 °F (38 2 °C)] 100 4 °F (38 °C)  HR:  [68-93] 68  Resp:  [13-18] 13  BP: (129-197)/(62-97) 152/67  SpO2:  [99 %-100 %] 100 %   Vent Mode: AC/VC  FiO2 (%):  [40] 40  -ABG: ABG:  Lab Results   Component Value Date    PHART 7 396 12/18/2018    [id number] 40 4 12/18/2018    PO2ART 90 6 12/18/2018    [id number] 24 2 12/18/2018    BEART -0 6 12/18/2018    SOURCE Line, Arterial 12/18/2018     VBG:    Results from last 7 days  Lab Units 12/18/18 2229   ABG SOURCE  Line, Arterial          Respiratory    Lab Data (Last 4 hours)      12/18 2229            pH, Arterial       7 396           pCO2, Arterial       40 4           pO2, Arterial       90 6           HCO3, Arterial       24 2           Base Excess, Arterial       -0 6                O2/Vent Data       12/18 2148   Most Recent         Vent Mode AC/VC  AC/VC      Resp Rate (BPM) (BPM) 14  14      Vt (mL) (mL) 400  400      FIO2 (%) (%) 40  40      PEEP (cmH2O) (cmH2O) 5  5      MV 6 11  5 86                     -PLAN:   Hx of COPD, s/p multiple R sided rib fx, flail chest, w/ tiny R apical PTX  Stable, continue to monitor respiratory status closely  Continue ventilator management  Wean ventilator settings as tolerated  F/u 6AM ABG    GI:  MEDICATIONS: Pepcid 20mg BID  GI ppx: n/a  -PLAN:   Stable, continue to monitor    :     Results from last 7 days  Lab Units 18   BUN mg/dL 7 9   CREATININE mg/dL 0 49* 0 42*     COLÓN: yes  I/O:     I/O       701 -  07 -  07    I V  (mL/kg)  56 3 (1)    Total Intake(mL/kg)  56 3 (1)    Urine (mL/kg/hr)  800    Total Output   800    Net   -743 7              -PLAN:  Monitor UOP  Trend Creatinine    F/E/N:   FLUIDS: [place] @100  ELECTROLYTES:    Results from last 7 days  Lab Units 18   SODIUM mmol/L 136 122*   POTASSIUM mmol/L 3 6 3 7   CHLORIDE mmol/L 105 88*   CO2 mmol/L 25 26   ANION GAP mmol/L 6 8   CALCIUM mg/dL 7 5* 8 7     NUTRITION: Diet NPO     -PLAN:  Sodium rapidly corrected from 122 to 136 after bolus of 3% hypertonic saline  Continue to monitor with q4h BMPs  Consider Renal consult    ID:     Results from last 7 days  Lab Units 18   WBC Thousand/uL 13 08*     CULTURES:    n/a  ANTIBIOTICS: n/a  -PLAN:   No acute infectious concerns    HEME:    Results from last 7 days  Lab Units 18   HEMOGLOBIN g/dL 11 6   HEMATOCRIT % 35 3   PLATELETS Thousands/uL 273       Results from last 7 days  Lab Units 18   INR  1 01   PTT seconds 25*     DVT ppx: Pharmacologic held d/t acute IPH, SDH, SAH, mechanical--bilateral SCDs  -PLAN:   F/u AM labs  Stable  Transfuse PRN for Hgb <7    ENDO:   -POC-->141        -MEDICATIONS: SSI #2    -PLAN:  Hx of T2DM, hypothyroidism  Continue SSI  Continue glucose checks     MSK/SKIN:   S/p multiple R sided rib fx, flail chest, R 2nd-11th rib fx at costovertebral junctions, 4th-8th ribs fx laterally, R anterior 2nd rib angulated fx, fx of anterior R glenoid, acromium    -PLAN:  F/u Orthopedic Surgery consult for anterior R glenoid, acromium fx  PT/OT  Routine skin care  Frequent offloading     LDA:  Invasive Devices     Peripheral Intravenous Line            Peripheral IV 12/18/18 Left Forearm less than 1 day    Peripheral IV 12/18/18 Right Forearm less than 1 day          Arterial Line            Arterial Line 12/18/18 Left Radial less than 1 day          Drain            NG/OG/Enteral Tube Orogastric 16 Fr Left mouth less than 1 day    Urethral Catheter 16 Fr  less than 1 day          Airway            ETT  Cuffed 8 mm less than 1 day                Disposition: Continue ICU Care    Code Status: Level 1 - Full Code    Counseling / Coordination of Care  Total Critical Care time spent 30 minutes excluding procedures, teaching and family updates  ______________________________________________________________________    Chief Complaint: Found down at home, traumatic brain injury, multiple traumatic musculoskeletal injuries    24 Hour Events:     Pt was brought to the ICU in stable condition, intubated and on mechanical ventilation  Bolus of hypertonic 3% percent saline given prior to arrival   Patient found to have multiple traumatic injuries on imaging-large left intraparenchymal hemorrhage with components of SDH, SAH, and IVH, flail chest with multiple right-sided rib fractures, right anterior glenoid fracture, right acromion fracture, right 1st through 4th transverse process fractures, and small right apical pneumothorax  Review of Systems   Unable to perform ROS: Intubated     ______________________________________________________________________    Physical Exam:     Physical Exam   Constitutional: She appears well-developed and well-nourished     HENT: Head: Normocephalic and atraumatic    +R orbital ecchymoses  +c-collar in place   Eyes: Pupils are equal, round, and reactive to light  Conjunctivae are normal  Right eye exhibits no discharge  Left eye exhibits no discharge  Neck: Normal range of motion  Neck supple  Cardiovascular: Normal rate and regular rhythm  Pulses:       Radial pulses are 2+ on the right side, and 2+ on the left side  Femoral pulses are 2+ on the right side, and 2+ on the left side  Dorsalis pedis pulses are 2+ on the right side, and 2+ on the left side  Posterior tibial pulses are 2+ on the right side, and 2+ on the left side  Pulmonary/Chest: Effort normal    Abdominal: Soft  She exhibits distension  She exhibits no mass  Genitourinary:   Genitourinary Comments: +kenny in place   Musculoskeletal: She exhibits no edema or deformity  + R knee hematoma    Neurological: GCS eye subscore is 3  GCS verbal subscore is 1  GCS motor subscore is 6  Skin:   +R shoulder ecchymoses  +B/l flank ecchymoses  B/l knee abrasions       ______________________________________________________________________  Vitals:    18 2300 18 2325 18 2359 18 0000   BP:       BP Location:       Pulse: 72   68   Resp: 13   13   Temp: (!) 100 8 °F (38 2 °C)   100 4 °F (38 °C)   TempSrc:       SpO2: 99% 99% 100% 100%   Weight:           Temperature:   Temp (24hrs), Av 7 °F (38 2 °C), Min:100 4 °F (38 °C), Max:100 8 °F (38 2 °C)    Current Temperature: 100 4 °F (38 °C)  Weights:   IBW: -92 5 kg    There is no height or weight on file to calculate BMI    Weight (last 2 days)     Date/Time   Weight    18 2210  57 6 (126 99)    18 20:20:17  58 (127 87)            Hemodynamic Monitoring:  N/A     Non-Invasive/Invasive Ventilation Settings:  Respiratory    Lab Data (Last 4 hours)       2229            pH, Arterial       7 396           pCO2, Arterial       40 4           pO2, Arterial       90 6 HCO3, Arterial       24 2           Base Excess, Arterial       -0 6                O2/Vent Data       12/18 2148   Most Recent         Vent Mode AC/VC  AC/VC      Resp Rate (BPM) (BPM) 14  14      Vt (mL) (mL) 400  400      FIO2 (%) (%) 40  40      PEEP (cmH2O) (cmH2O) 5  5      MV 6 11  5 86                Lab Results   Component Value Date    PHART 7 396 12/18/2018    [id number] 40 4 12/18/2018    PO2ART 90 6 12/18/2018    [id number] 24 2 12/18/2018    BEART -0 6 12/18/2018    SOURCE Line, Arterial 12/18/2018     SpO2: SpO2: 100 %  Intake and Outputs:  I/O     None           Nutrition:        Diet Orders            Start     Ordered    12/18/18 2108  Diet NPO  Diet effective now     Question Answer Comment   Diet Type NPO    RD to adjust diet per protocol? Yes        12/18/18 2124          Labs:     Results from last 7 days  Lab Units 12/18/18 2028   WBC Thousand/uL 13 08*   HEMOGLOBIN g/dL 11 6   HEMATOCRIT % 35 3   PLATELETS Thousands/uL 273   NEUTROS PCT % 89*   MONOS PCT % 6       Results from last 7 days  Lab Units 12/18/18  2154 12/18/18 2030   SODIUM mmol/L 136 122*   POTASSIUM mmol/L 3 6 3 7   CHLORIDE mmol/L 105 88*   CO2 mmol/L 25 26   ANION GAP mmol/L 6 8   BUN mg/dL 7 9   CREATININE mg/dL 0 49* 0 42*   CALCIUM mg/dL 7 5* 8 7   ALT U/L  --  96*   AST U/L  --  130*   ALK PHOS U/L  --  138*   ALBUMIN g/dL  --  3 4*   TOTAL BILIRUBIN mg/dL  --  0 42            Results from last 7 days  Lab Units 12/18/18  2030   INR  1 01   PTT seconds 25*             ABG:  Lab Results   Component Value Date    PHART 7 396 12/18/2018    [id number] 40 4 12/18/2018    PO2ART 90 6 12/18/2018    YDX3SDS 24 2 12/18/2018    BEART -0 6 12/18/2018    SOURCE Line, Arterial 12/18/2018     VBG:    Results from last 7 days  Lab Units 12/18/18 2229   ABG SOURCE  Line, Arterial         No results found for: [facility]   Imaging:     TRAUMA - CT chest abdomen pelvis w contrast   Final Result by Adams Boxer, MD (12/18 2132)      1  Multiple right-sided rib fractures with tiny right apical pneumothorax  Fractures extend from the second through eleventh ribs at the costovertebral junctions and fourth through eighth ribs laterally  This is consistent with flail chest  A right    anterior second rib fracture is severely displaced/angulated  2   Fracture of the anterior right glenoid, acromium, and first through fourth thoracic transverse processes on the right  3   Focal irregular opacity in the right upper lobe with adjacent 4 mm nodule  While contusion is possible given overlying trauma, the appearance of the lesion is most suspicious for a primary lung cancer and follow-up PET/CT is recommended  4   Borderline upper abdominal lymph nodes with density/stranding at the base of the small bowel mesentery  While mesenteric contusion can have this appearance in the setting of trauma  There are calcifications which suggest chronicity  Lymphoproliferative disorder cannot be excluded and follow-up and 3-6 months is recommended  5   Chronic interstitial lung disease of uncertain etiology  Given esophageal dilatation, this could represent NSIP in the setting of scleroderma  I personally discussed this study with Brigida Oliveira on 12/18/2018 at 8:45 PM                   Workstation performed: [id number]         CTA head and neck w wo contrast   Final Result by Nicholas Joel MD (12/18 2132)         1  Atherosclerotic plaque at the right carotid bifurcation resulting in moderate (50-69%) stenosis  No evidence of carotid or vertebral artery dissection or pseudoaneurysm to suggest acute vascular injury  2   Displacement of left MCA branches by left temporal parietal hematoma  No evidence of aneurysm or vascular malformation  No significant stenosis in the major vessels of the [place]  3   Patent dural venous sinuses                    Workstation performed: [id number]         TRAUMA - CT spine cervical wo contrast   Final Result by Breanne Barry MD (12/18 2108)      1  No cervical spine fracture or traumatic malalignment  2   Small right apical pneumothorax  3   Nondisplaced fractures of the right T1 and T2 transverse processes  Fracture of the posterior right second rib  I personally discussed this study with Joe Liner on 12/18/2018 at 8:45 PM                          Workstation performed: [id number]         TRAUMA - CT head wo contrast   Final Result by Breanne Barry MD (12/18 2101)      1  Large parenchymal hemorrhage on the left with associated left-sided subdural hematoma and bilateral subarachnoid hemorrhage including intraventricular hemorrhage  2   Mass effect and midline shift to the right measuring 4 mm  I personally discussed this study with Lerry Liner on 12/18/2018 at 8:45 PM                      Workstation performed: [id number]         XR trauma multiple    (Results Pending)   XR chest 1 view    (Results Pending)   XR chest portable    (Results Pending)   CT head wo contrast    (Results Pending)   XR shoulder 2+ vw right    (Results Pending)       Micro:       Allergies: Not on File  Medications:   Scheduled Meds:    Current Facility-Administered Medications:  chlorhexidine 15 mL [employer] Q12H [address] Epi Contreras MD    famotidine 20 mg Intravenous BID Epi Contreras MD    fentaNYL 100 mcg/hr Intravenous Continuous Epi Contreras MD Last Rate: 100 mcg/hr (12/18/18 2125)   labetalol 5 mg Intravenous Q4H PRN Constantin Travis MD    levETIRAcetam 500 mg Intravenous Q12H [address] Constantin Travis MD    multi-electrolyte 100 mL/hr Intravenous Continuous Epi Contreras MD Last Rate: 100 mL/hr (12/18/18 2310)   propofol 5-50 mcg/kg/min Intravenous Titrated Ana Dalton DO Last Rate: 40 mcg/kg/min (12/18/18 2300)     Continuous Infusions:    fentaNYL 100 mcg/hr Last Rate: 100 mcg/hr (12/18/18 2125)   multi-electrolyte 100 mL/hr Last Rate: 100 mL/hr (12/18/18 2310)   propofol 5-50 mcg/kg/min Last Rate: 40 mcg/kg/min (12/18/18 2300)     PRN Meds:    labetalol 5 mg Q4H PRN     VTE Pharmacologic Prophylaxis: Pharmacologic VTE Prophylaxis contraindicated due to acute intraparenchymal hemorrhage  VTE Mechanical Prophylaxis: sequential compression device  Invasive lines and devices: Invasive Devices     Peripheral Intravenous Line            Peripheral IV 12/18/18 Left Forearm less than 1 day    Peripheral IV 12/18/18 Right Forearm less than 1 day          Arterial Line            Arterial Line 12/18/18 Left Radial less than 1 day          Drain            NG/OG/Enteral Tube Orogastric 16 Fr Left mouth less than 1 day    Urethral Catheter 16 Fr  less than 1 day          Airway            ETT  Cuffed 8 mm less than 1 day                     Portions of the record may have been created with voice recognition software  Occasional wrong word or "sound a like" substitutions may have occurred due to the inherent limitations of voice recognition software  Read the chart carefully and recognize, using context, where substitutions have occurred      Kerri Salter MD

## 2018-12-19 NOTE — PLAN OF CARE
Problem: Prexisting or High Potential for Compromised Skin Integrity  Goal: Skin integrity is maintained or improved  INTERVENTIONS:  - Identify patients at risk for skin breakdown  - Assess and monitor skin integrity  - Assess and monitor nutrition and hydration status  - Monitor labs (i e  albumin)  - Assess for incontinence   - Turn and reposition patient  - Assist with mobility/ambulation  - Relieve pressure over bony prominences  - Avoid friction and shearing  - Provide appropriate hygiene as needed including keeping skin clean and dry  - Evaluate need for skin moisturizer/barrier cream  - Collaborate with interdisciplinary team (i e  Nutrition, Rehabilitation, etc )   - Patient/family teaching   Outcome: Progressing      Problem: Potential for Falls  Goal: Patient will remain free of falls  INTERVENTIONS:  - Assess patient frequently for physical needs  -  Identify cognitive and physical deficits and behaviors that affect risk of falls    -  [place] fall precautions as indicated by assessment   - Educate patient/family on patient safety including physical limitations  - Instruct patient to call for assistance with activity based on assessment  - Modify environment to reduce risk of injury  - Consider OT/PT consult to assist with strengthening/mobility   Outcome: Progressing      Problem: PAIN - ADULT  Goal: Verbalizes/displays adequate comfort level or baseline comfort level  Interventions:  - Encourage patient to monitor pain and request assistance  - Assess pain using appropriate pain scale  - Administer analgesics based on type and severity of pain and evaluate response  - Implement non-pharmacological measures as appropriate and evaluate response  - Consider cultural and social influences on pain and pain management  - Notify physician/advanced practitioner if interventions unsuccessful or patient reports new pain  Outcome: Progressing      Problem: INFECTION - ADULT  Goal: Absence or prevention of progression during hospitalization  INTERVENTIONS:  - Assess and monitor for signs and symptoms of infection  - Monitor lab/diagnostic results  - Monitor all insertion sites, i e  indwelling lines, tubes, and drains  - Monitor endotracheal (as able) and nasal secretions for changes in amount and color  - [place] appropriate cooling/warming therapies per order  - Administer medications as ordered  - Instruct and encourage patient and family to use good hand hygiene technique  - Identify and instruct in appropriate isolation precautions for identified infection/condition  Outcome: Progressing    Goal: Absence of fever/infection during neutropenic period  INTERVENTIONS:  - Monitor WBC  - Implement neutropenic guidelines  Outcome: Progressing      Problem: SAFETY ADULT  Goal: Maintain or return to baseline ADL function  INTERVENTIONS:  -  Assess patient's ability to carry out ADLs; assess patient's baseline for ADL function and identify physical deficits which impact ability to perform ADLs (bathing, care of mouth/teeth, toileting, grooming, dressing, etc )  - Assess/evaluate cause of self-care deficits   - Assess range of motion  - Assess patient's mobility; develop plan if impaired  - Assess patient's need for assistive devices and provide as appropriate  - Encourage maximum independence but intervene and supervise when necessary  ¯ Involve family in performance of ADLs  ¯ Assess for home care needs following discharge   ¯ Request OT consult to assist with ADL evaluation and planning for discharge  ¯ Provide patient education as appropriate  Outcome: Progressing    Goal: Maintain or return mobility status to optimal level  INTERVENTIONS:  - Assess patient's baseline mobility status (ambulation, transfers, stairs, etc )    - Identify cognitive and physical deficits and behaviors that affect mobility  - Identify mobility aids required to assist with transfers and/or ambulation (gait belt, sit-to-stand, lift, walker, cane, etc )  - [place] fall precautions as indicated by assessment  - Record patient progress and toleration of activity level on Mobility SBAR; progress patient to next Phase/Stage  - Instruct patient to call for assistance with activity based on assessment  - Request Rehabilitation consult to assist with strengthening/weightbearing, etc   Outcome: Progressing      Problem: DISCHARGE PLANNING  Goal: Discharge to home or other facility with appropriate resources  INTERVENTIONS:  - Identify barriers to discharge w/patient and caregiver  - Arrange for needed discharge resources and transportation as appropriate  - Identify discharge learning needs (meds, wound care, etc )  - Arrange for interpretive services to assist at discharge as needed  - Refer to Case Management Department for coordinating discharge planning if the patient needs post-hospital services based on physician/advanced practitioner order or complex needs related to functional status, cognitive ability, or social support system  Outcome: Progressing      Problem: NEUROSENSORY - ADULT  Goal: Achieves stable or improved neurological status  INTERVENTIONS  - Monitor and report changes in neurological status  - Initiate measures to prevent increased intracranial pressure  - Maintain blood pressure and fluid volume within ordered parameters to optimize cerebral perfusion  - Monitor temperature, glucose, and sodium or any other associated labs   Initiate appropriate interventions as ordered  - Monitor for seizure activity   - Administer anti-seizure medications as ordered  Outcome: Progressing    Goal: Absence of seizures  INTERVENTIONS  - Monitor for seizure activity  - Administer anti-seizure medications as ordered  - Monitor neurological status  Outcome: Progressing    Goal: Remains free of injury related to seizures activity  INTERVENTIONS  - Maintain airway, patient safety  and administer oxygen as ordered  - Monitor patient for seizure activity, document and report duration and description of seizure to physician/advanced practitioner  - If seizure occurs,  ensure patient safety during seizure  - Reorient patient post seizure  - Seizure pads on all 4 side rails  - Instruct patient/family to notify RN of any seizure activity including if an aura is experienced  - Instruct patient/family to call for assistance with activity based on nursing assessment  - Administer anti-seizure medications as ordered  - Monitor fetal well being  Outcome: Progressing    Goal: Achieves maximal functionality and self care  INTERVENTIONS  - Monitor swallowing and airway patency with patient fatigue and changes in neurological status  - Encourage and assist patient to increase activity and self care with guidance from rehab services  - Encourage visually impaired, hearing impaired and aphasic patients to use assistive/communication devices  Outcome: Progressing      Problem: SAFETY,RESTRAINT: NV/NON-SELF DESTRUCTIVE BEHAVIOR  Goal: Remains free of harm/injury (restraint for non violent/non self-detsructive behavior)  INTERVENTIONS:  - Instruct patient/family regarding restraint use   - Assess and monitor physiologic and psychological status   - Provide interventions and comfort measures to meet assessed patient needs   - Identify and implement measures to help patient regain control  - Assess readiness for release of restraint   Outcome: Progressing    Goal: Returns to optimal restraint-free functioning  INTERVENTIONS:  - Assess the patient's behavior and symptoms that indicate continued need for restraint  - Identify and implement measures to help patient regain control  - Assess readiness for release of restraint   Outcome: Progressing

## 2018-12-19 NOTE — UTILIZATION REVIEW
Initial Clinical Review    Admission: Date/Time/Statement: 12/18/18 @ 2040     Orders Placed This Encounter   Procedures    Inpatient Admission     Standing Status:   Standing     Number of Occurrences:   1     Order Specific Question:   Admitting Physician     Answer:   Nicho Dodd [[id number]]     Order Specific Question:   Level of Care     Answer:   Critical Care [15]     Order Specific Question:   Estimated length of stay     Answer:   More than 2 Midnights     Order Specific Question:   Certification     Answer:   I certify that inpatient services are medically necessary for this patient for a duration of greater than two midnights  See H&P and MD Progress Notes for additional information about the patient's course of treatment  ED: Date/Time/Mode of Arrival:   ED Arrival Information     Expected Arrival Acuity Means of Arrival Escorted By Service Admission Type    - 12/18/2018 20:34 Immediate Ambulance [address]    Arrival Complaint    -        Chief Complaint:   Chief Complaint   Patient presents with    Fall     refer to trauma documentation     History of Illness:  Anayeli Macdonald is a 59 y o  female who presents after being found down by her sister  Apparently there was blood on the walls in the house  Upon arrival her GCS was 11 (E3, V2, M6)  She was not moving her right side of her body  She had multiple bruises on her body  Further history is limited due to altered mental status    EMS noted that the patient had been down for likely approximately 48 hr, which is when she was last seen normal      ED Vital Signs:   ED Triage Vitals   Temperature Pulse Respirations Blood Pressure SpO2   12/18/18 2014 12/18/18 2014 12/18/18 2014 12/18/18 2014 12/18/18 2014   100 5 °F (38 1 °C) 90 18 (!) 192/91 99 %      Temp Source Heart Rate Source Patient Position - Orthostatic VS BP Location FiO2 (%)   12/18/18 2014 12/18/18 2014 12/18/18 2014 12/18/18 2200 12/18/18 2200   Tympanic Monitor Lying Left arm 40      Pain Score       --               Wt Readings from Last 1 Encounters:   12/18/18 57 6 kg (126 lb 15 8 oz)     Vital Signs (abnormal):       100 8 °F (38 2 °C)  72  13  --  --  116/56  76 mmHg  99 %  --     100 8 °F (38 2 °C)  78  18  152/67  99  184/80  118 mmHg  99 %  --     100 8 °F (38 2 °C)  74  15  138/62  87  --  --  100 %  Other (comment)    --  79  14   177/97  --  --  --  100 %  --    --  93  --   197/93  --  --  --  100 %  --    --  92  18   177/85  --  --  --  99 %  --    100 5 °F (38 1 °C)  90  18   192/91  --  --  --  99 %  --     Abnormal Labs:   12/18 12/19     Sodium 122    136    128    128 128      Potassium 3 7 3 6 3 4 4 2 4 2   Chloride 88 105 94 95 98   Creatinine 0 42 0 49 0 32 0 34 0 31   Glucose, Random 166 141 109 101 100   Calcium 8 7 7 5 7 9 7 9 7 9          ALT 96       Alkaline Phosphatase 138       Albumin 3 4       Magnesium   1 4 2 3      WBC 13 08  H/H 10 2/30 2  CK MB 9 0, 5 1  Total , 812  PTT 25  Ionized doug 1 07    Diagnostic Test Results:     12/18 Trauma xrays -  Multiple right-sided rib fractures  Consolidation at the base may represent atelectasis or pneumonia  12/18 CT head - 1  Large parenchymal hemorrhage on the left with associated left-sided subdural hematoma and bilateral subarachnoid hemorrhage including intraventricular hemorrhage  2   Mass effect and midline shift to the right measuring 4 mm   12/18 CT cervical spine -  Small right apical pneumothorax  Nondisplaced fractures of the right T1 and T2 transverse processes  Fracture of the posterior right second rib    12/18 CT chest, abd, pelvis - 1  Multiple right-sided rib fractures with tiny right apical pneumothorax  Fractures extend from the second through eleventh ribs at the costovertebral junctions and fourth through eighth ribs laterally  This is consistent with flail chest  A right anterior second rib fracture is severely displaced/angulated     2   Fracture of the anterior right glenoid, acromium, and first through fourth thoracic transverse processes on the right  3   Focal irregular opacity in the right upper lobe with adjacent 4 mm nodule  While contusion is possible given overlying trauma, the appearance of the lesion is most suspicious for a primary lung cancer and follow-up PET/CT is recommended  4   Borderline upper abdominal lymph nodes with density/stranding at the base of the small bowel mesentery  While mesenteric contusion can have this appearance in the setting of trauma  There are calcifications which suggest chronicity  Lymphoproliferative disorder cannot be excluded and follow-up and 3-6 months is recommended  5   Chronic interstitial lung disease of uncertain etiology  Given esophageal dilatation, this could represent NSIP in the setting of scleroderma  12/18 CTA head and neck - 1  Atherosclerotic plaque at the right carotid bifurcation resulting in moderate (50-69%) stenosis  No evidence of carotid or vertebral artery dissection or pseudoaneurysm to suggest acute vascular injury  2   Displacement of left MCA branches by left temporal parietal hematoma  No evidence of aneurysm or vascular malformation  No significant stenosis in the major vessels of the [place]  3   Patent dural venous sinuses  12/19 Xray R shoulder - Right-sided rib fractures again noted  Possible acromion fracture      The small glenoid fracture seen on CT scan of the chest is not clearly appreciated on this exam     ED Treatment:   Medication Administration from 12/18/2018 2002 to 12/18/2018 2132    Date/Time Order Dose Route Action   12/18/2018 2025 multi-electrolyte (ISOLYTE-S PH 7 4) bolus 500 mL Intravenous New Bag   12/18/2018 2045 sodium chloride (HYPERTONIC) 3 % bolus 250 mL 250 mL Intravenous New Bag   12/18/2018 2113 sodium chloride (HYPERTONIC) 3 % infusion 50 mL/hr Intravenous New Bag   12/18/2018 2130 propofol (DIPRIVAN) 1000 mg in 100 mL infusion (premix) 50 mcg/kg/min Intravenous Rate/Dose Change   12/18/2018 2112 propofol (DIPRIVAN) 1000 mg in 100 mL infusion (premix) 45 mcg/kg/min Intravenous Rate/Dose Change   12/18/2018 2055 propofol (DIPRIVAN) 1000 mg in 100 mL infusion (premix) 40 mcg/kg/min Intravenous Rate/Dose Change   12/18/2018 2050 propofol (DIPRIVAN) 1000 mg in 100 mL infusion (premix) 20 mcg/kg/min Intravenous New Bag   12/18/2018 2125 fentaNYL 1250 mcg in sodium chloride 0 9% 125mL drip 100 mcg/hr Intravenous New Bag   12/18/2018 2052 iohexol (OMNIPAQUE) 350 MG/ML injection (MULTI-DOSE) 100 mL 100 mL Intravenous Given   12/18/2018 2130 fentanyl citrate (PF) 100 MCG/2ML 50 mcg 50 mcg Intravenous Given   12/18/2018 2130 chlorhexidine (PERIDEX) 0 12 % oral rinse 15 mL 15 mL Swish & Spit Not Given        Past Medical/Surgical History: Active Ambulatory Problems     Diagnosis Date Noted    No Active Ambulatory Problems     Resolved Ambulatory Problems     Diagnosis Date Noted    No Resolved Ambulatory Problems     Past Medical History:   Diagnosis Date    Diabetes mellitus, type II ([facility] [address] )      Admitting Diagnosis: Acute respiratory failure ([facility] [address] ) [J96 00]  Trauma [T14 90XA]    Age/Sex: 59 y o  female    Assessment/Plan:   Trauma Alert: Level A  Model of Arrival: Ambulance  Trauma Team: Attending Dr Shady Parker and Residents Dr Khadar Dee  Consultants: Neurosurgery: Regarding intracranial hemorrhage  Returned call:  Yes       Trauma Active Problems:      Left intraparenchymal hemorrhage with 4 mm midline shift  Left subdural hematoma  Bilateral subarachnoid hemorrhage with intraventricular extension  Right apical pneumothorax  T1-T4 transverse process fractures  Right 2nd-11th posterior rib fracture  Right upper lung nodule  Acute encephalopathy  Acute respiratory failure     Trauma Plan:      -admit to SICU  -intubated for airway protection  -propofol, fentanyl GTT for sedation and analgesia  -neurosurgery consulted - this case was discussed with the neurosurgeon on-call by the attending physician  -hypertonic saline bolus and drip started due to concern for increased ICP  -elevate head of bed  -check BMP, CBC, coags  -repeat chest x-ray status post intubation  -hold pharmacologic DVT prophylaxis  -further management by ICU team     Chief Complaint:  Unwitnessed fall    Admission Orders:  Scheduled Meds:   Current Facility-Administered Medications:  chlorhexidine 15 mL Swish & Spit Q12H [address]    fentaNYL 100 mcg/hr Intravenous Continuous Last Rate: 100 mcg/hr (12/19/18 0923)   insulin lispro 1-5 Units Subcutaneous TID AC    insulin lispro 1-5 Units Subcutaneous HS    labetalol 5 mg Intravenous Q4H PRN    levETIRAcetam 500 mg Oral Q12H [address]    multi-electrolyte 100 mL/hr Intravenous Continuous Last Rate: 100 mL/hr (12/19/18 0736)   pramipexole 0 25 mg Oral BID    propofol 5-50 mcg/kg/min Intravenous Titrated Last Rate: 10 mcg/kg/min (12/19/18 1054)   QUEtiapine 25 mg Oral HS      Continuous Infusions:   fentaNYL 100 mcg/hr Last Rate: 100 mcg/hr (12/19/18 0923)   multi-electrolyte 100 mL/hr Last Rate: 100 mL/hr (12/19/18 0736)   propofol 5-50 mcg/kg/min Last Rate: 10 mcg/kg/min (12/19/18 1054)     PRN Meds: labetalol    MICU critical care level of care  Mechanical ventilation  SCDs  Hourly neuro checks   BMP q 4 hr   Diet NPO   Cons Ortho   Cons Neurosurgery   Cons CM, nutrition and PT/OT   _____________________  12/19 Ortho Consult   59 y  o female sp fall with R glenoid and acromion fracture      Plan:   · NWB RUE sling for comfort when extubated  · Will discuss definitive management with ortho team in am  · FU shoulder imaging  · Patient will need formal tertiary exam when extubated and off sedation  · Dispo: Ortho will follow  ______________________  12/18 Critical Care Consult   63F found down  - IPH/SDH w shift  -SAH  - multiple R rib fx  - R glenoid/acromion fx  - T1-T4 TP fx  - hyponatremia     Plan:  Neuro: q1h neuro checks, gcs 10T unable to move R side on eval; neurosx consultation, keppra, rpt CT in AM or if decline in status  CV:  HD normal, MAP > 65  Resp: intubated; CXR in AM  GI: NPO/NGT  FEN: hypertonic bolus given in trauma bay; initial Na 122 with increase to 136 --> rpt 128; will cont to monitor with serial bmp and slowly correct; Replete lytes prn, monitor UOP  Heme: hold AC/DVT ppx at this tiem  ID: afebrile, no abx  Endo:  accuchecks  MSK: ortho consultation for fracture     Dispo: remain in ICU

## 2018-12-19 NOTE — RESPIRATORY THERAPY NOTE
RT Protocol Note  Ian Bermudez 81 y o  female MRN: [id number]  Unit/Bed#: ED 16 Encounter: [id number]    Assessment    Active Problems:    * No active hospital problems  *      Home Pulmonary Medications:  None listed       No past medical history on file  Social History     Social History    Marital status: N/A     Spouse name: N/A    Number of children: N/A    Years of education: N/A     Social History Main Topics    Smoking status: Not on file    Smokeless tobacco: Not on file    Alcohol use Not on file    Drug use: Unknown    Sexual activity: Not on file     Other Topics Concern    Not on file     Social History Narrative    No narrative on file       Subjective         Objective    Physical Exam:   Assessment Type: (P) Assess only  General Appearance: (P) Sedated  Respiratory Pattern: (P) Spontaneous, Symmetrical, Assisted  Chest Assessment: (P) Chest expansion symmetrical  Bilateral Breath Sounds: (P) Clear    Vitals:  Blood pressure (!) 177/85, pulse 92, temperature 100 5 °F (38 1 °C), temperature source Tympanic, resp  rate 18, weight 58 kg (127 lb 13 9 oz), SpO2 99 %  Imaging and other studies:  awaiting cxr           Plan    Respiratory Plan: (P) Vent/NIV/HFNC        Resp Comments: (P) pt intubated at this time with 8 0 ETT 23 at the lip  with no complications  pt placed on ventilator at this time  pt assessed at this time for  respiratory protocol for ventilator use  pt has no pulmonary history or medications listed  pt has head bleed  will continue to monitor at this time

## 2018-12-19 NOTE — PROGRESS NOTES
12/18/18 2100   Plan of Care   Comments  consulted with interdisciplinary team, cultivated a relationship of care and support, provided hospitality, listened empathically, provided a silent and supportive presence, provided chaplaincy education      Assessment Completed by: Unit visit

## 2018-12-20 ENCOUNTER — ANESTHESIA EVENT (INPATIENT)
Dept: PERIOP | Facility: HOSPITAL | Age: 63
DRG: 003 | End: 2018-12-20
Payer: MEDICARE

## 2018-12-20 ENCOUNTER — APPOINTMENT (INPATIENT)
Dept: RADIOLOGY | Facility: HOSPITAL | Age: 63
DRG: 003 | End: 2018-12-20
Payer: MEDICARE

## 2018-12-20 ENCOUNTER — ANESTHESIA (INPATIENT)
Dept: PERIOP | Facility: HOSPITAL | Age: 63
DRG: 003 | End: 2018-12-20
Payer: MEDICARE

## 2018-12-20 LAB
ANION GAP SERPL CALCULATED.3IONS-SCNC: 7 MMOL/L (ref 4–13)
ANION GAP SERPL CALCULATED.3IONS-SCNC: 8 MMOL/L (ref 4–13)
APTT PPP: 31 SECONDS (ref 26–38)
ATRIAL RATE: 64 BPM
BASE EXCESS BLDA CALC-SCNC: -2.7 MMOL/L
BASE EXCESS BLDA CALC-SCNC: -4 MMOL/L (ref -2–3)
BASE EXCESS BLDA CALC-SCNC: -4 MMOL/L (ref -2–3)
BASE EXCESS BLDA CALC-SCNC: 1 MMOL/L (ref -2–3)
BASE EXCESS BLDA CALC-SCNC: 1 MMOL/L (ref -2–3)
BASOPHILS # BLD AUTO: 0.01 THOUSANDS/ΜL (ref 0–0.1)
BASOPHILS NFR BLD AUTO: 0 % (ref 0–1)
BODY TEMPERATURE: 101.5 DEGREES FEHRENHEIT
BUN SERPL-MCNC: 6 MG/DL (ref 5–25)
BUN SERPL-MCNC: 7 MG/DL (ref 5–25)
CA-I BLD-SCNC: 0.95 MMOL/L (ref 1.12–1.32)
CA-I BLD-SCNC: 0.99 MMOL/L (ref 1.12–1.32)
CA-I BLD-SCNC: 1.04 MMOL/L (ref 1.12–1.32)
CA-I BLD-SCNC: 1.1 MMOL/L (ref 1.12–1.32)
CA-I BLD-SCNC: 1.3 MMOL/L (ref 1.12–1.32)
CALCIUM SERPL-MCNC: 7.6 MG/DL (ref 8.3–10.1)
CALCIUM SERPL-MCNC: 7.9 MG/DL (ref 8.3–10.1)
CALCIUM SERPL-MCNC: 8 MG/DL (ref 8.3–10.1)
CALCIUM SERPL-MCNC: 8.2 MG/DL (ref 8.3–10.1)
CHLORIDE SERPL-SCNC: 106 MMOL/L (ref 100–108)
CHLORIDE SERPL-SCNC: 106 MMOL/L (ref 100–108)
CHLORIDE SERPL-SCNC: 97 MMOL/L (ref 100–108)
CHLORIDE SERPL-SCNC: 97 MMOL/L (ref 100–108)
CK MB SERPL-MCNC: 1.8 NG/ML (ref 0–5)
CK MB SERPL-MCNC: <1 % (ref 0–2.5)
CK SERPL-CCNC: 397 U/L (ref 26–192)
CO2 SERPL-SCNC: 23 MMOL/L (ref 21–32)
CO2 SERPL-SCNC: 24 MMOL/L (ref 21–32)
CREAT SERPL-MCNC: 0.27 MG/DL (ref 0.6–1.3)
CREAT SERPL-MCNC: 0.28 MG/DL (ref 0.6–1.3)
CREAT SERPL-MCNC: 0.3 MG/DL (ref 0.6–1.3)
CREAT SERPL-MCNC: 0.34 MG/DL (ref 0.6–1.3)
EOSINOPHIL # BLD AUTO: 0 THOUSAND/ΜL (ref 0–0.61)
EOSINOPHIL NFR BLD AUTO: 0 % (ref 0–6)
ERYTHROCYTE [DISTWIDTH] IN BLOOD BY AUTOMATED COUNT: 15.3 % (ref 11.6–15.1)
ERYTHROCYTE [DISTWIDTH] IN BLOOD BY AUTOMATED COUNT: 15.3 % (ref 11.6–15.1)
ERYTHROCYTE [DISTWIDTH] IN BLOOD BY AUTOMATED COUNT: 15.8 % (ref 11.6–15.1)
GFR SERPL CREATININE-BSD FRML MDRD: 118 ML/MIN/1.73SQ M
GFR SERPL CREATININE-BSD FRML MDRD: 123 ML/MIN/1.73SQ M
GFR SERPL CREATININE-BSD FRML MDRD: 126 ML/MIN/1.73SQ M
GFR SERPL CREATININE-BSD FRML MDRD: 127 ML/MIN/1.73SQ M
GLUCOSE SERPL-MCNC: 104 MG/DL (ref 65–140)
GLUCOSE SERPL-MCNC: 115 MG/DL (ref 65–140)
GLUCOSE SERPL-MCNC: 117 MG/DL (ref 65–140)
GLUCOSE SERPL-MCNC: 119 MG/DL (ref 65–140)
GLUCOSE SERPL-MCNC: 119 MG/DL (ref 65–140)
GLUCOSE SERPL-MCNC: 121 MG/DL (ref 65–140)
GLUCOSE SERPL-MCNC: 125 MG/DL (ref 65–140)
GLUCOSE SERPL-MCNC: 92 MG/DL (ref 65–140)
GLUCOSE SERPL-MCNC: 96 MG/DL (ref 65–140)
GLUCOSE SERPL-MCNC: 99 MG/DL (ref 65–140)
HCO3 BLDA-SCNC: 20.6 MMOL/L (ref 22–28)
HCO3 BLDA-SCNC: 21 MMOL/L (ref 22–28)
HCO3 BLDA-SCNC: 21.1 MMOL/L (ref 22–28)
HCO3 BLDA-SCNC: 25.8 MMOL/L (ref 22–28)
HCO3 BLDA-SCNC: 26.1 MMOL/L (ref 24–30)
HCT VFR BLD AUTO: 20.9 % (ref 34.8–46.1)
HCT VFR BLD AUTO: 24.4 % (ref 34.8–46.1)
HCT VFR BLD AUTO: 27 % (ref 34.8–46.1)
HCT VFR BLD AUTO: 29.4 % (ref 34.8–46.1)
HCT VFR BLD CALC: 26 % (ref 34.8–46.1)
HCT VFR BLD CALC: 28 % (ref 34.8–46.1)
HCT VFR BLD CALC: 28 % (ref 34.8–46.1)
HCT VFR BLD CALC: 35 % (ref 34.8–46.1)
HGB BLD-MCNC: 6.9 G/DL (ref 11.5–15.4)
HGB BLD-MCNC: 8.1 G/DL (ref 11.5–15.4)
HGB BLD-MCNC: 8.8 G/DL (ref 11.5–15.4)
HGB BLD-MCNC: 9.5 G/DL (ref 11.5–15.4)
HGB BLDA-MCNC: 11.9 G/DL (ref 11.5–15.4)
HGB BLDA-MCNC: 8.8 G/DL (ref 11.5–15.4)
HGB BLDA-MCNC: 9.5 G/DL (ref 11.5–15.4)
HGB BLDA-MCNC: 9.5 G/DL (ref 11.5–15.4)
HOROWITZ INDEX BLDA+IHG-RTO: 40 MM[HG]
IMM GRANULOCYTES # BLD AUTO: 0.05 THOUSAND/UL (ref 0–0.2)
IMM GRANULOCYTES NFR BLD AUTO: 1 % (ref 0–2)
INR PPP: 1.29 (ref 0.86–1.17)
LYMPHOCYTES # BLD AUTO: 0.47 THOUSANDS/ΜL (ref 0.6–4.47)
LYMPHOCYTES NFR BLD AUTO: 5 % (ref 14–44)
MAGNESIUM SERPL-MCNC: 1.8 MG/DL (ref 1.6–2.6)
MCH RBC QN AUTO: 25.4 PG (ref 26.8–34.3)
MCH RBC QN AUTO: 26.9 PG (ref 26.8–34.3)
MCH RBC QN AUTO: 27 PG (ref 26.8–34.3)
MCHC RBC AUTO-ENTMCNC: 32.3 G/DL (ref 31.4–37.4)
MCHC RBC AUTO-ENTMCNC: 32.6 G/DL (ref 31.4–37.4)
MCHC RBC AUTO-ENTMCNC: 33.2 G/DL (ref 31.4–37.4)
MCV RBC AUTO: 79 FL (ref 82–98)
MCV RBC AUTO: 81 FL (ref 82–98)
MCV RBC AUTO: 83 FL (ref 82–98)
MONOCYTES # BLD AUTO: 0.46 THOUSAND/ΜL (ref 0.17–1.22)
MONOCYTES NFR BLD AUTO: 5 % (ref 4–12)
NEUTROPHILS # BLD AUTO: 8.08 THOUSANDS/ΜL (ref 1.85–7.62)
NEUTS SEG NFR BLD AUTO: 89 % (ref 43–75)
NRBC BLD AUTO-RTO: 0 /100 WBCS
O2 CT BLDA-SCNC: 14.7 ML/DL (ref 16–23)
OXYHGB MFR BLDA: 91.5 % (ref 94–97)
P AXIS: 81 DEGREES
PCO2 BLD: 22 MMOL/L (ref 21–32)
PCO2 BLD: 22 MMOL/L (ref 21–32)
PCO2 BLD: 27 MMOL/L (ref 21–32)
PCO2 BLD: 27 MMOL/L (ref 21–32)
PCO2 BLD: 37.7 MM HG (ref 36–44)
PCO2 BLD: 37.9 MM HG (ref 36–44)
PCO2 BLD: 40.6 MM HG (ref 36–44)
PCO2 BLD: 41.1 MM HG (ref 42–50)
PCO2 BLDA: 31.2 MM HG (ref 36–44)
PEEP RESPIRATORY: 5 CM[H2O]
PH BLD: 7.35 [PH] (ref 7.35–7.45)
PH BLD: 7.36 [PH] (ref 7.35–7.45)
PH BLD: 7.41 [PH] (ref 7.35–7.45)
PH BLD: 7.41 [PH] (ref 7.3–7.4)
PH BLDA: 7.44 [PH] (ref 7.35–7.45)
PHOSPHATE SERPL-MCNC: 2.2 MG/DL (ref 2.3–4.1)
PLATELET # BLD AUTO: 175 THOUSANDS/UL (ref 149–390)
PLATELET # BLD AUTO: 184 THOUSANDS/UL (ref 149–390)
PLATELET # BLD AUTO: 216 THOUSANDS/UL (ref 149–390)
PMV BLD AUTO: 10 FL (ref 8.9–12.7)
PMV BLD AUTO: 9.5 FL (ref 8.9–12.7)
PMV BLD AUTO: 9.7 FL (ref 8.9–12.7)
PO2 BLD: 152 MM HG (ref 75–129)
PO2 BLD: 22 MM HG (ref 35–45)
PO2 BLD: 61 MM HG (ref 75–129)
PO2 BLD: 63 MM HG (ref 75–129)
PO2 BLDA: 62.1 MM HG (ref 75–129)
POTASSIUM BLD-SCNC: 3.3 MMOL/L (ref 3.5–5.3)
POTASSIUM BLD-SCNC: 3.5 MMOL/L (ref 3.5–5.3)
POTASSIUM BLD-SCNC: 3.6 MMOL/L (ref 3.5–5.3)
POTASSIUM BLD-SCNC: 3.9 MMOL/L (ref 3.5–5.3)
POTASSIUM SERPL-SCNC: 3.5 MMOL/L (ref 3.5–5.3)
POTASSIUM SERPL-SCNC: 3.8 MMOL/L (ref 3.5–5.3)
POTASSIUM SERPL-SCNC: 3.8 MMOL/L (ref 3.5–5.3)
POTASSIUM SERPL-SCNC: 3.9 MMOL/L (ref 3.5–5.3)
PR INTERVAL: 104 MS
PROTHROMBIN TIME: 16.2 SECONDS (ref 11.8–14.2)
QRS AXIS: 62 DEGREES
QRSD INTERVAL: 92 MS
QT INTERVAL: 504 MS
QTC INTERVAL: 521 MS
RBC # BLD AUTO: 3 MILLION/UL (ref 3.81–5.12)
RBC # BLD AUTO: 3.27 MILLION/UL (ref 3.81–5.12)
RBC # BLD AUTO: 3.74 MILLION/UL (ref 3.81–5.12)
SAO2 % BLD FROM PO2: 37 % (ref 95–98)
SAO2 % BLD FROM PO2: 90 % (ref 95–98)
SAO2 % BLD FROM PO2: 91 % (ref 95–98)
SAO2 % BLD FROM PO2: 99 % (ref 95–98)
SODIUM BLD-SCNC: 128 MMOL/L (ref 136–145)
SODIUM BLD-SCNC: 137 MMOL/L (ref 136–145)
SODIUM BLD-SCNC: 137 MMOL/L (ref 136–145)
SODIUM BLD-SCNC: 138 MMOL/L (ref 136–145)
SODIUM SERPL-SCNC: 129 MMOL/L (ref 136–145)
SODIUM SERPL-SCNC: 129 MMOL/L (ref 136–145)
SODIUM SERPL-SCNC: 137 MMOL/L (ref 136–145)
SODIUM SERPL-SCNC: 137 MMOL/L (ref 136–145)
SPECIMEN SOURCE: ABNORMAL
T WAVE AXIS: 78 DEGREES
TROPONIN I SERPL-MCNC: <0.02 NG/ML
TROPONIN I SERPL-MCNC: <0.02 NG/ML
VENT AC: 14
VENT- AC: AC
VENTRICULAR RATE: 64 BPM
VT SETTING VENT: 400 ML
WBC # BLD AUTO: 8.31 THOUSAND/UL (ref 4.31–10.16)
WBC # BLD AUTO: 9.07 THOUSAND/UL (ref 4.31–10.16)
WBC # BLD AUTO: 9.39 THOUSAND/UL (ref 4.31–10.16)

## 2018-12-20 PROCEDURE — 94760 N-INVAS EAR/PLS OXIMETRY 1: CPT

## 2018-12-20 PROCEDURE — 82330 ASSAY OF CALCIUM: CPT | Performed by: EMERGENCY MEDICINE

## 2018-12-20 PROCEDURE — 71045 X-RAY EXAM CHEST 1 VIEW: CPT

## 2018-12-20 PROCEDURE — 73200 CT UPPER EXTREMITY W/O DYE: CPT

## 2018-12-20 PROCEDURE — 80048 BASIC METABOLIC PNL TOTAL CA: CPT | Performed by: SURGERY

## 2018-12-20 PROCEDURE — 99233 SBSQ HOSP IP/OBS HIGH 50: CPT | Performed by: NEUROLOGICAL SURGERY

## 2018-12-20 PROCEDURE — 80048 BASIC METABOLIC PNL TOTAL CA: CPT | Performed by: EMERGENCY MEDICINE

## 2018-12-20 PROCEDURE — 85014 HEMATOCRIT: CPT

## 2018-12-20 PROCEDURE — 84484 ASSAY OF TROPONIN QUANT: CPT | Performed by: EMERGENCY MEDICINE

## 2018-12-20 PROCEDURE — 93010 ELECTROCARDIOGRAM REPORT: CPT | Performed by: INTERNAL MEDICINE

## 2018-12-20 PROCEDURE — 99291 CRITICAL CARE FIRST HOUR: CPT | Performed by: EMERGENCY MEDICINE

## 2018-12-20 PROCEDURE — 80048 BASIC METABOLIC PNL TOTAL CA: CPT | Performed by: NURSE PRACTITIONER

## 2018-12-20 PROCEDURE — 93005 ELECTROCARDIOGRAM TRACING: CPT

## 2018-12-20 PROCEDURE — 82550 ASSAY OF CK (CPK): CPT | Performed by: NURSE PRACTITIONER

## 2018-12-20 PROCEDURE — 00C40ZZ EXTIRPATION OF MATTER FROM INTRACRANIAL SUBDURAL SPACE, OPEN APPROACH: ICD-10-PCS | Performed by: NEUROLOGICAL SURGERY

## 2018-12-20 PROCEDURE — 84295 ASSAY OF SERUM SODIUM: CPT

## 2018-12-20 PROCEDURE — 70450 CT HEAD/BRAIN W/O DYE: CPT

## 2018-12-20 PROCEDURE — 85027 COMPLETE CBC AUTOMATED: CPT | Performed by: NEUROLOGICAL SURGERY

## 2018-12-20 PROCEDURE — 82948 REAGENT STRIP/BLOOD GLUCOSE: CPT

## 2018-12-20 PROCEDURE — 85730 THROMBOPLASTIN TIME PARTIAL: CPT | Performed by: EMERGENCY MEDICINE

## 2018-12-20 PROCEDURE — 85610 PROTHROMBIN TIME: CPT | Performed by: EMERGENCY MEDICINE

## 2018-12-20 PROCEDURE — 73630 X-RAY EXAM OF FOOT: CPT

## 2018-12-20 PROCEDURE — 85018 HEMOGLOBIN: CPT | Performed by: SURGERY

## 2018-12-20 PROCEDURE — 00U20JZ SUPPLEMENT DURA MATER WITH SYNTHETIC SUBSTITUTE, OPEN APPROACH: ICD-10-PCS | Performed by: NEUROLOGICAL SURGERY

## 2018-12-20 PROCEDURE — 83735 ASSAY OF MAGNESIUM: CPT | Performed by: EMERGENCY MEDICINE

## 2018-12-20 PROCEDURE — 00N00ZZ RELEASE BRAIN, OPEN APPROACH: ICD-10-PCS | Performed by: NEUROLOGICAL SURGERY

## 2018-12-20 PROCEDURE — 00C00ZZ EXTIRPATION OF MATTER FROM BRAIN, OPEN APPROACH: ICD-10-PCS | Performed by: NEUROLOGICAL SURGERY

## 2018-12-20 PROCEDURE — 82330 ASSAY OF CALCIUM: CPT

## 2018-12-20 PROCEDURE — P9016 RBC LEUKOCYTES REDUCED: HCPCS

## 2018-12-20 PROCEDURE — 84132 ASSAY OF SERUM POTASSIUM: CPT

## 2018-12-20 PROCEDURE — 85027 COMPLETE CBC AUTOMATED: CPT | Performed by: NURSE PRACTITIONER

## 2018-12-20 PROCEDURE — 94760 N-INVAS EAR/PLS OXIMETRY 1: CPT | Performed by: SOCIAL WORKER

## 2018-12-20 PROCEDURE — 61312 CRNEC/CRNOT STTL XDRL/SDRL: CPT | Performed by: NEUROLOGICAL SURGERY

## 2018-12-20 PROCEDURE — 30233N1 TRANSFUSION OF NONAUTOLOGOUS RED BLOOD CELLS INTO PERIPHERAL VEIN, PERCUTANEOUS APPROACH: ICD-10-PCS | Performed by: EMERGENCY MEDICINE

## 2018-12-20 PROCEDURE — 82803 BLOOD GASES ANY COMBINATION: CPT

## 2018-12-20 PROCEDURE — 94003 VENT MGMT INPAT SUBQ DAY: CPT

## 2018-12-20 PROCEDURE — 82553 CREATINE MB FRACTION: CPT | Performed by: NURSE PRACTITIONER

## 2018-12-20 PROCEDURE — 84484 ASSAY OF TROPONIN QUANT: CPT | Performed by: PHYSICIAN ASSISTANT

## 2018-12-20 PROCEDURE — 85025 COMPLETE CBC W/AUTO DIFF WBC: CPT | Performed by: EMERGENCY MEDICINE

## 2018-12-20 PROCEDURE — 82805 BLOOD GASES W/O2 SATURATION: CPT | Performed by: EMERGENCY MEDICINE

## 2018-12-20 PROCEDURE — 05H533Z INSERTION OF INFUSION DEVICE INTO RIGHT SUBCLAVIAN VEIN, PERCUTANEOUS APPROACH: ICD-10-PCS | Performed by: NEUROLOGICAL SURGERY

## 2018-12-20 PROCEDURE — 82947 ASSAY GLUCOSE BLOOD QUANT: CPT

## 2018-12-20 PROCEDURE — C1781 MESH (IMPLANTABLE): HCPCS | Performed by: NEUROLOGICAL SURGERY

## 2018-12-20 PROCEDURE — 85014 HEMATOCRIT: CPT | Performed by: SURGERY

## 2018-12-20 PROCEDURE — P9040 RBC LEUKOREDUCED IRRADIATED: HCPCS

## 2018-12-20 PROCEDURE — 84100 ASSAY OF PHOSPHORUS: CPT | Performed by: EMERGENCY MEDICINE

## 2018-12-20 DEVICE — DURA 62105 SUBSTITUTE DUREPAIR 3X3IN NCE
Type: IMPLANTABLE DEVICE | Site: BRAIN | Status: FUNCTIONAL
Brand: DUREPAIR®

## 2018-12-20 RX ORDER — ACETAMINOPHEN 160 MG/5ML
650 SUSPENSION, ORAL (FINAL DOSE FORM) ORAL EVERY 8 HOURS PRN
Status: DISCONTINUED | OUTPATIENT
Start: 2018-12-20 | End: 2018-12-22

## 2018-12-20 RX ORDER — EPHEDRINE SULFATE 50 MG/ML
INJECTION, SOLUTION INTRAVENOUS AS NEEDED
Status: DISCONTINUED | OUTPATIENT
Start: 2018-12-20 | End: 2018-12-20 | Stop reason: SURG

## 2018-12-20 RX ORDER — ROCURONIUM BROMIDE 10 MG/ML
INJECTION, SOLUTION INTRAVENOUS AS NEEDED
Status: DISCONTINUED | OUTPATIENT
Start: 2018-12-20 | End: 2018-12-20 | Stop reason: SURG

## 2018-12-20 RX ORDER — ALBUMIN, HUMAN INJ 5% 5 %
SOLUTION INTRAVENOUS CONTINUOUS PRN
Status: DISCONTINUED | OUTPATIENT
Start: 2018-12-20 | End: 2018-12-20 | Stop reason: SURG

## 2018-12-20 RX ORDER — ACETAMINOPHEN 160 MG
TABLET,DISINTEGRATING ORAL AS NEEDED
Status: DISCONTINUED | OUTPATIENT
Start: 2018-12-20 | End: 2018-12-20 | Stop reason: HOSPADM

## 2018-12-20 RX ORDER — CEFAZOLIN SODIUM 1 G/3ML
INJECTION, POWDER, FOR SOLUTION INTRAMUSCULAR; INTRAVENOUS AS NEEDED
Status: DISCONTINUED | OUTPATIENT
Start: 2018-12-20 | End: 2018-12-20 | Stop reason: SURG

## 2018-12-20 RX ORDER — SODIUM CHLORIDE 9 MG/ML
INJECTION, SOLUTION INTRAVENOUS CONTINUOUS PRN
Status: DISCONTINUED | OUTPATIENT
Start: 2018-12-20 | End: 2018-12-20 | Stop reason: SURG

## 2018-12-20 RX ORDER — CALCIUM CHLORIDE 100 MG/ML
INJECTION INTRAVENOUS; INTRAVENTRICULAR AS NEEDED
Status: DISCONTINUED | OUTPATIENT
Start: 2018-12-20 | End: 2018-12-20 | Stop reason: SURG

## 2018-12-20 RX ORDER — LIDOCAINE HYDROCHLORIDE AND EPINEPHRINE 10; 10 MG/ML; UG/ML
INJECTION, SOLUTION INFILTRATION; PERINEURAL AS NEEDED
Status: DISCONTINUED | OUTPATIENT
Start: 2018-12-20 | End: 2018-12-20 | Stop reason: HOSPADM

## 2018-12-20 RX ORDER — SODIUM CHLORIDE, SODIUM GLUCONATE, SODIUM ACETATE, POTASSIUM CHLORIDE, MAGNESIUM CHLORIDE, SODIUM PHOSPHATE, DIBASIC, AND POTASSIUM PHOSPHATE .53; .5; .37; .037; .03; .012; .00082 G/100ML; G/100ML; G/100ML; G/100ML; G/100ML; G/100ML; G/100ML
1000 INJECTION, SOLUTION INTRAVENOUS ONCE
Status: COMPLETED | OUTPATIENT
Start: 2018-12-20 | End: 2018-12-20

## 2018-12-20 RX ORDER — ONDANSETRON 2 MG/ML
INJECTION INTRAMUSCULAR; INTRAVENOUS AS NEEDED
Status: DISCONTINUED | OUTPATIENT
Start: 2018-12-20 | End: 2018-12-20 | Stop reason: SURG

## 2018-12-20 RX ORDER — HYDRALAZINE HYDROCHLORIDE 20 MG/ML
5 INJECTION INTRAMUSCULAR; INTRAVENOUS EVERY 6 HOURS PRN
Status: DISCONTINUED | OUTPATIENT
Start: 2018-12-20 | End: 2019-01-03 | Stop reason: HOSPADM

## 2018-12-20 RX ORDER — CEFAZOLIN SODIUM 2 G/50ML
2000 SOLUTION INTRAVENOUS EVERY 8 HOURS
Status: COMPLETED | OUTPATIENT
Start: 2018-12-20 | End: 2018-12-21

## 2018-12-20 RX ORDER — FENTANYL CITRATE 50 UG/ML
INJECTION, SOLUTION INTRAMUSCULAR; INTRAVENOUS AS NEEDED
Status: DISCONTINUED | OUTPATIENT
Start: 2018-12-20 | End: 2018-12-20 | Stop reason: SURG

## 2018-12-20 RX ORDER — MAGNESIUM HYDROXIDE 1200 MG/15ML
LIQUID ORAL AS NEEDED
Status: DISCONTINUED | OUTPATIENT
Start: 2018-12-20 | End: 2018-12-20 | Stop reason: HOSPADM

## 2018-12-20 RX ADMIN — SODIUM CHLORIDE 500 ML: 0.9 INJECTION, SOLUTION INTRAVENOUS at 11:57

## 2018-12-20 RX ADMIN — LEVETIRACETAM 500 MG: 100 SOLUTION ORAL at 20:59

## 2018-12-20 RX ADMIN — SODIUM CHLORIDE: 0.9 INJECTION, SOLUTION INTRAVENOUS at 08:05

## 2018-12-20 RX ADMIN — SODIUM CHLORIDE: 0.9 INJECTION, SOLUTION INTRAVENOUS at 10:20

## 2018-12-20 RX ADMIN — LEVETIRACETAM 500 MG: 100 SOLUTION ORAL at 07:49

## 2018-12-20 RX ADMIN — ROCURONIUM BROMIDE 10 MG: 10 INJECTION INTRAVENOUS at 08:29

## 2018-12-20 RX ADMIN — ALBUMIN (HUMAN): 12.5 SOLUTION INTRAVENOUS at 10:40

## 2018-12-20 RX ADMIN — ALBUMIN (HUMAN): 12.5 SOLUTION INTRAVENOUS at 08:47

## 2018-12-20 RX ADMIN — CEFAZOLIN 1000 MG: 1 INJECTION, POWDER, FOR SOLUTION INTRAVENOUS at 09:03

## 2018-12-20 RX ADMIN — EPHEDRINE SULFATE 5 MG: 50 INJECTION, SOLUTION INTRAMUSCULAR; INTRAVENOUS; SUBCUTANEOUS at 08:22

## 2018-12-20 RX ADMIN — SODIUM CHLORIDE, SODIUM GLUCONATE, SODIUM ACETATE, POTASSIUM CHLORIDE, MAGNESIUM CHLORIDE, SODIUM PHOSPHATE, DIBASIC, AND POTASSIUM PHOSPHATE 1000 ML: .53; .5; .37; .037; .03; .012; .00082 INJECTION, SOLUTION INTRAVENOUS at 21:00

## 2018-12-20 RX ADMIN — PRAMIPEXOLE DIHYDROCHLORIDE 0.25 MG: 0.25 TABLET ORAL at 23:28

## 2018-12-20 RX ADMIN — CEFAZOLIN 1000 MG: 1 INJECTION, POWDER, FOR SOLUTION INTRAVENOUS at 08:42

## 2018-12-20 RX ADMIN — CALCIUM CHLORIDE 1 G: 100 INJECTION PARENTERAL at 09:53

## 2018-12-20 RX ADMIN — PROPOFOL 50 MCG/KG/MIN: 10 INJECTION, EMULSION INTRAVENOUS at 07:51

## 2018-12-20 RX ADMIN — PHENYLEPHRINE HYDROCHLORIDE 20 MCG/MIN: 10 INJECTION INTRAVENOUS at 09:04

## 2018-12-20 RX ADMIN — FENTANYL CITRATE 50 MCG: 50 INJECTION, SOLUTION INTRAMUSCULAR; INTRAVENOUS at 10:40

## 2018-12-20 RX ADMIN — CALCIUM GLUCONATE 1 G: 98 INJECTION, SOLUTION INTRAVENOUS at 12:33

## 2018-12-20 RX ADMIN — CHLORHEXIDINE GLUCONATE 0.12% ORAL RINSE 15 ML: 1.2 LIQUID ORAL at 20:59

## 2018-12-20 RX ADMIN — ONDANSETRON 4 MG: 2 INJECTION INTRAMUSCULAR; INTRAVENOUS at 10:17

## 2018-12-20 RX ADMIN — ROCURONIUM BROMIDE 30 MG: 10 INJECTION INTRAVENOUS at 08:45

## 2018-12-20 RX ADMIN — FENTANYL CITRATE 100 MCG/HR: 50 INJECTION, SOLUTION INTRAMUSCULAR; INTRAVENOUS at 15:44

## 2018-12-20 RX ADMIN — SODIUM CHLORIDE, SODIUM GLUCONATE, SODIUM ACETATE, POTASSIUM CHLORIDE, MAGNESIUM CHLORIDE, SODIUM PHOSPHATE, DIBASIC, AND POTASSIUM PHOSPHATE 100 ML/HR: .53; .5; .37; .037; .03; .012; .00082 INJECTION, SOLUTION INTRAVENOUS at 03:28

## 2018-12-20 RX ADMIN — SODIUM CHLORIDE, SODIUM GLUCONATE, SODIUM ACETATE, POTASSIUM CHLORIDE, MAGNESIUM CHLORIDE, SODIUM PHOSPHATE, DIBASIC, AND POTASSIUM PHOSPHATE 100 ML/HR: .53; .5; .37; .037; .03; .012; .00082 INJECTION, SOLUTION INTRAVENOUS at 21:52

## 2018-12-20 RX ADMIN — PRAMIPEXOLE DIHYDROCHLORIDE 0.25 MG: 0.25 TABLET ORAL at 17:27

## 2018-12-20 RX ADMIN — CEFAZOLIN SODIUM 2000 MG: 2 SOLUTION INTRAVENOUS at 17:25

## 2018-12-20 RX ADMIN — CHLORHEXIDINE GLUCONATE 0.12% ORAL RINSE 15 ML: 1.2 LIQUID ORAL at 07:49

## 2018-12-20 RX ADMIN — FENTANYL CITRATE 50 MCG: 50 INJECTION, SOLUTION INTRAMUSCULAR; INTRAVENOUS at 09:00

## 2018-12-20 RX ADMIN — ROCURONIUM BROMIDE 20 MG: 10 INJECTION INTRAVENOUS at 10:39

## 2018-12-20 RX ADMIN — ROCURONIUM BROMIDE 40 MG: 10 INJECTION INTRAVENOUS at 08:24

## 2018-12-20 RX ADMIN — PROPOFOL 15 MCG/KG/MIN: 10 INJECTION, EMULSION INTRAVENOUS at 16:06

## 2018-12-20 RX ADMIN — SODIUM CHLORIDE, SODIUM GLUCONATE, SODIUM ACETATE, POTASSIUM CHLORIDE, MAGNESIUM CHLORIDE, SODIUM PHOSPHATE, DIBASIC, AND POTASSIUM PHOSPHATE 100 ML/HR: .53; .5; .37; .037; .03; .012; .00082 INJECTION, SOLUTION INTRAVENOUS at 16:05

## 2018-12-20 RX ADMIN — SODIUM CHLORIDE, SODIUM GLUCONATE, SODIUM ACETATE, POTASSIUM CHLORIDE, MAGNESIUM CHLORIDE, SODIUM PHOSPHATE, DIBASIC, AND POTASSIUM PHOSPHATE 1000 ML: .53; .5; .37; .037; .03; .012; .00082 INJECTION, SOLUTION INTRAVENOUS at 18:24

## 2018-12-20 RX ADMIN — QUETIAPINE FUMARATE 25 MG: 25 TABLET ORAL at 21:25

## 2018-12-20 RX ADMIN — ACETAMINOPHEN 650 MG: 160 SUSPENSION ORAL at 02:40

## 2018-12-20 RX ADMIN — DEXAMETHASONE SODIUM PHOSPHATE 4 MG: 10 INJECTION INTRAMUSCULAR; INTRAVENOUS at 08:21

## 2018-12-20 RX ADMIN — PRAMIPEXOLE DIHYDROCHLORIDE 0.25 MG: 0.25 TABLET ORAL at 02:40

## 2018-12-20 NOTE — PROGRESS NOTES
Patient seen and examined independently this morning at approximately 7:00 a m   On sedation she will open her eyes she does not follow commands  She moves her left upper extremity and bilateral lower extremities quite well  She withdraws her right upper extremity  She remains intubated  Head CT with progressive left temporal hematoma mass effect and shift  Sodium remains low, 129  I had an extensive conversation with her sister, [place] [facility]  We discussed the options of continued medical management with delayed surgery should she have a deterioration versus up-front surgical evacuation of hematoma/decompressive hemicraniectomy  We discussed the goal of surgery would be to prevent further deterioration  We discussed the risks and benefits associated with this including bleeding, infection, stroke, paralysis, seizure, need for repeat surgery/cranioplasty, and death  In order to prevent progressive neurologic deficit or deterioration she has elected to proceed with a left jesi craniotomy for hematoma evacuation versus craniectomy  We will proceed with this this morning

## 2018-12-20 NOTE — PROGRESS NOTES
Progress Note - Critical Care   Ray Estrada 59 y o  female MRN: [id number]  Unit/Bed#: MICU 02 Encounter: [id number]    Attending Physician: Jair Stahl DO    Chief Complaint: Found down at home, traumatic brain injury, multiple traumatic musculoskeletal injuries    24 Hour Events: Sinus sanford down to 40s, but resolved spontaneously  Reassuring electrolytes  Had tried to self extubate at one point so increased propofol    _________________________________________________________________    Assessment and Plan:   Principal Problem:    Traumatic brain injury [facility])  Active Problems:    Multiple fractures of ribs, bilateral, initial encounter for closed fracture    Traumatic pneumothorax    Glenoid fracture of shoulder, right, closed, initial encounter    Intraparenchymal hemorrhage of brain ([facility] )    Hyponatremia    Flail chest, initial encounter for closed fracture    Fracture of thoracic transverse process, closed, initial encounter ([facility] )    Subdural hematoma ([facility] )    Subarachnoid hemorrhage ([facility] )    Diabetes mellitus, type II ([facility] )  Resolved Problems:    * No resolved hospital problems  *        NEURO: L  Temporal lobe intraparenchymal hemorrhage, subdural, subarachnoid   -GCS: 7T (E: 1, V: 1, M: 5)  -SEDATIVES: Propofol @25  -ANALGESIA: Fentanyl @100  -SEIZURE PROPHYLAXIS: Keppra 500mg BID  -MEDICATIONS: Pramipexole, Quetiapine -  Home meds for Parkinson's, bipolar  - CT repeat on 12/20 showed slight increase in cerebral edema & slight increase in midline shift   Stable SDH and SAH    - Neurosurgery plans: OR at 0700 on 12/20 for hemicraniotomy  - Wean sedation as tolerated    CV:   - Bradycardia improved   -MEDICATIONS: Labetalol 5mg q4h PRN and hydralazine 5 mg q6h PRN for HTN  - Keep SBP <140 per NSGY recs      PULM:   Temp:  [98 6 °F (37 °C)-101 1 °F (38 4 °C)] 99 3 °F (37 4 °C)  HR:  [56-74] 68  Resp:  [13-39] 14  BP: (116-163)/(55-67) 116/55  SpO2:  [91 %-100 %] 100 %   Vent Mode: AC/VC  -ABG: ABG:  Lab Results   Component Value Date    PHART 7 438 12/20/2018    RWQ3BXE 31 2 (L) 12/20/2018    PO2ART 62 1 (L) 12/20/2018    PFU0GDM 20 6 (L) 12/20/2018    BEART -2 7 12/20/2018    SOURCE Line, Arterial 12/20/2018     Respiratory    Lab Data (Last 4 hours)    None         O2/Vent Data (Last 4 hours)      12/20 0359 12/20 0652         Vent Mode AC/VC AC/VC      Resp Rate (BPM) (BPM) 14 14      Vt (mL) (mL) 400 400      FIO2 (%) (%) 40 40      PEEP (cmH2O) (cmH2O) 5 5      Patient safety screen outcome:  Failed      MV 6 6                   -PLAN:   - Hx of COPD, s/p multiple R sided rib fx, flail chest, w/ tiny R apical PTX  - R shoulder CT revealed progression of RUL airspace disease suggesting possible atelectassis vs aspiration vs pulm contusion as well as enlargement of small right pleural effusion    - Wean ventilator settings as tolerated  - Rib fx/flail chest protocol once extubated    GI:  - NPO, pending surgery  MEDICATIONS: Pepcid 20mg BID  -PLAN:   Stable, continue to monitor    :     Results from last 7 days  Lab Units 12/20/18  0054 12/19/18  2149 12/19/18  1835 12/19/18  1337 12/19/18  0929 12/19/18  0455 12/19/18  0133   BUN mg/dL 7 7 7 6 6 7 7   CREATININE mg/dL 0 34* 0 27* 0 30* 0 32* 0 31* 0 34* 0 32*     CLOÓN: yes  I/O:   I/O       12/18 0701 - 12/19 0700 12/19 0701 - 12/20 0700    I V  (mL/kg) 693 7 (12) 3087 9 (53 6)    NG/GT  220    IV Piggyback 350 200    Total Intake(mL/kg) 1043 7 (18 1) 3507 9 (60 9)    Urine (mL/kg/hr) 1145 1300 (0 9)    Emesis/NG output  200    Total Output 1145 1500    Net -101 4 +2007 9                UOP: 0 9 cc/kg/hr    -PLAN:  Monitor UOP  Trend Creatinine    F/E/N:   FLUIDS: Isolyte @100  ELECTROLYTES:    Results from last 7 days  Lab Units 12/20/18  0059 12/20/18  0054 12/19/18  2149 12/19/18  1835 12/19/18  1337 12/19/18  0929 12/19/18  0455 12/19/18  0133 12/18/18  2229   SODIUM mmol/L  --  129* 130* 128* 130* 128* 128* 128*  --    POTASSIUM mmol/L --  3 8 3 8 3 9 4 0 4 2 4 2 3 4*  --    CHLORIDE mmol/L  --  97* 99* 97* 98* 98* 95* 94*  --    CO2 mmol/L  --  24 24 25 25 25 25 26  --    CO2, I-STAT mmol/L 27  --   --   --   --   --   --   --   --    ANION GAP mmol/L  --  8 7 6 7 5 8 8  --    CALCIUM mg/dL  --  8 0* 7 6* 8 0* 7 9* 7 9* 7 9* 7 9*  --    MAGNESIUM mg/dL  --  1 8  --   --   --   --  2 3  --  1 4*   PHOSPHORUS mg/dL  --  2 2*  --   --   --   --  3 2  --  3 5     NUTRITION: Diet NPO     -PLAN:  Sodium 128 from 136  Continue to monitor w/ q4h BMPs    ID:   - Fever 101 1 last night but wbc normal  Will continue to monitor for infectious symptoms and trend wbc       Results from last 7 days  Lab Units 18  0518   WBC Thousand/uL 9 39 7 70 13 08*     CULTURES:   n/a  ANTIBIOTICS: n/a  -PLAN:   No acute infectious concerns    HEME:    Results from last 7 days  Lab Units 18  0526 18  0059 18   HEMOGLOBIN g/dL 9 5*  --  10 2* 11 6   I STAT HEMOGLOBIN g/dl  --  11 9  --   --    HEMATOCRIT % 29 4*  --  30 2* 35 3   HEMATOCRIT, ISTAT %  --  35  --   --    PLATELETS Thousands/uL 216  --  262 273       Results from last 7 days  Lab Units 18   INR  1 01   PTT seconds 25*     DVT ppx: Pharmacologic held d/t acute IPH/SDH/SAH, mechanical: b/l SCDs  -PLAN:   F/u AM labs  Stable  Transfuse for Hgb <7    ENDO:   -POC-->141    Results from last 7 days  Lab Units 18  005   GLUCOSE, ISTAT mg/dl 99       -MEDICATIONS: SSI#2    -PLAN:  Hx of T2DM, hypothyroidism  Continue SSI  Continue glucose checks    MSK/SKIN:   S/p multiple R sided rib fx, flail chest, R 2nd-11th rib fx at costovertebral junctions, 4th-8th fx laterally, R anterior 2nd rib angulated fx, fx of anterior R glenoid, acromion    -PLAN:  F/U Orthopedic Surgery c/s - plan for sling for comfort after extubated   PT/OT  Routine skin care  Frequent offloading     LDA:  Invasive Devices     Peripheral Intravenous Line Peripheral IV 12/18/18 Right Forearm 1 day    Peripheral IV 12/19/18 Right Forearm 1 day          Arterial Line            Arterial Line 12/18/18 Left Radial 1 day          Drain            NG/OG/Enteral Tube Orogastric 16 Fr Left mouth 1 day    Urethral Catheter 16 Fr  1 day          Airway            ETT  Cuffed 8 mm 1 day                Disposition: Continue ICU Care    Code Status: Level 1 - Full Code    Counseling / Coordination of Care  Total Critical Care time spent 30 minutes excluding procedures, teaching and family updates  _________________________________________________________________      Review of Systems   Unable to perform ROS: Intubated     ______________________________________________________________________    Physical Exam:     Physical Exam   Constitutional: She appears well-developed and well-nourished  She is intubated  HENT:   Head: Normocephalic and atraumatic    + R periorbital ecchymoses     Eyes: Pupils are equal, round, and reactive to light  Neck: Neck supple  Cardiovascular: Normal rate and regular rhythm  Pulses:       Radial pulses are 2+ on the right side, and 2+ on the left side  Femoral pulses are 2+ on the right side, and 2+ on the left side  Dorsalis pedis pulses are 2+ on the right side, and 2+ on the left side  Posterior tibial pulses are 2+ on the right side, and 2+ on the left side  Pulmonary/Chest: Effort normal  She is intubated  Abdominal: She exhibits no distension and no mass  There is no tenderness  There is no guarding  No hernia  Genitourinary:   Genitourinary Comments: +kenny in place   Musculoskeletal: She exhibits no edema or deformity  +R knee hematoma   Neurological: GCS eye subscore is 1  GCS verbal subscore is 1  GCS motor subscore is 5     Skin:   + R shoulder ecchymosis  + b/l flank ecchymoses  + b/l knee abrasions       ______________________________________________________________________  Vitals: 18 0200 18 0300 18 0359 18 0400   BP: 163/60 147/67  116/55   Pulse: 64 68  68   Resp: 18 16  14   Temp: (!) 101 1 °F (38 4 °C) 100 4 °F (38 °C)  99 3 °F (37 4 °C)   TempSrc:       SpO2: 95% 99% 99% 100%   Weight:       Height:           Temperature:   Temp (24hrs), Av 6 °F (37 6 °C), Min:98 6 °F (37 °C), Max:101 1 °F (38 4 °C)    Current Temperature: 99 3 °F (37 4 °C)  Weights:   IBW: 52 4 kg    Body mass index is 22 49 kg/m²  Weight (last 2 days)     Date/Time   Weight    18  --    Comment rows:    OBSERV: Pt being repositioned  at 18 1422  57 6 (126 99)    18 2210  57 6 (126 99)    18 20:20:17  58 (127 87)            Hemodynamic Monitoring:  N/A     Non-Invasive/Invasive Ventilation Settings:  Respiratory    Lab Data (Last 4 hours)    None         O2/Vent Data (Last 4 hours)      359           Vent Mode AC/VC       Resp Rate (BPM) (BPM) 14       Vt (mL) (mL) 400       FIO2 (%) (%) 40       PEEP (cmH2O) (cmH2O) 5       MV 6                 Lab Results   Component Value Date    PHART 7 438 2018    HKD4OPH 31 2 (L) 2018    PO2ART 62 1 (L) 2018    UQT8ONS 20 6 (L) 2018    BEART -2 7 2018    SOURCE Line, Arterial 2018     SpO2: SpO2: 100 %  Intake and Outputs:  I/O        0701 -  0700  07 -  0700    I V  (mL/kg)  325 3 (5 6)    IV Piggyback  100    Total Intake(mL/kg)  425 3 (7 4)    Urine (mL/kg/hr)  1060    Total Output   1060    Net   -634 7              UOP: 1 0 ml/hr   Nutrition:        Diet Orders            Start     Ordered    18 8571  Diet NPO  Diet effective now     Comments:  Please begin at 10 mL/hr and advancy by 10 mL/hr every 4 hours to a goal of 50 mL/hr   Question Answer Comment   Diet Type NPO    RD to adjust diet per protocol?  Yes        18 4990        Labs:     Results from last 7 days  Lab Units 18  0526 18  0059 18  0455 12/18/18 2028   WBC Thousand/uL 9 39  --  7 70 13 08*   HEMOGLOBIN g/dL 9 5*  --  10 2* 11 6   I STAT HEMOGLOBIN g/dl  --  11 9  --   --    HEMATOCRIT % 29 4*  --  30 2* 35 3   HEMATOCRIT, ISTAT %  --  35  --   --    PLATELETS Thousands/uL 216  --  262 273   NEUTROS PCT %  --   --  76* 89*   MONOS PCT %  --   --  10 6       Results from last 7 days  Lab Units 12/20/18  0059 12/20/18  0054 12/19/18  2149 12/19/18  1835 12/19/18  1337 12/19/18  0929 12/19/18  0455 12/19/18  0133  12/18/18 2030   SODIUM mmol/L  --  129* 130* 128* 130* 128* 128* 128*  < > 122*   POTASSIUM mmol/L  --  3 8 3 8 3 9 4 0 4 2 4 2 3 4*  < > 3 7   CHLORIDE mmol/L  --  97* 99* 97* 98* 98* 95* 94*  < > 88*   CO2 mmol/L  --  24 24 25 25 25 25 26  < > 26   CO2, I-STAT mmol/L 27  --   --   --   --   --   --   --   --   --    ANION GAP mmol/L  --  8 7 6 7 5 8 8  < > 8   BUN mg/dL  --  7 7 7 6 6 7 7  < > 9   CREATININE mg/dL  --  0 34* 0 27* 0 30* 0 32* 0 31* 0 34* 0 32*  < > 0 42*   CALCIUM mg/dL  --  8 0* 7 6* 8 0* 7 9* 7 9* 7 9* 7 9*  < > 8 7   ALT U/L  --   --   --   --   --   --   --   --   --  96*   AST U/L  --   --   --   --   --   --   --   --   --  130*   ALK PHOS U/L  --   --   --   --   --   --   --   --   --  138*   ALBUMIN g/dL  --   --   --   --   --   --   --   --   --  3 4*   TOTAL BILIRUBIN mg/dL  --   --   --   --   --   --   --   --   --  0 42   < > = values in this interval not displayed      Results from last 7 days  Lab Units 12/20/18  0054 12/19/18  0455 12/18/18  2229   MAGNESIUM mg/dL 1 8 2 3 1 4*   PHOSPHORUS mg/dL 2 2* 3 2 3 5        Results from last 7 days  Lab Units 12/18/18  2030   INR  1 01   PTT seconds 25*       Results from last 7 days  Lab Units 12/20/18  0054   TROPONIN I ng/mL <0 02       Results from last 7 days  Lab Units 12/19/18  0133   LACTIC ACID mmol/L 0 7     ABG:  Lab Results   Component Value Date    PHART 7 438 12/20/2018    KEL0OKS 31 2 (L) 12/20/2018    PO2ART 62 1 (L) 12/20/2018    USL0TRB 20 6 (L) 12/20/2018    BEART -2 7 12/20/2018    SOURCE Line, Arterial 12/20/2018     VBG:    Results from last 7 days  Lab Units 12/20/18  0102   ABG SOURCE  Line, Arterial         No results found for: [facility]   Imaging:     CT shoulder right wo contrast   Final Result by Santana Woo MD (12/20 6928)      Acute fracture anterior glenoid rim with 1 to 2 mm anteromedial displacement  Acute comminuted fracture posterior acromion with 6 mm mediolateral distraction and 1 to 2 mm inferior cortical offset  No dislocation  Interval progression of right lower lobe and right upper lobe dependent airspace disease  This may represent subsegmental atelectasis, aspiration, and/or pulmonary contusion  Interval enlargement of small right pleural effusion  No other significant interval change  The examination demonstrates a significant  finding and was documented as such in [facility] for liaison and referring practitioner notification  Workstation performed: [id number]         CT head wo contrast   Final Result by Xavier James MD (71/33 8893)      1  Left temporal lobe intraparenchymal hemorrhage with slight increase in surrounding edema as manifest by increased effacement of the left occipital horn when compared with prior examination  Slight increase in left to right midline shift now    measuring up to 6 mm, previously 4 mm  2   Stable subdural, subarachnoid and intraventricular blood products  No large delayed intracranial hemorrhage  The study was marked in [facility] for immediate notification  Workstation performed: [id number]         XR chest portable ICU   Final Result by Rob Collazo DO (12/19 5495)      Right basilar opacity partially obscuring the hemidiaphragm may represent atelectasis or infiltrate  Possible small basilar effusion              Workstation performed: [id number]         XR shoulder 2+ vw right   Final Result by Rob Collazo DO (12/19 1614) Right-sided rib fractures again noted  Possible acromion fracture  The small glenoid fracture seen on CT scan of the chest is not clearly appreciated on this exam             Workstation performed: [id number]         XR trauma multiple   Final Result by Nasir Salazar DO (12/19 5714)      Multiple right-sided rib fractures  Consolidation at the base may represent atelectasis or pneumonia  Workstation performed: [id number]         XR chest portable   Final Result by Mahad Dawkins MD (12/19 9648)      Satisfactory intubation  Bibasilar subsegmental atelectasis  No pneumothorax  Workstation performed: [id number]         CT head wo contrast   Final Result by Lila Perry MD (12/19 8813)      1  Stable size of large intraparenchymal hemorrhage centered in the left temporal lobe with surrounding mass effect and edema and adjacent left temporal to frontal subdural hemorrhage  Stable midline shift from left to right  2   Increased conspicuity of bilateral posterior subarachnoid and intraventricular blood products are likely due to redistribution  Workstation performed: [id number]         TRAUMA - CT chest abdomen pelvis w contrast   Final Result by Lorenzo Smith MD (12/18 2132)      1  Multiple right-sided rib fractures with tiny right apical pneumothorax  Fractures extend from the second through eleventh ribs at the costovertebral junctions and fourth through eighth ribs laterally  This is consistent with flail chest  A right    anterior second rib fracture is severely displaced/angulated  2   Fracture of the anterior right glenoid, acromium, and first through fourth thoracic transverse processes on the right  3   Focal irregular opacity in the right upper lobe with adjacent 4 mm nodule  While contusion is possible given overlying trauma, the appearance of the lesion is most suspicious for a primary lung cancer and follow-up PET/CT is recommended  4   Borderline upper abdominal lymph nodes with density/stranding at the base of the small bowel mesentery  While mesenteric contusion can have this appearance in the setting of trauma  There are calcifications which suggest chronicity  Lymphoproliferative disorder cannot be excluded and follow-up and 3-6 months is recommended  5   Chronic interstitial lung disease of uncertain etiology  Given esophageal dilatation, this could represent NSIP in the setting of scleroderma  I personally discussed this study with Nneka Or on 12/18/2018 at 8:45 PM                   Workstation performed: [id number]         CTA head and neck w wo contrast   Final Result by Ruddy Jaquez MD (12/18 2132)         1  Atherosclerotic plaque at the right carotid bifurcation resulting in moderate (50-69%) stenosis  No evidence of carotid or vertebral artery dissection or pseudoaneurysm to suggest acute vascular injury  2   Displacement of left MCA branches by left temporal parietal hematoma  No evidence of aneurysm or vascular malformation  No significant stenosis in the major vessels of the [place]  3   Patent dural venous sinuses  Workstation performed: [id number]         TRAUMA - CT spine cervical wo contrast   Final Result by Warner Fraser MD (12/18 2108)      1  No cervical spine fracture or traumatic malalignment  2   Small right apical pneumothorax  3   Nondisplaced fractures of the right T1 and T2 transverse processes  Fracture of the posterior right second rib  I personally discussed this study with Nneka Or on 12/18/2018 at 8:45 PM                          Workstation performed: [id number]         TRAUMA - CT head wo contrast   Final Result by Warner Fraser MD (12/18 2101)      1  Large parenchymal hemorrhage on the left with associated left-sided subdural hematoma and bilateral subarachnoid hemorrhage including intraventricular hemorrhage  2   Mass effect and midline shift to the right measuring 4 mm  I personally discussed this study with Taran Humphries on 12/18/2018 at 8:45 PM                      Workstation performed: [id number]         XR chest 1 view    (Results Pending)   XR chest portable ICU    (Results Pending)       Micro: Allergies: No Known Allergies  Medications:   Scheduled Meds:    Current Facility-Administered Medications:  acetaminophen 650 mg Oral Q8H PRN Nubia Perez MD    albuterol        albuterol 2 5 mg Nebulization Q4H PRN Regina Magana DO    chlorhexidine 15 mL Swish & Spit Q12H [facility] Dwight Everett MD    fentaNYL 100 mcg/hr Intravenous Continuous Dwight Everett MD Last Rate: 100 mcg/hr (12/19/18 2301)   hydrALAZINE 5 mg Intravenous Q6H PRN Nubia Perez MD    insulin lispro 1-5 Units Subcutaneous TID AC Carina Mayes MD    insulin lispro 1-5 Units Subcutaneous HS Carina Mayes MD    labetalol 5 mg Intravenous Q4H PRN Carina Mayes MD    levETIRAcetam 500 mg Oral Q12H [facility] TRISTAN Mccarty    multi-electrolyte 100 mL/hr Intravenous Continuous Carina Mayes MD Last Rate: 100 mL/hr (12/20/18 0328)   pramipexole 0 25 mg Oral BID TRISTAN Mccarty    propofol 5-50 mcg/kg/min Intravenous Titrated Elvera Chan Dalton DO Last Rate: 25 mcg/kg/min (12/20/18 0430)   QUEtiapine 25 mg Oral HS TRISTAN Mccarty      Continuous Infusions:    fentaNYL 100 mcg/hr Last Rate: 100 mcg/hr (12/19/18 2301)   multi-electrolyte 100 mL/hr Last Rate: 100 mL/hr (12/20/18 0328)   propofol 5-50 mcg/kg/min Last Rate: 25 mcg/kg/min (12/20/18 0430)     PRN Meds:    acetaminophen 650 mg Q8H PRN   albuterol 2 5 mg Q4H PRN   hydrALAZINE 5 mg Q6H PRN   labetalol 5 mg Q4H PRN     VTE Pharmacologic Prophylaxis: Pharmacologic VTE Prophylaxis contraindicated due to Acute IPH/SAH/SDH  VTE Mechanical Prophylaxis: sequential compression device  Invasive lines and devices:   Invasive Devices     Peripheral Intravenous Line            Peripheral IV 12/18/18 Right Forearm 1 day    Peripheral IV 12/19/18 Right Forearm 1 day          Arterial Line            Arterial Line 12/18/18 Left Radial 1 day          Drain            NG/OG/Enteral Tube Orogastric 16 Fr Left mouth 1 day    Urethral Catheter 16 Fr  1 day          Airway            ETT  Cuffed 8 mm 1 day                     Portions of the record may have been created with voice recognition software  Occasional wrong word or "sound a like" substitutions may have occurred due to the inherent limitations of voice recognition software  Read the chart carefully and recognize, using context, where substitutions have occurred      Daniela Luo MD

## 2018-12-20 NOTE — PROGRESS NOTES
Pt received direct back form or intubated sedated noted bp to be much lower than desired labs sent ivf ran in as orderd ekg done propofol held till pt responded

## 2018-12-20 NOTE — PROGRESS NOTES
Post-op reassessment:      Patient returned from OR s/p hemicraniotomy with drains in place  Received 2 units PRBCs in the OR  GCS7T and hypotensive to 75/40 initially but MAPs improved to 60s and GCS improved to 11T, E4V1M6, with holding propofol and initiating 500 cc bolus  /53   Pulse 72   Temp 100 °F (37 8 °C) (Probe)   Resp (!) 29   Ht 5' 3" (1 6 m)   Wt 57 6 kg (126 lb 15 8 oz)   SpO2 100%   BMI 22 49 kg/m²        Physical Exam   Constitutional: No distress  HENT:   Head: Normocephalic  Hemicraniotomy drains in place draining serosanguinous fluid   Eyes: Pupils are equal, round, and reactive to light  Neck: Normal range of motion  Neck supple  No tracheal deviation present  Cardiovascular: Normal rate, regular rhythm and normal heart sounds  Exam reveals no gallop and no friction rub  No murmur heard  Pulmonary/Chest: Effort normal and breath sounds normal  No respiratory distress  She has no wheezes  She has no rales  Abdominal: Soft  Bowel sounds are normal  She exhibits no distension and no mass  There is no tenderness  There is no rebound and no guarding  Musculoskeletal: She exhibits no edema  Neurological: GCS eye subscore is 4  GCS verbal subscore is 1  GCS motor subscore is 6  Intubated    Skin: Skin is warm and dry   She is not diaphoretic

## 2018-12-20 NOTE — ANESTHESIA POSTPROCEDURE EVALUATION
Post-Op Assessment Note      CV Status:  Stable    Mental Status:  Unresponsive    Hydration Status:  Stable    PONV Controlled:  None    Airway Patency:  Patent  Airway: intubated    Post Op Vitals Reviewed: Yes          Staff: CRNA           BP   99/46   Temp   37 4   Pulse 69   Resp   14   SpO2   100

## 2018-12-20 NOTE — ANESTHESIA PROCEDURE NOTES
Central Line Insertion  Performed by: Veronica Ibrahim  Authorized by: Akshat Colon   Date/Time: 12/20/2018 8:15 AM  Catheter Type:  triple lumen  Catheter size: 7 Fr  Patient position: Trendelenburg    Sedation:  Patient sedated: yes (geta)  Assessment: blood return through all ports  Preparation: skin prepped with 2% chlorhexidine  Skin prep agent dried: skin prep agent completely dried prior to procedure  Sterile barriers: all five maximum sterile barriers used - cap, mask, sterile gown, sterile gloves, and large sterile sheet  Hand hygiene: hand hygiene performed prior to central venous catheter insertion  Ultrasound guidance: yes  sterile gel and probe cover used in ultrasound-guided central venous catheter insertionNumber of attempts: 1  Successful placement: yes  Post-procedure: line sutured,  dressing applied and chlorhexidine patch applied  Patient tolerance: Patient tolerated the procedure well with no immediate complications

## 2018-12-20 NOTE — PROGRESS NOTES
Pt had bradycardia and went into multifocal PVCs with couplets  VS remained stable  12-lead obtained  Resident notified  Resident at bedside  Pt sustained rhythm for approximately 30 minutes without incident  Resolved on it's own

## 2018-12-20 NOTE — ANESTHESIA PROCEDURE NOTES
Arterial Line Insertion  Date/Time: 12/20/2018 10:23 AM  Performed by: Trini Feliz by: Love Acuña   Consent: Verbal consent not obtained  Written consent not obtained  Preparation: Patient was prepped and draped in the usual sterile fashion  Indications: hemodynamic monitoring    Sedation:  Patient sedated: GETA      Procedure Details:  Needle gauge: 20  Seldinger technique: Seldinger technique used  Number of attempts: 1    Post-procedure:  Post-procedure: dressing applied  Waveform: good waveform  Post-procedure CNS: normal  Patient tolerance: Patient tolerated the procedure well with no immediate complications

## 2018-12-20 NOTE — OP NOTE
OPERATIVE REPORT  PATIENT NAME: Porsche Pace    :  1955  MRN: [id number]  Pt Location: [facility] OR ROOM 18    SURGERY DATE: 2018    Surgeon(s) and Role:     * Alcides Hill MD - Primary    Preop Diagnosis:  Intraparenchymal hemorrhage of brain ([address] ) [I61 9]    Post-Op Diagnosis Codes:     * Intraparenchymal hemorrhage of brain ([address] ) [I61 9]    Procedures:  Left hemicraniectomy  Evacuation of left temporal intraparenchymal hematoma  Expansatile duraplasty  Placement of right subclavian CVC    Specimen(s):  * No specimens in log *    Estimated Blood Loss:   1000 mL    Drains:  Closed/Suction Drain Left Head Bulb 10 Fr  (Active)   Drainage Appearance Bloody 2018 10:52 AM   Status To bulb suction 2018 10:52 AM   Number of days: 0       NG/OG/Enteral Tube Orogastric 16 Fr Left mouth (Active)   Placement Reverification Auscultation 2018  8:00 AM   Site Assessment Clean;Dry; Intact 2018  8:00 AM   Status Suction-low continuous 2018  8:00 AM   Drainage Appearance Bile;Yellow; Thin 2018  8:00 AM   Intake (mL) 100 mL 2018  2:01 AM   Output (mL) 0 mL 2018  6:01 AM   Number of days: 2       Urethral Catheter 16 Fr  (Active)   Reasons to continue Urinary Catheter  Accurate I&O assessment in critically ill patients (48 hr  max) 2018 10:18 AM   Site Assessment Clean;Skin intact 2018  8:00 AM   Collection Container Standard drainage bag 2018  8:00 AM   Securement Method Securing device (Describe) 2018  8:00 AM   Output (mL) 450 mL 2018  8:00 AM   Number of days: 2       Anesthesia Type:   General    Operative Indications:  Intraparenchymal hemorrhage of brain ([address] ) [I61 9]  This is a unfortunate 71-year-old female who was found down in her house with multiple traumatic injuries including a right shoulder fracture, multiple fractured ribs, and a large left temporal intraparenchymal hemorrhage as well as subarachnoid and subdural blood    Was originally managed medically however due to the progression of her radiographic mass effect, poor exam, and concern for imminent decline she was brought in an urgent fashion for decompression  Her sister understood the risks and benefits of the procedure including bleeding, infection, stroke, paralysis, need for cranioplasty, and death  Operative Findings:  Significant brain edema and large temporal hematoma  Complications:   None    Procedure and Technique:  After obtaining written informed consent patient was brought to the operating room  General endotracheal anesthesia was induced  After attempting placement of additional intravenous access by Anesthesia this was found to be difficult  As such I proceeded with the placement of a right subclavian central venous catheter  Her arm and neck were cleaned and prepped and draped in the usual sterile fashion  An 18 gauge needle was then used to cannulate the subclavian vein  Using modified Seldinger technique and a J-wire access the some subclavian vein was obtained  This was then dilated and a standard fashion  A triple-lumen central venous catheter was then threaded  The wire was removed and accounted for  The line was secured in place and dressed with sterile dressing  Next the patient's head was rotated and turned towards the right  Her hair was clipped in the usual sterile fashion and a large question-eva incision was prepared  Her head was then prepped and draped in the usual sterile fashion  A surgical time-out was performed  20 cc of 1% lidocaine with 100,000 epinephrine was infiltrated along the incision  Next 10 blade was used to turn the myocutaneous flap  Care was taken to protect temporalis fascia which was then subsequently opened monopolar cautery  The myocutaneous flap was reflected anteriorly and secured in place with stitches and rubber bands for retraction    Next the [employer] drill was used to make bur holes at the keyhole, frontal, parietal, and temporal bones  The craniotome was then used to turn a craniotomy flap greater than 15 cm  The bone was then saved on the back table in bacitracin irrigation  Peripheral tack-up stitches were placed  There is significant venous bleeding from the anterior portion likely related to bridging veins  These were isolated bipolar and cauterized when able  In addition FloSeal, Gelfoam, cottonoids were used for hemostasis  Once hemostasis was achieved the dura was fully opened and reflected anteriorly with Metzenbaum scissors  The brain was under clear pressure and herniated outward  There was significant amount hematoma along the temporal pole, the superior surface of the temporal lobe, and subdural hematoma  The subdural hematoma was evacuated  Due to continued mass effect I elected to remove a portion of the intraparenchymal hemorrhage  Using bipolar cautery a small corticectomy was made along the hematoma  The hematoma was then dissected out and removed using bipolar cautery and suction  Once this was done hemostasis was achieved using FloSeal, hydrogen peroxide, and Surgicel  After ensuring hemostasis the wound was copiously irrigated with antibiotic irrigation  The temporal floor was then explored and residual bone was removed to allow proper decompression of the temporal lobe  Next 3 DuraRepairs were used to create an expansile duraplasty  Next a 10 [place] PVC drain was placed in a subgaleal fashion  The myocutaneous flap was reflected back and the galea was closed using inverted 2 0 Vicryl  The skin was closed with staples  The drain stitch was secured in place with a 2 0 Prolene  A sterile dressing was then applied  There are 2 uninterrupted and correct surgical counts  Surgical sign-out was performed         I was present for the entire procedure    Patient Disposition:  Critical Care Unit    SIGNATURE: Jeaneth Hoover MD  DATE: December 20, 2018  TIME: 11:09 AM

## 2018-12-20 NOTE — RESPIRATORY THERAPY NOTE
resp care      12/20/18 1131   Respiratory Assessment   Resp Comments Pt arrived from OR  Placed on same vent settings with increased 02  OR staff states low p02 in OR that required 100%  Pt has coars bs on right  CXR ordered  ETT  Cuffed 8 mm   Placement Date/Time: 12/18/18 2045   Previously placed by?: Trauma Staff  Mask Ventilation: Ventilated by mask (1)  Preoxygenated: Yes  Technique: Rapid sequence  Type: Cuffed  Tube Size: 8 mm  Placement Verification: Auscultation; End tidal CO2  Secur       Secured at (cm) 23   Measured from [facility] [address] Right   Repositioned Center to Right   Secured by Commercial tube tirado   HI-LO Suction  Intermittent suction   Additional Assessments   SpO2 100 %

## 2018-12-20 NOTE — NURSING NOTE
Pt taken to CT scan with RT and RN  Pt became restless, propofol increased accordingly  Tolerated transport to and from well  No changes noted

## 2018-12-20 NOTE — OCCUPATIONAL THERAPY NOTE
OCCUPATIONAL THERAPY CANCEL NOTE    OT orders received  Chart reveiwed  Pt is currently in OR for "Left hemicraniectomy"  Will D/C OT at this time  Please re-consult post-op when medically stable w/ updated activity orders  Thank you      Estela Naranjo MS, OTR/L

## 2018-12-20 NOTE — PHYSICAL THERAPY NOTE
Physical Therapy Cancellation Note    PT orders received, chart review performed  Pt currently in OR for left hemicraniectomy  PT to sign off at this time  Please re-consult post-op when pt is clinically and medically appropriate   Thank you     Gama Perry, PT, DPT

## 2018-12-20 NOTE — ANESTHESIA PREPROCEDURE EVALUATION
Review of Systems/Medical History  Patient summary reviewed        Cardiovascular  Negative cardio ROS    Pulmonary  Negative pulmonary ROS Oxygen dependent intubated,        GI/Hepatic  Negative GI/hepatic ROS          Negative  ROS        Endo/Other  Negative endo/other ROS Diabetes well controlled type 2 ,      GYN  Negative gynecology ROS          Hematology  Negative hematology ROS      Musculoskeletal  Negative musculoskeletal ROS        Neurology  Negative neurology ROS   Cerebral bleeding with side effects , intracranial hemorrhage,    Psychology   Negative psychology ROS              Physical Exam    Airway       Dental       Cardiovascular  Comment: Negative ROS,     Pulmonary      Other Findings        Anesthesia Plan  ASA Score- 4     Anesthesia Type- general with ASA Monitors  Additional Monitors:   Airway Plan:     Comment: Patient intubated  Plan Factors-    Induction- inhalational     Postoperative Plan-     Informed Consent-   I personally reviewed this patient with the CRNA  Discussed and agreed on the Anesthesia Plan with the CRNA  Jessica Bradford

## 2018-12-20 NOTE — PLAN OF CARE
Problem: Prexisting or High Potential for Compromised Skin Integrity  Goal: Skin integrity is maintained or improved  INTERVENTIONS:  - Identify patients at risk for skin breakdown  - Assess and monitor skin integrity  - Assess and monitor nutrition and hydration status  - Monitor labs (i e  albumin)  - Assess for incontinence   - Turn and reposition patient  - Assist with mobility/ambulation  - Relieve pressure over bony prominences  - Avoid friction and shearing  - Provide appropriate hygiene as needed including keeping skin clean and dry  - Evaluate need for skin moisturizer/barrier cream  - Collaborate with interdisciplinary team (i e  Nutrition, Rehabilitation, etc )   - Patient/family teaching   Outcome: Progressing      Problem: Potential for Falls  Goal: Patient will remain free of falls  INTERVENTIONS:  - Assess patient frequently for physical needs  -  Identify cognitive and physical deficits and behaviors that affect risk of falls    -  [place] fall precautions as indicated by assessment   - Educate patient/family on patient safety including physical limitations  - Instruct patient to call for assistance with activity based on assessment  - Modify environment to reduce risk of injury  - Consider OT/PT consult to assist with strengthening/mobility   Outcome: Progressing      Problem: PAIN - ADULT  Goal: Verbalizes/displays adequate comfort level or baseline comfort level  Interventions:  - Encourage patient to monitor pain and request assistance  - Assess pain using appropriate pain scale  - Administer analgesics based on type and severity of pain and evaluate response  - Implement non-pharmacological measures as appropriate and evaluate response  - Consider cultural and social influences on pain and pain management  - Notify physician/advanced practitioner if interventions unsuccessful or patient reports new pain   Outcome: Progressing      Problem: INFECTION - ADULT  Goal: Absence or prevention of progression during hospitalization  INTERVENTIONS:  - Assess and monitor for signs and symptoms of infection  - Monitor lab/diagnostic results  - Monitor all insertion sites, i e  indwelling lines, tubes, and drains  - Monitor endotracheal (as able) and nasal secretions for changes in amount and color  - [place] appropriate cooling/warming therapies per order  - Administer medications as ordered  - Instruct and encourage patient and family to use good hand hygiene technique  - Identify and instruct in appropriate isolation precautions for identified infection/condition   Outcome: Progressing    Goal: Absence of fever/infection during neutropenic period  INTERVENTIONS:  - Monitor WBC  - Implement neutropenic guidelines   Outcome: Progressing      Problem: SAFETY ADULT  Goal: Maintain or return to baseline ADL function  INTERVENTIONS:  -  Assess patient's ability to carry out ADLs; assess patient's baseline for ADL function and identify physical deficits which impact ability to perform ADLs (bathing, care of mouth/teeth, toileting, grooming, dressing, etc )  - Assess/evaluate cause of self-care deficits   - Assess range of motion  - Assess patient's mobility; develop plan if impaired  - Assess patient's need for assistive devices and provide as appropriate  - Encourage maximum independence but intervene and supervise when necessary  ¯ Involve family in performance of ADLs  ¯ Assess for home care needs following discharge   ¯ Request OT consult to assist with ADL evaluation and planning for discharge  ¯ Provide patient education as appropriate   Outcome: Progressing    Goal: Maintain or return mobility status to optimal level  INTERVENTIONS:  - Assess patient's baseline mobility status (ambulation, transfers, stairs, etc )    - Identify cognitive and physical deficits and behaviors that affect mobility  - Identify mobility aids required to assist with transfers and/or ambulation (gait belt, sit-to-stand, lift, walker, cane, etc )  - [place] fall precautions as indicated by assessment  - Record patient progress and toleration of activity level on Mobility SBAR; progress patient to next Phase/Stage  - Instruct patient to call for assistance with activity based on assessment  - Request Rehabilitation consult to assist with strengthening/weightbearing, etc    Outcome: Progressing      Problem: DISCHARGE PLANNING  Goal: Discharge to home or other facility with appropriate resources  INTERVENTIONS:  - Identify barriers to discharge w/patient and caregiver  - Arrange for needed discharge resources and transportation as appropriate  - Identify discharge learning needs (meds, wound care, etc )  - Arrange for interpretive services to assist at discharge as needed  - Refer to Case Management Department for coordinating discharge planning if the patient needs post-hospital services based on physician/advanced practitioner order or complex needs related to functional status, cognitive ability, or social support system   Outcome: Progressing      Problem: NEUROSENSORY - ADULT  Goal: Achieves stable or improved neurological status  INTERVENTIONS  - Monitor and report changes in neurological status  - Initiate measures to prevent increased intracranial pressure  - Maintain blood pressure and fluid volume within ordered parameters to optimize cerebral perfusion  - Monitor temperature, glucose, and sodium or any other associated labs   Initiate appropriate interventions as ordered  - Monitor for seizure activity   - Administer anti-seizure medications as ordered   Outcome: Progressing    Goal: Absence of seizures  INTERVENTIONS  - Monitor for seizure activity  - Administer anti-seizure medications as ordered  - Monitor neurological status   Outcome: Progressing    Goal: Remains free of injury related to seizures activity  INTERVENTIONS  - Maintain airway, patient safety  and administer oxygen as ordered  - Monitor patient for seizure activity, document and report duration and description of seizure to physician/advanced practitioner  - If seizure occurs,  ensure patient safety during seizure  - Reorient patient post seizure  - Seizure pads on all 4 side rails  - Instruct patient/family to notify RN of any seizure activity including if an aura is experienced  - Instruct patient/family to call for assistance with activity based on nursing assessment  - Administer anti-seizure medications as ordered  - Monitor fetal well being   Outcome: Progressing    Goal: Achieves maximal functionality and self care  INTERVENTIONS  - Monitor swallowing and airway patency with patient fatigue and changes in neurological status  - Encourage and assist patient to increase activity and self care with guidance from rehab services  - Encourage visually impaired, hearing impaired and aphasic patients to use assistive/communication devices   Outcome: Progressing      Problem: SAFETY,RESTRAINT: NV/NON-SELF DESTRUCTIVE BEHAVIOR  Goal: Remains free of harm/injury (restraint for non violent/non self-detsructive behavior)  INTERVENTIONS:  - Instruct patient/family regarding restraint use   - Assess and monitor physiologic and psychological status   - Provide interventions and comfort measures to meet assessed patient needs   - Identify and implement measures to help patient regain control  - Assess readiness for release of restraint    Outcome: Progressing    Goal: Returns to optimal restraint-free functioning  INTERVENTIONS:  - Assess the patient's behavior and symptoms that indicate continued need for restraint  - Identify and implement measures to help patient regain control  - Assess readiness for release of restraint    Outcome: Progressing      Problem: DISCHARGE PLANNING - CARE MANAGEMENT  Goal: Discharge to post-acute care or home with appropriate resources  INTERVENTIONS:  - Conduct assessment to determine patient/family and health care team treatment goals, and need for post-acute services based on payer coverage, community resources, and patient preferences, and barriers to discharge  - Address psychosocial, clinical, and financial barriers to discharge as identified in assessment in conjunction with the patient/family and health care team  - Arrange appropriate level of post-acute services according to patient's   needs and preference and payer coverage in collaboration with the physician and health care team  - Communicate with and update the patient/family, physician, and health care team regarding progress on the discharge plan  - Arrange appropriate transportation to post-acute venues  - Pt to d/c with appropriate resources when medically stable  Outcome: Progressing      Problem: Nutrition/Hydration-ADULT  Goal: Nutrient/Hydration intake appropriate for improving, restoring or maintaining nutritional needs  Monitor and assess patient's nutrition/hydration status for malnutrition (ex- brittle hair, bruises, dry skin, pale skin and conjunctiva, muscle wasting, smooth red tongue, and disorientation)  Collaborate with interdisciplinary team and initiate plan and interventions as ordered  Monitor patient's weight and dietary intake as ordered or per policy  Utilize nutrition screening tool and intervene per policy  Determine patient's food preferences and provide high-protein, high-caloric foods as appropriate       INTERVENTIONS:  - Monitor oral intake, urinary output, labs, and treatment plans  - Assess nutrition and hydration status and recommend course of action  - Evaluate amount of meals eaten  - Assist patient with eating if necessary   - Allow adequate time for meals  - Recommend/ encourage appropriate diets, oral nutritional supplements, and vitamin/mineral supplements  - Order, calculate, and assess calorie counts as needed  - Recommend, monitor, and adjust tube feedings and TPN/PPN based on assessed needs  - Assess need for intravenous fluids  - Provide specific nutrition/hydration education as appropriate  - Include patient/family/caregiver in decisions related to nutrition   Outcome: Progressing

## 2018-12-20 NOTE — RESPIRATORY THERAPY NOTE
RT Ventilator Management Note  Giorgi Burton 59 y o  female MRN: [id number]  Unit/Bed#: [facility] 02 Encounter: [id number]      Daily Screen       12/19/2018 0768             Patient safety screen outcome[de-identified] Failed    Not Ready for Weaning due to[de-identified] Underline problem not resolved            Physical Exam:   Assessment Type: (P) Assess only  General Appearance: (P) Sedated  Respiratory Pattern: (P) Normal, Assisted  Chest Assessment: (P) Chest expansion symmetrical  Bilateral Breath Sounds: (P) Clear  Suction: ET Tube      Resp Comments: (P) Pt remained on a/c settings throughout the night without incident

## 2018-12-21 ENCOUNTER — APPOINTMENT (INPATIENT)
Dept: RADIOLOGY | Facility: HOSPITAL | Age: 63
DRG: 003 | End: 2018-12-21
Payer: MEDICARE

## 2018-12-21 PROBLEM — S92.912A: Status: ACTIVE | Noted: 2018-12-21

## 2018-12-21 LAB
ABO GROUP BLD BPU: NORMAL
ANION GAP SERPL CALCULATED.3IONS-SCNC: 7 MMOL/L (ref 4–13)
BASE EXCESS BLDA CALC-SCNC: -0.1 MMOL/L
BASE EXCESS BLDA CALC-SCNC: -0.6 MMOL/L
BASE EXCESS BLDA CALC-SCNC: -1 MMOL/L
BASE EXCESS BLDA CALC-SCNC: -1.7 MMOL/L
BASOPHILS # BLD AUTO: 0.03 THOUSANDS/ΜL (ref 0–0.1)
BASOPHILS NFR BLD AUTO: 0 % (ref 0–1)
BODY TEMPERATURE: 100.4 DEGREES FEHRENHEIT
BPU ID: NORMAL
BUN SERPL-MCNC: 8 MG/DL (ref 5–25)
CALCIUM SERPL-MCNC: 7.5 MG/DL (ref 8.3–10.1)
CHLORIDE SERPL-SCNC: 105 MMOL/L (ref 100–108)
CO2 SERPL-SCNC: 26 MMOL/L (ref 21–32)
CREAT SERPL-MCNC: 0.25 MG/DL (ref 0.6–1.3)
CROSSMATCH: NORMAL
EOSINOPHIL # BLD AUTO: 0.01 THOUSAND/ΜL (ref 0–0.61)
EOSINOPHIL NFR BLD AUTO: 0 % (ref 0–6)
ERYTHROCYTE [DISTWIDTH] IN BLOOD BY AUTOMATED COUNT: 15.1 % (ref 11.6–15.1)
GFR SERPL CREATININE-BSD FRML MDRD: 131 ML/MIN/1.73SQ M
GLUCOSE SERPL-MCNC: 106 MG/DL (ref 65–140)
GLUCOSE SERPL-MCNC: 69 MG/DL (ref 65–140)
GLUCOSE SERPL-MCNC: 75 MG/DL (ref 65–140)
GLUCOSE SERPL-MCNC: 80 MG/DL (ref 65–140)
HCO3 BLDA-SCNC: 23.3 MMOL/L (ref 22–28)
HCO3 BLDA-SCNC: 24.3 MMOL/L (ref 22–28)
HCO3 BLDA-SCNC: 24.5 MMOL/L (ref 22–28)
HCO3 BLDA-SCNC: 25.2 MMOL/L (ref 22–28)
HCT VFR BLD AUTO: 29.4 % (ref 34.8–46.1)
HCT VFR BLD AUTO: 29.5 % (ref 34.8–46.1)
HGB BLD-MCNC: 9.8 G/DL (ref 11.5–15.4)
HGB BLD-MCNC: 9.9 G/DL (ref 11.5–15.4)
HOROWITZ INDEX BLDA+IHG-RTO: 40 MM[HG]
HOROWITZ INDEX BLDA+IHG-RTO: 70 MM[HG]
HOROWITZ INDEX BLDA+IHG-RTO: 70 MM[HG]
HOROWITZ INDEX BLDA+IHG-RTO: 80 MM[HG]
IMM GRANULOCYTES # BLD AUTO: 0.06 THOUSAND/UL (ref 0–0.2)
IMM GRANULOCYTES NFR BLD AUTO: 1 % (ref 0–2)
INR PPP: 1.2 (ref 0.86–1.17)
LYMPHOCYTES # BLD AUTO: 1.33 THOUSANDS/ΜL (ref 0.6–4.47)
LYMPHOCYTES NFR BLD AUTO: 14 % (ref 14–44)
MAGNESIUM SERPL-MCNC: 2 MG/DL (ref 1.6–2.6)
MCH RBC QN AUTO: 28 PG (ref 26.8–34.3)
MCHC RBC AUTO-ENTMCNC: 33.7 G/DL (ref 31.4–37.4)
MCV RBC AUTO: 83 FL (ref 82–98)
MONOCYTES # BLD AUTO: 0.64 THOUSAND/ΜL (ref 0.17–1.22)
MONOCYTES NFR BLD AUTO: 7 % (ref 4–12)
NEUTROPHILS # BLD AUTO: 7.26 THOUSANDS/ΜL (ref 1.85–7.62)
NEUTS SEG NFR BLD AUTO: 78 % (ref 43–75)
NRBC BLD AUTO-RTO: 0 /100 WBCS
O2 CT BLDA-SCNC: 14.2 ML/DL (ref 16–23)
O2 CT BLDA-SCNC: 14.3 ML/DL (ref 16–23)
O2 CT BLDA-SCNC: 14.7 ML/DL (ref 16–23)
O2 CT BLDA-SCNC: 16.3 ML/DL (ref 16–23)
OXYHGB MFR BLDA: 92.2 % (ref 94–97)
OXYHGB MFR BLDA: 93.6 % (ref 94–97)
OXYHGB MFR BLDA: 94 % (ref 94–97)
OXYHGB MFR BLDA: 96.1 % (ref 94–97)
PCO2 BLDA: 40.6 MM HG (ref 36–44)
PCO2 BLDA: 42.1 MM HG (ref 36–44)
PCO2 BLDA: 42.8 MM HG (ref 36–44)
PCO2 BLDA: 43.3 MM HG (ref 36–44)
PCO2 TEMP ADJ BLDA: 44 MM HG (ref 36–44)
PEEP RESPIRATORY: 5 CM[H2O]
PEEP RESPIRATORY: 8 CM[H2O]
PH BLD: 7.37 [PH] (ref 7.35–7.45)
PH BLDA: 7.37 [PH] (ref 7.35–7.45)
PH BLDA: 7.38 [PH] (ref 7.35–7.45)
PHOSPHATE SERPL-MCNC: 2.2 MG/DL (ref 2.3–4.1)
PLATELET # BLD AUTO: 180 THOUSANDS/UL (ref 149–390)
PMV BLD AUTO: 10 FL (ref 8.9–12.7)
PO2 BLD: 70.4 MM HG (ref 75–129)
PO2 BLDA: 65.7 MM HG (ref 75–129)
PO2 BLDA: 72.4 MM HG (ref 75–129)
PO2 BLDA: 75.8 MM HG (ref 75–129)
PO2 BLDA: 91.8 MM HG (ref 75–129)
POTASSIUM SERPL-SCNC: 3.7 MMOL/L (ref 3.5–5.3)
PROTHROMBIN TIME: 15.3 SECONDS (ref 11.8–14.2)
RBC # BLD AUTO: 3.53 MILLION/UL (ref 3.81–5.12)
SODIUM SERPL-SCNC: 138 MMOL/L (ref 136–145)
SPECIMEN SOURCE: ABNORMAL
SPECIMEN SOURCE: NORMAL
UNIT DISPENSE STATUS: NORMAL
UNIT PRODUCT CODE: NORMAL
UNIT RH: NORMAL
VENT AC: 14
VENT- AC: AC
VT SETTING VENT: 400 ML
WBC # BLD AUTO: 9.33 THOUSAND/UL (ref 4.31–10.16)

## 2018-12-21 PROCEDURE — 85610 PROTHROMBIN TIME: CPT | Performed by: EMERGENCY MEDICINE

## 2018-12-21 PROCEDURE — 99223 1ST HOSP IP/OBS HIGH 75: CPT | Performed by: PHYSICAL MEDICINE & REHABILITATION

## 2018-12-21 PROCEDURE — 99291 CRITICAL CARE FIRST HOUR: CPT | Performed by: EMERGENCY MEDICINE

## 2018-12-21 PROCEDURE — 71045 X-RAY EXAM CHEST 1 VIEW: CPT

## 2018-12-21 PROCEDURE — 94640 AIRWAY INHALATION TREATMENT: CPT

## 2018-12-21 PROCEDURE — 70450 CT HEAD/BRAIN W/O DYE: CPT

## 2018-12-21 PROCEDURE — 85018 HEMOGLOBIN: CPT | Performed by: SURGERY

## 2018-12-21 PROCEDURE — 36556 INSERT NON-TUNNEL CV CATH: CPT | Performed by: NEUROLOGICAL SURGERY

## 2018-12-21 PROCEDURE — 82805 BLOOD GASES W/O2 SATURATION: CPT | Performed by: EMERGENCY MEDICINE

## 2018-12-21 PROCEDURE — 80048 BASIC METABOLIC PNL TOTAL CA: CPT | Performed by: NURSE PRACTITIONER

## 2018-12-21 PROCEDURE — 94760 N-INVAS EAR/PLS OXIMETRY 1: CPT

## 2018-12-21 PROCEDURE — 94003 VENT MGMT INPAT SUBQ DAY: CPT

## 2018-12-21 PROCEDURE — 84100 ASSAY OF PHOSPHORUS: CPT | Performed by: EMERGENCY MEDICINE

## 2018-12-21 PROCEDURE — 83735 ASSAY OF MAGNESIUM: CPT | Performed by: EMERGENCY MEDICINE

## 2018-12-21 PROCEDURE — 85025 COMPLETE CBC W/AUTO DIFF WBC: CPT | Performed by: EMERGENCY MEDICINE

## 2018-12-21 PROCEDURE — 82948 REAGENT STRIP/BLOOD GLUCOSE: CPT

## 2018-12-21 PROCEDURE — 82805 BLOOD GASES W/O2 SATURATION: CPT | Performed by: SURGERY

## 2018-12-21 PROCEDURE — 85014 HEMATOCRIT: CPT | Performed by: SURGERY

## 2018-12-21 RX ORDER — FENTANYL CITRATE 50 UG/ML
INJECTION, SOLUTION INTRAMUSCULAR; INTRAVENOUS
Status: COMPLETED
Start: 2018-12-21 | End: 2018-12-21

## 2018-12-21 RX ORDER — LEVALBUTEROL 1.25 MG/.5ML
1.25 SOLUTION, CONCENTRATE RESPIRATORY (INHALATION)
Status: DISCONTINUED | OUTPATIENT
Start: 2018-12-22 | End: 2019-01-03 | Stop reason: HOSPADM

## 2018-12-21 RX ORDER — AMOXICILLIN 250 MG
1 CAPSULE ORAL
Status: DISCONTINUED | OUTPATIENT
Start: 2018-12-21 | End: 2018-12-24

## 2018-12-21 RX ORDER — FENTANYL CITRATE 50 UG/ML
50 INJECTION, SOLUTION INTRAMUSCULAR; INTRAVENOUS EVERY 2 HOUR PRN
Status: DISCONTINUED | OUTPATIENT
Start: 2018-12-21 | End: 2018-12-24

## 2018-12-21 RX ADMIN — CHLORHEXIDINE GLUCONATE 0.12% ORAL RINSE 15 ML: 1.2 LIQUID ORAL at 08:19

## 2018-12-21 RX ADMIN — FENTANYL CITRATE 50 MCG: 50 INJECTION, SOLUTION INTRAMUSCULAR; INTRAVENOUS at 19:54

## 2018-12-21 RX ADMIN — PRAMIPEXOLE DIHYDROCHLORIDE 0.25 MG: 0.25 TABLET ORAL at 17:45

## 2018-12-21 RX ADMIN — FENTANYL CITRATE 100 MCG/HR: 50 INJECTION, SOLUTION INTRAMUSCULAR; INTRAVENOUS at 19:09

## 2018-12-21 RX ADMIN — FENTANYL CITRATE 100 MCG/HR: 50 INJECTION, SOLUTION INTRAMUSCULAR; INTRAVENOUS at 03:57

## 2018-12-21 RX ADMIN — ALBUTEROL SULFATE 2.5 MG: 2.5 SOLUTION RESPIRATORY (INHALATION) at 20:51

## 2018-12-21 RX ADMIN — PROPOFOL 5 MCG/KG/MIN: 10 INJECTION, EMULSION INTRAVENOUS at 02:40

## 2018-12-21 RX ADMIN — POTASSIUM PHOSPHATE, MONOBASIC AND POTASSIUM PHOSPHATE, DIBASIC 15 MMOL: 224; 236 INJECTION, SOLUTION INTRAVENOUS at 06:09

## 2018-12-21 RX ADMIN — PRAMIPEXOLE DIHYDROCHLORIDE 0.25 MG: 0.25 TABLET ORAL at 22:10

## 2018-12-21 RX ADMIN — ACETAMINOPHEN 650 MG: 160 SUSPENSION ORAL at 21:48

## 2018-12-21 RX ADMIN — SODIUM CHLORIDE, SODIUM GLUCONATE, SODIUM ACETATE, POTASSIUM CHLORIDE, MAGNESIUM CHLORIDE, SODIUM PHOSPHATE, DIBASIC, AND POTASSIUM PHOSPHATE 100 ML/HR: .53; .5; .37; .037; .03; .012; .00082 INJECTION, SOLUTION INTRAVENOUS at 19:43

## 2018-12-21 RX ADMIN — LEVETIRACETAM 500 MG: 100 SOLUTION ORAL at 08:20

## 2018-12-21 RX ADMIN — CEFAZOLIN SODIUM 2000 MG: 2 SOLUTION INTRAVENOUS at 00:53

## 2018-12-21 RX ADMIN — PROPOFOL 30 MCG/KG/MIN: 10 INJECTION, EMULSION INTRAVENOUS at 11:27

## 2018-12-21 RX ADMIN — QUETIAPINE FUMARATE 25 MG: 25 TABLET ORAL at 21:48

## 2018-12-21 RX ADMIN — CHLORHEXIDINE GLUCONATE 0.12% ORAL RINSE 15 ML: 1.2 LIQUID ORAL at 21:48

## 2018-12-21 RX ADMIN — PROPOFOL 30 MCG/KG/MIN: 10 INJECTION, EMULSION INTRAVENOUS at 23:47

## 2018-12-21 RX ADMIN — LEVETIRACETAM 500 MG: 100 SOLUTION ORAL at 21:48

## 2018-12-21 RX ADMIN — SODIUM CHLORIDE, SODIUM GLUCONATE, SODIUM ACETATE, POTASSIUM CHLORIDE, MAGNESIUM CHLORIDE, SODIUM PHOSPHATE, DIBASIC, AND POTASSIUM PHOSPHATE 100 ML/HR: .53; .5; .37; .037; .03; .012; .00082 INJECTION, SOLUTION INTRAVENOUS at 08:17

## 2018-12-21 RX ADMIN — SENNOSIDES AND DOCUSATE SODIUM 1 TABLET: 8.6; 5 TABLET ORAL at 21:48

## 2018-12-21 RX ADMIN — PROPOFOL 35 MCG/KG/MIN: 10 INJECTION, EMULSION INTRAVENOUS at 19:09

## 2018-12-21 RX ADMIN — HYDRALAZINE HYDROCHLORIDE 5 MG: 20 INJECTION INTRAMUSCULAR; INTRAVENOUS at 20:00

## 2018-12-21 RX ADMIN — CEFAZOLIN SODIUM 2000 MG: 2 SOLUTION INTRAVENOUS at 08:19

## 2018-12-21 NOTE — PROGRESS NOTES
Progress Note - Neurosurgery   Princess Pacheco 59 y o  female MRN: [id number]  [facility] Encounter: [id number]    Assessment:    1  POD 1 s/p Left Left hemicraniectomy for evacuation of left temporal intraparenchymal hematoma, Expansatile duraplasty and Placement of right subclavian CVC  2  Acute Traumatic Large Left Temporal IPH  3  S/p Fall (Unwitnessed)  4  Acute Traumatic Left Temporal to frontal SDH  5  Acute Traumatic Bilateral posterior SAH  6  Acute Traumatic IVH  7  Acute Traumatic Nondisplaced Fractures  Right Transverse processes T1 - T4  8  Acute Traumatic Multiple rib fractures on the right   9  Acute Traumatic Right Glenoid fracture  10  Acute Traumatic Right Acromion fracture  11  Small right Apical Pneumothorax  12  RUL lung nodule  13  Diabetes Mellitus Type II     Plan:    · Exam revealed GCS 8T E3 V1T M4 pt intubated/sedated,Opens eyes to verbal stimuli, not following commands, withdraws to pain in BLE, LUE, very minimal withdrawal on RUE,  LUE: some elbow flexion in left arm and weak , no spontaneous movt in right arm, Spontaneously moving BLE, Reflexes 2+ throughout  No clonus  ? Left Hemicraniotomy site with dressing intact  Left scalp is soft  ? Subclavian CVC drain with serosanguinous fluid, output 270 cc/24 hrs, 105 cc/ 8hr, will maintain the drain due to high output  · Imaging reviewed personally and by attending  Results listed below  ? CT of head wo contrast 12/21/18 shows postop changes of left Craniectomy and evacuation of left temporal IPH  A small amount of IPH hemorrhage remains within anterior left temporal lobe and stable thin SDH along left frontal region  Increased blood products along the cerebral falx when compared with prior exam  Persistent edema predominantly within the left temporal lobe and effacement of the left-sided cerebral sulci however there is interval resolution of midline shift and decreased effacement of the left lateral ventricle  Continued washout and interval evolution of IVH/SAH blood products  ? Cta Head And Neck W Wo Contrast, Result Date: 12/18/2018: 1  Atherosclerotic plaque at the right carotid bifurcation resulting in moderate (50-69%) stenosis  No evidence of carotid or vertebral artery dissection or pseudoaneurysm to suggest acute vascular injury  2   Displacement of left MCA branches by left temporal parietal hematoma  No evidence of aneurysm or vascular malformation  ? Ct Head Wo Contrast, Result Date: 12/19/2018: 1  Stable size of large IPH centered in the left temporal lobe with surrounding mass effect and edema and adjacent left temporal to frontal subdural hemorrhage  Stable midline shift from left to right  2   Increased conspicuity of bilateral posterior subarachnoid and intraventricular blood products are likely due to redistribution  ? Trauma - Ct Spine Cervical Wo Contrast, Result Date: 12/18/2018:  Nondisplaced fractures of the right T1 and T2 transverse processes  Fracture of the posterior right second rib  ? Trauma - Ct Chest Abdomen Pelvis W Contrast, Result Date: 12/18/2018:  Fracture of the anterior right glenoid, acromium, and first through fourth thoracic transverse processes on the right  · Ongoing medical management per primary team - Critical Care service  ? PT Intubated, on ventillator and sedated with Propofol 1000mg in 100 ml infusion and Fentanyl 1250 mcg in NaCl 0 9% 125 ml drip at 100 mcg/hr  ? Ancef 2000 mg q 8hrs for 3 doses was completed  ? Continue Keppra 500 MG Q 12 Hrs x 1 week for seizure ppx  ? Labs:  § Na 12/21/18 wnl at 138-improved  § K 12/21/18  wnl at 3 7 - improved, KCl was given     § Sae 12/21/18 low at 7 5 - Sae Gluconate given on 12/20/18  § Hgb 12/21/18  low at 9 9 but improved, pt had received RBC transfusion as Hgb was low at 6 9 on 12/10/18  § CK decreased but still elevated at 397 on 12/20/18 - continuing fluid resuscitation with Isolyte   § DM II - Insulin sliding scale in place  AM glucose wnl at 75  · PT/ OT Evaluation and mobilization when able to   · DVT ppx: SCDs, Pharm DVT held due to acute IPH, SAH, SDH  · Continue regular neurological checks  · STAT CT of head wo contrast if neurological decline GCS > 2pts/hr  · Supportive management with pain control for the nondisplaced fx for the Right T1 and T2 transverse process fxs since the transverse processes are not integral to the stability of the thoracic vertebrae/thoracic spine  No brace at this time due to the concurrent multiple rib fractures  ·  Neurosurgery will continue to follow  Please call with any questions or concerns  Subjective/Objective   Chief Complaint: Inpatient follow up for Left temporal IPH and Left SDH POD 1 s/p Left Left hemicraniectomy for evacuation of left temporal intraparenchymal hematoma, Expansatile duraplasty and Placement of right subclavian CVC    Subjective: Pt is a 58 yo female who presented 3 days ago s/p unwitnessed fall with a Left IPH, left temporal SDH, bilateral posterior SAH/IVH  Today is POD 1 s/p Left Left hemicraniectomy for evacuation of left temporal intraparenchymal hematoma, Expansatile duraplasty and Placement of right subclavian CVC  Pt is intubated, sedated  She is unresponsive thus limited hx obtained  Objective: Pt lying in bed, sedate and intubated  NAD       I/O       12/19 0701 - 12/20 0700 12/20 0701 - 12/21 0700 12/21 0701 - 12/22 0700    I V  (mL/kg) 3087 9 (53 6) 4805 2 (83 4)     Blood  1400     NG/ 110     IV Piggyback 200 1150     Total Intake(mL/kg) 3507 9 (60 9) 7465 2 (129 6)     Urine (mL/kg/hr) 1300 (0 9) 2295 (1 7)     Emesis/NG output 200 240     Drains  270     Blood  1000     Total Output 1500 3805      Net +2007 9 +3660 2                   Invasive Devices     Peripheral Intravenous Line            Peripheral IV 12/19/18 Right Forearm 2 days    Peripheral IV 12/20/18 Left Antecubital less than 1 day    Peripheral IV 12/20/18 Left Arm less than 1 day          Arterial Line            Arterial Line 12/20/18 Right Radial less than 1 day          Drain            NG/OG/Enteral Tube Orogastric 16 Fr Left mouth 2 days    Urethral Catheter 16 Fr  2 days    Closed/Suction Drain Left Head Bulb 10 Fr  less than 1 day          Airway            ETT  Cuffed 8 mm 2 days                Physical Exam:  Vitals: Blood pressure 131/56, pulse 60, temperature 99 °F (37 2 °C), resp  rate 13, height 5' 3" (1 6 m), weight 57 6 kg (126 lb 15 8 oz), SpO2 96 %  ,Body mass index is 22 49 kg/m²  Hemodynamic Monitoring: MAP: Arterial Line MAP (mmHg): 64 mmHg    General appearance: sedated, appears stated age, NAD  Head: Left hemicraniotomy site with dressing intact, clean and dry  1 Subclavian CVC drain with serosanguinous fluid  Eyes: EOMI, PERRL  Neck: supple, symmetrical, trachea midline  Lungs: non labored breathing  Heart: regular heart rate  Neurologic:   Mental status: sedated, GCS 8T E3 V1T M4 opens eyes to verbal stimuli, intubated, withdraws to pain  Cranial nerves: limited due to sedation/intubation  Sensory: limited due to sedation/intubation  Motor: withdraws to pain in BLE, LUE, very minimal withdrawal on RUE,  LUE: some elbow flexion in left arm and weak , no spontaneous movt in right arm, Spontaneously moving BLE  Reflexes: 2+ and symmetric, no clonus  Coordination: limited  Not following commands         Lab Results:    Results from last 7 days  Lab Units 12/21/18  0444 12/21/18  0125 12/20/18  2205 12/20/18  1617 12/20/18  1153  12/19/18  0455   WBC Thousand/uL 9 33  --   --  8 31 9 07  < > 7 70   HEMOGLOBIN g/dL 9 9* 9 8* 6 9* 8 1* 8 8*  < > 10 2*   I STAT HEMOGLOBIN   --   --   --   --   --   < >  --    HEMATOCRIT % 29 4* 29 5* 20 9* 24 4* 27 0*  < > 30 2*   HEMATOCRIT, ISTAT   --   --   --   --   --   < >  --    PLATELETS Thousands/uL 180  --   --  175 184  < > 262   NEUTROS PCT % 78*  --   --   --  89*  --  76*   MONOS PCT % 7  --   --   --  5  --  10 < > = values in this interval not displayed  Results from last 7 days  Lab Units 12/21/18  0444 12/20/18  1727 12/20/18  1153 12/20/18  1029 12/20/18  0953 12/20/18  0917  12/18/18  2030   POTASSIUM mmol/L 3 7 3 8 3 9  --   --   --   < > 3 7   CHLORIDE mmol/L 105 106 106  --   --   --   < > 88*   CO2 mmol/L 26 24 23  --   --   --   < > 26   CO2, I-STAT mmol/L  --   --   --  22 22 27  < >  --    BUN mg/dL 8 7 6  --   --   --   < > 9   CREATININE mg/dL 0 25* 0 28* 0 30*  --   --   --   < > 0 42*   CALCIUM mg/dL 7 5* 7 9* 8 2*  --   --   --   < > 8 7   ALK PHOS U/L  --   --   --   --   --   --   --  138*   ALT U/L  --   --   --   --   --   --   --  96*   AST U/L  --   --   --   --   --   --   --  130*   GLUCOSE, ISTAT mg/dl  --   --   --  117 121 125  < >  --    < > = values in this interval not displayed  Results from last 7 days  Lab Units 12/21/18  0444 12/20/18  0054 12/19/18  0455   MAGNESIUM mg/dL 2 0 1 8 2 3       Results from last 7 days  Lab Units 12/21/18 0444 12/20/18  0054 12/19/18  0455   PHOSPHORUS mg/dL 2 2* 2 2* 3 2       Results from last 7 days  Lab Units 12/21/18  0444 12/20/18  1153 12/18/18  2030   INR  1 20* 1 29* 1 01   PTT seconds  --  31 25*     No results found for: TROPONINT  ABG:  Lab Results   Component Value Date    PHART 7 372 12/21/2018    LTC4DRX 42 8 12/21/2018    PO2ART 72 4 (L) 12/21/2018    KWR4OHN 24 3 12/21/2018    BEART -1 0 12/21/2018    SOURCE Line, Arterial 12/21/2018       Imaging Studies: I have personally reviewed pertinent reports  and I have personally reviewed pertinent films in PACS    Cta Head And Neck W Wo Contrast    Result Date: 12/18/2018  Impression: 1  Atherosclerotic plaque at the right carotid bifurcation resulting in moderate (50-69%) stenosis  No evidence of carotid or vertebral artery dissection or pseudoaneurysm to suggest acute vascular injury  2   Displacement of left MCA branches by left temporal parietal hematoma    No evidence of aneurysm or vascular malformation  No significant stenosis in the major vessels of the [place]  3   Patent dural venous sinuses  Workstation performed: [id number]     Xr Chest Portable    Result Date: 12/19/2018  Impression: Satisfactory intubation  Bibasilar subsegmental atelectasis  No pneumothorax  Workstation performed: [id number]     Xr Shoulder 2+ Vw Right    Result Date: 12/19/2018  Impression: Right-sided rib fractures again noted  Possible acromion fracture  The small glenoid fracture seen on CT scan of the chest is not clearly appreciated on this exam  Workstation performed: [id number]     Xr Foot 3+ Vw Left    Result Date: 12/21/2018  Impression: Nondisplaced transverse fracture involves the proximal aspect of the proximal to the 3rd digit  Workstation performed: [id number]     Ct Head Wo Contrast    Result Date: 12/21/2018  Impression: 1  Postsurgical changes of left craniectomy and evacuation of left temporal intraparenchymal hemorrhage  2   A small amount of intraparenchymal/extra-axial hemorrhage remains within the anterior left temporal lobe and stable thin subdural hemorrhage along the left frontal region  3   Increased blood products along the cerebral falx when compared with prior exam  4   Persistent edema predominantly within the left temporal lobe and effacement of the left-sided cerebral sulci however there is interval resolution of midline shift and decreased effacement of the left lateral ventricle  5   Continued washout and interval evolution of intraventricular and subarachnoid blood products  Workstation performed: [id number]     Ct Head Wo Contrast    Result Date: 12/20/2018  Impression: 1  Left temporal lobe intraparenchymal hemorrhage with slight increase in surrounding edema as manifest by increased effacement of the left occipital horn when compared with prior examination  Slight increase in left to right midline shift now measuring up to 6 mm, previously 4 mm   2   Stable subdural, subarachnoid and intraventricular blood products  No large delayed intracranial hemorrhage  The study was marked in [facility] for immediate notification  Workstation performed: [id number]     Ct Head Wo Contrast    Result Date: 12/19/2018  Impression: 1  Stable size of large intraparenchymal hemorrhage centered in the left temporal lobe with surrounding mass effect and edema and adjacent left temporal to frontal subdural hemorrhage  Stable midline shift from left to right  2   Increased conspicuity of bilateral posterior subarachnoid and intraventricular blood products are likely due to redistribution  Workstation performed: [id number]     Trauma - Ct Head Wo Contrast    Result Date: 12/18/2018  Impression: 1  Large parenchymal hemorrhage on the left with associated left-sided subdural hematoma and bilateral subarachnoid hemorrhage including intraventricular hemorrhage  2   Mass effect and midline shift to the right measuring 4 mm  I personally discussed this study with Marcy Farley on 12/18/2018 at 8:45 PM  Workstation performed: [id number]     Trauma - Ct Spine Cervical Wo Contrast    Result Date: 12/18/2018  Impression: 1  No cervical spine fracture or traumatic malalignment  2   Small right apical pneumothorax  3   Nondisplaced fractures of the right T1 and T2 transverse processes  Fracture of the posterior right second rib  I personally discussed this study with Marcy Farley on 12/18/2018 at 8:45 PM   Workstation performed: [id number]     Ct Shoulder Right Wo Contrast    Result Date: 12/20/2018  Impression: Acute fracture anterior glenoid rim with 1 to 2 mm anteromedial displacement  Acute comminuted fracture posterior acromion with 6 mm mediolateral distraction and 1 to 2 mm inferior cortical offset  No dislocation  Interval progression of right lower lobe and right upper lobe dependent airspace disease    This may represent subsegmental atelectasis, aspiration, and/or pulmonary contusion  Interval enlargement of small right pleural effusion  No other significant interval change  The examination demonstrates a significant  finding and was documented as such in [facility] for liaison and referring practitioner notification  Workstation performed: [id number]     Xr Chest 1 View    Result Date: 12/20/2018  Impression: Multiple right-sided rib fractures  Consolidation at the base may represent atelectasis or pneumonia  Workstation performed: [id number]     Trauma - Ct Chest Abdomen Pelvis W Contrast    Result Date: 12/18/2018  Impression: 1  Multiple right-sided rib fractures with tiny right apical pneumothorax  Fractures extend from the second through eleventh ribs at the costovertebral junctions and fourth through eighth ribs laterally  This is consistent with flail chest  A right anterior second rib fracture is severely displaced/angulated  2   Fracture of the anterior right glenoid, acromium, and first through fourth thoracic transverse processes on the right  3   Focal irregular opacity in the right upper lobe with adjacent 4 mm nodule  While contusion is possible given overlying trauma, the appearance of the lesion is most suspicious for a primary lung cancer and follow-up PET/CT is recommended  4   Borderline upper abdominal lymph nodes with density/stranding at the base of the small bowel mesentery  While mesenteric contusion can have this appearance in the setting of trauma  There are calcifications which suggest chronicity  Lymphoproliferative disorder cannot be excluded and follow-up and 3-6 months is recommended  5   Chronic interstitial lung disease of uncertain etiology  Given esophageal dilatation, this could represent NSIP in the setting of scleroderma  I personally discussed this study with Juancarlos Rosas on 12/18/2018 at 8:45 PM  Workstation performed: [id number]     Xr Trauma Multiple    Result Date: 12/19/2018  Impression: Multiple right-sided rib fractures   Consolidation at the base may represent atelectasis or pneumonia  Workstation performed: [id number]     Xr Chest Portable Icu    Result Date: 12/20/2018  Impression: Right subclavian catheter extending superiorly into the neck requiring repositioning  Workstation performed: [id number]     Xr Chest Portable Icu    Result Date: 12/20/2018  Impression: Mild central vascular congestion  Basilar opacities and effusions  Stable rib fractures  Workstation performed: [id number]     Xr Chest Portable Icu    Result Date: 12/19/2018  Impression: Right basilar opacity partially obscuring the hemidiaphragm may represent atelectasis or infiltrate  Possible small basilar effusion  Workstation performed: [id number]  EKG, Pathology, and Other Studies: I have personally reviewed pertinent reports        VTE Pharmacologic Prophylaxis: Sequential compression device (Venodyne)  and Reason for no pharmacologic prophylaxis Acute IPH, SDH, SAH    VTE Mechanical Prophylaxis: sequential compression device

## 2018-12-21 NOTE — ORTHOTIC NOTE
Orthotic Note            Date: 12/21/2018      Patient Name: Kayode Rubi        Time: 11:25am    Reason for Consult:  Patient Active Problem List   Diagnosis    Multiple fractures of ribs, bilateral, initial encounter for closed fracture    Traumatic pneumothorax    Glenoid fracture of shoulder, right, closed, initial encounter    [facility]raparenchymal hemorrhage of brain ([facility] [address] )    Hyponatremia    Traumatic brain injury ([facility] [address] )    Flail chest, initial encounter for closed fracture    Fracture of thoracic transverse process, closed, initial encounter ([facility] [address] )    Subdural hematoma ([facility] [address] )    Subarachnoid hemorrhage ([facility] [address] )    Diabetes mellitus, type II ([facility] [address] )    Closed nondisplaced left toe fracture   [facility] Protective Helmet   [facility] to be present at approximately 2:30pm 12/21 for fitting  I measured patient 21 1/2 inches at brow  [facility] contacted and will arrive at approximately 2:30pm to fit  Recommendations:  Please call Mobility Coordinator at ext  [phone] in regards to bracing instruction and/or adjustment  Minda Mistry Mobility Coordinator LCFo, LCOF, ASOP R  O T, O B T

## 2018-12-21 NOTE — RESPIRATORY THERAPY NOTE
RT Ventilator Management Note  Marques Cerrato 59 y o  female MRN: [id number]  Unit/Bed#: [facility] 02 Encounter: [id number]      Daily Screen       12/19/2018 0725 12/20/2018 6559          Patient safety screen outcome[de-identified] Failed Failed      Not Ready for Weaning due to[de-identified] Underline problem not resolved Underline problem not resolved              Physical Exam:   Assessment Type: (P) Assess only  General Appearance: (P) Sedated  Respiratory Pattern: (P) Assisted  Chest Assessment: (P) Chest expansion symmetrical  Bilateral Breath Sounds: (P) Diminished      Resp Comments: (P) Pt remained on A/c settings throughout the night without incident

## 2018-12-21 NOTE — PROGRESS NOTES
Progress Note - Critical Care   Vin Birmingham 59 y o  female MRN: [id number]  Unit/Bed#: MICU 02 Encounter: [id number]    Attending Physician: Jacqueline Chase DO      ______________________________________________________________________  Assessment and Plan:   Principal Problem:    Traumatic brain injury [facility])  Active Problems:    Multiple fractures of ribs, bilateral, initial encounter for closed fracture    Traumatic pneumothorax    Glenoid fracture of shoulder, right, closed, initial encounter    Intraparenchymal hemorrhage of brain ([facility] [address] )    Hyponatremia    Flail chest, initial encounter for closed fracture    Fracture of thoracic transverse process, closed, initial encounter ([facility] [address] )    Subdural hematoma ([facility] [address] )    Subarachnoid hemorrhage ([facility] [address] )    Diabetes mellitus, type II ([facility] [address] )  Resolved Problems:    * No resolved hospital problems   *        NEURO:  -GCS: 10T (E: 3, V: 1, M: 6)  -SEDATIVES: Propofol @  -ANALGESIA: Fentanyl @100  -SEIZURE PROPHYLAXIS: Keppra 500mg BID  -MEDICATIONS: Pramipexole 0 25mg BID, Seroquel 25mg qhs  -PLAN:   s/p hemicraniectomy, subgaleal drain placement, POD #1  q1h neuro checks  Monitor PETAR drain output  F/u CTH this AM    CV:   Temp:  [98 2 °F (36 8 °C)-100 °F (37 8 °C)] 99 3 °F (37 4 °C)  HR:  [54-72] 60  Resp:  [13-37] 14  BP: ()/(42-58) 131/56  SpO2:  [96 %-100 %] 96 %    Results from last 7 days  Lab Units 12/19/18  0133   LACTIC ACID mmol/L 0 7     -MEDICATIONS: Labetalol 5mg q4h PRN  -PLAN:   Hemodynamically stable  Keep MAP >65  Continue to monitor    PULM:   Temp:  [98 2 °F (36 8 °C)-100 °F (37 8 °C)] 99 3 °F (37 4 °C)  HR:  [54-72] 60  Resp:  [13-37] 14  BP: ()/(42-58) 131/56  SpO2:  [96 %-100 %] 96 %   Vent Mode: AC/VC  FiO2 (%):  [40-70] 40  -ABG: ABG:  Lab Results   Component Value Date    PHART 7 382 12/21/2018    TQJ4ELG 43 3 12/21/2018    PO2ART 91 8 12/21/2018    XKH8DQD 25 2 12/21/2018    BEART -0 1 12/21/2018    SOURCE Line, Arterial 12/21/2018 VBG:    Results from last 7 days  Lab Units 12/21/18  0444   ABG SOURCE  Line, Arterial       Respiratory    Lab Data (Last 4 hours)      12/21 0444            pH, Arterial       7 382           pCO2, Arterial       43 3           pO2, Arterial       91 8           HCO3, Arterial       25 2           Base Excess, Arterial       -0 1                O2/Vent Data           Most Recent         Vent Mode   AC/VC      Resp Rate (BPM) (BPM)   14      Vt (mL) (mL)   400      FIO2 (%) (%)   40      PEEP (cmH2O) (cmH2O)   5      MV   5 5                 Vent Mode: AC/VC  FiO2 (%):  [40-70] 40    -PLAN:   F/u 7am ABG  Continue ventilator management  Wean ventilator settings as tolerated  Daily spontaneous breathing trial, assess readiness to extubate  Consider 15 min T-piece trial prior to extubation given flail chest    GI:  MEDICATIONS: n/a  GI ppx: n/a  -PLAN:   Stable, continue to monitor    :     Results from last 7 days  Lab Units 12/21/18  0444 12/20/18  1727 12/20/18  1153 12/20/18  0526 12/20/18  0054 12/19/18  2149 12/19/18  1835   BUN mg/dL 8 7 6 7 7 7 7   CREATININE mg/dL 0 25* 0 28* 0 30* 0 27* 0 34* 0 27* 0 30*     COLÓN: yes  I/O:   I/O       12/19 0701 - 12/20 0700 12/20 0701 - 12/21 0700    I V  (mL/kg) 3087 9 (53 6) 4573 8 (79 4)    Blood  1400    NG/ 110    IV Piggyback 200 1150    Total Intake(mL/kg) 3507 9 (60 9) 7233 8 (125 6)    Urine (mL/kg/hr) 1300 (0 9) 2245 (1 6)    Emesis/NG output 200 220    Drains  250    Blood  1000    Total Output 1500 3715    Net +2007 9 +3518 8                UOP: 1 6 cc/kg/hr    F/E/N:   FLUIDS: Isolyte @100  ELECTROLYTES:    Results from last 7 days  Lab Units 12/21/18  0444 12/20/18  1727 12/20/18  1153 12/20/18  1029 12/20/18  0953 12/20/18  0917 12/20/18  0526  12/20/18  0054 12/19/18  2149 12/19/18  1835  12/19/18  0455  12/18/18  2229   SODIUM mmol/L 138 137 137  --   --   --  129*  --  129* 130* 128*  < > 128*  < >  --    POTASSIUM mmol/L 3 7 3 8 3 9  --   --   --  3 5  --  3 8 3 8 3 9  < > 4 2  < >  --    CHLORIDE mmol/L 105 106 106  --   --   --  97*  --  97* 99* 97*  < > 95*  < >  --    CO2 mmol/L 26 24 23  --   --   --  24  --  24 24 25  < > 25  < >  --    CO2, I-STAT mmol/L  --   --   --  22 22 27  --   < >  --   --   --   --   --   --   --    ANION GAP mmol/L 7 7 8  --   --   --  8  --  8 7 6  < > 8  < >  --    CALCIUM mg/dL 7 5* 7 9* 8 2*  --   --   --  7 6*  --  8 0* 7 6* 8 0*  < > 7 9*  < >  --    MAGNESIUM mg/dL 2 0  --   --   --   --   --   --   --  1 8  --   --   --  2 3  --  1 4*   PHOSPHORUS mg/dL 2 2*  --   --   --   --   --   --   --  2 2*  --   --   --  3 2  --  3 5   < > = values in this interval not displayed  NUTRITION: Diet NPO    -PLAN:  Consider TF  Replete electrolytes PRN    ID:     Results from last 7 days  Lab Units 18  0444 18  1617 18  1153 18  0526 18  0455 18   WBC Thousand/uL 9 33 8 31 9 07 9 39 7 70 13 08*     CULTURES:   n/a  ANTIBIOTICS: n/a  -PLAN:   No acute infectious concerns    HEME:    Results from last 7 days  Lab Units 18  0444 18  0125 18  2205 18  1617 18  1153 18  1029 18  0953  18  0526  18  0455 18   HEMOGLOBIN g/dL 9 9* 9 8* 6 9* 8 1* 8 8*  --   --   --  9 5*  --  10 2* 11 6   I STAT HEMOGLOBIN g/dl  --   --   --   --   --  9 5* 9 5*  < >  --   < >  --   --    HEMATOCRIT % 29 4* 29 5* 20 9* 24 4* 27 0*  --   --   --  29 4*  --  30 2* 35 3   HEMATOCRIT, ISTAT %  --   --   --   --   --  28* 28*  < >  --   < >  --   --    PLATELETS Thousands/uL 180  --   --  175 184  --   --   --  216  --  262 273   < > = values in this interval not displayed      Results from last 7 days  Lab Units 18  1153 18  2030   INR  1 29* 1 01   PTT seconds 31 25*     DVT ppx: Pharmacologic held d/t acute IPH/SHD/SAH, b/l SCDs  -PLAN:   F/u AM labs  Transfuse for Hgb <7    ENDO:   -POC-->92    Results from last 7 days  Lab Units 12/20/18  1029 12/20/18  0953 12/20/18  0917 12/20/18  0059   GLUCOSE, ISTAT mg/dl 117 121 125 99       -MEDICATIONS: SSI#2    -PLAN:  Continue SSI  Continue glucose checks     MSK/SKIN:   S/p multiple R sided rib fx, flail chest, R 2nd-11th rib fx at costovertebral junctions, 4th-8th fx laterally, R anterior 2nd rib angulated fx, fx of anterior R glenoid, acromion, T1-T4 transverse process fx  Non-operative care of R shoulder per Ortho  Rib fx protocol once taking PO  PT/OT  Routine skin care  Frequent offloading     LDA:  Invasive Devices     Peripheral Intravenous Line            Peripheral IV 12/19/18 Right Forearm 2 days    Peripheral IV 12/20/18 Left Antecubital less than 1 day    Peripheral IV 12/20/18 Left Arm less than 1 day          Arterial Line            Arterial Line 12/20/18 Right Radial less than 1 day          Drain            NG/OG/Enteral Tube Orogastric 16 Fr Left mouth 2 days    Urethral Catheter 16 Fr  2 days    Closed/Suction Drain Left Head Bulb 10 Fr  less than 1 day          Airway            ETT  Cuffed 8 mm 2 days                Disposition: Continue ICU Care    Code Status: Level 1 - Full Code    Counseling / Coordination of Care  Total Critical Care time spent 30 minutes excluding procedures, teaching and family updates  ______________________________________________________________________    Chief Complaint: Found down at home, traumatic brain injury, multiple traumatic musculoskeletal injuries    24 Hour Events:     No acute events overnight  Hgb level of 6 9 post-operatively with systolic BP in 74W  Received 2L Isolyte and 2u  PRBC  Currently hemodynamically stable  Review of Systems   Unable to perform ROS: Intubated     ______________________________________________________________________    Physical Exam:     Physical Exam   Constitutional: She appears well-developed and well-nourished  She is intubated  HENT:   Head: Normocephalic and atraumatic  Right Ear: External ear normal    Left Ear: External ear normal    Nose: Nose normal    L hemicraniectomy dressing c/d/i  L PETAR drain w/ serosanguinous output   Eyes: Pupils are equal, round, and reactive to light  Conjunctivae are normal    Neck: Normal range of motion  Neck supple  Cardiovascular: Normal rate and regular rhythm  Pulses:       Carotid pulses are 2+ on the right side, and 2+ on the left side  Radial pulses are 2+ on the right side, and 2+ on the left side  Dorsalis pedis pulses are 2+ on the right side, and 2+ on the left side  Posterior tibial pulses are 2+ on the right side, and 2+ on the left side  Pulmonary/Chest: She is intubated  No respiratory distress  Abdominal: Soft  She exhibits no distension and no mass  Musculoskeletal: She exhibits no edema or deformity  + R knee hematoma   Skin: Skin is warm  No rash noted  No erythema    + b/l knee abrasions  + R periorbital ecchymoses  + R shoulder ecchymoses  + b/l flank ecchymoses     ______________________________________________________________________  Vitals:    18 0300 18 0400 18 0500 18 0537   BP:       Pulse: 58 60 60    Resp: 14 14 14    Temp: 99 °F (37 2 °C) 99 °F (37 2 °C) 99 3 °F (37 4 °C)    TempSrc:  Probe     SpO2: 100% 99% 99% 96%   Weight:       Height:           Temperature:   Temp (24hrs), Av 1 °F (37 3 °C), Min:98 2 °F (36 8 °C), Max:100 °F (37 8 °C)    Current Temperature: 99 3 °F (37 4 °C)  Weights:   IBW: 52 4 kg    Body mass index is 22 49 kg/m²    Weight (last 2 days)     Date/Time   Weight    18  --    Comment rows:    OBSERV: Pt being repositioned  at 18 1422  57 6 (126 99)            Hemodynamic Monitoring:  N/A     Non-Invasive/Invasive Ventilation Settings:  Respiratory    Lab Data (Last 4 hours)       0444            pH, Arterial       7 382           pCO2, Arterial       43 3           pO2, Arterial       91 8 HCO3, Arterial       25 2           Base Excess, Arterial       -0 1                O2/Vent Data           Most Recent         Vent Mode   AC/VC      Resp Rate (BPM) (BPM)   14      Vt (mL) (mL)   400      FIO2 (%) (%)   40      PEEP (cmH2O) (cmH2O)   5      MV   5 5                Lab Results   Component Value Date    PHART 7 382 12/21/2018    [id number] 43 3 12/21/2018    PO2ART 91 8 12/21/2018    OND0QVF 25 2 12/21/2018    BEART -0 1 12/21/2018    SOURCE Line, Arterial 12/21/2018     SpO2: SpO2: 96 %  Intake and Outputs:  I/O       12/19 0701 - 12/20 0700 12/20 0701 - 12/21 0700    I V  (mL/kg) 3087 9 (53 6) 4573 8 (79 4)    Blood  1400    NG/ 110    IV Piggyback 200 1150    Total Intake(mL/kg) 3507 9 (60 9) 7233 8 (125 6)    Urine (mL/kg/hr) 1300 (0 9) 2245 (1 6)    Emesis/NG output 200 220    Drains  250    Blood  1000    Total Output 1500 3715    Net +2007 9 +3518 8              UOP: 1 6 ml/hr   Nutrition:        Diet Orders            Start     Ordered    12/19/18 3972  Diet NPO  Diet effective now     Comments:  Please begin at 10 mL/hr and advancy by 10 mL/hr every 4 hours to a goal of 50 mL/hr   Question Answer Comment   Diet Type NPO    RD to adjust diet per protocol?  Yes        12/19/18 0937        Labs:     Results from last 7 days  Lab Units 12/21/18  0444 12/21/18  0125 12/20/18  2205 12/20/18  1617 12/20/18  1153 12/20/18  1029 12/20/18  0953  12/20/18  0526  12/19/18  0455 12/18/18  2028   WBC Thousand/uL 9 33  --   --  8 31 9 07  --   --   --  9 39  --  7 70 13 08*   HEMOGLOBIN g/dL 9 9* 9 8* 6 9* 8 1* 8 8*  --   --   --  9 5*  --  10 2* 11 6   I STAT HEMOGLOBIN g/dl  --   --   --   --   --  9 5* 9 5*  < >  --   < >  --   --    HEMATOCRIT % 29 4* 29 5* 20 9* 24 4* 27 0*  --   --   --  29 4*  --  30 2* 35 3   HEMATOCRIT, ISTAT %  --   --   --   --   --  28* 28*  < >  --   < >  --   --    PLATELETS Thousands/uL 180  --   --  175 184  --   --   --  216  --  262 273   NEUTROS PCT % 78*  -- --   --  80*  --   --   --   --   --  76* 89*   MONOS PCT % 7  --   --   --  5  --   --   --   --   --  10 6   < > = values in this interval not displayed  Results from last 7 days  Lab Units 12/21/18  0444 12/20/18  1727 12/20/18  1153 12/20/18  1029 12/20/18  0953 12/20/18  0917 12/20/18  0526  12/20/18  0054 12/19/18  2149 12/19/18  1835  12/18/18  2030   SODIUM mmol/L 138 137 137  --   --   --  129*  --  129* 130* 128*  < > 122*   POTASSIUM mmol/L 3 7 3 8 3 9  --   --   --  3 5  --  3 8 3 8 3 9  < > 3 7   CHLORIDE mmol/L 105 106 106  --   --   --  97*  --  97* 99* 97*  < > 88*   CO2 mmol/L 26 24 23  --   --   --  24  --  24 24 25  < > 26   CO2, I-STAT mmol/L  --   --   --  22 22 27  --   < >  --   --   --   --   --    ANION GAP mmol/L 7 7 8  --   --   --  8  --  8 7 6  < > 8   BUN mg/dL 8 7 6  --   --   --  7  --  7 7 7  < > 9   CREATININE mg/dL 0 25* 0 28* 0 30*  --   --   --  0 27*  --  0 34* 0 27* 0 30*  < > 0 42*   CALCIUM mg/dL 7 5* 7 9* 8 2*  --   --   --  7 6*  --  8 0* 7 6* 8 0*  < > 8 7   ALT U/L  --   --   --   --   --   --   --   --   --   --   --   --  96*   AST U/L  --   --   --   --   --   --   --   --   --   --   --   --  130*   ALK PHOS U/L  --   --   --   --   --   --   --   --   --   --   --   --  138*   ALBUMIN g/dL  --   --   --   --   --   --   --   --   --   --   --   --  3 4*   TOTAL BILIRUBIN mg/dL  --   --   --   --   --   --   --   --   --   --   --   --  0 42   < > = values in this interval not displayed      Results from last 7 days  Lab Units 12/21/18  0444 12/20/18  0054 12/19/18  0455 12/18/18  2229   MAGNESIUM mg/dL 2 0 1 8 2 3 1 4*   PHOSPHORUS mg/dL 2 2* 2 2* 3 2 3 5        Results from last 7 days  Lab Units 12/20/18  1153 12/18/18  2030   INR  1 29* 1 01   PTT seconds 31 25*       Results from last 7 days  Lab Units 12/20/18  1204 12/20/18  0054   TROPONIN I ng/mL <0 02 <0 02       Results from last 7 days  Lab Units 12/19/18  0133   LACTIC ACID mmol/L 0 7     ABG:  Lab Results   Component Value Date    PHART 7 382 12/21/2018    [id number] 43 3 12/21/2018    PO2ART 91 8 12/21/2018    [id number] 25 2 12/21/2018    BEART -0 1 12/21/2018    SOURCE Line, Arterial 12/21/2018     VBG:    Results from last 7 days  Lab Units 12/21/18  0444   ABG SOURCE  Line, Arterial         No results found for: [facility]   Imaging:     CT head wo contrast   Final Result by Nela Martinez MD (12/21 0170)      1  Postsurgical changes of left craniectomy and evacuation of left temporal intraparenchymal hemorrhage  2   A small amount of intraparenchymal/extra-axial hemorrhage remains within the anterior left temporal lobe and stable thin subdural hemorrhage along the left frontal region  3   Increased blood products along the cerebral falx when compared with prior exam    4   Persistent edema predominantly within the left temporal lobe and effacement of the left-sided cerebral sulci however there is interval resolution of midline shift and decreased effacement of the left lateral ventricle  5   Continued washout and interval evolution of intraventricular and subarachnoid blood products  Workstation performed: [id number]         XR chest portable ICU   Final Result by Neeru Logan MD (12/20 1614)      Right subclavian catheter extending superiorly into the neck requiring repositioning  Workstation performed: [id number]         XR chest 1 view   Final Result by Mariama Blancas DO (12/20 1109)      Multiple right-sided rib fractures  Consolidation at the base may represent atelectasis or pneumonia  Workstation performed: [id number]         XR chest portable ICU   Final Result by Mariama Blancas DO (12/20 1019)      Mild central vascular congestion  Basilar opacities and effusions  Stable rib fractures                    Workstation performed: [id number]         CT shoulder right wo contrast   Final Result by Stephon Mejia MD (12/20 2066)      Acute fracture anterior glenoid rim with 1 to 2 mm anteromedial displacement  Acute comminuted fracture posterior acromion with 6 mm mediolateral distraction and 1 to 2 mm inferior cortical offset  No dislocation  Interval progression of right lower lobe and right upper lobe dependent airspace disease  This may represent subsegmental atelectasis, aspiration, and/or pulmonary contusion  Interval enlargement of small right pleural effusion  No other significant interval change  The examination demonstrates a significant  finding and was documented as such in [facility] for liaison and referring practitioner notification  Workstation performed: [id number]         CT head wo contrast   Final Result by Lindsay Ornelas MD (34/37 5935)      1  Left temporal lobe intraparenchymal hemorrhage with slight increase in surrounding edema as manifest by increased effacement of the left occipital horn when compared with prior examination  Slight increase in left to right midline shift now    measuring up to 6 mm, previously 4 mm  2   Stable subdural, subarachnoid and intraventricular blood products  No large delayed intracranial hemorrhage  The study was marked in [facility] for immediate notification  Workstation performed: [id number]         XR chest portable ICU   Final Result by Sofia Silver DO (12/19 1331)      Right basilar opacity partially obscuring the hemidiaphragm may represent atelectasis or infiltrate  Possible small basilar effusion  Workstation performed: [id number]         XR shoulder 2+ vw right   Final Result by Sofia Silver DO (12/19 3049)      Right-sided rib fractures again noted  Possible acromion fracture        The small glenoid fracture seen on CT scan of the chest is not clearly appreciated on this exam             Workstation performed: [id number]         XR trauma multiple   Final Result by Sofia Silver DO (12/19 5808)      Multiple right-sided rib fractures  Consolidation at the base may represent atelectasis or pneumonia  Workstation performed: [id number]         XR chest portable   Final Result by Jennifer Cabral MD (12/19 6208)      Satisfactory intubation  Bibasilar subsegmental atelectasis  No pneumothorax  Workstation performed: [id number]         CT head wo contrast   Final Result by Radha Lemus MD (12/19 8796)      1  Stable size of large intraparenchymal hemorrhage centered in the left temporal lobe with surrounding mass effect and edema and adjacent left temporal to frontal subdural hemorrhage  Stable midline shift from left to right  2   Increased conspicuity of bilateral posterior subarachnoid and intraventricular blood products are likely due to redistribution  Workstation performed: [id number]         TRAUMA - CT chest abdomen pelvis w contrast   Final Result by Steve Mendoza MD (12/18 2132)      1  Multiple right-sided rib fractures with tiny right apical pneumothorax  Fractures extend from the second through eleventh ribs at the costovertebral junctions and fourth through eighth ribs laterally  This is consistent with flail chest  A right    anterior second rib fracture is severely displaced/angulated  2   Fracture of the anterior right glenoid, acromium, and first through fourth thoracic transverse processes on the right  3   Focal irregular opacity in the right upper lobe with adjacent 4 mm nodule  While contusion is possible given overlying trauma, the appearance of the lesion is most suspicious for a primary lung cancer and follow-up PET/CT is recommended  4   Borderline upper abdominal lymph nodes with density/stranding at the base of the small bowel mesentery  While mesenteric contusion can have this appearance in the setting of trauma  There are calcifications which suggest chronicity      Lymphoproliferative disorder cannot be excluded and follow-up and 3-6 months is recommended  5   Chronic interstitial lung disease of uncertain etiology  Given esophageal dilatation, this could represent NSIP in the setting of scleroderma  I personally discussed this study with Aaliyah Mehta on 12/18/2018 at 8:45 PM                   Workstation performed: [id number]         CTA head and neck w wo contrast   Final Result by Ольга Rojas MD (12/18 2132)         1  Atherosclerotic plaque at the right carotid bifurcation resulting in moderate (50-69%) stenosis  No evidence of carotid or vertebral artery dissection or pseudoaneurysm to suggest acute vascular injury  2   Displacement of left MCA branches by left temporal parietal hematoma  No evidence of aneurysm or vascular malformation  No significant stenosis in the major vessels of the [place]  3   Patent dural venous sinuses  Workstation performed: [id number]         TRAUMA - CT spine cervical wo contrast   Final Result by Kofi Jackson MD (12/18 2108)      1  No cervical spine fracture or traumatic malalignment  2   Small right apical pneumothorax  3   Nondisplaced fractures of the right T1 and T2 transverse processes  Fracture of the posterior right second rib  I personally discussed this study with Aaliyah Mehta on 12/18/2018 at 8:45 PM                          Workstation performed: [id number]         TRAUMA - CT head wo contrast   Final Result by Kofi Jackson MD (12/18 2101)      1  Large parenchymal hemorrhage on the left with associated left-sided subdural hematoma and bilateral subarachnoid hemorrhage including intraventricular hemorrhage  2   Mass effect and midline shift to the right measuring 4 mm  I personally discussed this study with Aaliyah Mehta on 12/18/2018 at 8:45 PM                      Workstation performed: [id number]         XR foot 3+ vw left    (Results Pending)       Micro:       Allergies: No Known Allergies  Medications: Scheduled Meds:    Current Facility-Administered Medications:  acetaminophen 650 mg Oral Q8H PRN Sheila Malave MD    albuterol 2 5 mg Nebulization Q4H PRN Lee Luna DO    cefazolin 2,000 mg Intravenous Q8H Smitha Olmos MD Last Rate: 2,000 mg (12/21/18 0053)   chlorhexidine 15 mL Swish & Spit Q12H [facility] Lynne Tran MD    fentaNYL 100 mcg/hr Intravenous Continuous Lynne Tran MD Last Rate: 100 mcg/hr (12/21/18 0357)   hydrALAZINE 5 mg Intravenous Q6H PRN Sheila Malave MD    insulin lispro 1-5 Units Subcutaneous HS Brittany Galaviz MD    insulin lispro 1-5 Units Subcutaneous TID AC Aurora Gonzalez MD    labetalol 5 mg Intravenous Q4H PRN Brittany Galaviz MD    levETIRAcetam 500 mg Oral Q12H [facility] Osorio Nunnroula, CRNP    multi-electrolyte 100 mL/hr Intravenous Continuous Brittany Galaviz MD Last Rate: 100 mL/hr (12/20/18 2152)   pramipexole 0 25 mg Oral BID Osorio Nunnery, CRNP    propofol 5-50 mcg/kg/min Intravenous Titrated Nola Dalton DO Last Rate: 5 mcg/kg/min (12/21/18 0240)   QUEtiapine 25 mg Oral HS Osorio Nunnery, CRNP      Continuous Infusions:    fentaNYL 100 mcg/hr Last Rate: 100 mcg/hr (12/21/18 0357)   multi-electrolyte 100 mL/hr Last Rate: 100 mL/hr (12/20/18 2152)   propofol 5-50 mcg/kg/min Last Rate: 5 mcg/kg/min (12/21/18 0240)     PRN Meds:    acetaminophen 650 mg Q8H PRN   albuterol 2 5 mg Q4H PRN   hydrALAZINE 5 mg Q6H PRN   labetalol 5 mg Q4H PRN     VTE Pharmacologic Prophylaxis: Pharmacologic VTE Prophylaxis contraindicated due to acute IPH  VTE Mechanical Prophylaxis: sequential compression device  Invasive lines and devices:   Invasive Devices     Peripheral Intravenous Line            Peripheral IV 12/19/18 Right Forearm 2 days    Peripheral IV 12/20/18 Left Antecubital less than 1 day    Peripheral IV 12/20/18 Left Arm less than 1 day          Arterial Line            Arterial Line 12/20/18 Right Radial less than 1 day          Drain            NG/OG/Enteral Tube Orogastric 16 Fr Left mouth 2 days    Urethral Catheter 16 Fr  2 days    Closed/Suction Drain Left Head Bulb 10 Fr  less than 1 day          Airway            ETT  Cuffed 8 mm 2 days                     Portions of the record may have been created with voice recognition software  Occasional wrong word or "sound a like" substitutions may have occurred due to the inherent limitations of voice recognition software  Read the chart carefully and recognize, using context, where substitutions have occurred      Alexsandra Ennis MD

## 2018-12-21 NOTE — CONSULTS
Consult - Podiatry   Princess Pacheco 59 y o  female MRN: [id number]  Unit/Bed#: [facility] 02 Encounter: [id number]    Assessment/Plan     Assessment:  3  60 y/o female presents with left 3rd digit proximal phalanx nondisplaced transverse fracture secondary due to multiple falls, in hospital for traumatic brain injury  2  DM type 2        Plan:  - Xrays of left foot reviewed: there is non-displaced transverse fracture involving proximal phalanx to the 3rd digit  - Weight bear as tolerated with surgical shoe on the left   - Patient will follow up with Dr Dre Patel as an out patient upon discharge  - Rest of care per primary team  - Will sign off, call with questions  Thank you for consultation  History of Present Illness     HPI:  Princess Pacheco is a 59 y o  female who presents with left 3rd digit discoloration secondary to proximal phalanx fracture nondisplaced from multiple falls that patient has suffered  Patient is currently intubated thus unable to gather detailed past medical history  Consults  Review of Systems   Constitutional: Negative  HENT: Negative  Eyes: Negative  Respiratory: Negative  Cardiovascular: Negative  Gastrointestinal: Negative  Musculoskeletal: negative   Skin:bruised left 3rd toe   Neurological: Negative  Psych: negative  Historical Information   Past Medical History:   Diagnosis Date    Diabetes mellitus, type II ([facility] [address] )      History reviewed  No pertinent surgical history    Social History   History   Alcohol use Not on file     History   Drug use: Unknown     History   Smoking Status    Not on file   Smokeless Tobacco    Not on file     Family History:   Family History   Problem Relation Age of Onset    Cerebral aneurysm Sister        Meds/Allergies   Prescriptions Prior to Admission   Medication    FLUoxetine (PROzac) 40 MG capsule    lamoTRIgine (LaMICtal) 200 MG tablet    levothyroxine 100 mcg tablet    lisinopril-hydrochlorothiazide (PRINZIDE,ZESTORETIC) 10-12 5 MG per tablet    metFORMIN (FORTAMET) 1000 MG (OSM) 24 hr tablet    omeprazole (PriLOSEC) 20 mg delayed release capsule    pramipexole (MIRAPEX) 0 25 mg tablet    primidone (MYSOLINE) 50 mg tablet    QUEtiapine (SEROquel) 25 mg tablet     No Known Allergies    Objective   First Vitals:   Blood Pressure: (!) 192/91 (12/18/18 2014)  Pulse: 90 (12/18/18 2014)  Temperature: 100 5 °F (38 1 °C) (12/18/18 2014)  Temp Source: Tympanic (12/18/18 2014)  Respirations: 18 (12/18/18 2014)  Height: 5' 3" (160 cm) (12/19/18 1422)  Weight - Scale: 58 kg (127 lb 13 9 oz) (12/18/18 2020)  SpO2: 99 % (12/18/18 2014)    Current Vitals:   Blood Pressure: 131/56 (12/21/18 0045)  Pulse: 60 (12/21/18 1300)  Temperature: 98 8 °F (37 1 °C) (12/21/18 1200)  Temp Source: Probe (12/21/18 0800)  Respirations: (!) 10 (12/21/18 1300)  Height: 5' 3" (160 cm) (12/19/18 1423)  Weight - Scale: 57 6 kg (126 lb 15 8 oz) (12/19/18 1422)  SpO2: 97 % (12/21/18 1300)        /56   Pulse 60   Temp 98 8 °F (37 1 °C)   Resp (!) 10   Ht 5' 3" (1 6 m)   Wt 57 6 kg (126 lb 15 8 oz)   SpO2 97%   BMI 22 49 kg/m²      General Appearance:    Alert, cooperative, no distress   Head:    Normocephalic, without obvious abnormality, atraumatic   Eyes:    PERRL, conjunctiva/corneas clear, EOM's intact        Nose:   Moist mucous membranes   Neck:   Supple, symmetrical, trachea midline   Back:     Symmetric   Lungs:     Respirations unlabored   Heart:    Regular rate and rhythm, S1 and S2 normal, no murmur, rub   or gallop   Abdomen:     Soft, non-tender   Extremities:   Unable to assess   Pulses:   2/4 b/l DP and PT b/l   Skin:   Ecchymosis noted on the dorsal and plantar aspect of the left 3rd digit  Neurologic:  Protective sensation is unable to assess  left foot             Lab Results:   Admission on 12/18/2018   No results displayed because visit has over 200 results  Invalid input(s): LABAEARO            Imaging:  I have personally reviewed pertinent films in PACS  EKG, Pathology, and Other Studies: I have personally reviewed pertinent reports        Code Status: Level 1 - Full Code  Advance Directive and Living Will:      Power of :    POLST:

## 2018-12-21 NOTE — RESPIRATORY THERAPY NOTE
RT Ventilator Management Note  Porsche Pace 59 y o  female MRN: [id number]  Unit/Bed#: [facility] 02 Encounter: [id number]      Daily Screen       12/19/2018 0725 12/20/2018 5003          Patient safety screen outcome[de-identified] Failed Failed      Not Ready for Weaning due to[de-identified] Underline problem not resolved Underline problem not resolved              Physical Exam:   Assessment Type: Assess only  General Appearance: Sedated  Respiratory Pattern: Assisted  Chest Assessment: Chest expansion symmetrical  Bilateral Breath Sounds: Diminished      Resp Comments: (P) Pt  awake no wean orders at this time   Bs clear

## 2018-12-21 NOTE — PLAN OF CARE
Problem: Prexisting or High Potential for Compromised Skin Integrity  Goal: Skin integrity is maintained or improved  INTERVENTIONS:  - Identify patients at risk for skin breakdown  - Assess and monitor skin integrity  - Assess and monitor nutrition and hydration status  - Monitor labs (i e  albumin)  - Assess for incontinence   - Turn and reposition patient  - Assist with mobility/ambulation  - Relieve pressure over bony prominences  - Avoid friction and shearing  - Provide appropriate hygiene as needed including keeping skin clean and dry  - Evaluate need for skin moisturizer/barrier cream  - Collaborate with interdisciplinary team (i e  Nutrition, Rehabilitation, etc )   - Patient/family teaching   Outcome: Progressing      Problem: Potential for Falls  Goal: Patient will remain free of falls  INTERVENTIONS:  - Assess patient frequently for physical needs  -  Identify cognitive and physical deficits and behaviors that affect risk of falls    -  [place] fall precautions as indicated by assessment   - Educate patient/family on patient safety including physical limitations  - Instruct patient to call for assistance with activity based on assessment  - Modify environment to reduce risk of injury  - Consider OT/PT consult to assist with strengthening/mobility   Outcome: Progressing      Problem: PAIN - ADULT  Goal: Verbalizes/displays adequate comfort level or baseline comfort level  Interventions:  - Encourage patient to monitor pain and request assistance  - Assess pain using appropriate pain scale  - Administer analgesics based on type and severity of pain and evaluate response  - Implement non-pharmacological measures as appropriate and evaluate response  - Consider cultural and social influences on pain and pain management  - Notify physician/advanced practitioner if interventions unsuccessful or patient reports new pain   Outcome: Progressing      Problem: INFECTION - ADULT  Goal: Absence or prevention of progression during hospitalization  INTERVENTIONS:  - Assess and monitor for signs and symptoms of infection  - Monitor lab/diagnostic results  - Monitor all insertion sites, i e  indwelling lines, tubes, and drains  - Monitor endotracheal (as able) and nasal secretions for changes in amount and color  - [place] appropriate cooling/warming therapies per order  - Administer medications as ordered  - Instruct and encourage patient and family to use good hand hygiene technique  - Identify and instruct in appropriate isolation precautions for identified infection/condition   Outcome: Progressing    Goal: Absence of fever/infection during neutropenic period  INTERVENTIONS:  - Monitor WBC  - Implement neutropenic guidelines   Outcome: Progressing      Problem: SAFETY ADULT  Goal: Maintain or return to baseline ADL function  INTERVENTIONS:  -  Assess patient's ability to carry out ADLs; assess patient's baseline for ADL function and identify physical deficits which impact ability to perform ADLs (bathing, care of mouth/teeth, toileting, grooming, dressing, etc )  - Assess/evaluate cause of self-care deficits   - Assess range of motion  - Assess patient's mobility; develop plan if impaired  - Assess patient's need for assistive devices and provide as appropriate  - Encourage maximum independence but intervene and supervise when necessary  ¯ Involve family in performance of ADLs  ¯ Assess for home care needs following discharge   ¯ Request OT consult to assist with ADL evaluation and planning for discharge  ¯ Provide patient education as appropriate   Outcome: Progressing    Goal: Maintain or return mobility status to optimal level  INTERVENTIONS:  - Assess patient's baseline mobility status (ambulation, transfers, stairs, etc )    - Identify cognitive and physical deficits and behaviors that affect mobility  - Identify mobility aids required to assist with transfers and/or ambulation (gait belt, sit-to-stand, lift, walker, cane, etc )  - [place] fall precautions as indicated by assessment  - Record patient progress and toleration of activity level on Mobility SBAR; progress patient to next Phase/Stage  - Instruct patient to call for assistance with activity based on assessment  - Request Rehabilitation consult to assist with strengthening/weightbearing, etc    Outcome: Progressing      Problem: DISCHARGE PLANNING  Goal: Discharge to home or other facility with appropriate resources  INTERVENTIONS:  - Identify barriers to discharge w/patient and caregiver  - Arrange for needed discharge resources and transportation as appropriate  - Identify discharge learning needs (meds, wound care, etc )  - Arrange for interpretive services to assist at discharge as needed  - Refer to Case Management Department for coordinating discharge planning if the patient needs post-hospital services based on physician/advanced practitioner order or complex needs related to functional status, cognitive ability, or social support system   Outcome: Progressing      Problem: NEUROSENSORY - ADULT  Goal: Achieves stable or improved neurological status  INTERVENTIONS  - Monitor and report changes in neurological status  - Initiate measures to prevent increased intracranial pressure  - Maintain blood pressure and fluid volume within ordered parameters to optimize cerebral perfusion  - Monitor temperature, glucose, and sodium or any other associated labs   Initiate appropriate interventions as ordered  - Monitor for seizure activity   - Administer anti-seizure medications as ordered   Outcome: Progressing    Goal: Absence of seizures  INTERVENTIONS  - Monitor for seizure activity  - Administer anti-seizure medications as ordered  - Monitor neurological status   Outcome: Progressing    Goal: Remains free of injury related to seizures activity  INTERVENTIONS  - Maintain airway, patient safety  and administer oxygen as ordered  - Monitor patient for seizure activity, document and report duration and description of seizure to physician/advanced practitioner  - If seizure occurs,  ensure patient safety during seizure  - Reorient patient post seizure  - Seizure pads on all 4 side rails  - Instruct patient/family to notify RN of any seizure activity including if an aura is experienced  - Instruct patient/family to call for assistance with activity based on nursing assessment  - Administer anti-seizure medications as ordered  - Monitor fetal well being   Outcome: Progressing    Goal: Achieves maximal functionality and self care  INTERVENTIONS  - Monitor swallowing and airway patency with patient fatigue and changes in neurological status  - Encourage and assist patient to increase activity and self care with guidance from rehab services  - Encourage visually impaired, hearing impaired and aphasic patients to use assistive/communication devices   Outcome: Progressing      Problem: SAFETY,RESTRAINT: NV/NON-SELF DESTRUCTIVE BEHAVIOR  Goal: Remains free of harm/injury (restraint for non violent/non self-detsructive behavior)  INTERVENTIONS:  - Instruct patient/family regarding restraint use   - Assess and monitor physiologic and psychological status   - Provide interventions and comfort measures to meet assessed patient needs   - Identify and implement measures to help patient regain control  - Assess readiness for release of restraint    Outcome: Progressing    Goal: Returns to optimal restraint-free functioning  INTERVENTIONS:  - Assess the patient's behavior and symptoms that indicate continued need for restraint  - Identify and implement measures to help patient regain control  - Assess readiness for release of restraint    Outcome: Progressing      Problem: DISCHARGE PLANNING - CARE MANAGEMENT  Goal: Discharge to post-acute care or home with appropriate resources  INTERVENTIONS:  - Conduct assessment to determine patient/family and health care team treatment goals, and need for post-acute services based on payer coverage, community resources, and patient preferences, and barriers to discharge  - Address psychosocial, clinical, and financial barriers to discharge as identified in assessment in conjunction with the patient/family and health care team  - Arrange appropriate level of post-acute services according to patient's   needs and preference and payer coverage in collaboration with the physician and health care team  - Communicate with and update the patient/family, physician, and health care team regarding progress on the discharge plan  - Arrange appropriate transportation to post-acute venues  - Pt to d/c with appropriate resources when medically stable  Outcome: Progressing      Problem: Nutrition/Hydration-ADULT  Goal: Nutrient/Hydration intake appropriate for improving, restoring or maintaining nutritional needs  Monitor and assess patient's nutrition/hydration status for malnutrition (ex- brittle hair, bruises, dry skin, pale skin and conjunctiva, muscle wasting, smooth red tongue, and disorientation)  Collaborate with interdisciplinary team and initiate plan and interventions as ordered  Monitor patient's weight and dietary intake as ordered or per policy  Utilize nutrition screening tool and intervene per policy  Determine patient's food preferences and provide high-protein, high-caloric foods as appropriate       INTERVENTIONS:  - Monitor oral intake, urinary output, labs, and treatment plans  - Assess nutrition and hydration status and recommend course of action  - Evaluate amount of meals eaten  - Assist patient with eating if necessary   - Allow adequate time for meals  - Recommend/ encourage appropriate diets, oral nutritional supplements, and vitamin/mineral supplements  - Order, calculate, and assess calorie counts as needed  - Recommend, monitor, and adjust tube feedings and TPN/PPN based on assessed needs  - Assess need for intravenous fluids  - Provide specific nutrition/hydration education as appropriate  - Include patient/family/caregiver in decisions related to nutrition   Outcome: Progressing

## 2018-12-21 NOTE — CONSULTS
PHYSICAL MEDICINE AND REHABILITATION CONSULT NOTE  Emily Gonzalez 59 y o  female MRN: [id number]  Unit/Bed#: MICU 02 Encounter: [id number]    Requested by (Physician/Service): Cooper Jimenez DO  Reason for Consultation:  Assessment of rehabilitation needs    Chief complaint: Patient intubated and unable to state     HPI: Emily Gonzalez is a 59 y o  female with a PMH of diabetes who presented on 12/19/2018 after being found down in her apartment by her sister  She was not moving her right side and she had multiple bruises on her body  She was last seen normal 48 hours prior to being found  She was found to have multiple injuries including left temporal IPH, left frontal IPH, bilateral posterior SAH, non-displaced right transverse process fx T1-T4, multiple rib fractures with flail chest, right glenoid fx, right acromion fx and a small right apical pneumothorax  She underwent a left hemicraniectomy for evacuation of the left temporal IPH, expansatile duraplasty and placement of right subclavian CVC  She is currently POD #1 and remains intubated and sedated  PM&R were consulted for rehab recommendations      Subjective: The patient was seen in the MICU with her brother at bedside  He reports that the patient was independent and living alone in an apartment with elevator access, sister lives above her  He reports that she has had multiple falls in the past and that she did not ambulate with a rolling walker or cane but probably should have been  Per brother there was question of possible Parkinson's as patient has a tremor and he reports a shuffling gait        Review of Systems: A 10-point review of systems was performed   Negative except as listed above      Assessment:   Rehab  - PT/OT/ST evaluations when medically stable to participate with therapies  - Bowel/bladder: currently patient has kenny in place, voiding trial when not requiring accurate I&Os  - DVT ppx:  SCDs  - Disposition unclear at this time as patient is intubated/sedated however will likely require inpatient rehabilitation, acute vs TBI rehabilitation  TBI  Recommendations:   - PT/OT/SLP evaluations and treatment when clinically prudent  - Patient is POD #1, drain management as per neurosurgery  - Keppra x 1 week for seizure prevention  - Monitor for agitation  - Continue Q2h turning for pressure relief  DFS3,Hill-Rom P500 bed, or equivalent bed if available  - Unable to assess for tone at this time, but patient is certainly at high risk; will require continued close monitoring  Multi-Trauma  - Patient currently sedated  - Pain management with IV Fentanyl   - Non-operative management for right glenoid and acromion fx  - NWB to RUE in sling  - WBAT to left foot in surgical shoe for non-displaced transverse fx involving proximal phalanx to 3rd digit    Nutrition  - OG tube in place  - Started on tube feedings    ? tremor/shuffling gait  - Patient on Mirapex   - Patient may eventually benefit from a neurology consult as she recovers based on her exam    Impairments  - Impaired cognition and swallow  - Impaired functional mobility and ADLs      Thank you for allowing the PM&R service to participate in the care of this patient  We will continue to follow Nancy Landry progress with you  Please do not hesitate to call with questions or concerns       PT OT SLP   Pending evaluation Pending evaluation Pending evaluation     Physical Exam:  General: intubated and sedated  Head: Drain in place  Eye: Normal external eye, conjunctiva, lids   Ears: Normal external ears  Nose: Normal external nose, mucus membranes  Pharynx: Dental Hygiene adequate  Normal buccal mucosa  Normal pharynx  Neck / Thyroid: Supple, no masses, nodes, nodules or enlargement    Pulmonary: clear to auscultation bilaterally and no crackles, no wheezes, chest expansion normal  Cardiovascular: normal rate, regular rhythm, normal S1, S2, no murmurs, rubs, clicks or gallops  Abdomen: soft, nontender, nondistended, no masses or organomegaly  Skin/Extremity: ecchymoses - scattered, face  Inicision: C/D/I, with no erythema  Neurologic: the patient opens eyes to voice and sternal rub, is not following commands  Psych: Appropriate affect, alert and oriented to person, place and time   Musculoskeletal - Strength:   Right  Left  Site  Right  Left  Site    5 5  S Ab: Shoulder Abductors  5  5  HF: Hip Flexors    5 5  EF: Elbow Flexors  5 5 KF: Knee Flexors    5  5  EE: Elbow Extensors  5  5  KE: Knee Extensors    5  5  WE: Wrist Extensors  5  5  DR: Dorsi Flexors    5  5  FF: Finger Flexors  5  5  PF: Plantar Flexors    5  5  HI: Hand Intrinsics  5  5  EHL: Extensor Hallucis Longus     Vin Birmingham Lives with: lives alone  She lives in [facility] apartment  The living area: elevator and can live on one level  Equipment in home: None  There No steps to enter the home  Patient/family's goals: Return to previous home/apartment  At this time it is not clear if family will be able to provide 24/7 supervision      Allergies:   No Known Allergies     Past Medical History:   Past Surgical History:   Family History:   Social history:   Past Medical History:   Diagnosis Date    [facility]     History reviewed  No pertinent surgical history    Family History   Problem Relation Age of Onset    Cerebral aneurysm Sister       Social History     Social History    Marital status: Single     Spouse name: N/A    Number of children: N/A    Years of education: N/A     Social History Main Topics    Smoking status: None    Smokeless tobacco: None    Alcohol use None    Drug use: Unknown    Sexual activity: Not Asked     Other Topics Concern    None     Social History Narrative    None          Vital Signs: Reviewed    Temp:  [98 6 °F (37 °C)-100 °F (37 8 °C)] 98 8 °F (37 1 °C)  HR:  [58-72] 60  Resp:  [10-37] 10  BP: ()/(42-56) 131/56  Arterial Line BP: ()/(40-68) 104/46  FiO2 (%): [40-70] 40   Intake/Output Summary (Last 24 hours) at 12/21/18 1327  Last data filed at 12/21/18 1254   Gross per 24 hour   Intake          5052 09 ml   Output             1650 ml   Net          3402 09 ml        Laboratory: Reviewed    Lab Results   Component Value Date    HGB 9 9 (L) 12/21/2018    HCT 29 4 (L) 12/21/2018    WBC 9 33 12/21/2018     Lab Results   Component Value Date    BUN 8 12/21/2018    K 3 7 12/21/2018     12/21/2018    GLUCOSE 117 12/20/2018    CREATININE 0 25 (L) 12/21/2018     Lab Results   Component Value Date    PROTIME 15 3 (H) 12/21/2018    INR 1 20 (H) 12/21/2018        Imaging: Reviewed  Cta Head And Neck W Wo Contrast    Result Date: 12/18/2018  Impression: 1  Atherosclerotic plaque at the right carotid bifurcation resulting in moderate (50-69%) stenosis  No evidence of carotid or vertebral artery dissection or pseudoaneurysm to suggest acute vascular injury  2   Displacement of left MCA branches by left temporal parietal hematoma  No evidence of aneurysm or vascular malformation  No significant stenosis in the major vessels of the [place]  3   Patent dural venous sinuses  Workstation performed: [id number]     Xr Chest Portable    Result Date: 12/19/2018  Impression: Satisfactory intubation  Bibasilar subsegmental atelectasis  No pneumothorax  Workstation performed: [id number]     Xr Shoulder 2+ Vw Right    Result Date: 12/19/2018  Impression: Right-sided rib fractures again noted  Possible acromion fracture  The small glenoid fracture seen on CT scan of the chest is not clearly appreciated on this exam  Workstation performed: [id number]     Xr Foot 3+ Vw Left    Result Date: 12/21/2018  Impression: Nondisplaced transverse fracture involves the proximal aspect of the proximal to the 3rd digit  Workstation performed: [id number]     Ct Head Wo Contrast    Result Date: 12/21/2018  Impression: 1    Postsurgical changes of left craniectomy and evacuation of left temporal intraparenchymal hemorrhage  2   A small amount of intraparenchymal/extra-axial hemorrhage remains within the anterior left temporal lobe and stable thin subdural hemorrhage along the left frontal region  3   Increased blood products along the cerebral falx when compared with prior exam  4   Persistent edema predominantly within the left temporal lobe and effacement of the left-sided cerebral sulci however there is interval resolution of midline shift and decreased effacement of the left lateral ventricle  5   Continued washout and interval evolution of intraventricular and subarachnoid blood products  Workstation performed: [id number]     Ct Head Wo Contrast    Result Date: 12/20/2018  Impression: 1  Left temporal lobe intraparenchymal hemorrhage with slight increase in surrounding edema as manifest by increased effacement of the left occipital horn when compared with prior examination  Slight increase in left to right midline shift now measuring up to 6 mm, previously 4 mm  2   Stable subdural, subarachnoid and intraventricular blood products  No large delayed intracranial hemorrhage  The study was marked in [facility] for immediate notification  Workstation performed: [id number]     Ct Head Wo Contrast    Result Date: 12/19/2018  Impression: 1  Stable size of large intraparenchymal hemorrhage centered in the left temporal lobe with surrounding mass effect and edema and adjacent left temporal to frontal subdural hemorrhage  Stable midline shift from left to right  2   Increased conspicuity of bilateral posterior subarachnoid and intraventricular blood products are likely due to redistribution  Workstation performed: [id number]     Trauma - Ct Head Wo Contrast    Result Date: 12/18/2018  Impression: 1  Large parenchymal hemorrhage on the left with associated left-sided subdural hematoma and bilateral subarachnoid hemorrhage including intraventricular hemorrhage   2   Mass effect and midline shift to the right measuring 4 mm  I personally discussed this study with Turneraliya Steven on 12/18/2018 at 8:45 PM  Workstation performed: [id number]     Trauma - Ct Spine Cervical Wo Contrast    Result Date: 12/18/2018  Impression: 1  No cervical spine fracture or traumatic malalignment  2   Small right apical pneumothorax  3   Nondisplaced fractures of the right T1 and T2 transverse processes  Fracture of the posterior right second rib  I personally discussed this study with Zamzam Harris on 12/18/2018 at 8:45 PM   Workstation performed: [id number]     Ct Shoulder Right Wo Contrast    Result Date: 12/20/2018  Impression: Acute fracture anterior glenoid rim with 1 to 2 mm anteromedial displacement  Acute comminuted fracture posterior acromion with 6 mm mediolateral distraction and 1 to 2 mm inferior cortical offset  No dislocation  Interval progression of right lower lobe and right upper lobe dependent airspace disease  This may represent subsegmental atelectasis, aspiration, and/or pulmonary contusion  Interval enlargement of small right pleural effusion  No other significant interval change  The examination demonstrates a significant  finding and was documented as such in [facility] for liaison and referring practitioner notification  Workstation performed: [id number]     Xr Chest 1 View    Result Date: 12/20/2018  Impression: Multiple right-sided rib fractures  Consolidation at the base may represent atelectasis or pneumonia  Workstation performed: [id number]     Trauma - Ct Chest Abdomen Pelvis W Contrast    Result Date: 12/18/2018  Impression: 1  Multiple right-sided rib fractures with tiny right apical pneumothorax  Fractures extend from the second through eleventh ribs at the costovertebral junctions and fourth through eighth ribs laterally  This is consistent with flail chest  A right anterior second rib fracture is severely displaced/angulated   2   Fracture of the anterior right glenoid, acromium, and first through fourth thoracic transverse processes on the right  3   Focal irregular opacity in the right upper lobe with adjacent 4 mm nodule  While contusion is possible given overlying trauma, the appearance of the lesion is most suspicious for a primary lung cancer and follow-up PET/CT is recommended  4   Borderline upper abdominal lymph nodes with density/stranding at the base of the small bowel mesentery  While mesenteric contusion can have this appearance in the setting of trauma  There are calcifications which suggest chronicity  Lymphoproliferative disorder cannot be excluded and follow-up and 3-6 months is recommended  5   Chronic interstitial lung disease of uncertain etiology  Given esophageal dilatation, this could represent NSIP in the setting of scleroderma  I personally discussed this study with Ursula Myrick on 12/18/2018 at 8:45 PM  Workstation performed: [id number]     Xr Trauma Multiple    Result Date: 12/19/2018  Impression: Multiple right-sided rib fractures  Consolidation at the base may represent atelectasis or pneumonia  Workstation performed: [id number]     Xr Chest Portable Icu    Result Date: 12/20/2018  Impression: Right subclavian catheter extending superiorly into the neck requiring repositioning  Workstation performed: [id number]     Xr Chest Portable Icu    Result Date: 12/20/2018  Impression: Mild central vascular congestion  Basilar opacities and effusions  Stable rib fractures  Workstation performed: [id number]     Xr Chest Portable Icu    Result Date: 12/19/2018  Impression: Right basilar opacity partially obscuring the hemidiaphragm may represent atelectasis or infiltrate  Possible small basilar effusion   Workstation performed: [id number]       Current Medications:     Current Facility-Administered Medications:     acetaminophen (TYLENOL) oral suspension 650 mg, 650 mg, Oral, Q8H PRN, Kevin Krause MD, 650 mg at 12/20/18 0240    albuterol inhalation solution 2 5 mg, 2 5 mg, Nebulization, Q4H PRN, Jamil Walton DO, 2 5 mg at 12/19/18 2005    chlorhexidine (PERIDEX) 0 12 % oral rinse 15 mL, 15 mL, [place], Q12H [facility], Topher Dugan MD, 15 mL at 12/21/18 0819    fentaNYL 1250 mcg in sodium chloride 0 9% 125mL drip, 100 mcg/hr, Intravenous, Continuous, Topher Dugan MD, Last Rate: 10 mL/hr at 12/21/18 0357, 100 mcg/hr at 12/21/18 0357    hydrALAZINE (APRESOLINE) injection 5 mg, 5 mg, Intravenous, Q6H PRN, Desire Hickman MD, Stopped at 12/20/18 0240    insulin lispro (HumaLOG) 100 units/mL subcutaneous injection 1-5 Units, 1-5 Units, Subcutaneous, HS, Gaby Dugan MD    insulin lispro (HumaLOG) 100 units/mL subcutaneous injection 1-5 Units, 1-5 Units, Subcutaneous, TID AC **AND** Fingerstick Glucose (POCT), , , Q6H, Aurora Gonzalez MD    labetalol (NORMODYNE) injection 5 mg, 5 mg, Intravenous, Q4H PRN, Gaby Dugan MD    levETIRAcetam (KEPPRA) oral solution 500 mg, 500 mg, Oral, Q12H [facility], TRISTAN Mercedes, 500 mg at 12/21/18 0820    multi-electrolyte (ISOLYTE-S PH 7 4 equivalent) IV solution, 100 mL/hr, Intravenous, Continuous, Gaby Dugan MD, Last Rate: 100 mL/hr at 12/21/18 0817, 100 mL/hr at 12/21/18 0817    pramipexole (MIRAPEX) tablet 0 25 mg, 0 25 mg, Oral, BID, TRISTAN Mercedes, 0 25 mg at 12/20/18 2328    propofol (DIPRIVAN) 1000 mg in 100 mL infusion (premix), 5-50 mcg/kg/min, Intravenous, Titrated, Chaka Dalton DO, Last Rate: 8 7 mL/hr at 12/21/18 1215, 25 mcg/kg/min at 12/21/18 1215    QUEtiapine (SEROquel) tablet 25 mg, 25 mg, Oral, HS, [place] Marck, CRNP, 25 mg at 12/20/18 2125    senna-docusate sodium (SENOKOT S) 8 6-50 mg per tablet 1 tablet, 1 tablet, Oral, HS, Aurora Gonzalez MD

## 2018-12-22 ENCOUNTER — APPOINTMENT (INPATIENT)
Dept: RADIOLOGY | Facility: HOSPITAL | Age: 63
DRG: 003 | End: 2018-12-22
Payer: MEDICARE

## 2018-12-22 PROBLEM — G93.40 ACUTE ENCEPHALOPATHY: Status: ACTIVE | Noted: 2018-12-22

## 2018-12-22 PROBLEM — J96.00 ACUTE RESPIRATORY FAILURE (HCC): Status: ACTIVE | Noted: 2018-12-18

## 2018-12-22 PROBLEM — J96.01 ACUTE RESPIRATORY FAILURE WITH HYPOXIA (HCC): Status: ACTIVE | Noted: 2018-12-22

## 2018-12-22 PROBLEM — E87.1 HYPONATREMIA: Status: RESOLVED | Noted: 2018-12-19 | Resolved: 2018-12-22

## 2018-12-22 PROBLEM — E87.6 HYPOKALEMIA: Status: ACTIVE | Noted: 2018-12-22

## 2018-12-22 PROBLEM — Z98.890 STATUS POST CRANIECTOMY: Status: ACTIVE | Noted: 2018-12-22

## 2018-12-22 PROBLEM — E83.39 HYPOPHOSPHATEMIA: Status: ACTIVE | Noted: 2018-12-22

## 2018-12-22 PROBLEM — T17.500A MUCUS PLUGGING OF BRONCHI: Status: ACTIVE | Noted: 2018-12-22

## 2018-12-22 LAB
ANION GAP SERPL CALCULATED.3IONS-SCNC: 9 MMOL/L (ref 4–13)
ANISOCYTOSIS BLD QL SMEAR: PRESENT
BASE EXCESS BLDA CALC-SCNC: 2.4 MMOL/L
BASOPHILS # BLD MANUAL: 0 THOUSAND/UL (ref 0–0.1)
BASOPHILS NFR MAR MANUAL: 0 % (ref 0–1)
BUN SERPL-MCNC: 6 MG/DL (ref 5–25)
CALCIUM SERPL-MCNC: 7.7 MG/DL (ref 8.3–10.1)
CHLORIDE SERPL-SCNC: 103 MMOL/L (ref 100–108)
CO2 SERPL-SCNC: 25 MMOL/L (ref 21–32)
CREAT SERPL-MCNC: 0.32 MG/DL (ref 0.6–1.3)
EOSINOPHIL # BLD MANUAL: 0.1 THOUSAND/UL (ref 0–0.4)
EOSINOPHIL NFR BLD MANUAL: 1 % (ref 0–6)
ERYTHROCYTE [DISTWIDTH] IN BLOOD BY AUTOMATED COUNT: 15.2 % (ref 11.6–15.1)
GFR SERPL CREATININE-BSD FRML MDRD: 121 ML/MIN/1.73SQ M
GLUCOSE SERPL-MCNC: 105 MG/DL (ref 65–140)
GLUCOSE SERPL-MCNC: 163 MG/DL (ref 65–140)
GLUCOSE SERPL-MCNC: 172 MG/DL (ref 65–140)
GLUCOSE SERPL-MCNC: 177 MG/DL (ref 65–140)
GLUCOSE SERPL-MCNC: 77 MG/DL (ref 65–140)
HCO3 BLDA-SCNC: 26.2 MMOL/L (ref 22–28)
HCT VFR BLD AUTO: 29.9 % (ref 34.8–46.1)
HGB BLD-MCNC: 10 G/DL (ref 11.5–15.4)
HOROWITZ INDEX BLDA+IHG-RTO: 60 MM[HG]
LYMPHOCYTES # BLD AUTO: 1.25 THOUSAND/UL (ref 0.6–4.47)
LYMPHOCYTES # BLD AUTO: 13 % (ref 14–44)
MCH RBC QN AUTO: 28 PG (ref 26.8–34.3)
MCHC RBC AUTO-ENTMCNC: 33.4 G/DL (ref 31.4–37.4)
MCV RBC AUTO: 84 FL (ref 82–98)
MONOCYTES # BLD AUTO: 0.67 THOUSAND/UL (ref 0–1.22)
MONOCYTES NFR BLD: 7 % (ref 4–12)
NEUTROPHILS # BLD MANUAL: 7.5 THOUSAND/UL (ref 1.85–7.62)
NEUTS BAND NFR BLD MANUAL: 3 % (ref 0–8)
NEUTS SEG NFR BLD AUTO: 75 % (ref 43–75)
NRBC BLD AUTO-RTO: 0 /100 WBCS
O2 CT BLDA-SCNC: 14.2 ML/DL (ref 16–23)
OXYHGB MFR BLDA: 97.7 % (ref 94–97)
PCO2 BLDA: 37.2 MM HG (ref 36–44)
PEEP RESPIRATORY: 8 CM[H2O]
PH BLDA: 7.46 [PH] (ref 7.35–7.45)
PHOSPHATE SERPL-MCNC: 2.2 MG/DL (ref 2.3–4.1)
PLATELET # BLD AUTO: 209 THOUSANDS/UL (ref 149–390)
PLATELET BLD QL SMEAR: ADEQUATE
PMV BLD AUTO: 9.7 FL (ref 8.9–12.7)
PO2 BLDA: 124.8 MM HG (ref 75–129)
POLYCHROMASIA BLD QL SMEAR: PRESENT
POTASSIUM SERPL-SCNC: 3.3 MMOL/L (ref 3.5–5.3)
RBC # BLD AUTO: 3.57 MILLION/UL (ref 3.81–5.12)
RBC MORPH BLD: PRESENT
SODIUM SERPL-SCNC: 137 MMOL/L (ref 136–145)
SPECIMEN SOURCE: ABNORMAL
TOTAL CELLS COUNTED SPEC: 100
VARIANT LYMPHS # BLD AUTO: 1 %
VENT AC: 14
VENT- AC: AC
VT SETTING VENT: 400 ML
WBC # BLD AUTO: 9.62 THOUSAND/UL (ref 4.31–10.16)

## 2018-12-22 PROCEDURE — 99291 CRITICAL CARE FIRST HOUR: CPT | Performed by: SURGERY

## 2018-12-22 PROCEDURE — 94760 N-INVAS EAR/PLS OXIMETRY 1: CPT

## 2018-12-22 PROCEDURE — C1751 CATH, INF, PER/CENT/MIDLINE: HCPCS

## 2018-12-22 PROCEDURE — 84100 ASSAY OF PHOSPHORUS: CPT | Performed by: PHYSICIAN ASSISTANT

## 2018-12-22 PROCEDURE — 94003 VENT MGMT INPAT SUBQ DAY: CPT

## 2018-12-22 PROCEDURE — 36569 INSJ PICC 5 YR+ W/O IMAGING: CPT

## 2018-12-22 PROCEDURE — 85027 COMPLETE CBC AUTOMATED: CPT | Performed by: PHYSICIAN ASSISTANT

## 2018-12-22 PROCEDURE — 94640 AIRWAY INHALATION TREATMENT: CPT

## 2018-12-22 PROCEDURE — 71045 X-RAY EXAM CHEST 1 VIEW: CPT

## 2018-12-22 PROCEDURE — 82805 BLOOD GASES W/O2 SATURATION: CPT | Performed by: EMERGENCY MEDICINE

## 2018-12-22 PROCEDURE — 71275 CT ANGIOGRAPHY CHEST: CPT

## 2018-12-22 PROCEDURE — 36600 WITHDRAWAL OF ARTERIAL BLOOD: CPT

## 2018-12-22 PROCEDURE — 02HV33Z INSERTION OF INFUSION DEVICE INTO SUPERIOR VENA CAVA, PERCUTANEOUS APPROACH: ICD-10-PCS | Performed by: EMERGENCY MEDICINE

## 2018-12-22 PROCEDURE — 85007 BL SMEAR W/DIFF WBC COUNT: CPT | Performed by: PHYSICIAN ASSISTANT

## 2018-12-22 PROCEDURE — 82948 REAGENT STRIP/BLOOD GLUCOSE: CPT

## 2018-12-22 PROCEDURE — 80048 BASIC METABOLIC PNL TOTAL CA: CPT | Performed by: PHYSICIAN ASSISTANT

## 2018-12-22 RX ORDER — ACETAMINOPHEN 160 MG/5ML
650 SUSPENSION, ORAL (FINAL DOSE FORM) ORAL EVERY 6 HOURS
Status: DISCONTINUED | OUTPATIENT
Start: 2018-12-22 | End: 2019-01-03 | Stop reason: HOSPADM

## 2018-12-22 RX ORDER — POTASSIUM CHLORIDE 14.9 MG/ML
20 INJECTION INTRAVENOUS ONCE
Status: COMPLETED | OUTPATIENT
Start: 2018-12-22 | End: 2018-12-22

## 2018-12-22 RX ORDER — FUROSEMIDE 10 MG/ML
20 INJECTION INTRAMUSCULAR; INTRAVENOUS ONCE
Status: COMPLETED | OUTPATIENT
Start: 2018-12-22 | End: 2018-12-22

## 2018-12-22 RX ORDER — SODIUM CHLORIDE, SODIUM GLUCONATE, SODIUM ACETATE, POTASSIUM CHLORIDE, MAGNESIUM CHLORIDE, SODIUM PHOSPHATE, DIBASIC, AND POTASSIUM PHOSPHATE .53; .5; .37; .037; .03; .012; .00082 G/100ML; G/100ML; G/100ML; G/100ML; G/100ML; G/100ML; G/100ML
500 INJECTION, SOLUTION INTRAVENOUS ONCE
Status: COMPLETED | OUTPATIENT
Start: 2018-12-22 | End: 2018-12-22

## 2018-12-22 RX ORDER — NICOTINE 21 MG/24HR
14 PATCH, TRANSDERMAL 24 HOURS TRANSDERMAL DAILY
Status: DISCONTINUED | OUTPATIENT
Start: 2018-12-22 | End: 2019-01-03 | Stop reason: HOSPADM

## 2018-12-22 RX ORDER — POTASSIUM CHLORIDE 20MEQ/15ML
40 LIQUID (ML) ORAL ONCE
Status: COMPLETED | OUTPATIENT
Start: 2018-12-22 | End: 2018-12-22

## 2018-12-22 RX ORDER — LEVALBUTEROL 1.25 MG/.5ML
SOLUTION, CONCENTRATE RESPIRATORY (INHALATION)
Status: COMPLETED
Start: 2018-12-22 | End: 2018-12-22

## 2018-12-22 RX ADMIN — FENTANYL CITRATE 100 MCG/HR: 50 INJECTION, SOLUTION INTRAMUSCULAR; INTRAVENOUS at 16:40

## 2018-12-22 RX ADMIN — IPRATROPIUM BROMIDE 0.5 MG: 0.5 SOLUTION RESPIRATORY (INHALATION) at 07:38

## 2018-12-22 RX ADMIN — ACETAMINOPHEN 650 MG: 160 SUSPENSION ORAL at 12:30

## 2018-12-22 RX ADMIN — LEVALBUTEROL 1.25 MG: 1.25 SOLUTION, CONCENTRATE RESPIRATORY (INHALATION) at 13:03

## 2018-12-22 RX ADMIN — FUROSEMIDE 20 MG: 10 INJECTION, SOLUTION INTRAMUSCULAR; INTRAVENOUS at 12:30

## 2018-12-22 RX ADMIN — IOHEXOL 85 ML: 350 INJECTION, SOLUTION INTRAVENOUS at 10:42

## 2018-12-22 RX ADMIN — PRAMIPEXOLE DIHYDROCHLORIDE 0.25 MG: 0.25 TABLET ORAL at 17:00

## 2018-12-22 RX ADMIN — QUETIAPINE FUMARATE 25 MG: 25 TABLET ORAL at 22:30

## 2018-12-22 RX ADMIN — SENNOSIDES AND DOCUSATE SODIUM 1 TABLET: 8.6; 5 TABLET ORAL at 22:30

## 2018-12-22 RX ADMIN — PRAMIPEXOLE DIHYDROCHLORIDE 0.25 MG: 0.25 TABLET ORAL at 22:26

## 2018-12-22 RX ADMIN — PROPOFOL 25 MCG/KG/MIN: 10 INJECTION, EMULSION INTRAVENOUS at 05:20

## 2018-12-22 RX ADMIN — POTASSIUM CHLORIDE 40 MEQ: 20 SOLUTION ORAL at 09:59

## 2018-12-22 RX ADMIN — NICOTINE 14 MG: 14 PATCH TRANSDERMAL at 09:59

## 2018-12-22 RX ADMIN — FENTANYL CITRATE 100 MCG/HR: 50 INJECTION, SOLUTION INTRAMUSCULAR; INTRAVENOUS at 08:00

## 2018-12-22 RX ADMIN — CALCIUM GLUCONATE 1 G: 98 INJECTION, SOLUTION INTRAVENOUS at 09:59

## 2018-12-22 RX ADMIN — LEVALBUTEROL 1.25 MG: 1.25 SOLUTION, CONCENTRATE RESPIRATORY (INHALATION) at 19:01

## 2018-12-22 RX ADMIN — ACETAMINOPHEN 650 MG: 160 SUSPENSION ORAL at 07:52

## 2018-12-22 RX ADMIN — INSULIN LISPRO 1 UNITS: 100 INJECTION, SOLUTION INTRAVENOUS; SUBCUTANEOUS at 18:05

## 2018-12-22 RX ADMIN — IPRATROPIUM BROMIDE 0.5 MG: 0.5 SOLUTION RESPIRATORY (INHALATION) at 00:15

## 2018-12-22 RX ADMIN — PROPOFOL 50 MCG/KG/MIN: 10 INJECTION, EMULSION INTRAVENOUS at 12:30

## 2018-12-22 RX ADMIN — LEVALBUTEROL 1.25 MG: 1.25 SOLUTION, CONCENTRATE RESPIRATORY (INHALATION) at 07:38

## 2018-12-22 RX ADMIN — PROPOFOL 50 MCG/KG/MIN: 10 INJECTION, EMULSION INTRAVENOUS at 22:50

## 2018-12-22 RX ADMIN — LEVETIRACETAM 500 MG: 100 SOLUTION ORAL at 22:26

## 2018-12-22 RX ADMIN — LEVALBUTEROL 1.25 MG: 1.25 SOLUTION, CONCENTRATE RESPIRATORY (INHALATION) at 00:15

## 2018-12-22 RX ADMIN — IPRATROPIUM BROMIDE 0.5 MG: 0.5 SOLUTION RESPIRATORY (INHALATION) at 19:01

## 2018-12-22 RX ADMIN — IPRATROPIUM BROMIDE 0.5 MG: 0.5 SOLUTION RESPIRATORY (INHALATION) at 13:03

## 2018-12-22 RX ADMIN — CHLORHEXIDINE GLUCONATE 0.12% ORAL RINSE 15 ML: 1.2 LIQUID ORAL at 08:03

## 2018-12-22 RX ADMIN — PROPOFOL 50 MCG/KG/MIN: 10 INJECTION, EMULSION INTRAVENOUS at 16:40

## 2018-12-22 RX ADMIN — LEVETIRACETAM 500 MG: 100 SOLUTION ORAL at 10:00

## 2018-12-22 RX ADMIN — INSULIN LISPRO 1 UNITS: 100 INJECTION, SOLUTION INTRAVENOUS; SUBCUTANEOUS at 12:31

## 2018-12-22 RX ADMIN — CHLORHEXIDINE GLUCONATE 0.12% ORAL RINSE 15 ML: 1.2 LIQUID ORAL at 22:26

## 2018-12-22 RX ADMIN — FENTANYL CITRATE 50 MCG: 50 INJECTION, SOLUTION INTRAMUSCULAR; INTRAVENOUS at 17:05

## 2018-12-22 RX ADMIN — SODIUM CHLORIDE, SODIUM GLUCONATE, SODIUM ACETATE, POTASSIUM CHLORIDE, MAGNESIUM CHLORIDE, SODIUM PHOSPHATE, DIBASIC, AND POTASSIUM PHOSPHATE 500 ML: .53; .5; .37; .037; .03; .012; .00082 INJECTION, SOLUTION INTRAVENOUS at 02:15

## 2018-12-22 RX ADMIN — ACETAMINOPHEN 650 MG: 160 SUSPENSION ORAL at 17:30

## 2018-12-22 RX ADMIN — POTASSIUM CHLORIDE 20 MEQ: 200 INJECTION, SOLUTION INTRAVENOUS at 07:52

## 2018-12-22 NOTE — PROCEDURES
Insert PICC line  Date/Time: 12/22/2018 9:30 AM  Performed by: Radha See by: Adali Baker     Patient location:  Bedside  Other Assisting Provider: Yes (comment) (Gregoria Parekh inf tech)    Consent:     Consent obtained:  Written    Consent given by:  Healthcare agent    Procedural risks discussed: consent obtained by physician  [place] protocol:     Procedure explained and questions answered to patient or proxy's satisfaction: yes      Relevant documents present and verified: yes      Test results available and properly labeled: yes      Radiology Images displayed and confirmed  If images not available, report reviewed: yes      Required blood products, implants, devices, and special equipment available: yes      Site/side marked: yes      Immediately prior to procedure, a time out was called: yes      Patient identity confirmed:  Arm band  Pre-procedure details:     Hand hygiene: Hand hygiene performed prior to insertion      Sterile barrier technique: All elements of maximal sterile technique followed      Skin preparation:  ChloraPrep    Skin preparation agent: Skin preparation agent completely dried prior to procedure    Indications:     PICC line indications: no peripheral vascular access    Anesthesia (see MAR for exact dosages):      Anesthesia method:  Local infiltration    Local anesthetic:  Lidocaine 1% w/o epi (2ml)  Procedure details:     Location:  [place]    Vessel type: vein      Laterality:  Left    Approach: percutaneous technique used      Patient position:  Flat    Procedural supplies:  Triple lumen    Catheter size:  5 Fr    Landmarks identified: yes      Ultrasound guidance: yes      Sterile ultrasound techniques: Sterile gel and sterile probe covers were used      Number of attempts:  1    Successful placement: yes      Vessel of catheter tip end:  [place] 3CG confirmed    Total catheter length (cm):  42    Catheter out on skin (cm):  0    Max flow rate:  999 Arm circumference:  24  Post-procedure details:     Post-procedure:  Securement device placed and dressing applied    Assessment:  Blood return through all ports and free fluid flow    Post-procedure complications: none      Patient tolerance of procedure:   Tolerated well, no immediate complications

## 2018-12-22 NOTE — RESPIRATORY THERAPY NOTE
RT Ventilator Management Note  Markie Moreno 59 y o  female MRN: [id number]  Unit/Bed#: MICU 02 Encounter: [id number]      Daily Screen       12/20/2018 1092 12/22/2018 0742          Patient safety screen outcome[de-identified] Failed Failed      Not Ready for Weaning due to[de-identified] Underline problem not resolved Underline problem not resolved              Physical Exam:   Assessment Type: (P) Pre-treatment  General Appearance: (P) Sedated  Respiratory Pattern: (P) Assisted  Chest Assessment: (P) Chest expansion symmetrical  Bilateral Breath Sounds: (P) Coarse  Cough: Productive  Suction: (P) ET Tube  O2 Device: Ventilator      Resp Comments: (P) SBT not done this AM  Pt  sats 89-90%  FIO2 increased to 80%

## 2018-12-22 NOTE — PROGRESS NOTES
Progress Note - Critical Care   Claudio Newby 59 y o  female MRN: [id number]  [facility]: [id number]    Attending Physician: Reta Miller DO    Chief Complaint: Found down at home, traumatic brain injury, multiple traumatic musculoskeletal injuries    24 Hour Events: Desatted to 80% on PS vent settings  Switched to [facility] - initially at FiO2 of 90%, increased PEEP to 8  CXR showed worsening opacities  Eventually able to titrate FiO2 to 60%  Became agitated and acutely hypertensive at 200/80 - given 5 mg hydralazine at 2000 and BP gradually decreased to low of 90/46 at 1200  BP improved spontaneously and she is now normotensive   ______________________________________________________________________  Assessment and Plan:   Principal Problem:    Traumatic brain injury [facility])  Active Problems:    Multiple fractures of ribs, bilateral, initial encounter for closed fracture    Traumatic pneumothorax    Glenoid fracture of shoulder, right, closed, initial encounter    Intraparenchymal hemorrhage of brain ([facility] [address] )    Hyponatremia    Flail chest, initial encounter for closed fracture    Fracture of thoracic transverse process, closed, initial encounter ([facility] [address] )    Subdural hematoma (HCC)    Subarachnoid hemorrhage ([facility] [address] )    Diabetes mellitus, type II ([facility] [address] )    Closed nondisplaced left toe fracture  Resolved Problems:    * No resolved hospital problems   *        NEURO:  -GCS: 9T (E: 3, V: 1, M: 5) - Localizes pain and opens eyes to speech  -SEDATIVES: Propofol @ 15  -ANALGESIA: Fentanyl @100 mcg/hr  -SEIZURE PROPHYLAXIS: Keppra 500mg BID  -MEDICATIONS: Pramipexole 0 25mg BID, Seroquel 25mg qhs  -PLAN:   s/p hemicraniectomy, subgaleal drain placement, POD #2  q1h neuro checks  24 hr drain output 100 cc    CV:   -MEDICATIONS: Labetalol 5mg q4h PRN, hydralazine 5 mg q6h PRN for HTN  -PLAN:   Keep MAP >65, SBP <140  Continue to monitor    PULM:     Respiratory    Lab Data (Last 4 hours)      12/22 0504            pH, Arterial       (!)7 465           pCO2, Arterial       37 2           pO2, Arterial       124 8           HCO3, Arterial       26 2           Base Excess, Arterial       2 4                O2/Vent Data       12/22 0348            Vent Mode AC/VC        Resp Rate (BPM) (BPM) 14        Vt (mL) (mL) 400        FIO2 (%) (%) 60        PEEP (cmH2O) (cmH2O) 8        MV 6 55                   Vent Mode: AC/VC  FiO2 (%):  [40] 40    -PLAN:   Worsening opacities on CXR and desat overnight - consider bronchoscopy today  Continue ventilator management  Wean ventilator settings as tolerated  Daily spontaneous breathing trial, assess readiness to extubate  Consider 15 min T-piece trial prior to extubation given flail chest    GI:  - Diet: Tube feeds started yesterday   - Currently on cyclic Jevity 1 2 @ 80 cc/hr with 100 cc free water flushes Q6    :     Results from last 7 days  Lab Units 12/22/18  0516 12/21/18  0444 12/20/18  1727 12/20/18  1153 12/20/18  0526 12/20/18  0054 12/19/18  2149   BUN mg/dL 6 8 7 6 7 7 7   CREATININE mg/dL 0 32* 0 25* 0 28* 0 30* 0 27* 0 34* 0 27*     COLÓN: yes  UOP: 0 7 cc/kg/hr  I/O       12/20 0701 - 12/21 0700 12/21 0701 - 12/22 0700    I V  (mL/kg) 4805 2 (83 4) 3316 8 (57 6)    Blood 1400     NG/ 390    IV Piggyback 1150 300    Feedings  420    Total Intake(mL/kg) 7465 2 (129 6) 4426 8 (76 9)    Urine (mL/kg/hr) 2295 (1 7) 945 (0 7)    Emesis/NG output 240     Drains 270 100    Blood 1000     Total Output 3805 1045    Net +3660 2 +3381 8              F/E/N:   FLUIDS: Discontinued maintenance fluids this morning now that she is at goal with tube feeds  ELECTROLYTES: replete PRN    Results from last 7 days  Lab Units 12/22/18  0516 12/21/18  0444 12/20/18  1727 12/20/18  1153 12/20/18  1029 12/20/18  0953 12/20/18  0917 12/20/18  0526  12/20/18  0054 12/19/18  2149  12/19/18  0455  12/18/18  2229   SODIUM mmol/L 137 138 137 137  --   --   --  129*  --  129* 130*  < > 128*  < > --    POTASSIUM mmol/L 3 3* 3 7 3 8 3 9  --   --   --  3 5  --  3 8 3 8  < > 4 2  < >  --    CHLORIDE mmol/L 103 105 106 106  --   --   --  97*  --  97* 99*  < > 95*  < >  --    CO2 mmol/L 25 26 24 23  --   --   --  24  --  24 24  < > 25  < >  --    CO2, I-STAT mmol/L  --   --   --   --  22 22 27  --   < >  --   --   --   --   --   --    ANION GAP mmol/L 9 7 7 8  --   --   --  8  --  8 7  < > 8  < >  --    CALCIUM mg/dL 7 7* 7 5* 7 9* 8 2*  --   --   --  7 6*  --  8 0* 7 6*  < > 7 9*  < >  --    MAGNESIUM mg/dL  --  2 0  --   --   --   --   --   --   --  1 8  --   --  2 3  --  1 4*   PHOSPHORUS mg/dL 2 2* 2 2*  --   --   --   --   --   --   --  2 2*  --   --  3 2  --  3 5   < > = values in this interval not displayed  NUTRITION: Diet Enteral/Parenteral; Tube Feeding No Oral Diet; Jevity 1 2 Sae; Cyclic; 20; 16 hours; 132; Water; Every 6 hours    ID:   Fever 100 8 F at 2030, resolved   Completed 3 doses of ppx Ancef for surgery     Results from last 7 days  Lab Units 12/22/18  0516 12/21/18  0444 12/20/18  1617 12/20/18  1153 12/20/18  0526 12/19/18  0455 12/18/18 2028   WBC Thousand/uL 9 62 9 33 8 31 9 07 9 39 7 70 13 08*     -PLAN:   No acute infectious concerns    HEME:    Results from last 7 days  Lab Units 12/22/18  0516 12/21/18  0444 12/21/18  0125 12/20/18  2205 12/20/18  1617 12/20/18  1153 12/20/18  1029  12/20/18  0526  12/19/18  0455 12/18/18 2028   HEMOGLOBIN g/dL 10 0* 9 9* 9 8* 6 9* 8 1* 8 8*  --   --  9 5*  --  10 2* 11 6   I STAT HEMOGLOBIN g/dl  --   --   --   --   --   --  9 5*  < >  --   < >  --   --    HEMATOCRIT % 29 9* 29 4* 29 5* 20 9* 24 4* 27 0*  --   --  29 4*  --  30 2* 35 3   HEMATOCRIT, ISTAT %  --   --   --   --   --   --  28*  < >  --   < >  --   --    PLATELETS Thousands/uL 209 180  --   --  175 184  --   --  216  --  262 273   < > = values in this interval not displayed      Results from last 7 days  Lab Units 12/21/18  0444 12/20/18  1153 12/18/18  2030   INR  1 20* 1 29* 1 01 PTT seconds  --  31 25*     -PLAN:   F/u AM labs  Transfuse for Hgb <7    ENDO:   - Continue SSI#2    Results from last 7 days  Lab Units 12/20/18  1029 12/20/18  0953 12/20/18  0917 12/20/18  0059   GLUCOSE, ISTAT mg/dl 117 121 125 99        MSK/SKIN:   S/p multiple R sided rib fx, flail chest, R 2nd-11th rib fx at costovertebral junctions, 4th-8th fx laterally, R anterior 2nd rib angulated fx, fx of anterior R glenoid, acromion, T1-T4 transverse process fx  Non-operative care of R shoulder per Ortho  Rib fx protocol once taking PO  PT/OT  Routine skin care  Frequent offloading     LDA:  Invasive Devices     Peripheral Intravenous Line            Peripheral IV 12/20/18 Left Antecubital 1 day    Peripheral IV 12/20/18 Left Arm 1 day          Drain            NG/OG/Enteral Tube Orogastric 16 Fr Left mouth 3 days    Urethral Catheter 16 Fr  3 days    Closed/Suction Drain Left Head Bulb 10 Fr  1 day          Airway            ETT  Cuffed 8 mm 3 days                Disposition: Continue ICU Care    Code Status: Level 1 - Full Code    Counseling / Coordination of Care  Total Critical Care time spent 30 minutes excluding procedures, teaching and family updates  _________________________________________________________________    Review of Systems   Unable to perform ROS: Intubated     ______________________________________________________________________    Physical Exam:     Physical Exam   Constitutional: She appears well-developed and well-nourished  She is intubated  HENT:   Head: Normocephalic and atraumatic  Right Ear: External ear normal    Left Ear: External ear normal    Nose: Nose normal    L hemicraniectomy dressing c/d/i  L PETAR drain w/ serosanguinous output   Eyes: Pupils are equal, round, and reactive to light  Conjunctivae are normal    Neck: Normal range of motion  Neck supple  Cardiovascular: Normal rate and regular rhythm      Pulses:       Carotid pulses are 2+ on the right side, and 2+ on the left side  Radial pulses are 2+ on the right side, and 2+ on the left side  Dorsalis pedis pulses are 2+ on the right side, and 2+ on the left side  Posterior tibial pulses are 2+ on the right side, and 2+ on the left side  Pulmonary/Chest: She is intubated  No respiratory distress  She has rhonchi (bilaterally)  Abdominal: Soft  She exhibits no distension and no mass  Musculoskeletal: She exhibits no edema or deformity  + R knee hematoma   Skin: Skin is warm  No rash noted  No erythema    + b/l knee abrasions  + R periorbital ecchymoses  + R shoulder ecchymoses  + b/l flank ecchymoses     ______________________________________________________________________  Vitals:    18 0300 18 0348 18 0400 18 0500   BP: 115/56  (!) 102/47 113/55   Pulse: 68  66 64   Resp: 22  18 20   Temp: 99 7 °F (37 6 °C)  99 3 °F (37 4 °C) 99 3 °F (37 4 °C)   TempSrc:   Probe Probe   SpO2: 99% 98% 99% 99%   Weight:       Height:           Temperature:   Temp (24hrs), Av 3 °F (37 4 °C), Min:98 2 °F (36 8 °C), Max:100 8 °F (38 2 °C)    Current Temperature: 99 3 °F (37 4 °C)    Nutrition:        Diet Orders            Start     Ordered    18 1037  Diet Enteral/Parenteral; Tube Feeding No Oral Diet; Jevity 1 2 Sae; Cyclic; 20; 16 hours; 143; Water; Every 6 hours  Diet effective now     Comments:  Increase by 20 cc/hr every 4 hours to goal of 80 cc/hr   Question Answer Comment   Diet Type Enteral/Parenteral    Enteral/Parenteral Tube Feeding No Oral Diet    Tube Feeding Formula: Jevity 1 2 Sae    Bolus/Cyclic/Continuous Cyclic    Tube Feeding Cyclic Rate (mL/hr): 20    Tube Feeding Cyclic: Administer over: 16 hours    Tube Feeding water flush (mL): 100    Water Flush type: Water    Water flush frequency: Every 6 hours    RD to adjust diet per protocol?  Yes        18 1038        Labs:     Results from last 7 days  Lab Units 18  0516 18  0444 18  0125 12/20/18  2205 12/20/18  1617 12/20/18  1153 12/20/18  1029  12/20/18  0526  12/19/18  0455 12/18/18  2028   WBC Thousand/uL 9 62 9 33  --   --  8 31 9 07  --   --  9 39  --  7 70 13 08*   HEMOGLOBIN g/dL 10 0* 9 9* 9 8* 6 9* 8 1* 8 8*  --   --  9 5*  --  10 2* 11 6   I STAT HEMOGLOBIN g/dl  --   --   --   --   --   --  9 5*  < >  --   < >  --   --    HEMATOCRIT % 29 9* 29 4* 29 5* 20 9* 24 4* 27 0*  --   --  29 4*  --  30 2* 35 3   HEMATOCRIT, ISTAT %  --   --   --   --   --   --  28*  < >  --   < >  --   --    PLATELETS Thousands/uL 209 180  --   --  175 184  --   --  216  --  262 273   NEUTROS PCT %  --  78*  --   --   --  89*  --   --   --   --  76* 89*   MONOS PCT %  --  7  --   --   --  5  --   --   --   --  10 6   < > = values in this interval not displayed      Results from last 7 days  Lab Units 12/22/18  0516 12/21/18  0444 12/20/18  1727 12/20/18  1153 12/20/18  1029 12/20/18  0953 12/20/18  0917 12/20/18  0526  12/20/18  0054 12/19/18  2149  12/18/18  2030   SODIUM mmol/L 137 138 137 137  --   --   --  129*  --  129* 130*  < > 122*   POTASSIUM mmol/L 3 3* 3 7 3 8 3 9  --   --   --  3 5  --  3 8 3 8  < > 3 7   CHLORIDE mmol/L 103 105 106 106  --   --   --  97*  --  97* 99*  < > 88*   CO2 mmol/L 25 26 24 23  --   --   --  24  --  24 24  < > 26   CO2, I-STAT mmol/L  --   --   --   --  22 22 27  --   < >  --   --   --   --    ANION GAP mmol/L 9 7 7 8  --   --   --  8  --  8 7  < > 8   BUN mg/dL 6 8 7 6  --   --   --  7  --  7 7  < > 9   CREATININE mg/dL 0 32* 0 25* 0 28* 0 30*  --   --   --  0 27*  --  0 34* 0 27*  < > 0 42*   CALCIUM mg/dL 7 7* 7 5* 7 9* 8 2*  --   --   --  7 6*  --  8 0* 7 6*  < > 8 7   ALT U/L  --   --   --   --   --   --   --   --   --   --   --   --  96*   AST U/L  --   --   --   --   --   --   --   --   --   --   --   --  130*   ALK PHOS U/L  --   --   --   --   --   --   --   --   --   --   --   --  138*   ALBUMIN g/dL  --   --   --   --   --   --   --   --   --   --   --   --  3 4* TOTAL BILIRUBIN mg/dL  --   --   --   --   --   --   --   --   --   --   --   --  0 42   < > = values in this interval not displayed  Results from last 7 days  Lab Units 12/22/18  0516 12/21/18  0444 12/20/18  0054 12/19/18  0455 12/18/18  2229   MAGNESIUM mg/dL  --  2 0 1 8 2 3 1 4*   PHOSPHORUS mg/dL 2 2* 2 2* 2 2* 3 2 3 5        Results from last 7 days  Lab Units 12/21/18  0444 12/20/18  1153 12/18/18  2030   INR  1 20* 1 29* 1 01   PTT seconds  --  31 25*       Results from last 7 days  Lab Units 12/20/18  1204 12/20/18  0054   TROPONIN I ng/mL <0 02 <0 02       Results from last 7 days  Lab Units 12/19/18  0133   LACTIC ACID mmol/L 0 7     ABG:  Lab Results   Component Value Date    PHART 7 465 (H) 12/22/2018    EXL5BMH 37 2 12/22/2018    PO2ART 124 8 12/22/2018    ELR4UQC 26 2 12/22/2018    BEART 2 4 12/22/2018    SOURCE Brachial, Right 12/22/2018     VBG:    Results from last 7 days  Lab Units 12/22/18  0504   ABG SOURCE  Brachial, Right         No results found for: [facility]   Imaging:     XR foot 3+ vw left   Final Result by Paris Barrera DO (12/21 2422)      Nondisplaced transverse fracture involves the proximal aspect of the proximal to the 3rd digit  Workstation performed: [id number]         CT head wo contrast   Final Result by Bryce Solomon MD (96/93 6829)      1  Postsurgical changes of left craniectomy and evacuation of left temporal intraparenchymal hemorrhage  2   A small amount of intraparenchymal/extra-axial hemorrhage remains within the anterior left temporal lobe and stable thin subdural hemorrhage along the left frontal region  3   Increased blood products along the cerebral falx when compared with prior exam    4   Persistent edema predominantly within the left temporal lobe and effacement of the left-sided cerebral sulci however there is interval resolution of midline shift and decreased effacement of the left lateral ventricle     5   Continued washout and interval evolution of intraventricular and subarachnoid blood products  Workstation performed: [id number]         XR chest portable ICU   Final Result by Barby Painting MD (12/20 1614)      Right subclavian catheter extending superiorly into the neck requiring repositioning  Workstation performed: [id number]         XR chest 1 view   Final Result by Marlee Perez DO (12/20 1109)      Multiple right-sided rib fractures  Consolidation at the base may represent atelectasis or pneumonia  Workstation performed: [id number]         XR chest portable ICU   Final Result by Marlee Perez DO (12/20 1019)      Mild central vascular congestion  Basilar opacities and effusions  Stable rib fractures  Workstation performed: [id number]         CT shoulder right wo contrast   Final Result by Giancarlo Avila MD (12/20 3922)      Acute fracture anterior glenoid rim with 1 to 2 mm anteromedial displacement  Acute comminuted fracture posterior acromion with 6 mm mediolateral distraction and 1 to 2 mm inferior cortical offset  No dislocation  Interval progression of right lower lobe and right upper lobe dependent airspace disease  This may represent subsegmental atelectasis, aspiration, and/or pulmonary contusion  Interval enlargement of small right pleural effusion  No other significant interval change  The examination demonstrates a significant  finding and was documented as such in [facility] for liaison and referring practitioner notification  Workstation performed: [id number]         CT head wo contrast   Final Result by Wilfredo Gibson MD (53/35 4116)      1  Left temporal lobe intraparenchymal hemorrhage with slight increase in surrounding edema as manifest by increased effacement of the left occipital horn when compared with prior examination    Slight increase in left to right midline shift now    measuring up to 6 mm, previously 4 mm    2   Stable subdural, subarachnoid and intraventricular blood products  No large delayed intracranial hemorrhage  The study was marked in [facility] for immediate notification  Workstation performed: [id number]         XR chest portable ICU   Final Result by Preston De Leon DO (12/19 1331)      Right basilar opacity partially obscuring the hemidiaphragm may represent atelectasis or infiltrate  Possible small basilar effusion  Workstation performed: [id number]         XR shoulder 2+ vw right   Final Result by Preston De Leon DO (12/19 4152)      Right-sided rib fractures again noted  Possible acromion fracture  The small glenoid fracture seen on CT scan of the chest is not clearly appreciated on this exam             Workstation performed: [id number]         XR trauma multiple   Final Result by Preston De Leon DO (12/19 6025)      Multiple right-sided rib fractures  Consolidation at the base may represent atelectasis or pneumonia  Workstation performed: [id number]         XR chest portable   Final Result by Geena Neal MD (12/19 5297)      Satisfactory intubation  Bibasilar subsegmental atelectasis  No pneumothorax  Workstation performed: [id number]         CT head wo contrast   Final Result by Ashely Agee MD (12/19 7783)      1  Stable size of large intraparenchymal hemorrhage centered in the left temporal lobe with surrounding mass effect and edema and adjacent left temporal to frontal subdural hemorrhage  Stable midline shift from left to right  2   Increased conspicuity of bilateral posterior subarachnoid and intraventricular blood products are likely due to redistribution  Workstation performed: [id number]         TRAUMA - CT chest abdomen pelvis w contrast   Final Result by Jyoti Epps MD (12/18 2132)      1  Multiple right-sided rib fractures with tiny right apical pneumothorax   Fractures extend from the second through eleventh ribs at the costovertebral junctions and fourth through eighth ribs laterally  This is consistent with flail chest  A right    anterior second rib fracture is severely displaced/angulated  2   Fracture of the anterior right glenoid, acromium, and first through fourth thoracic transverse processes on the right  3   Focal irregular opacity in the right upper lobe with adjacent 4 mm nodule  While contusion is possible given overlying trauma, the appearance of the lesion is most suspicious for a primary lung cancer and follow-up PET/CT is recommended  4   Borderline upper abdominal lymph nodes with density/stranding at the base of the small bowel mesentery  While mesenteric contusion can have this appearance in the setting of trauma  There are calcifications which suggest chronicity  Lymphoproliferative disorder cannot be excluded and follow-up and 3-6 months is recommended  5   Chronic interstitial lung disease of uncertain etiology  Given esophageal dilatation, this could represent NSIP in the setting of scleroderma  I personally discussed this study with Mayur Blum on 12/18/2018 at 8:45 PM                   Workstation performed: [id number]         CTA head and neck w wo contrast   Final Result by Missy Jeffrey MD (12/18 2132)         1  Atherosclerotic plaque at the right carotid bifurcation resulting in moderate (50-69%) stenosis  No evidence of carotid or vertebral artery dissection or pseudoaneurysm to suggest acute vascular injury  2   Displacement of left MCA branches by left temporal parietal hematoma  No evidence of aneurysm or vascular malformation  No significant stenosis in the major vessels of the [place]  3   Patent dural venous sinuses  Workstation performed: [id number]         TRAUMA - CT spine cervical wo contrast   Final Result by Jamee Carver MD (12/18 2108)      1    No cervical spine fracture or traumatic malalignment  2   Small right apical pneumothorax  3   Nondisplaced fractures of the right T1 and T2 transverse processes  Fracture of the posterior right second rib  I personally discussed this study with Joe Liner on 12/18/2018 at 8:45 PM                          Workstation performed: [id number]         TRAUMA - CT head wo contrast   Final Result by Breanne Barry MD (12/18 2101)      1  Large parenchymal hemorrhage on the left with associated left-sided subdural hematoma and bilateral subarachnoid hemorrhage including intraventricular hemorrhage  2   Mass effect and midline shift to the right measuring 4 mm  I personally discussed this study with Joe Liner on 12/18/2018 at 8:45 PM                      Workstation performed: [id number]         XR chest portable    (Results Pending)   XR chest portable    (Results Pending)       Micro:       Allergies: No Known Allergies  Medications:   Scheduled Meds:    Current Facility-Administered Medications:  acetaminophen 650 mg Oral Q8H PRN Deyvi Sahrpe MD    albuterol 2 5 mg Nebulization Q4H PRN [facility]    calcium chloride 1 g Intravenous Once Aurora Gonzalez MD    chlorhexidine 15 mL Swish & Spit Q12H [facility] Epi Contreras MD    fentaNYL 100 mcg/hr Intravenous Continuous Epi Contreras MD Last Rate: 100 mcg/hr (12/21/18 1909)   fentanyl citrate (PF) 50 mcg Intravenous Q2H PRN Deyvi Sharpe MD    hydrALAZINE 5 mg Intravenous Q6H PRN Deyvi Sharpe MD    influenza vaccine 0 5 mL Intramuscular Prior to discharge [facility], DO    insulin lispro 1-5 Units Subcutaneous HS Constantin Travis MD    insulin lispro 1-5 Units Subcutaneous TID AC Aurora Gonzalez MD    ipratropium 0 5 mg Nebulization Q6H [facility], DO    labetalol 5 mg Intravenous Q4H PRN Constantin Travis MD    levalbuterol 1 25 mg Nebulization Q6H [facility], DO    levETIRAcetam 500 mg Oral Q12H [address], CRNP multi-electrolyte 100 mL/hr Intravenous Continuous Nicki Santiago MD Last Rate: 100 mL/hr (12/22/18 0245)   potassium chloride 20 mEq Intravenous Once Aurora Gonzalez MD    pramipexole 0 25 mg Oral BID TRISTAN Arambula    propofol 5-50 mcg/kg/min Intravenous Titrated Rawland Milks Trager, DO Last Rate: 15 mcg/kg/min (12/22/18 0604)   QUEtiapine 25 mg Oral HS TRISTAN Arambula    senna-docusate sodium 1 tablet Oral HS Aurora Gonzalez MD      Continuous Infusions:    fentaNYL 100 mcg/hr Last Rate: 100 mcg/hr (12/21/18 1909)   multi-electrolyte 100 mL/hr Last Rate: 100 mL/hr (12/22/18 0245)   propofol 5-50 mcg/kg/min Last Rate: 15 mcg/kg/min (12/22/18 0604)     PRN Meds:    acetaminophen 650 mg Q8H PRN   albuterol 2 5 mg Q4H PRN   fentanyl citrate (PF) 50 mcg Q2H PRN   hydrALAZINE 5 mg Q6H PRN   influenza vaccine 0 5 mL Prior to discharge   labetalol 5 mg Q4H PRN     VTE Pharmacologic Prophylaxis: Pharmacologic VTE Prophylaxis contraindicated due to acute IPH  VTE Mechanical Prophylaxis: sequential compression device  Invasive lines and devices: Invasive Devices     Peripheral Intravenous Line            Peripheral IV 12/20/18 Left Antecubital 1 day    Peripheral IV 12/20/18 Left Arm 1 day          Drain            NG/OG/Enteral Tube Orogastric 16 Fr Left mouth 3 days    Urethral Catheter 16 Fr  3 days    Closed/Suction Drain Left Head Bulb 10 Fr  1 day          Airway            ETT  Cuffed 8 mm 3 days                     Portions of the record may have been created with voice recognition software  Occasional wrong word or "sound a like" substitutions may have occurred due to the inherent limitations of voice recognition software  Read the chart carefully and recognize, using context, where substitutions have occurred      David Bruce MD

## 2018-12-22 NOTE — RESPIRATORY THERAPY NOTE
RT Protocol Note  Eh Boy 59 y o  female MRN: [id number]  Unit/Bed#: [facility] 02 Encounter: [id number]    Assessment    Principal Problem:    Traumatic brain injury [facility])  Active Problems:    Multiple fractures of ribs, bilateral, initial encounter for closed fracture    Traumatic pneumothorax    Glenoid fracture of shoulder, right, closed, initial encounter    Intraparenchymal hemorrhage of brain ([address] )    Hyponatremia    Flail chest, initial encounter for closed fracture    Fracture of thoracic transverse process, closed, initial encounter ([address] )    Subdural hematoma (HCC)    Subarachnoid hemorrhage ([address] )    Diabetes mellitus, type II ([facility] )    Closed nondisplaced left toe fracture      Home Pulmonary Medications:  na       Past Medical History:   Diagnosis Date    Diabetes mellitus, type II ([place] [address] )      Social History     Social History    Marital status: Single     Spouse name: N/A    Number of children: N/A    Years of education: N/A     Social History Main Topics    Smoking status: Current Every Day Smoker     Packs/day: 1 00     Years: 30 00    Smokeless tobacco: Never Used    Alcohol use No    Drug use: No    Sexual activity: No     Other Topics Concern    None     Social History Narrative    None       Subjective         Objective    Physical Exam:   Assessment Type: Assess only  General Appearance: Sedated  Respiratory Pattern: Assisted  Chest Assessment: Chest expansion symmetrical  Bilateral Breath Sounds: Expiratory wheezes, Coarse, Inspiratory wheezes  Cough: Productive  Suction: ET Tube  O2 Device: Ventilator    Vitals:  Blood pressure 120/52, pulse 86, temperature 98 9 °F (37 2 °C), temperature source Probe, resp  rate 19, height 5' 3" (1 6 m), weight 57 6 kg (126 lb 15 8 oz), SpO2 97 %        Results from last 7 days  Lab Units 12/21/18 2017   PH ART  7 377   PCO2 ART mm Hg 40 6   PO2 ART mm Hg 75 8   HCO3 ART mmol/L 23 3   BASE EXC ART mmol/L -1 7   O2 CONTENT ART mL/dL 16 3   O2 HGB, ARTERIAL % 94 0   ABG SOURCE  Line, Arterial       Imaging and other studies: I have personally reviewed pertinent reports  O2 Device: Ventilator     Plan    Respiratory Plan: Vent/NIV/HFNC  Airway Clearance Plan: Discontinue Protocol     Resp Comments: Pt  evaluated per Respiratory Protocol  Pt's breaqth sounds have become coarse with inspiratory and expiratory wheezes bilaterally at this time  Pt  given prn albuterol Tx  with slight improvement to breath sounds post Tx   will order Xopenex/Atrovent udns Q6ATC per Respiratory Protocol at this time

## 2018-12-23 ENCOUNTER — APPOINTMENT (INPATIENT)
Dept: RADIOLOGY | Facility: HOSPITAL | Age: 63
DRG: 003 | End: 2018-12-23
Payer: MEDICARE

## 2018-12-23 LAB
ALBUMIN SERPL BCP-MCNC: 1.5 G/DL (ref 3.5–5)
ALP SERPL-CCNC: 129 U/L (ref 46–116)
ALT SERPL W P-5'-P-CCNC: 40 U/L (ref 12–78)
ANION GAP SERPL CALCULATED.3IONS-SCNC: 5 MMOL/L (ref 4–13)
AST SERPL W P-5'-P-CCNC: 56 U/L (ref 5–45)
BASOPHILS # BLD AUTO: 0.03 THOUSANDS/ΜL (ref 0–0.1)
BASOPHILS NFR BLD AUTO: 0 % (ref 0–1)
BILIRUB SERPL-MCNC: 0.48 MG/DL (ref 0.2–1)
BUN SERPL-MCNC: 7 MG/DL (ref 5–25)
CA-I BLD-SCNC: 1.14 MMOL/L (ref 1.12–1.32)
CALCIUM SERPL-MCNC: 8.2 MG/DL (ref 8.3–10.1)
CHLORIDE SERPL-SCNC: 103 MMOL/L (ref 100–108)
CO2 SERPL-SCNC: 29 MMOL/L (ref 21–32)
CREAT SERPL-MCNC: 0.33 MG/DL (ref 0.6–1.3)
EOSINOPHIL # BLD AUTO: 0.11 THOUSAND/ΜL (ref 0–0.61)
EOSINOPHIL NFR BLD AUTO: 1 % (ref 0–6)
ERYTHROCYTE [DISTWIDTH] IN BLOOD BY AUTOMATED COUNT: 15.7 % (ref 11.6–15.1)
GFR SERPL CREATININE-BSD FRML MDRD: 119 ML/MIN/1.73SQ M
GLUCOSE SERPL-MCNC: 119 MG/DL (ref 65–140)
GLUCOSE SERPL-MCNC: 150 MG/DL (ref 65–140)
GLUCOSE SERPL-MCNC: 150 MG/DL (ref 65–140)
HCT VFR BLD AUTO: 28.4 % (ref 34.8–46.1)
HGB BLD-MCNC: 9.3 G/DL (ref 11.5–15.4)
IMM GRANULOCYTES # BLD AUTO: 0.13 THOUSAND/UL (ref 0–0.2)
IMM GRANULOCYTES NFR BLD AUTO: 1 % (ref 0–2)
LYMPHOCYTES # BLD AUTO: 1.05 THOUSANDS/ΜL (ref 0.6–4.47)
LYMPHOCYTES NFR BLD AUTO: 11 % (ref 14–44)
MAGNESIUM SERPL-MCNC: 1.8 MG/DL (ref 1.6–2.6)
MCH RBC QN AUTO: 27.9 PG (ref 26.8–34.3)
MCHC RBC AUTO-ENTMCNC: 32.7 G/DL (ref 31.4–37.4)
MCV RBC AUTO: 85 FL (ref 82–98)
MONOCYTES # BLD AUTO: 0.79 THOUSAND/ΜL (ref 0.17–1.22)
MONOCYTES NFR BLD AUTO: 8 % (ref 4–12)
NEUTROPHILS # BLD AUTO: 7.72 THOUSANDS/ΜL (ref 1.85–7.62)
NEUTS SEG NFR BLD AUTO: 79 % (ref 43–75)
NRBC BLD AUTO-RTO: 0 /100 WBCS
PHOSPHATE SERPL-MCNC: 2.6 MG/DL (ref 2.3–4.1)
PLATELET # BLD AUTO: 222 THOUSANDS/UL (ref 149–390)
PMV BLD AUTO: 9.6 FL (ref 8.9–12.7)
POTASSIUM SERPL-SCNC: 3.5 MMOL/L (ref 3.5–5.3)
PROCALCITONIN SERPL-MCNC: 0.27 NG/ML
PROT SERPL-MCNC: 4.9 G/DL (ref 6.4–8.2)
RBC # BLD AUTO: 3.33 MILLION/UL (ref 3.81–5.12)
SODIUM SERPL-SCNC: 137 MMOL/L (ref 136–145)
WBC # BLD AUTO: 9.83 THOUSAND/UL (ref 4.31–10.16)

## 2018-12-23 PROCEDURE — 82948 REAGENT STRIP/BLOOD GLUCOSE: CPT

## 2018-12-23 PROCEDURE — 82330 ASSAY OF CALCIUM: CPT | Performed by: EMERGENCY MEDICINE

## 2018-12-23 PROCEDURE — 71045 X-RAY EXAM CHEST 1 VIEW: CPT

## 2018-12-23 PROCEDURE — 84100 ASSAY OF PHOSPHORUS: CPT | Performed by: EMERGENCY MEDICINE

## 2018-12-23 PROCEDURE — 85025 COMPLETE CBC W/AUTO DIFF WBC: CPT | Performed by: EMERGENCY MEDICINE

## 2018-12-23 PROCEDURE — 99291 CRITICAL CARE FIRST HOUR: CPT | Performed by: SURGERY

## 2018-12-23 PROCEDURE — 83735 ASSAY OF MAGNESIUM: CPT | Performed by: EMERGENCY MEDICINE

## 2018-12-23 PROCEDURE — 94003 VENT MGMT INPAT SUBQ DAY: CPT

## 2018-12-23 PROCEDURE — 99024 POSTOP FOLLOW-UP VISIT: CPT | Performed by: NEUROLOGICAL SURGERY

## 2018-12-23 PROCEDURE — 93005 ELECTROCARDIOGRAM TRACING: CPT

## 2018-12-23 PROCEDURE — 94760 N-INVAS EAR/PLS OXIMETRY 1: CPT

## 2018-12-23 PROCEDURE — 74018 RADEX ABDOMEN 1 VIEW: CPT

## 2018-12-23 PROCEDURE — 80053 COMPREHEN METABOLIC PANEL: CPT | Performed by: EMERGENCY MEDICINE

## 2018-12-23 PROCEDURE — 94640 AIRWAY INHALATION TREATMENT: CPT

## 2018-12-23 PROCEDURE — 84145 PROCALCITONIN (PCT): CPT | Performed by: GENERAL PRACTICE

## 2018-12-23 RX ORDER — HEPARIN SODIUM 5000 [USP'U]/ML
5000 INJECTION, SOLUTION INTRAVENOUS; SUBCUTANEOUS EVERY 8 HOURS SCHEDULED
Status: DISCONTINUED | OUTPATIENT
Start: 2018-12-23 | End: 2019-01-03

## 2018-12-23 RX ORDER — METOCLOPRAMIDE HYDROCHLORIDE 5 MG/ML
10 INJECTION INTRAMUSCULAR; INTRAVENOUS ONCE
Status: COMPLETED | OUTPATIENT
Start: 2018-12-23 | End: 2018-12-23

## 2018-12-23 RX ORDER — POTASSIUM CHLORIDE 14.9 MG/ML
20 INJECTION INTRAVENOUS ONCE
Status: COMPLETED | OUTPATIENT
Start: 2018-12-23 | End: 2018-12-23

## 2018-12-23 RX ORDER — BISACODYL 10 MG
10 SUPPOSITORY, RECTAL RECTAL DAILY
Status: DISCONTINUED | OUTPATIENT
Start: 2018-12-23 | End: 2018-12-28

## 2018-12-23 RX ORDER — FUROSEMIDE 10 MG/ML
20 INJECTION INTRAMUSCULAR; INTRAVENOUS
Status: DISCONTINUED | OUTPATIENT
Start: 2018-12-23 | End: 2018-12-24

## 2018-12-23 RX ORDER — FUROSEMIDE 10 MG/ML
20 INJECTION INTRAMUSCULAR; INTRAVENOUS ONCE
Status: COMPLETED | OUTPATIENT
Start: 2018-12-23 | End: 2018-12-23

## 2018-12-23 RX ORDER — ALBUMIN (HUMAN) 12.5 G/50ML
25 SOLUTION INTRAVENOUS EVERY 6 HOURS
Status: COMPLETED | OUTPATIENT
Start: 2018-12-23 | End: 2018-12-24

## 2018-12-23 RX ORDER — POLYETHYLENE GLYCOL 3350 17 G/17G
17 POWDER, FOR SOLUTION ORAL DAILY
Status: DISCONTINUED | OUTPATIENT
Start: 2018-12-23 | End: 2018-12-28

## 2018-12-23 RX ORDER — MORPHINE SULFATE 4 MG/ML
4 INJECTION, SOLUTION INTRAMUSCULAR; INTRAVENOUS ONCE
Status: COMPLETED | OUTPATIENT
Start: 2018-12-23 | End: 2018-12-23

## 2018-12-23 RX ADMIN — ALBUMIN HUMAN 25 G: 0.25 SOLUTION INTRAVENOUS at 14:07

## 2018-12-23 RX ADMIN — MORPHINE SULFATE 4 MG: 4 INJECTION INTRAVENOUS at 17:56

## 2018-12-23 RX ADMIN — FENTANYL CITRATE 100 MCG/HR: 50 INJECTION, SOLUTION INTRAMUSCULAR; INTRAVENOUS at 17:57

## 2018-12-23 RX ADMIN — CHLORHEXIDINE GLUCONATE 0.12% ORAL RINSE 15 ML: 1.2 LIQUID ORAL at 21:08

## 2018-12-23 RX ADMIN — PRAMIPEXOLE DIHYDROCHLORIDE 0.25 MG: 0.25 TABLET ORAL at 22:00

## 2018-12-23 RX ADMIN — POTASSIUM CHLORIDE 20 MEQ: 200 INJECTION, SOLUTION INTRAVENOUS at 06:42

## 2018-12-23 RX ADMIN — PROPOFOL 50 MCG/KG/MIN: 10 INJECTION, EMULSION INTRAVENOUS at 17:57

## 2018-12-23 RX ADMIN — LEVETIRACETAM 500 MG: 100 SOLUTION ORAL at 21:11

## 2018-12-23 RX ADMIN — POLYETHYLENE GLYCOL 3350 17 G: 17 POWDER, FOR SOLUTION ORAL at 08:37

## 2018-12-23 RX ADMIN — BISACODYL 10 MG: 10 SUPPOSITORY RECTAL at 09:43

## 2018-12-23 RX ADMIN — LEVALBUTEROL 1.25 MG: 1.25 SOLUTION, CONCENTRATE RESPIRATORY (INHALATION) at 00:54

## 2018-12-23 RX ADMIN — IPRATROPIUM BROMIDE 0.5 MG: 0.5 SOLUTION RESPIRATORY (INHALATION) at 13:11

## 2018-12-23 RX ADMIN — SENNOSIDES AND DOCUSATE SODIUM 1 TABLET: 8.6; 5 TABLET ORAL at 21:11

## 2018-12-23 RX ADMIN — FENTANYL CITRATE 100 MCG/HR: 50 INJECTION, SOLUTION INTRAMUSCULAR; INTRAVENOUS at 06:16

## 2018-12-23 RX ADMIN — NICOTINE 14 MG: 14 PATCH TRANSDERMAL at 08:37

## 2018-12-23 RX ADMIN — LEVALBUTEROL 1.25 MG: 1.25 SOLUTION, CONCENTRATE RESPIRATORY (INHALATION) at 07:08

## 2018-12-23 RX ADMIN — ACETAMINOPHEN 650 MG: 160 SUSPENSION ORAL at 06:48

## 2018-12-23 RX ADMIN — LEVETIRACETAM 500 MG: 100 SOLUTION ORAL at 08:36

## 2018-12-23 RX ADMIN — PRAMIPEXOLE DIHYDROCHLORIDE 0.25 MG: 0.25 TABLET ORAL at 17:57

## 2018-12-23 RX ADMIN — QUETIAPINE FUMARATE 25 MG: 25 TABLET ORAL at 21:12

## 2018-12-23 RX ADMIN — PROPOFOL 40 MCG/KG/MIN: 10 INJECTION, EMULSION INTRAVENOUS at 08:37

## 2018-12-23 RX ADMIN — ACETAMINOPHEN 650 MG: 160 SUSPENSION ORAL at 18:31

## 2018-12-23 RX ADMIN — POTASSIUM CHLORIDE 20 MEQ: 200 INJECTION, SOLUTION INTRAVENOUS at 08:37

## 2018-12-23 RX ADMIN — LEVALBUTEROL 1.25 MG: 1.25 SOLUTION, CONCENTRATE RESPIRATORY (INHALATION) at 19:06

## 2018-12-23 RX ADMIN — METOCLOPRAMIDE 10 MG: 5 INJECTION, SOLUTION INTRAMUSCULAR; INTRAVENOUS at 14:07

## 2018-12-23 RX ADMIN — DEXMEDETOMIDINE HYDROCHLORIDE 0.5 MCG/KG/HR: 100 INJECTION, SOLUTION INTRAVENOUS at 17:03

## 2018-12-23 RX ADMIN — HEPARIN SODIUM 5000 UNITS: 5000 INJECTION INTRAVENOUS; SUBCUTANEOUS at 21:11

## 2018-12-23 RX ADMIN — HEPARIN SODIUM 5000 UNITS: 5000 INJECTION INTRAVENOUS; SUBCUTANEOUS at 10:49

## 2018-12-23 RX ADMIN — FENTANYL CITRATE 50 MCG: 50 INJECTION, SOLUTION INTRAMUSCULAR; INTRAVENOUS at 16:48

## 2018-12-23 RX ADMIN — FUROSEMIDE 20 MG: 10 INJECTION, SOLUTION INTRAMUSCULAR; INTRAVENOUS at 08:36

## 2018-12-23 RX ADMIN — LEVALBUTEROL 1.25 MG: 1.25 SOLUTION, CONCENTRATE RESPIRATORY (INHALATION) at 13:11

## 2018-12-23 RX ADMIN — ACETAMINOPHEN 650 MG: 160 SUSPENSION ORAL at 00:53

## 2018-12-23 RX ADMIN — IPRATROPIUM BROMIDE 0.5 MG: 0.5 SOLUTION RESPIRATORY (INHALATION) at 07:08

## 2018-12-23 RX ADMIN — ACETAMINOPHEN 650 MG: 160 SUSPENSION ORAL at 13:00

## 2018-12-23 RX ADMIN — PROPOFOL 35 MCG/KG/MIN: 10 INJECTION, EMULSION INTRAVENOUS at 15:28

## 2018-12-23 RX ADMIN — ALBUMIN HUMAN 25 G: 0.25 SOLUTION INTRAVENOUS at 09:46

## 2018-12-23 RX ADMIN — DEXMEDETOMIDINE HYDROCHLORIDE 0.1 MCG/KG/HR: 100 INJECTION, SOLUTION INTRAVENOUS at 13:00

## 2018-12-23 RX ADMIN — IPRATROPIUM BROMIDE 0.5 MG: 0.5 SOLUTION RESPIRATORY (INHALATION) at 19:06

## 2018-12-23 RX ADMIN — CHLORHEXIDINE GLUCONATE 0.12% ORAL RINSE 15 ML: 1.2 LIQUID ORAL at 08:36

## 2018-12-23 RX ADMIN — PROPOFOL 50 MCG/KG/MIN: 10 INJECTION, EMULSION INTRAVENOUS at 05:04

## 2018-12-23 RX ADMIN — FUROSEMIDE 20 MG: 10 INJECTION, SOLUTION INTRAMUSCULAR; INTRAVENOUS at 17:00

## 2018-12-23 RX ADMIN — ALBUMIN HUMAN 25 G: 0.25 SOLUTION INTRAVENOUS at 21:08

## 2018-12-23 RX ADMIN — IPRATROPIUM BROMIDE 0.5 MG: 0.5 SOLUTION RESPIRATORY (INHALATION) at 00:54

## 2018-12-23 RX ADMIN — INSULIN LISPRO 1 UNITS: 100 INJECTION, SOLUTION INTRAVENOUS; SUBCUTANEOUS at 00:23

## 2018-12-23 NOTE — PROGRESS NOTES
Assessment and Plan  POD3 left hemicraniectomy  - cont care  - wean sedation as able  - drain removed  - okay for dvt ppx  - no further nsx intervention anticipated acutely  Plan for fu in two weeks with repeat hct  Will see as needed call with questions    Poor sats and agitation overnight  Temp:  [97 2 °F (36 2 °C)-101 1 °F (38 4 °C)] 100 °F (37 8 °C)  HR:  [70-82] 74  Resp:  [12-32] 14  BP: ()/(46-74) 106/54  FiO2 (%):  [60] 60  Sedated, febrile overnight  Perrl  Flap soft wd left to pain, minimal response on right      Results from last 7 days  Lab Units 12/23/18 0447 12/22/18  0516 12/21/18  0444  12/20/18  1153   WBC Thousand/uL 9 83 9 62 9 33  < > 9 07   HEMOGLOBIN g/dL 9 3* 10 0* 9 9*  < > 8 8*   HEMATOCRIT % 28 4* 29 9* 29 4*  < > 27 0*   PLATELETS Thousands/uL 222 209 180  < > 184   NEUTROS PCT % 79*  --  78*  --  89*   MONOS PCT % 8  --  7  --  5   MONO PCT %  --  7  --   --   --    < > = values in this interval not displayed  Results from last 7 days  Lab Units 12/23/18 0447 12/22/18  0516 12/21/18  0444  12/20/18  1029 12/20/18  0953 12/20/18  0917  12/18/18  2030   POTASSIUM mmol/L 3 5 3 3* 3 7  < >  --   --   --   < > 3 7   CHLORIDE mmol/L 103 103 105  < >  --   --   --   < > 88*   CO2 mmol/L 29 25 26  < >  --   --   --   < > 26   CO2, I-STAT mmol/L  --   --   --   --  22 22 27  < >  --    BUN mg/dL 7 6 8  < >  --   --   --   < > 9   CREATININE mg/dL 0 33* 0 32* 0 25*  < >  --   --   --   < > 0 42*   CALCIUM mg/dL 8 2* 7 7* 7 5*  < >  --   --   --   < > 8 7   ALK PHOS U/L 129*  --   --   --   --   --   --   --  138*   ALT U/L 40  --   --   --   --   --   --   --  96*   AST U/L 56*  --   --   --   --   --   --   --  130*   GLUCOSE, ISTAT mg/dl  --   --   --   --  117 121 125  < >  --    < > = values in this interval not displayed      Results from last 7 days  Lab Units 12/23/18  0447 12/21/18  0444 12/20/18  0054   MAGNESIUM mg/dL 1 8 2 0 1 8       Results from last 7 days  Lab Units 12/23/18  0447 12/22/18  0516 12/21/18  0444   PHOSPHORUS mg/dL 2 6 2 2* 2 2*       Results from last 7 days  Lab Units 12/21/18  0444 12/20/18  1153 12/18/18 2030   INR  1 20* 1 29* 1 01   PTT seconds  --  31 25*     No results found for: TROPONINT  ABG:  Lab Results   Component Value Date    PHART 7 465 (H) 12/22/2018    YPV9DMY 37 2 12/22/2018    PO2ART 124 8 12/22/2018    AKM2VMO 26 2 12/22/2018    BEART 2 4 12/22/2018    SOURCE Brachial, Right 12/22/2018

## 2018-12-23 NOTE — RESPIRATORY THERAPY NOTE
RT Ventilator Management Note  Grant Kerns 59 y o  female MRN: [id number]  Unit/Bed#: [facility] 02 Encounter: [id number]      Daily Screen       12/22/2018 0742 12/23/2018 0708          Patient safety screen outcome[de-identified] Failed Failed      Not Ready for Weaning due to[de-identified] Underline problem not resolved Underline problem not resolved              Physical Exam:   Assessment Type: Assess only  General Appearance: Sedated  Respiratory Pattern: Assisted  Chest Assessment: Chest expansion symmetrical  Bilateral Breath Sounds: Expiratory wheezes, Coarse  Cough: Productive  Suction: ET Tube  O2 Device: Ventilator      Resp Comments: (P) Pt  is more sedated now  Sats are high 90's   peep and FIO2 decreased

## 2018-12-23 NOTE — PROGRESS NOTES
Progress Note - Critical Care   Srinivas Rivas 59 y o  female MRN: [id number]  Unit/Bed#: MICU 02 Encounter: [id number]    Attending Physician: Alfredo Jackson DO    Reason for ICU Admission:  Traumatic brain injury, multiple traumatic of musculoskeletal injuries, status post left hemicraniectomy pod# 3    ______________________________________________________________________  Assessment and Plan:   Principal Problem:    Traumatic brain injury [facility])  Active Problems:    Multiple fractures of ribs, bilateral, initial encounter for closed fracture    Traumatic pneumothorax    Glenoid fracture of shoulder, right, closed, initial encounter    Intraparenchymal hemorrhage of brain ([address] )    Flail chest, initial encounter for closed fracture    Fracture of thoracic transverse process, closed, initial encounter ([address] )    Subdural hematoma ([address] )    Subarachnoid hemorrhage ([address] )    Diabetes mellitus, type II ([address] )    Closed nondisplaced left toe fracture    Hypokalemia    Hypophosphatemia    Acute encephalopathy    Status post craniectomy    Acute respiratory failure with hypoxia ([address] )    Mucus plugging of bronchi    Acute respiratory failure ([address] )  Resolved Problems:    Hyponatremia      Neurology:  GCS: 3T (E: 1, V: 1T, M: 1)  Sedatives:  Propofol @ 50  Analgesia:  Fentanyl @ 100, tylenol scheduled  Seizure Prophylaxis:  Keppra 500 b i d    Delirium precautions: CAM-ICU, regulate sleep/wake cycle  A/P:   S/P jesi craniectomy, subgaleal drain placement  -Q 1 hour neuro checks  -drain removed 12/22  -follow neurosurgery recs  -pramipexole 0 25mg BID, seroquel 25mg qhs  -per nursing and neurosurgery patient 9T off of sedation    Cardiovascular:   Temp:  [97 2 °F (36 2 °C)-101 1 °F (38 4 °C)] 100 4 °F (38 °C)  HR:  [70-92] 80  Resp:  [12-32] 16  BP: ()/(46-74) 109/50  SpO2:  [80 %-100 %] 99 %    Results from last 7 days  Lab Units 12/19/18  0133   LACTIC ACID mmol/L 0 7     Medications:  Labetalol and hydralazine p r n    A/P:   No acute issues  Maintain map greater than 65, systolic blood pressure less than 140    Pulmonary:   Temp:  [97 2 °F (36 2 °C)-101 1 °F (38 4 °C)] 100 4 °F (38 °C)  HR:  [70-92] 80  Resp:  [12-32] 16  BP: ()/(46-74) 109/50  SpO2:  [80 %-100 %] 99 %   Vent Mode: AC/VC  FiO2 (%):  [60-80] 60  ABG: ABG:  Lab Results   Component Value Date    PHART 7 465 (H) 12/22/2018    BFP0EQY 37 2 12/22/2018    PO2ART 124 8 12/22/2018    XYE9EYZ 26 2 12/22/2018    BEART 2 4 12/22/2018    SOURCE Brachial, Right 12/22/2018     VBG:    Results from last 7 days  Lab Units 12/22/18  0504   ABG SOURCE  Brachial, Right       A/P:   Acute hypoxic respiratory failure  -remains intubated, tracheostomy planned for 12/24  -14/400/60%/8  -wean ventilator settings as tolerated  -Maintain O2 sat > 92%  -morning cxr pending    History of COPD  -scheduled breathing treatments  -xopenex, Atrovent    History of smoking-nicotine patch    GI:  Medications:  None  GI ppx:  None  Bowel Regimen:  Senokot, miralax, dulcolax  A/P:   Lack of bowel movements  - added miralax and dulcolax  - KUB pending    Nutrition  -Tube feeds at goal  -PEG placement tomorrow    Hypoalbuminemia  -continue to monitor    :     Results from last 7 days  Lab Units 12/23/18  0447 12/22/18  0516 12/21/18  0444 12/20/18  1727 12/20/18  1153 12/20/18  0526 12/20/18  0054   BUN mg/dL 7 6 8 7 6 7 7   CREATININE mg/dL 0 33* 0 32* 0 25* 0 28* 0 30* 0 27* 0 34*     I/O       12/21 0701 - 12/22 0700 12/22 0701 - 12/23 0700    I V  (mL/kg) 3316 8 (57 6) 867 7 (15 1)    NG/ 370    IV Piggyback 300 203 3    Feedings 500 1280    Total Intake(mL/kg) 4506 8 (78 2) 2721 1 (47 2)    Urine (mL/kg/hr) 945 (0 7) 2110 (1 5)    Drains 100 15    Total Output 1045 2125    Net +3461 8 +596 1              [place]:  Indwelling  I/O: 2721/2125  UOP (mL/kg/hr): 1 5  Fluid Balance: +9624, lasix 20 this morning  A/P:  No acute issues    F/E/N:   Fluids:  None  Electrolytes: Replete as needed, K this morning    Results from last 7 days  Lab Units 18  0447 18  0516 18  0444 18  1727 18  1153 18  1029 18  0953  18  0526  18  0054  18  0455  18  2229   SODIUM mmol/L 137 137 138 137 137  --   --   --  129*  --  129*  < > 128*  < >  --    POTASSIUM mmol/L 3 5 3 3* 3 7 3 8 3 9  --   --   --  3 5  --  3 8  < > 4 2  < >  --    CHLORIDE mmol/L 103 103 105 106 106  --   --   --  97*  --  97*  < > 95*  < >  --    CO2 mmol/L 29 25 26 24 23  --   --   --  24  --  24  < > 25  < >  --    CO2, I-STAT mmol/L  --   --   --   --   --  22 22  < >  --   < >  --   --   --   --   --    ANION GAP mmol/L 5 9 7 7 8  --   --   --  8  --  8  < > 8  < >  --    CALCIUM mg/dL 8 2* 7 7* 7 5* 7 9* 8 2*  --   --   --  7 6*  --  8 0*  < > 7 9*  < >  --    MAGNESIUM mg/dL 1 8  --  2 0  --   --   --   --   --   --   --  1 8  --  2 3  --  1 4*   PHOSPHORUS mg/dL 2 6 2 2* 2 2*  --   --   --   --   --   --   --  2 2*  --  3 2  --  3 5   < > = values in this interval not displayed  Nutrition: Diet Enteral/Parenteral; Tube Feeding No Oral Diet; Jevity 1 2 Sae; Cyclic; 20; 16 hours; 709; Water; Every 6 hours    ID:     Results from last 7 days  Lab Units 18  0516 18  0444 18  1617 18  1153 18  0526 18  0455   WBC Thousand/uL 9 83 9 62 9 33 8 31 9 07 9 39 7 70     CULTURES:     Temp (24hrs), Av 7 °F (37 6 °C), Min:97 2 °F (36 2 °C), Max:101 1 °F (38 4 °C)    Antibiotics (Day/of):   None  A/P:  No acute issues  Mild fever overnight at 101 1  No leukocytosis  Procal this morning    HEME:    Results from last 7 days  Lab Units 18  0447 18  0516 18  0444 18  0125 18  2205 18  1617 18  1153  18  0526  18  0455   HEMOGLOBIN g/dL 9 3* 10 0* 9 9* 9 8* 6 9* 8 1* 8 8*  --  9 5*  --  10 2*   I STAT HEMOGLOBIN   --   --   --   --   --   --   --   < >  --   < >  -- HEMATOCRIT % 28 4* 29 9* 29 4* 29 5* 20 9* 24 4* 27 0*  --  29 4*  --  30 2*   HEMATOCRIT, ISTAT   --   --   --   --   --   --   --   < >  --   < >  --    PLATELETS Thousands/uL 222 209 180  --   --  175 184  --  216  --  262   < > = values in this interval not displayed  Results from last 7 days  Lab Units 12/21/18  0444 12/20/18  1153 12/18/18  2030   INR  1 20* 1 29* 1 01   PTT seconds  --  31 25*     VTE Pharmacologic Prophylaxis: Pharmacologic VTE Prophylaxis contraindicated due to Recent procedure  VTE Mechanical Prophylaxis: sequential compression device  A/P:  No acute issues  Continue to monitor hemoglobin    ENDO:   POCG:    Results from last 7 days  Lab Units 12/20/18  1029 12/20/18  0953 12/20/18  0917 12/20/18  0059   GLUCOSE, ISTAT mg/dl 117 121 125 99     Medications:  SSI  A/P:  No acute issues, continue to monitor glucose     MSK/SKIN:   Complex shoulder fracture  -non operative management per Orthopedics  Routine skin care  Frequent offloading     LDA:  Invasive Devices     Peripherally Inserted Central Catheter Line            PICC Line 05/55/55 Left Basilic less than 1 day          Drain            NG/OG/Enteral Tube Orogastric 16 Fr Left mouth 4 days    Urethral Catheter 16 Fr  4 days          Airway            ETT  Cuffed 8 mm 4 days                Disposition:  ICU care    Code Status: Level 1 - Full Code    Counseling / Coordination of Care    ______________________________________________________________________    Chief Complaint:  None    24 Hour Events:  No acute events overnight  Per nursing, when the patient is repositioned she tends to get agitated and has a decrease in her oxygen saturation which then rapidly improves  Overnight tube feeds were held from approximately 10:00 p m  To 6:00 a m  Laura Saba During this time 400 cc of Jevity was removed from the patient's stomach    Abdomen is soft and mildly distended, lack of bowel movements, consider prokinetic versus suppository  ______________________________________________________________________    Physical Exam:     Physical Exam   Constitutional: No distress  She is intubated  HENT:   Head:       Right Ear: External ear normal    Left Ear: External ear normal    Eyes: Pupils are equal, round, and reactive to light  Conjunctivae are normal  Right eye exhibits no discharge  Left eye exhibits no discharge  No scleral icterus  Neck: Neck supple  No JVD present  No tracheal deviation present  No thyromegaly present  Cardiovascular: Normal rate, regular rhythm, normal heart sounds and intact distal pulses  Exam reveals no gallop and no friction rub  No murmur heard  Pulmonary/Chest: Effort normal  She is intubated  No respiratory distress  She has no wheezes  She has rhonchi in the right lower field and the left lower field  She has no rales  Abdominal: Soft  Bowel sounds are normal  She exhibits distension ( Mildly distended)  She exhibits no mass  There is no tenderness  There is no guarding  Musculoskeletal: She exhibits edema ( Bilateral hands appear mildly edematous)  She exhibits no tenderness or deformity  Neurological: She exhibits normal muscle tone  GCS 3 T upon examination this morning  Patient appears to be sensitive to sedation and is currently on propofol  According to nursing reports during sedation breaks she is a 9T  Skin: Skin is warm  She is not diaphoretic  Multiple, mild abrasions and ecchymosis on bilateral upper extremities   Vitals reviewed      ______________________________________________________________________  Vitals:    18 0310 18 0339 18 0410 18 0510   BP: (!) 100/49  (!) 96/46 109/50   Pulse: 76  72 80   Resp: 19  16 16   Temp: 99 7 °F (37 6 °C)  100 4 °F (38 °C) 100 4 °F (38 °C)   TempSrc:       SpO2: 98% 98% 98% 99%   Weight:       Height:           Temperature:   Temp (24hrs), Av 7 °F (37 6 °C), Min:97 2 °F (36 2 °C), Max:101 1 °F (38 4 °C)    Current Temperature: 100 4 °F (38 °C)  Weights:   IBW: 52 4 kg    Body mass index is 22 49 kg/m²  Weight (last 2 days)     None        Hemodynamic Monitoring:       Non-Invasive/Invasive Ventilation Settings:  Respiratory    Lab Data (Last 4 hours)    None         O2/Vent Data (Last 4 hours)      12/23 0339           Vent Mode AC/VC       Resp Rate (BPM) (BPM) 14       Vt (mL) (mL) 400       FIO2 (%) (%) 60       PEEP (cmH2O) (cmH2O) 8       MV 7 21                 No results found for: PHART, AHV1LRC, PO2ART, OAO3HSV, U5TOOVMV, BEART, SOURCE    Intake and Outputs:  I/O       12/21 0701 - 12/22 0700 12/22 0701 - 12/23 0700    I V  (mL/kg) 3316 8 (57 6) 867 7 (15 1)    NG/ 370    IV Piggyback 300 203 3    Feedings 500 1280    Total Intake(mL/kg) 4506 8 (78 2) 2721 1 (47 2)    Urine (mL/kg/hr) 945 (0 7) 2110 (1 5)    Drains 100 15    Total Output 1045 2125    Net +3461 8 +596 1                Nutrition:        Diet Orders            Start     Ordered    12/21/18 1037  Diet Enteral/Parenteral; Tube Feeding No Oral Diet; Jevity 1 2 Sae; Cyclic; 20; 16 hours; 368; Water; Every 6 hours  Diet effective now     Comments:  Increase by 20 cc/hr every 4 hours to goal of 80 cc/hr   Question Answer Comment   Diet Type Enteral/Parenteral    Enteral/Parenteral Tube Feeding No Oral Diet    Tube Feeding Formula: Jevity 1 2 Sae    Bolus/Cyclic/Continuous Cyclic    Tube Feeding Cyclic Rate (mL/hr): 20    Tube Feeding Cyclic: Administer over: 16 hours    Tube Feeding water flush (mL): 100    Water Flush type: Water    Water flush frequency: Every 6 hours    RD to adjust diet per protocol?  Yes        12/21/18 1038          Labs:     Results from last 7 days  Lab Units 12/23/18  0447 12/22/18  0516 12/21/18  0444 12/21/18  0125 12/20/18  2205 12/20/18  1617 12/20/18  1153  12/20/18  0526  12/19/18  0455 12/18/18  2028   WBC Thousand/uL 9 83 9 62 9 33  --   --  8 31 9 07  --  9 39  --  7 70 13 08*   HEMOGLOBIN g/dL 9 3* 10 0* 9 9* 9 8* 6 9* 8 1* 8 8*  --  9 5*  --  10 2* 11 6   I STAT HEMOGLOBIN   --   --   --   --   --   --   --   < >  --   < >  --   --    HEMATOCRIT % 28 4* 29 9* 29 4* 29 5* 20 9* 24 4* 27 0*  --  29 4*  --  30 2* 35 3   HEMATOCRIT, ISTAT   --   --   --   --   --   --   --   < >  --   < >  --   --    PLATELETS Thousands/uL 222 209 180  --   --  175 184  --  216  --  262 273   NEUTROS PCT % 79*  --  78*  --   --   --  89*  --   --   --  76* 89*   BANDS PCT %  --  3  --   --   --   --   --   --   --   --   --   --    MONOS PCT % 8  --  7  --   --   --  5  --   --   --  10 6   MONO PCT %  --  7  --   --   --   --   --   --   --   --   --   --    < > = values in this interval not displayed      Results from last 7 days  Lab Units 12/23/18  0447 12/22/18  0516 12/21/18  0444 12/20/18  1727 12/20/18  1153 12/20/18  1029 12/20/18  0953  12/20/18  0526  12/20/18  0054  12/18/18  2030   SODIUM mmol/L 137 137 138 137 137  --   --   --  129*  --  129*  < > 122*   POTASSIUM mmol/L 3 5 3 3* 3 7 3 8 3 9  --   --   --  3 5  --  3 8  < > 3 7   CHLORIDE mmol/L 103 103 105 106 106  --   --   --  97*  --  97*  < > 88*   CO2 mmol/L 29 25 26 24 23  --   --   --  24  --  24  < > 26   CO2, I-STAT mmol/L  --   --   --   --   --  22 22  < >  --   < >  --   --   --    ANION GAP mmol/L 5 9 7 7 8  --   --   --  8  --  8  < > 8   BUN mg/dL 7 6 8 7 6  --   --   --  7  --  7  < > 9   CREATININE mg/dL 0 33* 0 32* 0 25* 0 28* 0 30*  --   --   --  0 27*  --  0 34*  < > 0 42*   CALCIUM mg/dL 8 2* 7 7* 7 5* 7 9* 8 2*  --   --   --  7 6*  --  8 0*  < > 8 7   ALT U/L 40  --   --   --   --   --   --   --   --   --   --   --  96*   AST U/L 56*  --   --   --   --   --   --   --   --   --   --   --  130*   ALK PHOS U/L 129*  --   --   --   --   --   --   --   --   --   --   --  138*   ALBUMIN g/dL 1 5*  --   --   --   --   --   --   --   --   --   --   --  3 4*   TOTAL BILIRUBIN mg/dL 0 48  --   --   --   --   --   --   --   --   --   --   --  0 42   < > = values in this interval not displayed  Results from last 7 days  Lab Units 12/23/18  0447 12/22/18  0516 12/21/18  0444 12/20/18  0054 12/19/18  0455 12/18/18  2229   MAGNESIUM mg/dL 1 8  --  2 0 1 8 2 3 1 4*   PHOSPHORUS mg/dL 2 6 2 2* 2 2* 2 2* 3 2 3 5        Results from last 7 days  Lab Units 12/21/18  0444 12/20/18  1153 12/18/18  2030   INR  1 20* 1 29* 1 01   PTT seconds  --  31 25*       Results from last 7 days  Lab Units 12/20/18  1204 12/20/18  0054   TROPONIN I ng/mL <0 02 <0 02       Results from last 7 days  Lab Units 12/19/18  0133   LACTIC ACID mmol/L 0 7     ABG:  Lab Results   Component Value Date    PHART 7 465 (H) 12/22/2018    KQN5FXG 37 2 12/22/2018    PO2ART 124 8 12/22/2018    OYL9MVF 26 2 12/22/2018    BEART 2 4 12/22/2018    SOURCE Brachial, Right 12/22/2018     VBG:    Results from last 7 days  Lab Units 12/22/18  0504   ABG SOURCE  Brachial, Right         No results found for: [facility]   Imaging:   Cta Head And Neck W Wo Contrast    Result Date: 12/18/2018  Impression: 1  Atherosclerotic plaque at the right carotid bifurcation resulting in moderate (50-69%) stenosis  No evidence of carotid or vertebral artery dissection or pseudoaneurysm to suggest acute vascular injury  2   Displacement of left MCA branches by left temporal parietal hematoma  No evidence of aneurysm or vascular malformation  No significant stenosis in the major vessels of the [place]  3   Patent dural venous sinuses  Workstation performed: [id number]     Xr Chest Portable    Result Date: 12/19/2018  Impression: Satisfactory intubation  Bibasilar subsegmental atelectasis  No pneumothorax  Workstation performed: [id number]     Xr Shoulder 2+ Vw Right    Result Date: 12/19/2018  Impression: Right-sided rib fractures again noted  Possible acromion fracture   The small glenoid fracture seen on CT scan of the chest is not clearly appreciated on this exam  Workstation performed: [id number]     Xr Foot 3+ Vw Left    Result Date: 12/21/2018  Impression: Nondisplaced transverse fracture involves the proximal aspect of the proximal to the 3rd digit  Workstation performed: [id number]     Ct Head Wo Contrast    Result Date: 12/21/2018  Impression: 1  Postsurgical changes of left craniectomy and evacuation of left temporal intraparenchymal hemorrhage  2   A small amount of intraparenchymal/extra-axial hemorrhage remains within the anterior left temporal lobe and stable thin subdural hemorrhage along the left frontal region  3   Increased blood products along the cerebral falx when compared with prior exam  4   Persistent edema predominantly within the left temporal lobe and effacement of the left-sided cerebral sulci however there is interval resolution of midline shift and decreased effacement of the left lateral ventricle  5   Continued washout and interval evolution of intraventricular and subarachnoid blood products  Workstation performed: [id number]     Ct Head Wo Contrast    Result Date: 12/20/2018  Impression: 1  Left temporal lobe intraparenchymal hemorrhage with slight increase in surrounding edema as manifest by increased effacement of the left occipital horn when compared with prior examination  Slight increase in left to right midline shift now measuring up to 6 mm, previously 4 mm  2   Stable subdural, subarachnoid and intraventricular blood products  No large delayed intracranial hemorrhage  The study was marked in [facility] for immediate notification  Workstation performed: [id number]     Ct Head Wo Contrast    Result Date: 12/19/2018  Impression: 1  Stable size of large intraparenchymal hemorrhage centered in the left temporal lobe with surrounding mass effect and edema and adjacent left temporal to frontal subdural hemorrhage  Stable midline shift from left to right  2   Increased conspicuity of bilateral posterior subarachnoid and intraventricular blood products are likely due to redistribution  Workstation performed: [id number]     Trauma - Ct Head Wo Contrast    Result Date: 12/18/2018  Impression: 1  Large parenchymal hemorrhage on the left with associated left-sided subdural hematoma and bilateral subarachnoid hemorrhage including intraventricular hemorrhage  2   Mass effect and midline shift to the right measuring 4 mm  I personally discussed this study with Sandra Chun on 12/18/2018 at 8:45 PM  Workstation performed: [id number]     Trauma - Ct Spine Cervical Wo Contrast    Result Date: 12/18/2018  Impression: 1  No cervical spine fracture or traumatic malalignment  2   Small right apical pneumothorax  3   Nondisplaced fractures of the right T1 and T2 transverse processes  Fracture of the posterior right second rib  I personally discussed this study with Sandra Chun on 12/18/2018 at 8:45 PM   Workstation performed: [id number]     Ct Shoulder Right Wo Contrast    Result Date: 12/20/2018  Impression: Acute fracture anterior glenoid rim with 1 to 2 mm anteromedial displacement  Acute comminuted fracture posterior acromion with 6 mm mediolateral distraction and 1 to 2 mm inferior cortical offset  No dislocation  Interval progression of right lower lobe and right upper lobe dependent airspace disease  This may represent subsegmental atelectasis, aspiration, and/or pulmonary contusion  Interval enlargement of small right pleural effusion  No other significant interval change  The examination demonstrates a significant  finding and was documented as such in [facility] for liaison and referring practitioner notification  Workstation performed: [id number]     Xr Chest 1 View    Result Date: 12/20/2018  Impression: Multiple right-sided rib fractures  Consolidation at the base may represent atelectasis or pneumonia  Workstation performed: [id number]     Trauma - Ct Chest Abdomen Pelvis W Contrast    Result Date: 12/18/2018  Impression: 1    Multiple right-sided rib fractures with tiny right apical pneumothorax  Fractures extend from the second through eleventh ribs at the costovertebral junctions and fourth through eighth ribs laterally  This is consistent with flail chest  A right anterior second rib fracture is severely displaced/angulated  2   Fracture of the anterior right glenoid, acromium, and first through fourth thoracic transverse processes on the right  3   Focal irregular opacity in the right upper lobe with adjacent 4 mm nodule  While contusion is possible given overlying trauma, the appearance of the lesion is most suspicious for a primary lung cancer and follow-up PET/CT is recommended  4   Borderline upper abdominal lymph nodes with density/stranding at the base of the small bowel mesentery  While mesenteric contusion can have this appearance in the setting of trauma  There are calcifications which suggest chronicity  Lymphoproliferative disorder cannot be excluded and follow-up and 3-6 months is recommended  5   Chronic interstitial lung disease of uncertain etiology  Given esophageal dilatation, this could represent NSIP in the setting of scleroderma  I personally discussed this study with Musa Cook on 12/18/2018 at 8:45 PM  Workstation performed: [id number]     Xr Trauma Multiple    Result Date: 12/19/2018  Impression: Multiple right-sided rib fractures  Consolidation at the base may represent atelectasis or pneumonia  Workstation performed: [id number]     Xr Chest Portable Icu    Result Date: 12/20/2018  Impression: Right subclavian catheter extending superiorly into the neck requiring repositioning  Workstation performed: [id number]     Xr Chest Portable Icu    Result Date: 12/20/2018  Impression: Mild central vascular congestion  Basilar opacities and effusions  Stable rib fractures   Workstation performed: [id number]     Xr Chest Portable Icu    Result Date: 12/19/2018  Impression: Right basilar opacity partially obscuring the hemidiaphragm may represent atelectasis or infiltrate  Possible small basilar effusion  Workstation performed: [id number]     Cta Chest Pe Study    Result Date: 12/22/2018  Impression: No evidence of pulmonary embolism Moderate right effusion small left effusion There is development of diffuse lung opacities with reticulation, groundglass density with a few areas of basilar consolidation  This may be due to pulmonary edema  However infiltrates are difficult to exclude  The ET tube is low-lying with tip lying in the radha with likely projection into the right main bronchus  Small mediastinal lymph nodes larger from the previous study probably reactive  I personally discussed this study with Pamela Saturnino on 12/22/2018 at 11:46 AM  Workstation performed: [id number]      I have personally reviewed pertinent reports  EKG: NSR  Micro:       Allergies: No Known Allergies  Medications:   Scheduled Meds:    Current Facility-Administered Medications:  acetaminophen 650 mg Oral Q6H Adri Beltran PA-C    albuterol 2 5 mg Nebulization Q4H PRN Reta Miller, DO    chlorhexidine 15 mL Swish & Spit Q12H [address] Nelida Shelby MD    fentaNYL 100 mcg/hr Intravenous Continuous Nelida Shelby MD Last Rate: 100 mcg/hr (12/23/18 9408)   fentanyl citrate (PF) 50 mcg Intravenous Q2H PRN Alfonso Jacques MD    hydrALAZINE 5 mg Intravenous Q6H PRN Alfonso Jacques MD    influenza vaccine 0 5 mL Intramuscular Prior to discharge Reta Miller DO    insulin lispro 1-5 Units Subcutaneous HS Binh Rivas MD    insulin lispro 1-5 Units Subcutaneous TID AC Aurora Gonzalez MD    ipratropium 0 5 mg Nebulization Q6H Reta Miller DO    labetalol 5 mg Intravenous Q4H PRN Binh Rivas MD    levalbuterol 1 25 mg Nebulization Q6H Reta Miller, DO    levETIRAcetam 500 mg Oral Q12H [address] TRISTAN Kulkarni    nicotine 14 mg Transdermal Daily Adri Beltran PA-C    potassium chloride 20 mEq Intravenous Once Alfonso Jacques MD Last Rate: 20 mEq (12/23/18 8119)   potassium chloride 20 mEq Intravenous Once Nubia Perez MD    pramipexole 0 25 mg Oral BID TRISTAN Mccarty    propofol 5-50 mcg/kg/min Intravenous Titrated Danishvera Chan Dalton DO Last Rate: 50 mcg/kg/min (12/23/18 0504)   QUEtiapine 25 mg Oral HS TRISTAN Mccarty    senna-docusate sodium 1 tablet Oral HS Aurora Gonzalez MD      Continuous Infusions:    fentaNYL 100 mcg/hr Last Rate: 100 mcg/hr (12/23/18 0616)   propofol 5-50 mcg/kg/min Last Rate: 50 mcg/kg/min (12/23/18 0504)     PRN Meds:    albuterol 2 5 mg Q4H PRN   fentanyl citrate (PF) 50 mcg Q2H PRN   hydrALAZINE 5 mg Q6H PRN   influenza vaccine 0 5 mL Prior to discharge   labetalol 5 mg Q4H PRN       Invasive lines and devices: Invasive Devices     Peripherally Inserted Central Catheter Line            PICC Line 83/32/27 Left Basilic less than 1 day          Drain            NG/OG/Enteral Tube Orogastric 16 Fr Left mouth 4 days    Urethral Catheter 16 Fr  4 days          Airway            ETT  Cuffed 8 mm 4 days                     Portions of the record may have been created with voice recognition software  Occasional wrong word or "sound a like" substitutions may have occurred due to the inherent limitations of voice recognition software  Read the chart carefully and recognize, using context, where substitutions have occurred      Nubia Perez MD

## 2018-12-23 NOTE — RESPIRATORY THERAPY NOTE
RT Ventilator Management Note  Porsche Pace 59 y o  female MRN: [id number]  [facility] En[facility]: [id number]      Daily Screen       12/22/2018 0742 12/23/2018 0708          Patient safety screen outcome[de-identified] Failed Failed      Not Ready for Weaning due to[de-identified] Underline problem not resolved Underline problem not resolved              Physical Exam:   Assessment Type: (P) Assess only  General Appearance: Sedated  Respiratory Pattern: Assisted  Chest Assessment: Chest expansion symmetrical  Bilateral Breath Sounds: Expiratory wheezes, Coarse  Cough: Productive  Suction: ET Tube  O2 Device: Ventilator      Resp Comments: (P) FIO2 and peep increased due to frequent desaturations

## 2018-12-23 NOTE — RESPIRATORY THERAPY NOTE
RT Ventilator Management Note  Princess Pacheco 59 y o  female MRN: [id number]  Unit/Bed#: [facility] 02 Encounter: [id number]      Daily Screen       12/20/2018 0647 12/22/2018 0742          Patient safety screen outcome[de-identified] Failed Failed      Not Ready for Weaning due to[de-identified] Underline problem not resolved Underline problem not resolved              Physical Exam:   Assessment Type: Assess only  General Appearance: Sedated  Respiratory Pattern: Assisted  Chest Assessment: Chest expansion symmetrical  Bilateral Breath Sounds: Expiratory wheezes, Coarse  Cough: Productive  Suction: ET Tube  O2 Device: Ventilator      Resp Comments: Pt  remains stable on AC mode  Will continue to assess and monitor per protocol

## 2018-12-23 NOTE — RESPIRATORY THERAPY NOTE
RT Ventilator Management Note  Gisell Johns 59 y o  female MRN: [id number]  Unit/Bed#: [facility] 02 Encounter: [id number]      Daily Screen       12/22/2018 0742 12/23/2018 0708          Patient safety screen outcome[de-identified] Failed Failed      Not Ready for Weaning due to[de-identified] Underline problem not resolved Underline problem not resolved              Physical Exam:   Assessment Type: Pre-treatment  General Appearance: Sedated  Respiratory Pattern: Assisted  Chest Assessment: Chest expansion symmetrical  Bilateral Breath Sounds: Expiratory wheezes, Coarse  Cough: Productive  Suction: ET Tube  O2 Device: Ventilator      Resp Comments: (P) Pt  stable on current vent settings  No SBT at this time  Will await MD rounds for plan   No changes at this time

## 2018-12-24 ENCOUNTER — ANESTHESIA EVENT (INPATIENT)
Dept: PERIOP | Facility: HOSPITAL | Age: 63
DRG: 003 | End: 2018-12-24
Payer: MEDICARE

## 2018-12-24 ENCOUNTER — APPOINTMENT (INPATIENT)
Dept: RADIOLOGY | Facility: HOSPITAL | Age: 63
DRG: 003 | End: 2018-12-24
Payer: MEDICARE

## 2018-12-24 ENCOUNTER — APPOINTMENT (INPATIENT)
Dept: NON INVASIVE DIAGNOSTICS | Facility: HOSPITAL | Age: 63
DRG: 003 | End: 2018-12-24
Payer: MEDICARE

## 2018-12-24 ENCOUNTER — ANESTHESIA (INPATIENT)
Dept: PERIOP | Facility: HOSPITAL | Age: 63
DRG: 003 | End: 2018-12-24
Payer: MEDICARE

## 2018-12-24 PROBLEM — D62 ACUTE BLOOD LOSS ANEMIA: Status: ACTIVE | Noted: 2018-12-24

## 2018-12-24 PROBLEM — K56.7 ILEUS (HCC): Status: ACTIVE | Noted: 2018-12-24

## 2018-12-24 LAB
ABO GROUP BLD: NORMAL
ADDITIONAL SETTING: 2
ANCILLARY VALUES: 0
ANION GAP SERPL CALCULATED.3IONS-SCNC: 3 MMOL/L (ref 4–13)
ANION GAP SERPL CALCULATED.3IONS-SCNC: 6 MMOL/L (ref 4–13)
ANION GAP SERPL CALCULATED.3IONS-SCNC: 6 MMOL/L (ref 4–13)
ATRIAL RATE: 84 BPM
BASE EXCESS BLDA CALC-SCNC: 6 MMOL/L (ref -2–3)
BASE EXCESS BLDA CALC-SCNC: 7 MMOL/L (ref -2–3)
BASOPHILS # BLD MANUAL: 0 THOUSAND/UL (ref 0–0.1)
BASOPHILS # BLD MANUAL: 0 THOUSAND/UL (ref 0–0.1)
BASOPHILS NFR MAR MANUAL: 0 % (ref 0–1)
BASOPHILS NFR MAR MANUAL: 0 % (ref 0–1)
BLD GP AB SCN SERPL QL: NEGATIVE
BUN SERPL-MCNC: 7 MG/DL (ref 5–25)
BUN SERPL-MCNC: 8 MG/DL (ref 5–25)
BUN SERPL-MCNC: 9 MG/DL (ref 5–25)
CA-I BLD-SCNC: 1.14 MMOL/L (ref 1.12–1.32)
CA-I BLD-SCNC: 1.15 MMOL/L (ref 1.12–1.32)
CALCIUM SERPL-MCNC: 8.2 MG/DL (ref 8.3–10.1)
CALCIUM SERPL-MCNC: 8.6 MG/DL (ref 8.3–10.1)
CALCIUM SERPL-MCNC: 8.7 MG/DL (ref 8.3–10.1)
CHLORIDE SERPL-SCNC: 100 MMOL/L (ref 100–108)
CHLORIDE SERPL-SCNC: 103 MMOL/L (ref 100–108)
CHLORIDE SERPL-SCNC: 98 MMOL/L (ref 100–108)
CO2 SERPL-SCNC: 31 MMOL/L (ref 21–32)
CO2 SERPL-SCNC: 33 MMOL/L (ref 21–32)
CO2 SERPL-SCNC: 36 MMOL/L (ref 21–32)
CREAT SERPL-MCNC: 0.35 MG/DL (ref 0.6–1.3)
CREAT SERPL-MCNC: 0.36 MG/DL (ref 0.6–1.3)
CREAT SERPL-MCNC: 0.41 MG/DL (ref 0.6–1.3)
DS:DELIVERY SYSTEM: AC
EOSINOPHIL # BLD MANUAL: 0 THOUSAND/UL (ref 0–0.4)
EOSINOPHIL # BLD MANUAL: 0.07 THOUSAND/UL (ref 0–0.4)
EOSINOPHIL NFR BLD MANUAL: 0 % (ref 0–6)
EOSINOPHIL NFR BLD MANUAL: 1 % (ref 0–6)
ERYTHROCYTE [DISTWIDTH] IN BLOOD BY AUTOMATED COUNT: 16 % (ref 11.6–15.1)
ERYTHROCYTE [DISTWIDTH] IN BLOOD BY AUTOMATED COUNT: 16.1 % (ref 11.6–15.1)
FIO2 GAS DIL.REBREATH: 90 L
GFR SERPL CREATININE-BSD FRML MDRD: 111 ML/MIN/1.73SQ M
GFR SERPL CREATININE-BSD FRML MDRD: 116 ML/MIN/1.73SQ M
GFR SERPL CREATININE-BSD FRML MDRD: 117 ML/MIN/1.73SQ M
GIANT PLATELETS BLD QL SMEAR: PRESENT
GLUCOSE SERPL-MCNC: 101 MG/DL (ref 65–140)
GLUCOSE SERPL-MCNC: 113 MG/DL (ref 65–140)
GLUCOSE SERPL-MCNC: 119 MG/DL (ref 65–140)
GLUCOSE SERPL-MCNC: 121 MG/DL (ref 65–140)
GLUCOSE SERPL-MCNC: 125 MG/DL (ref 65–140)
GLUCOSE SERPL-MCNC: 131 MG/DL (ref 65–140)
GLUCOSE SERPL-MCNC: 140 MG/DL (ref 65–140)
GLUCOSE SERPL-MCNC: 154 MG/DL (ref 65–140)
GLUCOSE SERPL-MCNC: 155 MG/DL (ref 65–140)
GLUCOSE SERPL-MCNC: 96 MG/DL (ref 65–140)
HCO3 BLDA-SCNC: 32.3 MMOL/L (ref 24–30)
HCO3 BLDA-SCNC: 33.5 MMOL/L (ref 22–28)
HCT VFR BLD AUTO: 24.9 % (ref 34.8–46.1)
HCT VFR BLD AUTO: 25 % (ref 34.8–46.1)
HCT VFR BLD CALC: 28 % (ref 34.8–46.1)
HCT VFR BLD CALC: 29 % (ref 34.8–46.1)
HGB BLD-MCNC: 8 G/DL (ref 11.5–15.4)
HGB BLD-MCNC: 8.2 G/DL (ref 11.5–15.4)
HGB BLDA-MCNC: 9.5 G/DL (ref 11.5–15.4)
HGB BLDA-MCNC: 9.9 G/DL (ref 11.5–15.4)
LIPASE SERPL-CCNC: 157 U/L (ref 73–393)
LYMPHOCYTES # BLD AUTO: 0.62 THOUSAND/UL (ref 0.6–4.47)
LYMPHOCYTES # BLD AUTO: 0.75 THOUSAND/UL (ref 0.6–4.47)
LYMPHOCYTES # BLD AUTO: 11 % (ref 14–44)
LYMPHOCYTES # BLD AUTO: 9 % (ref 14–44)
MAGNESIUM SERPL-MCNC: 1.7 MG/DL (ref 1.6–2.6)
MCH RBC QN AUTO: 27.9 PG (ref 26.8–34.3)
MCH RBC QN AUTO: 28.6 PG (ref 26.8–34.3)
MCHC RBC AUTO-ENTMCNC: 32.1 G/DL (ref 31.4–37.4)
MCHC RBC AUTO-ENTMCNC: 32.8 G/DL (ref 31.4–37.4)
MCV RBC AUTO: 87 FL (ref 82–98)
MCV RBC AUTO: 87 FL (ref 82–98)
MONOCYTES # BLD AUTO: 0 THOUSAND/UL (ref 0–1.22)
MONOCYTES # BLD AUTO: 0.27 THOUSAND/UL (ref 0–1.22)
MONOCYTES NFR BLD: 0 % (ref 4–12)
MONOCYTES NFR BLD: 4 % (ref 4–12)
NEUTROPHILS # BLD MANUAL: 5.79 THOUSAND/UL (ref 1.85–7.62)
NEUTROPHILS # BLD MANUAL: 6.24 THOUSAND/UL (ref 1.85–7.62)
NEUTS SEG NFR BLD AUTO: 85 % (ref 43–75)
NEUTS SEG NFR BLD AUTO: 90 % (ref 43–75)
NRBC BLD AUTO-RTO: 0 /100 WBCS
NRBC BLD AUTO-RTO: 0 /100 WBCS
P AXIS: 66 DEGREES
PCO2 BLD: 34 MMOL/L (ref 21–32)
PCO2 BLD: 35 MMOL/L (ref 21–32)
PCO2 BLD: 56.1 MM HG (ref 42–50)
PCO2 BLD: 57.1 MM HG (ref 36–44)
PH BLD: 7.37 [PH] (ref 7.3–7.4)
PH BLD: 7.38 [PH] (ref 7.35–7.45)
PLATELET # BLD AUTO: 191 THOUSANDS/UL (ref 149–390)
PLATELET # BLD AUTO: 198 THOUSANDS/UL (ref 149–390)
PLATELET BLD QL SMEAR: ADEQUATE
PLATELET BLD QL SMEAR: ADEQUATE
PMV BLD AUTO: 10 FL (ref 8.9–12.7)
PMV BLD AUTO: 9.7 FL (ref 8.9–12.7)
PO2 BLD: 40 MM HG (ref 35–45)
PO2 BLD: 81 MM HG (ref 75–129)
POIKILOCYTOSIS BLD QL SMEAR: PRESENT
POTASSIUM BLD-SCNC: 4.5 MMOL/L (ref 3.5–5.3)
POTASSIUM BLD-SCNC: 4.7 MMOL/L (ref 3.5–5.3)
POTASSIUM SERPL-SCNC: 3.4 MMOL/L (ref 3.5–5.3)
POTASSIUM SERPL-SCNC: 3.6 MMOL/L (ref 3.5–5.3)
POTASSIUM SERPL-SCNC: 3.8 MMOL/L (ref 3.5–5.3)
PR INTERVAL: 100 MS
PRESSURE SETTING: 11
QRS AXIS: 52 DEGREES
QRSD INTERVAL: 88 MS
QT INTERVAL: 417 MS
QTC INTERVAL: 493 MS
RBC # BLD AUTO: 2.87 MILLION/UL (ref 3.81–5.12)
RBC # BLD AUTO: 2.87 MILLION/UL (ref 3.81–5.12)
RESPIRATORY RATE: 140
RH BLD: NEGATIVE
SAO2 % BLD FROM PO2: 72 % (ref 95–98)
SAO2 % BLD FROM PO2: 95 % (ref 95–98)
SODIUM BLD-SCNC: 141 MMOL/L (ref 136–145)
SODIUM BLD-SCNC: 141 MMOL/L (ref 136–145)
SODIUM SERPL-SCNC: 137 MMOL/L (ref 136–145)
SODIUM SERPL-SCNC: 139 MMOL/L (ref 136–145)
SODIUM SERPL-SCNC: 140 MMOL/L (ref 136–145)
SPECIMEN EXPIRATION DATE: NORMAL
SPECIMEN SOURCE: ABNORMAL
SPECIMEN SOURCE: ABNORMAL
T WAVE AXIS: 40 DEGREES
TOTAL CELLS COUNTED SPEC: 100
VENT TYPE: ABNORMAL
VENTILATION VALUE: 400
VENTRICULAR RATE: 84 BPM
WBC # BLD AUTO: 6.81 THOUSAND/UL (ref 4.31–10.16)
WBC # BLD AUTO: 6.93 THOUSAND/UL (ref 4.31–10.16)

## 2018-12-24 PROCEDURE — 85027 COMPLETE CBC AUTOMATED: CPT | Performed by: PHYSICIAN ASSISTANT

## 2018-12-24 PROCEDURE — 83735 ASSAY OF MAGNESIUM: CPT | Performed by: PHYSICIAN ASSISTANT

## 2018-12-24 PROCEDURE — 84295 ASSAY OF SERUM SODIUM: CPT

## 2018-12-24 PROCEDURE — 85014 HEMATOCRIT: CPT

## 2018-12-24 PROCEDURE — 86850 RBC ANTIBODY SCREEN: CPT | Performed by: SURGERY

## 2018-12-24 PROCEDURE — 82330 ASSAY OF CALCIUM: CPT

## 2018-12-24 PROCEDURE — 85007 BL SMEAR W/DIFF WBC COUNT: CPT | Performed by: EMERGENCY MEDICINE

## 2018-12-24 PROCEDURE — 31600 PLANNED TRACHEOSTOMY: CPT | Performed by: SURGERY

## 2018-12-24 PROCEDURE — 83690 ASSAY OF LIPASE: CPT | Performed by: SURGERY

## 2018-12-24 PROCEDURE — 86900 BLOOD TYPING SEROLOGIC ABO: CPT | Performed by: SURGERY

## 2018-12-24 PROCEDURE — 94760 N-INVAS EAR/PLS OXIMETRY 1: CPT

## 2018-12-24 PROCEDURE — 80048 BASIC METABOLIC PNL TOTAL CA: CPT | Performed by: PHYSICIAN ASSISTANT

## 2018-12-24 PROCEDURE — 86901 BLOOD TYPING SEROLOGIC RH(D): CPT | Performed by: SURGERY

## 2018-12-24 PROCEDURE — 80048 BASIC METABOLIC PNL TOTAL CA: CPT | Performed by: EMERGENCY MEDICINE

## 2018-12-24 PROCEDURE — 85007 BL SMEAR W/DIFF WBC COUNT: CPT | Performed by: PHYSICIAN ASSISTANT

## 2018-12-24 PROCEDURE — 94003 VENT MGMT INPAT SUBQ DAY: CPT

## 2018-12-24 PROCEDURE — 71045 X-RAY EXAM CHEST 1 VIEW: CPT

## 2018-12-24 PROCEDURE — 0B110F4 BYPASS TRACHEA TO CUTANEOUS WITH TRACHEOSTOMY DEVICE, OPEN APPROACH: ICD-10-PCS | Performed by: SURGERY

## 2018-12-24 PROCEDURE — 82948 REAGENT STRIP/BLOOD GLUCOSE: CPT

## 2018-12-24 PROCEDURE — 93970 EXTREMITY STUDY: CPT

## 2018-12-24 PROCEDURE — 93970 EXTREMITY STUDY: CPT | Performed by: SURGERY

## 2018-12-24 PROCEDURE — 82803 BLOOD GASES ANY COMBINATION: CPT

## 2018-12-24 PROCEDURE — 84132 ASSAY OF SERUM POTASSIUM: CPT

## 2018-12-24 PROCEDURE — 70450 CT HEAD/BRAIN W/O DYE: CPT

## 2018-12-24 PROCEDURE — 82947 ASSAY GLUCOSE BLOOD QUANT: CPT

## 2018-12-24 PROCEDURE — 94640 AIRWAY INHALATION TREATMENT: CPT

## 2018-12-24 PROCEDURE — 99291 CRITICAL CARE FIRST HOUR: CPT | Performed by: SURGERY

## 2018-12-24 PROCEDURE — 85027 COMPLETE CBC AUTOMATED: CPT | Performed by: EMERGENCY MEDICINE

## 2018-12-24 RX ORDER — HYDROMORPHONE HCL/PF 1 MG/ML
1 SYRINGE (ML) INJECTION
Status: DISCONTINUED | OUTPATIENT
Start: 2018-12-24 | End: 2018-12-24

## 2018-12-24 RX ORDER — MAGNESIUM SULFATE HEPTAHYDRATE 40 MG/ML
2 INJECTION, SOLUTION INTRAVENOUS ONCE
Status: COMPLETED | OUTPATIENT
Start: 2018-12-24 | End: 2018-12-24

## 2018-12-24 RX ORDER — ALBUMIN (HUMAN) 12.5 G/50ML
25 SOLUTION INTRAVENOUS EVERY 6 HOURS
Status: COMPLETED | OUTPATIENT
Start: 2018-12-24 | End: 2018-12-25

## 2018-12-24 RX ORDER — HYDROMORPHONE HCL 110MG/55ML
PATIENT CONTROLLED ANALGESIA SYRINGE INTRAVENOUS
Status: COMPLETED
Start: 2018-12-24 | End: 2018-12-24

## 2018-12-24 RX ORDER — HYDROMORPHONE HCL/PF 1 MG/ML
1 SYRINGE (ML) INJECTION EVERY 2 HOUR PRN
Status: DISCONTINUED | OUTPATIENT
Start: 2018-12-24 | End: 2019-01-03

## 2018-12-24 RX ORDER — AMOXICILLIN 250 MG
2 CAPSULE ORAL
Status: DISCONTINUED | OUTPATIENT
Start: 2018-12-24 | End: 2018-12-31

## 2018-12-24 RX ORDER — FUROSEMIDE 10 MG/ML
40 INJECTION INTRAMUSCULAR; INTRAVENOUS
Status: DISCONTINUED | OUTPATIENT
Start: 2018-12-24 | End: 2018-12-25

## 2018-12-24 RX ORDER — LEVOTHYROXINE SODIUM 88 UG/1
88 TABLET ORAL
Status: DISCONTINUED | OUTPATIENT
Start: 2018-12-24 | End: 2019-01-03 | Stop reason: HOSPADM

## 2018-12-24 RX ORDER — POTASSIUM CHLORIDE 29.8 MG/ML
40 INJECTION INTRAVENOUS ONCE
Status: COMPLETED | OUTPATIENT
Start: 2018-12-24 | End: 2018-12-24

## 2018-12-24 RX ORDER — SODIUM CHLORIDE 9 MG/ML
INJECTION, SOLUTION INTRAVENOUS CONTINUOUS PRN
Status: DISCONTINUED | OUTPATIENT
Start: 2018-12-24 | End: 2018-12-24 | Stop reason: SURG

## 2018-12-24 RX ORDER — POTASSIUM CHLORIDE 14.9 MG/ML
20 INJECTION INTRAVENOUS ONCE
Status: DISCONTINUED | OUTPATIENT
Start: 2018-12-24 | End: 2018-12-25

## 2018-12-24 RX ORDER — LAMOTRIGINE 100 MG/1
200 TABLET ORAL 2 TIMES DAILY
Status: DISCONTINUED | OUTPATIENT
Start: 2018-12-24 | End: 2018-12-25

## 2018-12-24 RX ORDER — PROPOFOL 10 MG/ML
INJECTION, EMULSION INTRAVENOUS CONTINUOUS PRN
Status: DISCONTINUED | OUTPATIENT
Start: 2018-12-24 | End: 2018-12-24 | Stop reason: SURG

## 2018-12-24 RX ORDER — LIDOCAINE 50 MG/G
2 PATCH TOPICAL DAILY
Status: DISCONTINUED | OUTPATIENT
Start: 2018-12-24 | End: 2019-01-03 | Stop reason: HOSPADM

## 2018-12-24 RX ORDER — HYDROMORPHONE HCL/PF 1 MG/ML
0.5 SYRINGE (ML) INJECTION
Status: DISCONTINUED | OUTPATIENT
Start: 2018-12-24 | End: 2018-12-24

## 2018-12-24 RX ORDER — PRIMIDONE 50 MG/1
50 TABLET ORAL EVERY 12 HOURS SCHEDULED
Status: DISCONTINUED | OUTPATIENT
Start: 2018-12-24 | End: 2018-12-25

## 2018-12-24 RX ORDER — MAGNESIUM HYDROXIDE 1200 MG/15ML
LIQUID ORAL AS NEEDED
Status: DISCONTINUED | OUTPATIENT
Start: 2018-12-24 | End: 2018-12-24 | Stop reason: HOSPADM

## 2018-12-24 RX ORDER — FLUOXETINE HYDROCHLORIDE 20 MG/5ML
80 LIQUID ORAL DAILY
Status: DISCONTINUED | OUTPATIENT
Start: 2018-12-24 | End: 2018-12-25

## 2018-12-24 RX ORDER — HYDROMORPHONE HCL/PF 1 MG/ML
1 SYRINGE (ML) INJECTION ONCE
Status: DISCONTINUED | OUTPATIENT
Start: 2018-12-24 | End: 2018-12-26 | Stop reason: ALTCHOICE

## 2018-12-24 RX ORDER — MORPHINE SULFATE 4 MG/ML
4 INJECTION, SOLUTION INTRAMUSCULAR; INTRAVENOUS EVERY 4 HOURS PRN
Status: DISCONTINUED | OUTPATIENT
Start: 2018-12-24 | End: 2018-12-24

## 2018-12-24 RX ORDER — FLUOXETINE HYDROCHLORIDE 20 MG/5ML
40 LIQUID ORAL DAILY
Status: DISCONTINUED | OUTPATIENT
Start: 2018-12-24 | End: 2018-12-24

## 2018-12-24 RX ORDER — ROCURONIUM BROMIDE 10 MG/ML
INJECTION, SOLUTION INTRAVENOUS AS NEEDED
Status: DISCONTINUED | OUTPATIENT
Start: 2018-12-24 | End: 2018-12-24 | Stop reason: SURG

## 2018-12-24 RX ORDER — LIDOCAINE HYDROCHLORIDE 10 MG/ML
INJECTION, SOLUTION EPIDURAL; INFILTRATION; INTRACAUDAL; PERINEURAL
Status: COMPLETED
Start: 2018-12-24 | End: 2018-12-24

## 2018-12-24 RX ORDER — LEVOTHYROXINE SODIUM 0.1 MG/1
100 TABLET ORAL
Status: DISCONTINUED | OUTPATIENT
Start: 2018-12-24 | End: 2018-12-24

## 2018-12-24 RX ORDER — LIDOCAINE HYDROCHLORIDE 10 MG/ML
INJECTION, SOLUTION INFILTRATION; PERINEURAL AS NEEDED
Status: DISCONTINUED | OUTPATIENT
Start: 2018-12-24 | End: 2018-12-24 | Stop reason: HOSPADM

## 2018-12-24 RX ORDER — HYDROMORPHONE HCL 110MG/55ML
2 PATIENT CONTROLLED ANALGESIA SYRINGE INTRAVENOUS
Status: DISCONTINUED | OUTPATIENT
Start: 2018-12-24 | End: 2018-12-25

## 2018-12-24 RX ORDER — METHOCARBAMOL 750 MG/1
750 TABLET, FILM COATED ORAL EVERY 6 HOURS SCHEDULED
Status: DISCONTINUED | OUTPATIENT
Start: 2018-12-24 | End: 2018-12-25

## 2018-12-24 RX ADMIN — PROPOFOL 35.1 MCG/KG/MIN: 10 INJECTION, EMULSION INTRAVENOUS at 13:41

## 2018-12-24 RX ADMIN — METHOCARBAMOL 750 MG: 750 TABLET, FILM COATED ORAL at 17:14

## 2018-12-24 RX ADMIN — SODIUM CHLORIDE: 0.9 INJECTION, SOLUTION INTRAVENOUS at 09:09

## 2018-12-24 RX ADMIN — ALBUMIN (HUMAN) 25 G: 12.5 SOLUTION INTRAVENOUS at 11:03

## 2018-12-24 RX ADMIN — FUROSEMIDE 20 MG: 10 INJECTION, SOLUTION INTRAMUSCULAR; INTRAVENOUS at 08:06

## 2018-12-24 RX ADMIN — POLYETHYLENE GLYCOL 3350 17 G: 17 POWDER, FOR SOLUTION ORAL at 08:21

## 2018-12-24 RX ADMIN — LIDOCAINE HYDROCHLORIDE: 10 INJECTION, SOLUTION EPIDURAL; INFILTRATION; INTRACAUDAL; PERINEURAL at 18:04

## 2018-12-24 RX ADMIN — METHYLNALTREXONE BROMIDE 8 MG: 12 INJECTION, SOLUTION SUBCUTANEOUS at 13:27

## 2018-12-24 RX ADMIN — PROPOFOL 0.2 MCG/KG/MIN: 10 INJECTION, EMULSION INTRAVENOUS at 09:10

## 2018-12-24 RX ADMIN — IPRATROPIUM BROMIDE 0.5 MG: 0.5 SOLUTION RESPIRATORY (INHALATION) at 07:07

## 2018-12-24 RX ADMIN — LAMOTRIGINE 200 MG: 100 TABLET ORAL at 11:03

## 2018-12-24 RX ADMIN — QUETIAPINE FUMARATE 25 MG: 25 TABLET ORAL at 21:05

## 2018-12-24 RX ADMIN — IPRATROPIUM BROMIDE 0.5 MG: 0.5 SOLUTION RESPIRATORY (INHALATION) at 14:27

## 2018-12-24 RX ADMIN — ACETAMINOPHEN 650 MG: 160 SUSPENSION ORAL at 13:32

## 2018-12-24 RX ADMIN — ALBUMIN (HUMAN) 25 G: 12.5 SOLUTION INTRAVENOUS at 21:33

## 2018-12-24 RX ADMIN — IPRATROPIUM BROMIDE 0.5 MG: 0.5 SOLUTION RESPIRATORY (INHALATION) at 00:34

## 2018-12-24 RX ADMIN — ALBUMIN HUMAN 25 G: 0.25 SOLUTION INTRAVENOUS at 02:46

## 2018-12-24 RX ADMIN — NICOTINE 14 MG: 14 PATCH TRANSDERMAL at 08:03

## 2018-12-24 RX ADMIN — MAGNESIUM SULFATE IN WATER 2 G: 40 INJECTION, SOLUTION INTRAVENOUS at 17:14

## 2018-12-24 RX ADMIN — PROPOFOL 50 MCG/KG/MIN: 10 INJECTION, EMULSION INTRAVENOUS at 19:48

## 2018-12-24 RX ADMIN — HEPARIN SODIUM 5000 UNITS: 5000 INJECTION INTRAVENOUS; SUBCUTANEOUS at 21:05

## 2018-12-24 RX ADMIN — LIDOCAINE 2 PATCH: 50 PATCH CUTANEOUS at 08:04

## 2018-12-24 RX ADMIN — ROCURONIUM BROMIDE 50 MG: 10 INJECTION INTRAVENOUS at 09:10

## 2018-12-24 RX ADMIN — FENTANYL CITRATE 50 MCG: 50 INJECTION, SOLUTION INTRAMUSCULAR; INTRAVENOUS at 01:57

## 2018-12-24 RX ADMIN — ACETAMINOPHEN 650 MG: 160 SUSPENSION ORAL at 08:05

## 2018-12-24 RX ADMIN — Medication 20 MG: at 17:20

## 2018-12-24 RX ADMIN — LEVALBUTEROL 1.25 MG: 1.25 SOLUTION, CONCENTRATE RESPIRATORY (INHALATION) at 00:34

## 2018-12-24 RX ADMIN — BISACODYL 10 MG: 10 SUPPOSITORY RECTAL at 08:21

## 2018-12-24 RX ADMIN — FLUOXETINE HYDROCHLORIDE 80 MG: 20 LIQUID ORAL at 10:54

## 2018-12-24 RX ADMIN — Medication 20 MG: at 08:21

## 2018-12-24 RX ADMIN — HEPARIN SODIUM 5000 UNITS: 5000 INJECTION INTRAVENOUS; SUBCUTANEOUS at 14:03

## 2018-12-24 RX ADMIN — METHOCARBAMOL 750 MG: 750 TABLET, FILM COATED ORAL at 08:21

## 2018-12-24 RX ADMIN — ALBUMIN (HUMAN) 25 G: 12.5 SOLUTION INTRAVENOUS at 15:37

## 2018-12-24 RX ADMIN — IPRATROPIUM BROMIDE 0.5 MG: 0.5 SOLUTION RESPIRATORY (INHALATION) at 19:30

## 2018-12-24 RX ADMIN — LEVALBUTEROL 1.25 MG: 1.25 SOLUTION, CONCENTRATE RESPIRATORY (INHALATION) at 07:07

## 2018-12-24 RX ADMIN — PRIMIDONE 50 MG: 50 TABLET ORAL at 21:05

## 2018-12-24 RX ADMIN — LEVETIRACETAM 500 MG: 100 SOLUTION ORAL at 08:07

## 2018-12-24 RX ADMIN — FENTANYL CITRATE 100 MCG/HR: 50 INJECTION, SOLUTION INTRAMUSCULAR; INTRAVENOUS at 04:48

## 2018-12-24 RX ADMIN — PROPOFOL 50 MCG/KG/MIN: 10 INJECTION, EMULSION INTRAVENOUS at 04:05

## 2018-12-24 RX ADMIN — LEVETIRACETAM 500 MG: 100 SOLUTION ORAL at 21:05

## 2018-12-24 RX ADMIN — ACETAMINOPHEN 650 MG: 160 SUSPENSION ORAL at 18:05

## 2018-12-24 RX ADMIN — POTASSIUM CHLORIDE 40 MEQ: 400 INJECTION, SOLUTION INTRAVENOUS at 14:13

## 2018-12-24 RX ADMIN — LEVALBUTEROL 1.25 MG: 1.25 SOLUTION, CONCENTRATE RESPIRATORY (INHALATION) at 14:27

## 2018-12-24 RX ADMIN — LAMOTRIGINE 200 MG: 100 TABLET ORAL at 17:14

## 2018-12-24 RX ADMIN — ACETAMINOPHEN 650 MG: 160 SUSPENSION ORAL at 01:44

## 2018-12-24 RX ADMIN — PRIMIDONE 50 MG: 50 TABLET ORAL at 10:54

## 2018-12-24 RX ADMIN — CHLORHEXIDINE GLUCONATE 0.12% ORAL RINSE 15 ML: 1.2 LIQUID ORAL at 08:06

## 2018-12-24 RX ADMIN — PRAMIPEXOLE DIHYDROCHLORIDE 0.25 MG: 0.25 TABLET ORAL at 17:16

## 2018-12-24 RX ADMIN — MORPHINE SULFATE 4 MG: 4 INJECTION INTRAVENOUS at 02:08

## 2018-12-24 RX ADMIN — FUROSEMIDE 40 MG: 10 INJECTION, SOLUTION INTRAMUSCULAR; INTRAVENOUS at 17:15

## 2018-12-24 RX ADMIN — LEVALBUTEROL 1.25 MG: 1.25 SOLUTION, CONCENTRATE RESPIRATORY (INHALATION) at 19:30

## 2018-12-24 RX ADMIN — PROPOFOL 50 MCG/KG/MIN: 10 INJECTION, EMULSION INTRAVENOUS at 08:21

## 2018-12-24 RX ADMIN — LEVOTHYROXINE SODIUM 88 MCG: 88 TABLET ORAL at 08:21

## 2018-12-24 RX ADMIN — FENTANYL CITRATE 100 MCG/HR: 50 INJECTION, SOLUTION INTRAMUSCULAR; INTRAVENOUS at 19:48

## 2018-12-24 RX ADMIN — SENNOSIDES AND DOCUSATE SODIUM 2 TABLET: 8.6; 5 TABLET ORAL at 21:08

## 2018-12-24 RX ADMIN — METHOCARBAMOL 750 MG: 750 TABLET, FILM COATED ORAL at 13:32

## 2018-12-24 RX ADMIN — CHLORHEXIDINE GLUCONATE 0.12% ORAL RINSE 15 ML: 1.2 LIQUID ORAL at 21:05

## 2018-12-24 NOTE — PROGRESS NOTES
Post-Operative Note     Subjective: Patient is a 59year old female who was diagnosed with a TBI and large left subdural hemorrhage with midline shift  She is POD #4 of left craniectomy for evacuation of the SDH  Today, patient is S/P tracheostomy in the setting of acute respiratory failure  Vitals:    12/24/18 0710 12/24/18 0740 12/24/18 0755 12/24/18 1010   BP: (!) 86/48 (!) 94/47  103/63   Pulse: 68 80  84   Resp: 15 13  14   Temp: 100 °F (37 8 °C) 99 7 °F (37 6 °C)  99 °F (37 2 °C)   TempSrc:       SpO2: 99% 99% 99% 92%   Weight:       Height:         Objective:  Constitutional: Well-Developed  Sedated with Propofol at 50 mcg/kg/min and Fentanyl 100mch/hr  Neuro: 3T on sedation; No signs of agitation  HENT: Tracheostomy in place; Staples intact over the left scalp region; Ecchymosis noted around right eye; Neck Supple  Eyes: Equal, round and minimally reactive bilaterally  Cardiac: Regular rate with no murmurs appreciated  Heart rate = 80 bpm; +1 radial pulse  +2 pedal pulses bilaterally   Pulmonary: Patient on AC/VC settings; RR: 14, Tidal Volume: 400, FiO2: 60%, PEEP 8; Breath sounds clear bilaterally with right sided, upper lung field with mild congestion  Abdominal: Soft, diminished bowel sounds  : Marroquin catheter in place draining yellow urine   MSK: Right shoulder ecchymosis; SCD on bilateral lower extremities  Skin: Warm, dry  Multiple areas of ecchymosis on bilateral upper extremities  Diffuse peripheral edema noted in extremities  A/P: 59year old female who was diagnosed with a TBI and large left subdural hemorrhage with midline shift (POD#4 left craniectomy); S/P Tracheostomy 2/2 acute respiratory failure today   - Pending noon labs of CBC and BMP  - Started Albumin 25% Q 6 hours for 4 doses   - NG Tube; Continue to hold TF at this time 2/2 residuals   - Continue frequent neurological examinations;  Wean sedation/anaglesia as possible; continue prophylactic Keppra; Continue Hydralazine/Labetol to maintain SBP < 140   - Wean ventilator settings as tolerated S/P tracheostomy   - Continue plan of diureses   - Continue to monitor H&H   - Continue Bowel Regimen   - Continue aggressive pulmonary toileting and respiratory protocol   - Continue DVT Prophylaxis with SQ Heparin and SCD

## 2018-12-24 NOTE — ANESTHESIA POSTPROCEDURE EVALUATION
Post-Op Assessment Note      CV Status:  Stable    Mental Status:  Unresponsive    Hydration Status:  Stable    PONV Controlled:  None    Airway Patency:  Patent    Post Op Vitals Reviewed: Yes          Staff: Anesthesiologist, CRNA           BP     Temp      Pulse     Resp      SpO2

## 2018-12-24 NOTE — PROGRESS NOTES
Progress Note - Neurosurgery   Isi Ba 59 y o  female MRN: [id number]  [facility]: [id number]    Assessment:  1  POD 4 s/p Left hemicraniectomy for evacuation of left temporal intraparenchymal hematoma, Expansatile duraplasty  2  POD 0 s/p Tracheostomy tube placement  3  Acute Traumatic Large Left Temporal IPH  4  S/p Fall (Unwitnessed)  5  Acute Traumatic Left Temporal to frontal SDH  6  Acute Traumatic Bilateral posterior SAH  7  Acute Traumatic IVH  8  Acute Traumatic Nondisplaced Fractures  Right Transverse processes T1 - T4  9  Acute Traumatic Multiple rib fractures on the right   10  Acute Traumatic Right Glenoid fracture  11  Acute Traumatic Right Acromion fracture  12  Small right Apical Pneumothorax  13  RUL lung nodule  14  Diabetes Mellitus Type II    Plan:  · Exam revealed GCS 3T E1 V1T M1 pt sedated with recent insertion of trach colllar,not opening eyes to verbal or tactile stimuli, not following commands, Reflexes intact  ? Left Hemicraniotomy site with staples intact  Left scalp flap is soft  · Imaging reviewed personally and by attending  Results listed below  ? CT of head wo contrast 12/21/18 shows postop changes of left Craniectomy and evacuation of left temporal IPH  A small amount of IPH hemorrhage remains within anterior left temporal lobe and stable thin SDH along left frontal region  Increased blood products along the cerebral falx when compared with prior exam  Persistent edema predominantly within the left temporal lobe and effacement of the left-sided cerebral sulci however there is interval resolution of midline shift and decreased effacement of the left lateral ventricle  Continued washout and interval evolution of IVH/SAH blood products    ? Ct Head Wo Contrast, Result Date: 12/19/2018: 1   Stable size of large IPH centered in the left temporal lobe with surrounding mass effect and edema and adjacent left temporal to frontal subdural hemorrhage   Stable midline shift from left to right  2   Increased conspicuity of bilateral posterior subarachnoid and intraventricular blood products are likely due to redistribution  · Ongoing medical management per primary team - Critical Care service  ? PT ventillated and sedated with Propofol 1000mg in 100 ml infusion and Fentanyl 1250 mcg in NaCl 0 9% 125 ml drip at 100 mcg/hr    ? Pt had tracheostomy today  ? Continue Keppra 500 MG Q 12 Hrs x 1 week for seizure ppx  ? Labs:  § Na 12/24/18 wnl at 140-improved  § K 12/24/18  wnl at 3 8 - improved, KCl was given  § Hgb 12/24/18  low at 8 0 but improved,  § DM II - Insulin sliding scale in place  · PT/ OT Evaluation and mobilization when able to  · DVT ppx: SCDs, heparin  · Continue regular neurological checks  · STAT CT of head wo contrast if neurological decline GCS > 2pts/hr  · Supportive management with pain control for the nondisplaced fx for the Right T1 and T2 transverse process fxs since the transverse processes are not integral to the stability of the thoracic vertebrae/thoracic spine  No brace at this time due to the concurrent multiple rib fractures  · No further neurosurgical intervention at this time  Neurosurgery will see patient as needed during the remainder of her hospitalization  Follow up appointment scheduled with neurosurgery on Jan 7th 2018 in 2 weeks with repeat CT of Head wo contrast and CTA Head w wo contrast  She also has a 6 week POV scheduled with Dr Cole Marrufo on Feb 6th   Please call with any questions or concerns  Subjective/Objective   Chief Complaint: Pt is unresponsive secondary to sedation/ Follow up POD 4 s/p  Left hemicraniectomy for evacuation of left temporal intraparenchymal hematoma, Expansatile duraplasty, Left SDH and bilateral posterior SAH       Subjective: Pt is a 60 yo female being followed inpatient POD 4 s/p  Left hemicraniectomy for evacuation of left temporal intraparenchymal hematoma, Expansatile duraplasty, Left SDH and bilateral posterior SAH  Pt was extubated today and had a tracheostomy done  She is intubated on Propofol and Fentanyl thus hx and exam is limited  Objective: Laying in bed, sedated  NAD  I/O       12/22 0701 - 12/23 0700 12/23 0701 - 12/24 0700 12/24 0701 - 12/25 0700    I V  (mL/kg) 919 9 (16) 793 1 (13 8)     NG/ 330     IV Piggyback 203 3 450     Feedings 1280 1152     Total Intake(mL/kg) 2773 2 (48 1) 2725 1 (47 3)     Urine (mL/kg/hr) 2180 (1 6) 2375 (1 7)     Emesis/NG output  350     Drains 15      Total Output 2195 2725      Net +578 2 +0 1             Unmeasured Stool Occurrence  1 x           Invasive Devices     Peripherally Inserted Central Catheter Line            PICC Line 20/32/94 Left Basilic 1 day          Drain            Urethral Catheter 16 Fr  5 days    NG/OG/Enteral Tube Orogastric less than 1 day          Airway            ETT  Cuffed 8 mm 5 days                Physical Exam:  Vitals: Blood pressure (!) 94/47, pulse 80, temperature 99 7 °F (37 6 °C), resp  rate 13, height 5' 3" (1 6 m), weight 57 6 kg (126 lb 15 8 oz), SpO2 99 %  ,Body mass index is 22 49 kg/m²  Hemodynamic Monitoring: MAP: Arterial Line MAP (mmHg): 102 mmHg    General appearance: sedated, appears stated age, no distress  Head: Left Hemicraniotomy site with staples intact  Left scalp flap is soft      Eyes:  PERRL- sluggish  Neck: supple, symmetrical, pt has trach collar in place  Lungs: non labored breathing on ventillator  Heart: regular heart rate  Neurologic:   Mental status: sedated, GCS 3T E1 V1T M1    Cranial nerves: grossly intact (Cranial nerves II-XII)  Sensory: limited  Motor: no spontaneous movements, not withdrawing to pain secondary to sedation   Reflexes: 2+ and symmetric    Lab Results:    Results from last 7 days  Lab Units 12/24/18  0447 12/23/18  0447 12/22/18  0516 12/21/18  0444  12/20/18  1153   WBC Thousand/uL 6 93 9 83 9 62 9 33  < > 9 07   HEMOGLOBIN g/dL 8 0* 9 3* 10 0* 9 9*  < > 8 8*   HEMATOCRIT % 24 9* 28 4* 29 9* 29 4*  < > 27 0*   PLATELETS Thousands/uL 198 222 209 180  < > 184   NEUTROS PCT %  --  79*  --  78*  --  89*   MONOS PCT %  --  8  --  7  --  5   MONO PCT % 0*  --  7  --   --   --    < > = values in this interval not displayed  Results from last 7 days  Lab Units 12/24/18 0447 12/23/18  0447 12/22/18  0516  12/20/18  1029 12/20/18  0953 12/20/18  0917 12/18/18 2030   POTASSIUM mmol/L 3 8 3 5 3 3*  < >  --   --   --   < > 3 7   CHLORIDE mmol/L 103 103 103  < >  --   --   --   < > 88*   CO2 mmol/L 31 29 25  < >  --   --   --   < > 26   CO2, I-STAT mmol/L  --   --   --   --  22 22 27  < >  --    BUN mg/dL 7 7 6  < >  --   --   --   < > 9   CREATININE mg/dL 0 36* 0 33* 0 32*  < >  --   --   --   < > 0 42*   CALCIUM mg/dL 8 6 8 2* 7 7*  < >  --   --   --   < > 8 7   ALK PHOS U/L  --  129*  --   --   --   --   --   --  138*   ALT U/L  --  40  --   --   --   --   --   --  96*   AST U/L  --  56*  --   --   --   --   --   --  130*   GLUCOSE, ISTAT mg/dl  --   --   --   --  117 121 125  < >  --    < > = values in this interval not displayed  Results from last 7 days  Lab Units 12/23/18 0447 12/21/18 0444 12/20/18  0054   MAGNESIUM mg/dL 1 8 2 0 1 8       Results from last 7 days  Lab Units 12/23/18 0447 12/22/18  0516 12/21/18  0444   PHOSPHORUS mg/dL 2 6 2 2* 2 2*       Results from last 7 days  Lab Units 12/21/18  0444 12/20/18  1153 12/18/18 2030   INR  1 20* 1 29* 1 01   PTT seconds  --  31 25*     No results found for: TROPONINT  ABG:  Lab Results   Component Value Date    PHART 7 465 (H) 12/22/2018    DFY7EBX 37 2 12/22/2018    PO2ART 124 8 12/22/2018    JDO4ENM 26 2 12/22/2018    BEART 2 4 12/22/2018    SOURCE Brachial, Right 12/22/2018       Imaging Studies: I have personally reviewed pertinent reports  and I have personally reviewed pertinent films in PACS    Cta Head And Neck W Wo Contrast    Result Date: 12/18/2018  Impression: 1    Atherosclerotic plaque at the right carotid bifurcation resulting in moderate (50-69%) stenosis  No evidence of carotid or vertebral artery dissection or pseudoaneurysm to suggest acute vascular injury  2   Displacement of left MCA branches by left temporal parietal hematoma  No evidence of aneurysm or vascular malformation  No significant stenosis in the major vessels of the [place]  3   Patent dural venous sinuses  Workstation performed: [id number]     Xr Chest Portable    Result Date: 12/23/2018  Impression: 1  Low position of endotracheal tube which should be withdrawn approximately 2 to 3 cm  2   Left lower lobe consolidation  3   Diffuse patchy opacities in both lungs, either pneumonia or, more likely, pulmonary edema  Workstation performed: [id number]     Xr Chest Portable    Result Date: 12/23/2018  Impression: 1  Tip of endotracheal tube 9 mm above the radha  I recommend that the tube be pulled back approximately 3 cm  2   Opacification in both lungs, most likely pulmonary edema or, possibly, bilateral pneumonia  Workstation performed: [id number]     Xr Chest Portable    Result Date: 12/19/2018  Impression: Satisfactory intubation  Bibasilar subsegmental atelectasis  No pneumothorax  Workstation performed: [id number]     Xr Shoulder 2+ Vw Right    Result Date: 12/19/2018  Impression: Right-sided rib fractures again noted  Possible acromion fracture  The small glenoid fracture seen on CT scan of the chest is not clearly appreciated on this exam  Workstation performed: [id number]     Xr Foot 3+ Vw Left    Result Date: 12/21/2018  Impression: Nondisplaced transverse fracture involves the proximal aspect of the proximal to the 3rd digit  Workstation performed: [id number]     Xr Abdomen 1 View Kub    Result Date: 12/24/2018  Impression: Increased bowel gas predominantly in the colon suggesting colonic ileus    Maximal transverse diameter, located in the right transverse colon is approximately 9 8 cm Workstation performed: [id number]     Ct Head Wo Contrast    Result Date: 12/21/2018  Impression: 1  Postsurgical changes of left craniectomy and evacuation of left temporal intraparenchymal hemorrhage  2   A small amount of intraparenchymal/extra-axial hemorrhage remains within the anterior left temporal lobe and stable thin subdural hemorrhage along the left frontal region  3   Increased blood products along the cerebral falx when compared with prior exam  4   Persistent edema predominantly within the left temporal lobe and effacement of the left-sided cerebral sulci however there is interval resolution of midline shift and decreased effacement of the left lateral ventricle  5   Continued washout and interval evolution of intraventricular and subarachnoid blood products  Workstation performed: [id number]     Ct Head Wo Contrast    Result Date: 12/20/2018  Impression: 1  Left temporal lobe intraparenchymal hemorrhage with slight increase in surrounding edema as manifest by increased effacement of the left occipital horn when compared with prior examination  Slight increase in left to right midline shift now measuring up to 6 mm, previously 4 mm  2   Stable subdural, subarachnoid and intraventricular blood products  No large delayed intracranial hemorrhage  The study was marked in [facility] for immediate notification  Workstation performed: [id number]     Ct Head Wo Contrast    Result Date: 12/19/2018  Impression: 1  Stable size of large intraparenchymal hemorrhage centered in the left temporal lobe with surrounding mass effect and edema and adjacent left temporal to frontal subdural hemorrhage  Stable midline shift from left to right  2   Increased conspicuity of bilateral posterior subarachnoid and intraventricular blood products are likely due to redistribution  Workstation performed: [id number]     Trauma - Ct Head Wo Contrast    Result Date: 12/18/2018  Impression: 1    Large parenchymal hemorrhage on the left with associated left-sided subdural hematoma and bilateral subarachnoid hemorrhage including intraventricular hemorrhage  2   Mass effect and midline shift to the right measuring 4 mm  I personally discussed this study with Aaliyahpreeti Simmonspatrice on 12/18/2018 at 8:45 PM  Workstation performed: [id number]     Trauma - Ct Spine Cervical Wo Contrast    Result Date: 12/18/2018  Impression: 1  No cervical spine fracture or traumatic malalignment  2   Small right apical pneumothorax  3   Nondisplaced fractures of the right T1 and T2 transverse processes  Fracture of the posterior right second rib  I personally discussed this study with Aaliyah Janine on 12/18/2018 at 8:45 PM   Workstation performed: [id number]     Ct Shoulder Right Wo Contrast    Result Date: 12/20/2018  Impression: Acute fracture anterior glenoid rim with 1 to 2 mm anteromedial displacement  Acute comminuted fracture posterior acromion with 6 mm mediolateral distraction and 1 to 2 mm inferior cortical offset  No dislocation  Interval progression of right lower lobe and right upper lobe dependent airspace disease  This may represent subsegmental atelectasis, aspiration, and/or pulmonary contusion  Interval enlargement of small right pleural effusion  No other significant interval change  The examination demonstrates a significant  finding and was documented as such in [facility] for liaison and referring practitioner notification  Workstation performed: [id number]     Xr Chest 1 View    Result Date: 12/20/2018  Impression: Multiple right-sided rib fractures  Consolidation at the base may represent atelectasis or pneumonia  Workstation performed: [id number]     Trauma - Ct Chest Abdomen Pelvis W Contrast    Result Date: 12/18/2018  Impression: 1  Multiple right-sided rib fractures with tiny right apical pneumothorax  Fractures extend from the second through eleventh ribs at the costovertebral junctions and fourth through eighth ribs laterally   This is consistent with flail chest  A right anterior second rib fracture is severely displaced/angulated  2   Fracture of the anterior right glenoid, acromium, and first through fourth thoracic transverse processes on the right  3   Focal irregular opacity in the right upper lobe with adjacent 4 mm nodule  While contusion is possible given overlying trauma, the appearance of the lesion is most suspicious for a primary lung cancer and follow-up PET/CT is recommended  4   Borderline upper abdominal lymph nodes with density/stranding at the base of the small bowel mesentery  While mesenteric contusion can have this appearance in the setting of trauma  There are calcifications which suggest chronicity  Lymphoproliferative disorder cannot be excluded and follow-up and 3-6 months is recommended  5   Chronic interstitial lung disease of uncertain etiology  Given esophageal dilatation, this could represent NSIP in the setting of scleroderma  I personally discussed this study with Walker Calixto on 12/18/2018 at 8:45 PM  Workstation performed: [id number]     Xr Trauma Multiple    Result Date: 12/19/2018  Impression: Multiple right-sided rib fractures  Consolidation at the base may represent atelectasis or pneumonia  Workstation performed: [id number]     Xr Chest Portable Icu    Result Date: 12/24/2018  Impression: Endotracheal tube tip 2 9 cm above the radha No interval change in appearance of diffuse bilateral pulmonary airspace disease Workstation performed: [id number]     Xr Chest Portable Icu    Result Date: 12/20/2018  Impression: Right subclavian catheter extending superiorly into the neck requiring repositioning  Workstation performed: [id number]     Xr Chest Portable Icu    Result Date: 12/20/2018  Impression: Mild central vascular congestion  Basilar opacities and effusions  Stable rib fractures   Workstation performed: [id number]     Xr Chest Portable Icu    Result Date: 12/19/2018  Impression: Right basilar opacity partially obscuring the hemidiaphragm may represent atelectasis or infiltrate  Possible small basilar effusion  Workstation performed: [id number]     Cta Chest Pe Study    Result Date: 12/22/2018  Impression: No evidence of pulmonary embolism Moderate right effusion small left effusion There is development of diffuse lung opacities with reticulation, groundglass density with a few areas of basilar consolidation  This may be due to pulmonary edema  However infiltrates are difficult to exclude  The ET tube is low-lying with tip lying in the radha with likely projection into the right main bronchus  Small mediastinal lymph nodes larger from the previous study probably reactive  I personally discussed this study with Aldair Delarosa on 12/22/2018 at 11:46 AM  Workstation performed: [id number] '    EKG, Pathology, and Other Studies: I have personally reviewed pertinent reports        VTE Pharmacologic Prophylaxis: Reason for no pharmacologic prophylaxis Pharm DVT held for Trach/Peg procedure    VTE Mechanical Prophylaxis: sequential compression device

## 2018-12-24 NOTE — NUTRITION
In light of Propofol @ 17 4 ml/hr, and when safe to resume TF, recommend Jevity 1 2 at 20 ml/hr & incr by 10 ml q 4 hrs as david to goal of 42 ml/hr + 1 PROSOURCE to = qd: 1008 ml,1270 Total Kcal,71 gm PRO,171 gm CHO,40 gm Fat,813 ml Free H2O,2 1 mg CHO/kg/min  Consider 100 ml Free H2O flush q 4 hrs as tolerated

## 2018-12-24 NOTE — SOCIAL WORK
[facility] contacted to CM to discuss the referral  They will follow over the holiday and touch base with CM on Thursday regarding potential d/c this week

## 2018-12-24 NOTE — PROGRESS NOTES
Progress Note - Critical Care   Syed Spain 59 y o  female MRN: [id number]  Unit/Bed#: MICU 02 Encounter: [id number]    Attending Physician: Ricardo Hickey, DO      ______________________________________________________________________  Assessment and Plan:   Principal Problem:    Traumatic brain injury [facility])  Active Problems:    Multiple fractures of ribs, bilateral, initial encounter for closed fracture    Traumatic pneumothorax    Glenoid fracture of shoulder, right, closed, initial encounter    Intraparenchymal hemorrhage of brain ([address] )    Flail chest, initial encounter for closed fracture    Fracture of thoracic transverse process, closed, initial encounter ([address] )    Subdural hematoma ([address] )    Subarachnoid hemorrhage ([address] )    Diabetes mellitus, type II ([address] )    Closed nondisplaced left toe fracture    Hypokalemia    Hypophosphatemia    Acute encephalopathy    Status post craniectomy    Acute respiratory failure with hypoxia ([address] )    Mucus plugging of bronchi    Acute respiratory failure ([address] )  Resolved Problems:    Hyponatremia    58 y/o female found down with TBI/large L SDH and midline shift POD #4 L craniectomy for evacuation of SDH    Neuro:   Left SDH/B SAH/TBI POD #4 s/p L craniectomy for evacuation- Post op CT head on 12/21 shows improvement  Neuro exam improving as well, follows commands off of sedation on occasion, however this is inconsistent and patient is not tracking with eyes  Continues to have no movement of RUE either  Continue Keppra per Neurosurgery  PETAR bulb removed by Dr Ashli Davis yesterday  Continue neuro checks  For Trach/PEG today by surgery  Will require TBI rehab upon discharge  Agitation- change IV prn morphine to IV prn dilaudid  Continue propofol infusion  Wean post op  Continue seroquel qhs  Analgesia-  see above  Schedule oxycodone via PEG tube once able  History of Bipolar disorder- resume home lamictal 200 mg BID       History of depression- resume home prozac 80 mg BID    History of essential tremor and RLS- resume primidone and continue mirapex      CV:   No active issues  BP is tolerating high dose of propofol  Goal MAP >/= 65 mmHg  Pulm:   Acute respiratory failure- due to pulmonary edema/hypoxia and neuro status  Continue aggressive pulmonary toilette/chest PT and diuresis  Lasix 20 mg IV BID- may need to increase dose of this depending on how she diureses with this today  Wean FiO2 back down to 60%  For Trach/PEG today by surgery  CXR this AM is unchanged, no evidence of mucous plugging  Bilateral opacities likely related to pulmonary edema  Suspicion for infection is low due to isolated fever only at time of agitation/desaturation and lack of leukocytosis or significant secretions  Continue scheduled nebs as well  Multiple R sided rib fractures (2-11)- pain control, Add lidoderm patches and robaxin  Will schedule oxycodone down PEG tube as well once tube feeds resumed  Pulmonary toilette/OOB when able  GI:   PEG today due to need for trach and inconsistent neuro exam/agitation (patient will not keep Crissie Leech in place and is high risk for aspiration with this)  Bowel distention- no evidence of obstruction  She was tolerating tube feeds  One small BM overnight  Received one time dose of Reglan yesterday following KUB  Will increase Senakot to 2 tabs daily and continue dulcolax suppositories daily and monitor for a response  Will resume TFs post op when okay with surgery  :   Renal function is normal  Adequate UOP via kenny catheter  Plan to keep kenny for an additional 24 hours to monitor UOP/volume status while diuresing  F/E/N:   Resueme TFs post op when okay with surgery  Currently on no IVFs while NPO due to volume overload/pulmonary edema/hypoxia  She is 10 liters positive this admission, net even for the last 24 hours  Continue diuresis and may need to increase this a bit- goal net negative 500 mls to 1 liter for 24 hours     Electrolytes normal      ID:   Isolated fever- likely reactive due to hypoxic episode while agitated  Will continue to monitor fever curve  If continues to spike will culture and treat  At this time suspicion for infection is low due to lack of leukocytosis/significant secretions/persistent fever, etc      Heme:   Acute blood loss anemia- Hgb dropped to 8 0 this AM from 9 2  No evidence of bleeding clinically  BM was non-bloody  Skull flap is soft and non-expanding following PETAR drain being pulled  Trend- will check Hgb postop  Endo:   History of DM II- goal BS <180  Continue SSI algorithm 2       Msk/Skin:   L 3rd proximal phalanx fracture- WBAT LLE in surgical shoe when appropriate  F/u with podiatry in 2 weeks  R glenoid and acromion fracture- NWB RUE in sling  Non-operative management  F/u with orthopedics as outpatient  R T1-T4 TP fractures- non-operative management  No sling/brace or neurosurgical evaluation eneded  Nael Peters brace for comfort when ambulatory if needed  Pain control  Proph:   SCDs, SQH, Keppra       Disposition:   Continue ICU level care  Code Status: Level 1 - Full Code    Counseling / Coordination of Care  Total Critical Care time spent 30 minutes excluding procedures, teaching and family updates  ______________________________________________________________________    Chief Complaint: Intubated and sedated    24 Hour Events: Became agitated overnight with turning and desaturated to the mid 70s  She was given additional morphine and fentanyl with propofol increased to 50 mcgs and she improved  Her FiO2 was up titrated to 100% FiO2 as well and her sats slowly improved  Per RN, not many secretions  Febrile to 102 F during her acute hypoxic episode  Otherwise afebrile  She was diuresed twice with 20 mg IV lasix yesterday with modest response (appx 500 to 600 mls of diuresis with each dose)        Review of Systems   Unable to perform ROS: Intubated ______________________________________________________________________    Physical Exam:     Physical Exam   Constitutional: She appears well-developed  HENT:   + left sided scalp staples C/D/I  Subgaleal drain was pulled yesterday   Eyes: Pupils are equal, round, and reactive to light    + 3 mm and reactive bilaterally   Neck: Normal range of motion  Neck supple  Cardiovascular: Normal rate, regular rhythm and normal heart sounds  Pulmonary/Chest: Effort normal and breath sounds normal    Resting comfortably on A/C settings with FiO2 at 70% currently  + abd binder removed   Abdominal: Soft  Bowel sounds are normal  She exhibits no distension  There is no tenderness  There is no rebound and no guarding  Genitourinary: Vagina normal    Genitourinary Comments: + kenny catheter in place draining clear yellow urine   Musculoskeletal: Normal range of motion  She exhibits no edema or deformity  Neurological:   + GCS 10T (3, 1T, 6)- off of sedation   Not moving the RUE at all  Wiggles toes bilaterally  Moves LUE as well  Skin: Skin is warm and dry  Capillary refill takes less than 2 seconds  ______________________________________________________________________  Vitals:    18 1470 18 0409 18 0509 18 0609   BP:  (!) 98/48 97/52 (!) 95/49   Pulse:  78 72 74   Resp:  17 18 16   Temp:  (!) 101 1 °F (38 4 °C) 100 4 °F (38 °C) 100 4 °F (38 °C)   TempSrc:       SpO2: 100% 99% 99% 99%   Weight:       Height:           Temperature:   Temp (24hrs), Av 3 °F (37 9 °C), Min:99 3 °F (37 4 °C), Max:102 2 °F (39 °C)    Current Temperature: 100 4 °F (38 °C)  Weights:   IBW: 52 4 kg    Body mass index is 22 49 kg/m²    Weight (last 2 days)     None        Hemodynamic Monitoring:  N/A     Non-Invasive/Invasive Ventilation Settings:  Respiratory    Lab Data (Last 4 hours)    None         O2/Vent Data (Last 4 hours)       0240  0318         Vent Mode  AC/VC      Resp Rate (BPM) (BPM)  14      Vt (mL) (mL)  400      FIO2 (%) (%) 80 70      PEEP (cmH2O) (cmH2O)  10      MV  7 48                No results found for: PHART, OVZ5YXK, PO2ART, JAU2BGL, G8STWFFP, BEART, SOURCE  SpO2: SpO2: 99 %, SpO2 Device: O2 Device: Other (comment)     Intake and Outputs:  I/O       12/22 0701 - 12/23 0700 12/23 0701 - 12/24 0700    I V  (mL/kg) 919 9 (16) 793 1 (13 8)    NG/ 330    IV Piggyback 203 3 450    Feedings 1280 1152    Total Intake(mL/kg) 2773 2 (48 1) 2725 1 (47 3)    Urine (mL/kg/hr) 2180 (1 6) 2375 (1 7)    Emesis/NG output  350    Drains 15     Total Output 2195 2725    Net +578 2 +0 1          Unmeasured Stool Occurrence  1 x        UOP: 95 ml/hr     Nutrition:        Diet Orders            Start     Ordered    12/23/18 1043  Diet Enteral/Parenteral; Tube Feeding No Oral Diet; Jevity 1 2 Sae; Cyclic; 20; 16 hours; 809; Water; Every 6 hours  Diet effective now     Comments:  Increase by 20 cc/hr every 4 hours to goal of 80 cc/hr  Hold at 0000 on 12/24 for OR   Question Answer Comment   Diet Type Enteral/Parenteral    Enteral/Parenteral Tube Feeding No Oral Diet    Tube Feeding Formula: Jevity 1 2 Sae    Bolus/Cyclic/Continuous Cyclic    Tube Feeding Cyclic Rate (mL/hr): 20    Tube Feeding Cyclic: Administer over: 16 hours    Tube Feeding water flush (mL): 100    Water Flush type: Water    Water flush frequency: Every 6 hours    RD to adjust diet per protocol? Yes        12/23/18 1043        Tube feedings currently on hold for OR today       Labs:     Results from last 7 days  Lab Units 12/24/18  0447 12/23/18  0447 12/22/18  0516 12/21/18  0444 12/21/18  0125 12/20/18  2205 12/20/18  1617 12/20/18  1153  12/20/18  0526  12/19/18  0455 12/18/18  2028   WBC Thousand/uL 6 93 9 83 9 62 9 33  --   --  8 31 9 07  --  9 39  --  7 70 13 08*   HEMOGLOBIN g/dL 8 0* 9 3* 10 0* 9 9* 9 8* 6 9* 8 1* 8 8*  --  9 5*  --  10 2* 11 6   I STAT HEMOGLOBIN   --   --   --   --   --   --   --   --   < >  --   < > --   --    HEMATOCRIT % 24 9* 28 4* 29 9* 29 4* 29 5* 20 9* 24 4* 27 0*  --  29 4*  --  30 2* 35 3   HEMATOCRIT, ISTAT   --   --   --   --   --   --   --   --   < >  --   < >  --   --    PLATELETS Thousands/uL 198 222 209 180  --   --  175 184  --  216  --  262 273   NEUTROS PCT %  --  79*  --  78*  --   --   --  89*  --   --   --  76* 89*   BANDS PCT %  --   --  3  --   --   --   --   --   --   --   --   --   --    MONOS PCT %  --  8  --  7  --   --   --  5  --   --   --  10 6   MONO PCT %  --   --  7  --   --   --   --   --   --   --   --   --   --    < > = values in this interval not displayed  Results from last 7 days  Lab Units 12/24/18  0447 12/23/18  0447 12/22/18  0516 12/21/18  0444 12/20/18  1727 12/20/18  1153 12/20/18  1029  12/20/18  0526  12/18/18  2030   SODIUM mmol/L 140 137 137 138 137 137  --   --  129*  < > 122*   POTASSIUM mmol/L 3 8 3 5 3 3* 3 7 3 8 3 9  --   --  3 5  < > 3 7   CHLORIDE mmol/L 103 103 103 105 106 106  --   --  97*  < > 88*   CO2 mmol/L 31 29 25 26 24 23  --   --  24  < > 26   CO2, I-STAT mmol/L  --   --   --   --   --   --  22  < >  --   < >  --    ANION GAP mmol/L 6 5 9 7 7 8  --   --  8  < > 8   BUN mg/dL 7 7 6 8 7 6  --   --  7  < > 9   CREATININE mg/dL 0 36* 0 33* 0 32* 0 25* 0 28* 0 30*  --   --  0 27*  < > 0 42*   CALCIUM mg/dL 8 6 8 2* 7 7* 7 5* 7 9* 8 2*  --   --  7 6*  < > 8 7   ALT U/L  --  40  --   --   --   --   --   --   --   --  96*   AST U/L  --  56*  --   --   --   --   --   --   --   --  130*   ALK PHOS U/L  --  129*  --   --   --   --   --   --   --   --  138*   ALBUMIN g/dL  --  1 5*  --   --   --   --   --   --   --   --  3 4*   TOTAL BILIRUBIN mg/dL  --  0 48  --   --   --   --   --   --   --   --  0 42   < > = values in this interval not displayed      Results from last 7 days  Lab Units 12/23/18  0447 12/22/18  0516 12/21/18  0444 12/20/18  0054 12/19/18  0455 12/18/18  2229   MAGNESIUM mg/dL 1 8  --  2 0 1 8 2 3 1 4*   PHOSPHORUS mg/dL 2 6 2 2* 2 2* 2  2* 3 2 3 5        Results from last 7 days  Lab Units 18  0444 18  1153 18  2030   INR  1 20* 1 29* 1 01   PTT seconds  --  31 25*       Results from last 7 days  Lab Units 18  1204 18  0054   TROPONIN I ng/mL <0 02 <0 02       Results from last 7 days  Lab Units 18  0133   LACTIC ACID mmol/L 0 7     ABG:  Lab Results   Component Value Date    PHART 7 465 (H) 2018    FRB5ALQ 37 2 2018    PO2ART 124 8 2018    VIM5ALK 26 2 2018    BEART 2 4 2018    SOURCE Brachial, Right 2018     VBG:  Results from last 7 days  Lab Units 18  0504   ABG SOURCE  Brachial, Right       Results from last 7 days  Lab Units 18  0827   PROCALCITONIN ng/ml 0 27*       Imagin/24 CXR: bilateral opacities diffusely c/w pulmonary edema   CXR: bilateral opacities diffusely   KUB: Distended proximal loops of bowel  I have personally reviewed pertinent reports  EKG: no new    Micro:       Allergies: No Known Allergies  Medications:   Scheduled Meds:  Current Facility-Administered Medications:  acetaminophen 650 mg Oral Q6H Sruthi Dahl PA-C    albuterol 2 5 mg Nebulization Q4H PRN Susana Whiteside DO    bisacodyl 10 mg Rectal Daily Wilmer Arcos MD    chlorhexidine 15 mL Swish & Spit Q12H [facility] Epi Contreras MD    dexmedetomidine 0 1-0 7 mcg/kg/hr Intravenous Titrated Deyvi Sharpe MD Last Rate: Stopped (18)   fentaNYL 100 mcg/hr Intravenous Continuous Epi Contreras MD Last Rate: 100 mcg/hr (18)   fentanyl citrate (PF) 50 mcg Intravenous Q2H PRN Deyvi Sharpe MD    furosemide 20 mg Intravenous BID (diuretic) Wilmer Arcso MD    heparin (porcine) 5,000 Units Subcutaneous [facility] Deyvi Sharpe MD    hydrALAZINE 5 mg Intravenous Q6H PRN Deyvi Sharpe MD    influenza vaccine 0 5 mL Intramuscular Prior to discharge Susana Schools, DO    insulin lispro 1-5 Units Subcutaneous Q6H [facility] Valentín Matthews MD    ipratropium 0 5 mg Nebulization Q6H Zaida Haff, DO    labetalol 5 mg Intravenous Q4H PRN Joseph Mckeon MD    levalbuterol 1 25 mg Nebulization Q6H Zaida Haff, DO    levETIRAcetam 500 mg Oral Q12H [facility] TRISTAN Higuera    morphine injection 4 mg Intravenous Q4H PRN Valentín Matthews MD    nicotine 14 mg Transdermal Daily Haydee Luna PA-C    polyethylene glycol 17 g Oral Daily Vin Ruth MD    pramipexole 0 25 mg Oral BID TRISTAN Higuera    propofol 5-50 mcg/kg/min Intravenous Titrated Sawyer Ron Dalton DO Last Rate: 50 mcg/kg/min (12/24/18 0405)   QUEtiapine 25 mg Oral HS TRISTAN Higuera    senna-docusate sodium 1 tablet Oral HS Aurora Gonzalez MD      Continuous Infusions:  dexmedetomidine 0 1-0 7 mcg/kg/hr Last Rate: Stopped (12/23/18 1727)   fentaNYL 100 mcg/hr Last Rate: 100 mcg/hr (12/24/18 0448)   propofol 5-50 mcg/kg/min Last Rate: 50 mcg/kg/min (12/24/18 0405)     PRN Meds:    albuterol 2 5 mg Q4H PRN   fentanyl citrate (PF) 50 mcg Q2H PRN   hydrALAZINE 5 mg Q6H PRN   influenza vaccine 0 5 mL Prior to discharge   labetalol 5 mg Q4H PRN   morphine injection 4 mg Q4H PRN     VTE Pharmacologic Prophylaxis: Heparin  VTE Mechanical Prophylaxis: sequential compression device     Invasive lines and devices: Invasive Devices     Peripherally Inserted Central Catheter Line            PICC Line 05/18/84 Left Basilic 1 day          Drain            Urethral Catheter 16 Fr  5 days    NG/OG/Enteral Tube Orogastric less than 1 day          Airway            ETT  Cuffed 8 mm 5 days                     Portions of the record may have been created with voice recognition software  Occasional wrong word or "sound a like" substitutions may have occurred due to the inherent limitations of voice recognition software  Read the chart carefully and recognize, using context, where substitutions have occurred      Kristyn Hogan PA-C

## 2018-12-24 NOTE — RESPIRATORY THERAPY NOTE
RT Ventilator Management Note  Grant Kerns 59 y o  female MRN: [id number]  Unit/Bed#: [facility] 02 Encounter: [id number]      Daily Screen       12/23/2018 0708 12/24/2018 0755          Patient safety screen outcome[de-identified] Failed Failed      Not Ready for Weaning due to[de-identified] Underline problem not resolved Underline problem not resolved              Physical Exam:   Assessment Type: Assess only  General Appearance: Sedated  Respiratory Pattern: Assisted  Chest Assessment: Chest expansion symmetrical  Bilateral Breath Sounds: Coarse  Cough: Productive  Suction: ET Tube  O2 Device: Ventilator      Resp Comments: (P) No wean this am for OR

## 2018-12-24 NOTE — DISCHARGE INSTRUCTIONS
Neurosurgical discharge instruction:      Monitor incision daily  Keep incision clean and dry   May shower and wash hair 72 hours after surgery   Avoid rubbing the incision, but gently massage hair   Do not use a hair dryer, and avoid hair products such as mousse, oils, and gels  Do not brush your hair away from the incision since this will put strain on the suture line   Do not dye or perm hair until cleared by MD Corrina Mckenzie Sutures/Staples removed 1/3/18   Coumadin, ASA, Plavix may be restarted once cleared by MD Corrina Mckenzie Recovery takes time  Please allow yourself time to heal    Mobilize with therapy daily  Helmet must be worn when out of bed   Follow-up as scheduled for postoperative visit with repeat CT head without contrast to be completed 2-3 days prior to visit  ** Please notify the office if incision becomes red, swollen, tender, or has increased drainage, and temp>101  Return to the ER if you experience increased headache, drowsiness, weakness, nausea/vomiting, or seizures  **

## 2018-12-24 NOTE — RESPIRATORY THERAPY NOTE
RT Ventilator Management Note  Ivory Or 59 y o  female MRN: [id number]  Unit/Bed#: [facility] 02 Encounter: [id number]      Daily Screen       12/22/2018 0742 12/23/2018 0708          Patient safety screen outcome[de-identified] Failed Failed      Not Ready for Weaning due to[de-identified] Underline problem not resolved Underline problem not resolved              Physical Exam:   Assessment Type: Assess only  General Appearance: Sedated  Respiratory Pattern: Assisted  Chest Assessment: Chest expansion symmetrical  Bilateral Breath Sounds: Coarse  Cough: Productive  Suction: ET Tube  O2 Device: Ventilator      Resp Comments: Pt  remains stable on AC mode  FiO2 decreased to 70% at this time  Will continue to assess and monitor per protocol

## 2018-12-24 NOTE — SOCIAL WORK
CM spoke to pt's sister and brother to inform them of the pt going to the OR for Trach and Peg  CM discussed the options of LTAC and Vent SNF  Family in agreement of LTAC referral, which CM placed

## 2018-12-24 NOTE — OP NOTE
OPERATIVE REPORT  PATIENT NAME: Gisell Johns    :  1955  MRN: [id number]  Pt Location: BE OR ROOM 06    SURGERY DATE: 2018    Surgeon(s) and Role:     * Jewels Overton MD - Primary     * Kalpana Bowles MD - Assisting    Preop Diagnosis:  Acute respiratory failure ([address] ) [J96 00]    Post-Op Diagnosis Codes:     * Acute respiratory failure ([address] ) [J96 00]    Procedure(s) (LRB):  TRACHEOSTOMY (N/A)    Specimen(s):  * No specimens in log *    Estimated Blood Loss:   Minimal    Drains:  NG/OG/Enteral Tube Orogastric (Active)   Placement Reverification Auscultation 2018  9:00 PM   Site Assessment Clean;Dry; Intact 2018  9:00 PM   Status Tube feed stopped or held 2018  9:50 PM   Output (mL) 350 mL 2018  5:00 AM   Number of days: 1       Urethral Catheter 16 Fr  (Active)   Reasons to continue Urinary Catheter  Accurate I&O assessment in critically ill patients (48 hr  max) 2018  7:12 AM   Goal for Removal Remove after 48 hrs of I/O monitoring 2018  7:03 AM   Site Assessment Skin intact; Clean 2018  9:00 PM   Collection Container Standard drainage bag 2018  9:00 PM   Securement Method Securing device (Describe) 2018  9:00 PM   Output (mL) 100 mL 2018  4:05 AM   Number of days: 6       [REMOVED] Closed/Suction Drain Left Head Bulb 10 Fr  (Removed)   Site Description Unable to view 2018  8:00 AM   Dressing Status Clean;Dry; Intact 2018  8:00 AM   Drainage Appearance Serosanguineous 2018  8:00 AM   Status To bulb suction 2018  8:00 AM   Intake (mL) 60 mL 2018  2:01 PM   Output (mL) 15 mL 2018  8:00 AM   Number of days: 2       [REMOVED] NG/OG/Enteral Tube Orogastric 16 Fr Left mouth (Removed)   Placement Reverification Auscultation 2018  8:00 AM   Site Assessment Clean;Dry 2018  8:00 PM   Enteral feeding tube interventions Flushed 2018  8:00 PM   Status Tube feed infusing 2018  8:00 PM   Drainage Appearance None 12/22/2018  8:00 AM   Intake (mL) 50 mL 12/22/2018 10:00 AM   Output (mL) 20 mL 12/21/2018  6:01 AM   Number of days: 5       Anesthesia Type:   General    Operative Indications:  Acute respiratory failure ([address] ) [J96 00]      Operative Findings:  Normal trachea    Complications:   None    Procedure and Technique:  Pt placed supine on table and prepped and draped in usual sterile manner  Timeout performed and all elements of timeout reviewed and confirmed  Pt had an incision made between the cricoid cartilage and the sternal notch  Dissection was taken through the platysma and the trachea was skeletonized at the level of the 2nd tracheal ring  A cruciate incision was then created at this level and spread with the tracheal   Under direct visualization, the ET tube was retracted out of the field of view of the tracheostomy site and an 8 Fr, cuffed, pre-tested Shiley tracheotomy tube was inserted  Bilateral breath sounds could be auscultated, normal expiratory tidal volumes were recorded and End tidal CO2 was detected  After confirmation of tube placement was ensured, the tube was secured with two 2-0 prolene sutures and a velcro trach tape        I was present for the entire procedure    Patient Disposition:  Critical Care Unit    SIGNATURE: Zia Fong MD  DATE: December 24, 2018  TIME: 10:04 AM

## 2018-12-24 NOTE — ANESTHESIA PREPROCEDURE EVALUATION
Review of Systems/Medical History  Patient summary reviewed  Chart reviewed      Cardiovascular   Pulmonary  Oxygen dependent ventilator, Pneumothorax:        GI/Hepatic            Endo/Other  Diabetes well controlled type 2 ,      GYN       Hematology  Anemia acute blood loss anemia,     Musculoskeletal       Neurology    Cerebral bleeding with side effects , subdural hemorrhage and intracranial hemorrhage,    Psychology           Physical Exam    Airway       Dental       Cardiovascular      Pulmonary      Other Findings  Patient is intubated      Anesthesia Plan  ASA Score- 4     Anesthesia Type- general with ASA Monitors  Additional Monitors:   Airway Plan:         Plan Factors-    Induction- intravenous  Postoperative Plan-     Informed Consent-   I personally reviewed this patient with the CRNA  Discussed and agreed on the Anesthesia Plan with the OMKAR Romero

## 2018-12-24 NOTE — PROGRESS NOTES
Progress Note - General Surgery  Maura Mejia 59 y o  female MRN: [id number]  [facility] En[facility]: [id number]    Assessment:  63F w/TBI s/p craniectomy and hypoxic respiratory failure unable to wean appropriately from vent  Plan:  - ? OR today for trach/PEG  - holding tube feeds  - rest per SICU      Subjective/Objective   Subjective: some desaturations yesterday, PEEP and FiO2 increased, although w/improvement in sedation able to wean down slightly    Objective:    Blood pressure (!) 95/49, pulse 74, temperature 100 4 °F (38 °C), resp  rate 16, height 5' 3" (1 6 m), weight 57 6 kg (126 lb 15 8 oz), SpO2 99 %  ,Body mass index is 22 49 kg/m²  I/O last 24 hours:   In: 1341 [I V :1119; NG/GT:330; IV Piggyback:450; Feedings:1472]  Out: 2395 [Urine:3105; Emesis/NG output:350]    Invasive Devices     Peripherally Inserted Central Catheter Line            PICC Line 74/25/82 Left Basilic 1 day          Drain            Urethral Catheter 16 Fr  5 days    NG/OG/Enteral Tube Orogastric less than 1 day          Airway            ETT  Cuffed 8 mm 5 days                Physical Exam:   Flap from hemicrani soft, no bulging  GCS 3T when sedated  Normocephalic, atraumatic  MMM, EOMI, PERRLA  Norm resp effort on mech ventilation 14/400/70/8  RRR  Abd soft, NT/ND  Peripheral edema present  Motor/sensation intact in distal extremities  CN grossly intact  -rash/lesions      Lab, Imaging and other studies:  Lab Results   Component Value Date    WBC 6 93 12/24/2018    HGB 8 0 (L) 12/24/2018    HCT 24 9 (L) 12/24/2018    MCV 87 12/24/2018     12/24/2018      Lab Results   Component Value Date    GLUCOSE 117 12/20/2018    CALCIUM 8 6 12/24/2018    K 3 8 12/24/2018    CO2 31 12/24/2018     12/24/2018    BUN 7 12/24/2018    CREATININE 0 36 (L) 12/24/2018       VTE Pharmacologic Prophylaxis: Sequential compression device (Venodyne)   VTE Mechanical Prophylaxis: sequential compression device

## 2018-12-25 ENCOUNTER — APPOINTMENT (INPATIENT)
Dept: RADIOLOGY | Facility: HOSPITAL | Age: 63
DRG: 003 | End: 2018-12-25
Payer: MEDICARE

## 2018-12-25 LAB
ANION GAP SERPL CALCULATED.3IONS-SCNC: 8 MMOL/L (ref 4–13)
ANION GAP SERPL CALCULATED.3IONS-SCNC: 8 MMOL/L (ref 4–13)
ANISOCYTOSIS BLD QL SMEAR: PRESENT
BACTERIA UR QL AUTO: ABNORMAL /HPF
BASE EXCESS BLDA CALC-SCNC: 9.1 MMOL/L
BASOPHILS # BLD MANUAL: 0 THOUSAND/UL (ref 0–0.1)
BASOPHILS # BLD MANUAL: 0 THOUSAND/UL (ref 0–0.1)
BASOPHILS NFR MAR MANUAL: 0 % (ref 0–1)
BASOPHILS NFR MAR MANUAL: 0 % (ref 0–1)
BILIRUB UR QL STRIP: ABNORMAL
BUN SERPL-MCNC: 12 MG/DL (ref 5–25)
BUN SERPL-MCNC: 15 MG/DL (ref 5–25)
CALCIUM SERPL-MCNC: 8.6 MG/DL (ref 8.3–10.1)
CALCIUM SERPL-MCNC: 8.7 MG/DL (ref 8.3–10.1)
CHLORIDE SERPL-SCNC: 100 MMOL/L (ref 100–108)
CHLORIDE SERPL-SCNC: 99 MMOL/L (ref 100–108)
CLARITY UR: ABNORMAL
CO2 SERPL-SCNC: 33 MMOL/L (ref 21–32)
CO2 SERPL-SCNC: 33 MMOL/L (ref 21–32)
COLOR UR: YELLOW
CREAT SERPL-MCNC: 0.36 MG/DL (ref 0.6–1.3)
CREAT SERPL-MCNC: 0.44 MG/DL (ref 0.6–1.3)
EOSINOPHIL # BLD MANUAL: 0 THOUSAND/UL (ref 0–0.4)
EOSINOPHIL # BLD MANUAL: 0.1 THOUSAND/UL (ref 0–0.4)
EOSINOPHIL NFR BLD MANUAL: 0 % (ref 0–6)
EOSINOPHIL NFR BLD MANUAL: 2 % (ref 0–6)
ERYTHROCYTE [DISTWIDTH] IN BLOOD BY AUTOMATED COUNT: 16.3 % (ref 11.6–15.1)
ERYTHROCYTE [DISTWIDTH] IN BLOOD BY AUTOMATED COUNT: 16.5 % (ref 11.6–15.1)
GFR SERPL CREATININE-BSD FRML MDRD: 109 ML/MIN/1.73SQ M
GFR SERPL CREATININE-BSD FRML MDRD: 116 ML/MIN/1.73SQ M
GIANT PLATELETS BLD QL SMEAR: PRESENT
GLUCOSE SERPL-MCNC: 103 MG/DL (ref 65–140)
GLUCOSE SERPL-MCNC: 103 MG/DL (ref 65–140)
GLUCOSE SERPL-MCNC: 106 MG/DL (ref 65–140)
GLUCOSE SERPL-MCNC: 112 MG/DL (ref 65–140)
GLUCOSE SERPL-MCNC: 115 MG/DL (ref 65–140)
GLUCOSE UR STRIP-MCNC: NEGATIVE MG/DL
HCO3 BLDA-SCNC: 34 MMOL/L (ref 22–28)
HCT VFR BLD AUTO: 24.9 % (ref 34.8–46.1)
HCT VFR BLD AUTO: 26.6 % (ref 34.8–46.1)
HGB BLD-MCNC: 7.9 G/DL (ref 11.5–15.4)
HGB BLD-MCNC: 8.6 G/DL (ref 11.5–15.4)
HGB UR QL STRIP.AUTO: ABNORMAL
HOROWITZ INDEX BLDA+IHG-RTO: 60 MM[HG]
HYALINE CASTS #/AREA URNS LPF: ABNORMAL /LPF
HYPERCHROMIA BLD QL SMEAR: PRESENT
HYPERCHROMIA BLD QL SMEAR: PRESENT
KETONES UR STRIP-MCNC: ABNORMAL MG/DL
LACTATE SERPL-SCNC: 1.6 MMOL/L (ref 0.5–2)
LEUKOCYTE ESTERASE UR QL STRIP: ABNORMAL
LYMPHOCYTES # BLD AUTO: 0.78 THOUSAND/UL (ref 0.6–4.47)
LYMPHOCYTES # BLD AUTO: 1.1 THOUSAND/UL (ref 0.6–4.47)
LYMPHOCYTES # BLD AUTO: 16 % (ref 14–44)
LYMPHOCYTES # BLD AUTO: 16 % (ref 14–44)
MAGNESIUM SERPL-MCNC: 1.7 MG/DL (ref 1.6–2.6)
MCH RBC QN AUTO: 27.7 PG (ref 26.8–34.3)
MCH RBC QN AUTO: 28 PG (ref 26.8–34.3)
MCHC RBC AUTO-ENTMCNC: 31.7 G/DL (ref 31.4–37.4)
MCHC RBC AUTO-ENTMCNC: 32.3 G/DL (ref 31.4–37.4)
MCV RBC AUTO: 87 FL (ref 82–98)
MCV RBC AUTO: 87 FL (ref 82–98)
MONOCYTES # BLD AUTO: 0.05 THOUSAND/UL (ref 0–1.22)
MONOCYTES # BLD AUTO: 0.41 THOUSAND/UL (ref 0–1.22)
MONOCYTES NFR BLD: 1 % (ref 4–12)
MONOCYTES NFR BLD: 6 % (ref 4–12)
NEUTROPHILS # BLD MANUAL: 3.87 THOUSAND/UL (ref 1.85–7.62)
NEUTROPHILS # BLD MANUAL: 5.38 THOUSAND/UL (ref 1.85–7.62)
NEUTS BAND NFR BLD MANUAL: 1 % (ref 0–8)
NEUTS BAND NFR BLD MANUAL: 4 % (ref 0–8)
NEUTS SEG NFR BLD AUTO: 75 % (ref 43–75)
NEUTS SEG NFR BLD AUTO: 77 % (ref 43–75)
NITRITE UR QL STRIP: POSITIVE
NON-SQ EPI CELLS URNS QL MICRO: ABNORMAL /HPF
NRBC BLD AUTO-RTO: 0 /100 WBCS
NRBC BLD AUTO-RTO: 1 /100 WBCS
NRBC BLD AUTO-RTO: 2 /100 WBC (ref 0–2)
O2 CT BLDA-SCNC: 12.8 ML/DL (ref 16–23)
OXYHGB MFR BLDA: 97.1 % (ref 94–97)
PCO2 BLDA: 48.5 MM HG (ref 36–44)
PEEP RESPIRATORY: 12 CM[H2O]
PH BLDA: 7.46 [PH] (ref 7.35–7.45)
PH UR STRIP.AUTO: 6 [PH] (ref 4.5–8)
PLATELET # BLD AUTO: 175 THOUSANDS/UL (ref 149–390)
PLATELET # BLD AUTO: 240 THOUSANDS/UL (ref 149–390)
PLATELET BLD QL SMEAR: ADEQUATE
PLATELET BLD QL SMEAR: ADEQUATE
PMV BLD AUTO: 10.4 FL (ref 8.9–12.7)
PMV BLD AUTO: 9.4 FL (ref 8.9–12.7)
PO2 BLDA: 109.1 MM HG (ref 75–129)
POLYCHROMASIA BLD QL SMEAR: PRESENT
POLYCHROMASIA BLD QL SMEAR: PRESENT
POTASSIUM SERPL-SCNC: 3.7 MMOL/L (ref 3.5–5.3)
POTASSIUM SERPL-SCNC: 3.8 MMOL/L (ref 3.5–5.3)
PROT UR STRIP-MCNC: ABNORMAL MG/DL
RBC # BLD AUTO: 2.85 MILLION/UL (ref 3.81–5.12)
RBC # BLD AUTO: 3.07 MILLION/UL (ref 3.81–5.12)
RBC #/AREA URNS AUTO: ABNORMAL /HPF
RBC MORPH BLD: PRESENT
SODIUM SERPL-SCNC: 140 MMOL/L (ref 136–145)
SODIUM SERPL-SCNC: 141 MMOL/L (ref 136–145)
SP GR UR STRIP.AUTO: 1.03 (ref 1–1.03)
SPECIMEN SOURCE: ABNORMAL
TOTAL CELLS COUNTED SPEC: 100
TOXIC GRANULES BLD QL SMEAR: PRESENT
UROBILINOGEN UR QL STRIP.AUTO: 4 E.U./DL
VARIANT LYMPHS # BLD AUTO: 2 %
VENT AC: 14
VENT- AC: AC
VT SETTING VENT: 450 ML
WBC # BLD AUTO: 4.9 THOUSAND/UL (ref 4.31–10.16)
WBC # BLD AUTO: 6.9 THOUSAND/UL (ref 4.31–10.16)
WBC #/AREA URNS AUTO: ABNORMAL /HPF
WBC TOXIC VACUOLES BLD QL SMEAR: PRESENT

## 2018-12-25 PROCEDURE — 94760 N-INVAS EAR/PLS OXIMETRY 1: CPT

## 2018-12-25 PROCEDURE — 71045 X-RAY EXAM CHEST 1 VIEW: CPT

## 2018-12-25 PROCEDURE — 87070 CULTURE OTHR SPECIMN AEROBIC: CPT | Performed by: EMERGENCY MEDICINE

## 2018-12-25 PROCEDURE — 74018 RADEX ABDOMEN 1 VIEW: CPT

## 2018-12-25 PROCEDURE — 82948 REAGENT STRIP/BLOOD GLUCOSE: CPT

## 2018-12-25 PROCEDURE — 82805 BLOOD GASES W/O2 SATURATION: CPT | Performed by: PHYSICIAN ASSISTANT

## 2018-12-25 PROCEDURE — 87077 CULTURE AEROBIC IDENTIFY: CPT | Performed by: EMERGENCY MEDICINE

## 2018-12-25 PROCEDURE — 85007 BL SMEAR W/DIFF WBC COUNT: CPT | Performed by: EMERGENCY MEDICINE

## 2018-12-25 PROCEDURE — 85027 COMPLETE CBC AUTOMATED: CPT | Performed by: PHYSICIAN ASSISTANT

## 2018-12-25 PROCEDURE — C9113 INJ PANTOPRAZOLE SODIUM, VIA: HCPCS | Performed by: PHYSICIAN ASSISTANT

## 2018-12-25 PROCEDURE — 81001 URINALYSIS AUTO W/SCOPE: CPT | Performed by: EMERGENCY MEDICINE

## 2018-12-25 PROCEDURE — 87205 SMEAR GRAM STAIN: CPT | Performed by: EMERGENCY MEDICINE

## 2018-12-25 PROCEDURE — 99291 CRITICAL CARE FIRST HOUR: CPT | Performed by: SURGERY

## 2018-12-25 PROCEDURE — 87040 BLOOD CULTURE FOR BACTERIA: CPT | Performed by: EMERGENCY MEDICINE

## 2018-12-25 PROCEDURE — 87184 SC STD DISK METHOD PER PLATE: CPT | Performed by: EMERGENCY MEDICINE

## 2018-12-25 PROCEDURE — 85027 COMPLETE CBC AUTOMATED: CPT | Performed by: EMERGENCY MEDICINE

## 2018-12-25 PROCEDURE — 94003 VENT MGMT INPAT SUBQ DAY: CPT

## 2018-12-25 PROCEDURE — 94640 AIRWAY INHALATION TREATMENT: CPT

## 2018-12-25 PROCEDURE — 87086 URINE CULTURE/COLONY COUNT: CPT | Performed by: EMERGENCY MEDICINE

## 2018-12-25 PROCEDURE — 85007 BL SMEAR W/DIFF WBC COUNT: CPT | Performed by: PHYSICIAN ASSISTANT

## 2018-12-25 PROCEDURE — 87181 SC STD AGAR DILUTION PER AGT: CPT | Performed by: EMERGENCY MEDICINE

## 2018-12-25 PROCEDURE — 83605 ASSAY OF LACTIC ACID: CPT | Performed by: EMERGENCY MEDICINE

## 2018-12-25 PROCEDURE — 80048 BASIC METABOLIC PNL TOTAL CA: CPT | Performed by: EMERGENCY MEDICINE

## 2018-12-25 PROCEDURE — 83735 ASSAY OF MAGNESIUM: CPT | Performed by: EMERGENCY MEDICINE

## 2018-12-25 PROCEDURE — 87185 SC STD ENZYME DETCJ PER NZM: CPT | Performed by: EMERGENCY MEDICINE

## 2018-12-25 PROCEDURE — 80048 BASIC METABOLIC PNL TOTAL CA: CPT | Performed by: PHYSICIAN ASSISTANT

## 2018-12-25 RX ORDER — ACETAMINOPHEN 160 MG/5ML
650 SUSPENSION, ORAL (FINAL DOSE FORM) ORAL ONCE
Status: COMPLETED | OUTPATIENT
Start: 2018-12-25 | End: 2018-12-25

## 2018-12-25 RX ORDER — PANTOPRAZOLE SODIUM 40 MG/1
40 INJECTION, POWDER, FOR SOLUTION INTRAVENOUS
Status: DISCONTINUED | OUTPATIENT
Start: 2018-12-25 | End: 2018-12-31

## 2018-12-25 RX ORDER — POTASSIUM CHLORIDE 29.8 MG/ML
40 INJECTION INTRAVENOUS ONCE
Status: COMPLETED | OUTPATIENT
Start: 2018-12-25 | End: 2018-12-25

## 2018-12-25 RX ORDER — MAGNESIUM SULFATE HEPTAHYDRATE 40 MG/ML
2 INJECTION, SOLUTION INTRAVENOUS ONCE
Status: COMPLETED | OUTPATIENT
Start: 2018-12-25 | End: 2018-12-25

## 2018-12-25 RX ORDER — VANCOMYCIN HYDROCHLORIDE 1 G/200ML
20 INJECTION, SOLUTION INTRAVENOUS EVERY 12 HOURS
Status: DISCONTINUED | OUTPATIENT
Start: 2018-12-25 | End: 2018-12-27

## 2018-12-25 RX ORDER — POTASSIUM CHLORIDE 29.8 MG/ML
40 INJECTION INTRAVENOUS ONCE
Status: COMPLETED | OUTPATIENT
Start: 2018-12-25 | End: 2018-12-26

## 2018-12-25 RX ORDER — FUROSEMIDE 10 MG/ML
40 INJECTION INTRAMUSCULAR; INTRAVENOUS
Status: DISCONTINUED | OUTPATIENT
Start: 2018-12-25 | End: 2018-12-30

## 2018-12-25 RX ORDER — FUROSEMIDE 10 MG/ML
20 INJECTION INTRAMUSCULAR; INTRAVENOUS
Status: DISCONTINUED | OUTPATIENT
Start: 2018-12-25 | End: 2018-12-25

## 2018-12-25 RX ORDER — SODIUM PHOSPHATE, DIBASIC AND SODIUM PHOSPHATE, MONOBASIC 7; 19 G/133ML; G/133ML
1 ENEMA RECTAL ONCE
Status: COMPLETED | OUTPATIENT
Start: 2018-12-25 | End: 2018-12-25

## 2018-12-25 RX ORDER — HYDROMORPHONE HCL/PF 1 MG/ML
1 SYRINGE (ML) INJECTION ONCE
Status: COMPLETED | OUTPATIENT
Start: 2018-12-25 | End: 2018-12-25

## 2018-12-25 RX ORDER — SODIUM CHLORIDE, SODIUM GLUCONATE, SODIUM ACETATE, POTASSIUM CHLORIDE, MAGNESIUM CHLORIDE, SODIUM PHOSPHATE, DIBASIC, AND POTASSIUM PHOSPHATE .53; .5; .37; .037; .03; .012; .00082 G/100ML; G/100ML; G/100ML; G/100ML; G/100ML; G/100ML; G/100ML
500 INJECTION, SOLUTION INTRAVENOUS ONCE
Status: COMPLETED | OUTPATIENT
Start: 2018-12-25 | End: 2018-12-25

## 2018-12-25 RX ADMIN — FENTANYL CITRATE 75 MCG/HR: 50 INJECTION, SOLUTION INTRAMUSCULAR; INTRAVENOUS at 16:38

## 2018-12-25 RX ADMIN — HYDROMORPHONE HYDROCHLORIDE 1 MG: 1 INJECTION, SOLUTION INTRAMUSCULAR; INTRAVENOUS; SUBCUTANEOUS at 16:33

## 2018-12-25 RX ADMIN — MAGNESIUM SULFATE IN WATER 2 G: 40 INJECTION, SOLUTION INTRAVENOUS at 17:44

## 2018-12-25 RX ADMIN — ACETAMINOPHEN 650 MG: 160 SUSPENSION ORAL at 06:01

## 2018-12-25 RX ADMIN — LEVOTHYROXINE SODIUM 88 MCG: 88 TABLET ORAL at 06:10

## 2018-12-25 RX ADMIN — PROPOFOL 45 MCG/KG/MIN: 10 INJECTION, EMULSION INTRAVENOUS at 21:16

## 2018-12-25 RX ADMIN — PROPOFOL 30 MCG/KG/MIN: 10 INJECTION, EMULSION INTRAVENOUS at 00:22

## 2018-12-25 RX ADMIN — FUROSEMIDE 40 MG: 10 INJECTION, SOLUTION INTRAMUSCULAR; INTRAVENOUS at 09:04

## 2018-12-25 RX ADMIN — SENNOSIDES AND DOCUSATE SODIUM 2 TABLET: 8.6; 5 TABLET ORAL at 21:20

## 2018-12-25 RX ADMIN — METRONIDAZOLE 500 MG: 500 INJECTION, SOLUTION INTRAVENOUS at 16:33

## 2018-12-25 RX ADMIN — METHOCARBAMOL 750 MG: 750 TABLET, FILM COATED ORAL at 00:01

## 2018-12-25 RX ADMIN — IPRATROPIUM BROMIDE 0.5 MG: 0.5 SOLUTION RESPIRATORY (INHALATION) at 07:36

## 2018-12-25 RX ADMIN — VANCOMYCIN HYDROCHLORIDE 1000 MG: 1 INJECTION, SOLUTION INTRAVENOUS at 21:31

## 2018-12-25 RX ADMIN — ACETAMINOPHEN 650 MG: 160 SUSPENSION ORAL at 14:19

## 2018-12-25 RX ADMIN — POTASSIUM CHLORIDE 40 MEQ: 400 INJECTION, SOLUTION INTRAVENOUS at 16:37

## 2018-12-25 RX ADMIN — POTASSIUM CHLORIDE 40 MEQ: 400 INJECTION, SOLUTION INTRAVENOUS at 08:55

## 2018-12-25 RX ADMIN — PRAMIPEXOLE DIHYDROCHLORIDE 0.25 MG: 0.25 TABLET ORAL at 00:01

## 2018-12-25 RX ADMIN — HEPARIN SODIUM 5000 UNITS: 5000 INJECTION INTRAVENOUS; SUBCUTANEOUS at 14:24

## 2018-12-25 RX ADMIN — IPRATROPIUM BROMIDE 0.5 MG: 0.5 SOLUTION RESPIRATORY (INHALATION) at 14:00

## 2018-12-25 RX ADMIN — SODIUM CHLORIDE 500 MG: 9 INJECTION, SOLUTION INTRAVENOUS at 20:44

## 2018-12-25 RX ADMIN — BISACODYL 10 MG: 10 SUPPOSITORY RECTAL at 09:28

## 2018-12-25 RX ADMIN — CEFEPIME HYDROCHLORIDE 2000 MG: 2 INJECTION, POWDER, FOR SOLUTION INTRAVENOUS at 09:27

## 2018-12-25 RX ADMIN — IPRATROPIUM BROMIDE 0.5 MG: 0.5 SOLUTION RESPIRATORY (INHALATION) at 20:15

## 2018-12-25 RX ADMIN — NICOTINE 14 MG: 14 PATCH TRANSDERMAL at 09:23

## 2018-12-25 RX ADMIN — CHLORHEXIDINE GLUCONATE 0.12% ORAL RINSE 15 ML: 1.2 LIQUID ORAL at 20:18

## 2018-12-25 RX ADMIN — PRAMIPEXOLE DIHYDROCHLORIDE 0.25 MG: 0.25 TABLET ORAL at 23:38

## 2018-12-25 RX ADMIN — HEPARIN SODIUM 5000 UNITS: 5000 INJECTION INTRAVENOUS; SUBCUTANEOUS at 21:20

## 2018-12-25 RX ADMIN — ACETAMINOPHEN 650 MG: 160 SUSPENSION ORAL at 00:01

## 2018-12-25 RX ADMIN — IPRATROPIUM BROMIDE 0.5 MG: 0.5 SOLUTION RESPIRATORY (INHALATION) at 01:01

## 2018-12-25 RX ADMIN — LEVALBUTEROL 1.25 MG: 1.25 SOLUTION, CONCENTRATE RESPIRATORY (INHALATION) at 14:00

## 2018-12-25 RX ADMIN — HYDROMORPHONE HYDROCHLORIDE 1 MG: 1 INJECTION, SOLUTION INTRAMUSCULAR; INTRAVENOUS; SUBCUTANEOUS at 21:00

## 2018-12-25 RX ADMIN — ACETAMINOPHEN 650 MG: 160 SUSPENSION ORAL at 18:56

## 2018-12-25 RX ADMIN — HYDROMORPHONE HYDROCHLORIDE 1 MG: 1 INJECTION, SOLUTION INTRAMUSCULAR; INTRAVENOUS; SUBCUTANEOUS at 14:59

## 2018-12-25 RX ADMIN — LIDOCAINE 2 PATCH: 50 PATCH CUTANEOUS at 09:53

## 2018-12-25 RX ADMIN — ACETAMINOPHEN 650 MG: 160 SUSPENSION ORAL at 15:05

## 2018-12-25 RX ADMIN — METRONIDAZOLE 500 MG: 500 INJECTION, SOLUTION INTRAVENOUS at 09:58

## 2018-12-25 RX ADMIN — FUROSEMIDE 40 MG: 10 INJECTION, SOLUTION INTRAMUSCULAR; INTRAVENOUS at 16:32

## 2018-12-25 RX ADMIN — PANTOPRAZOLE SODIUM 40 MG: 40 INJECTION, POWDER, FOR SOLUTION INTRAVENOUS at 09:23

## 2018-12-25 RX ADMIN — LEVALBUTEROL 1.25 MG: 1.25 SOLUTION, CONCENTRATE RESPIRATORY (INHALATION) at 20:15

## 2018-12-25 RX ADMIN — SODIUM CHLORIDE, SODIUM GLUCONATE, SODIUM ACETATE, POTASSIUM CHLORIDE, MAGNESIUM CHLORIDE, SODIUM PHOSPHATE, DIBASIC, AND POTASSIUM PHOSPHATE 500 ML: .53; .5; .37; .037; .03; .012; .00082 INJECTION, SOLUTION INTRAVENOUS at 03:17

## 2018-12-25 RX ADMIN — ALBUMIN (HUMAN) 25 G: 12.5 SOLUTION INTRAVENOUS at 03:57

## 2018-12-25 RX ADMIN — SODIUM PHOSPHATE, DIBASIC AND SODIUM PHOSPHATE, MONOBASIC 1 ENEMA: 7; 19 ENEMA RECTAL at 14:37

## 2018-12-25 RX ADMIN — CEFEPIME HYDROCHLORIDE 2000 MG: 2 INJECTION, POWDER, FOR SOLUTION INTRAVENOUS at 19:57

## 2018-12-25 RX ADMIN — HYDROMORPHONE HYDROCHLORIDE 1 MG: 1 INJECTION, SOLUTION INTRAMUSCULAR; INTRAVENOUS; SUBCUTANEOUS at 13:00

## 2018-12-25 RX ADMIN — CHLORHEXIDINE GLUCONATE 0.12% ORAL RINSE 15 ML: 1.2 LIQUID ORAL at 10:20

## 2018-12-25 RX ADMIN — HEPARIN SODIUM 5000 UNITS: 5000 INJECTION INTRAVENOUS; SUBCUTANEOUS at 06:01

## 2018-12-25 RX ADMIN — VANCOMYCIN HYDROCHLORIDE 1000 MG: 1 INJECTION, SOLUTION INTRAVENOUS at 09:59

## 2018-12-25 RX ADMIN — LEVALBUTEROL 1.25 MG: 1.25 SOLUTION, CONCENTRATE RESPIRATORY (INHALATION) at 01:01

## 2018-12-25 RX ADMIN — SODIUM CHLORIDE 500 MG: 9 INJECTION, SOLUTION INTRAVENOUS at 10:44

## 2018-12-25 RX ADMIN — PRAMIPEXOLE DIHYDROCHLORIDE 0.25 MG: 0.25 TABLET ORAL at 17:46

## 2018-12-25 RX ADMIN — METHOCARBAMOL 750 MG: 750 TABLET, FILM COATED ORAL at 06:01

## 2018-12-25 RX ADMIN — LEVALBUTEROL 1.25 MG: 1.25 SOLUTION, CONCENTRATE RESPIRATORY (INHALATION) at 07:36

## 2018-12-25 RX ADMIN — PROPOFOL 50 MCG/KG/MIN: 10 INJECTION, EMULSION INTRAVENOUS at 16:39

## 2018-12-25 RX ADMIN — POLYETHYLENE GLYCOL 3350 17 G: 17 POWDER, FOR SOLUTION ORAL at 09:28

## 2018-12-25 NOTE — PROGRESS NOTES
Progress Note - Critical Care   Emily Gonzalez 59 y o  female MRN: [id number]  Unit/Bed#: [facility] 02 Encounter: [id number]    Attending Physician: Jocelyne Reid MD      ______________________________________________________________________  Assessment and Plan:   Principal Problem:    Traumatic brain injury [facility])  Active Problems:    Multiple fractures of ribs, bilateral, initial encounter for closed fracture    Traumatic pneumothorax    Glenoid fracture of shoulder, right, closed, initial encounter    Intraparenchymal hemorrhage of brain ([address] )    Flail chest, initial encounter for closed fracture    Fracture of thoracic transverse process, closed, initial encounter ([address] )    Subdural hematoma ([address] )    Subarachnoid hemorrhage ([address] )    Diabetes mellitus, type II ([address] )    Closed nondisplaced left toe fracture    Hypokalemia    Hypophosphatemia    Acute encephalopathy    Status post craniectomy    Acute respiratory failure with hypoxia ([address] )    Mucus plugging of bronchi    Acute respiratory failure ([address] )    Acute blood loss anemia    Ileus ([address] )  Resolved Problems:    Hyponatremia    61-year-old female found down with TBI/large left subdural hematoma and midline shift postop day 5 from left craniectomy for evacuation of subdural hematoma    Neuro:   Left subdural hematoma/bilateral subarachnoid hemorrhages/TBI postop day 5  Status post left craniectomy for [id number] CT head from yesterday shows stability of intracranial hemorrhage with no new mass effect or shift  She does have a subgaleal collection, likely seroma  Neuro exam slightly worse, patient not moving or withdrawing to pain and she does open her eyes to noxious stimuli  Will check ABG to ensure improved hypercapnia and continue to monitor  If no improvement, consider EEG  Jaime Cardenas is on board at 500 mg Q 12-will transition to IV    Continue to hold sedation for neuro exam     Analgesia/sedation-fentanyl drip at 100 mics and propofol drip are currently on hold to obtain neuro exam   Hold Seroquel q h s  History of bipolar disorder-home Lamictal was resumed yesterday but now with worsening mental status will discontinue this medication at for the time    History of depression-due to depressed neurologic exam, will hold Prozac    History of essential tremor and RLS-continue Mirapex the patient was on previously but will hold primidone as this was started yesterday no neuro exam is worse    CV:   Hypotension overnight- likely related to diuresis  Patient was given 500 mL bolus overnight for hypotension in the 68R systolic we with maps in the 50s  Goal map greater than or equal to 65  Patient could be developing infection with possible sepsis in light of fevers that have been more continuous so will plan to culture if patient spikes fever again  History of hypertension-home antihypertensives on hold    Pulm:   Acute respiratory failure/ARDS s/p tracheostomy POD #1-follow-up a m  ABG and chest x-ray  Bilateral diffuse a paced studies yesterday with increasing episodes of hypoxia when patient comes tachypneic  Wean FiO2 as able and continue PT at 12  Continue diuresis as tolerates  Lytes patient is on Lasix 40 mg IV b i d      Multiple right-sided rib fractures (2-11)-pain control, continue Lidoderm patches and Tylenol  Will hold methocarbamol given neuro exam is this was scheduled  GI:   Ileus-tube feeds are on hold with greater than 1 L out in the last 24 hours  KUB this morning does show improved colonic distension  Patient does have notable stool distally  Will continue bowel regimen will schedule well as store and continue daily do clock suppositories and increased dose of Senokot  Will give fleets enema today if no BM by this afternoon  Patient was unable to receive PEG tube today due to gastroparesis/gastric distension  Will continue NG tube for now and plan to received PEG at a later date      :   Renal function is normal   Urine output is adequate with Marroquin catheter will continue diuresis  Maintain Marroquin catheter for strict I/shows as patient did become hypotensive overnight with diuresis but it is vital that we keep the patient negative  F/E/N:   No maintenance IV fluids at this time  Will plan to resume tube feeds once NG-tube output decreases  Patient has elected ileus at this time  Hypokalemia-replete    ID:   Fever with T-max 101 8° in the last 24 hours  Patient does not have leukocytosis  If patient continues to spike will send blood, urine, and sputum cultures  This fever could be reactive as well as patient has no obvious signs of infection however the most likely source would be pulmonary given increased oxygen requirements though this appears to be ARDS based on chest x-ray rather than a focal pneumonia  Follow-up exam chest x-ray and ABG  Heme:   Acute blood loss anemia-hemoglobin stable overall, no blood transfusion at this time  No obvious source of bleeding  This is likely related to postop blood loss    Endo:   History of diabetes mellitus-blood sugars have been well under 180, no coverage as needed the patient does have sliding scale insulin algorithm 2  History of hypothyroidism-continue home Synthroid     Msk/Skin:   Left 3rd proximal phalanx fracture of the foot-weight bear as tolerated left lower extremity in a surgical shoe when possible  Follow up with Podiatry  Right glenoid and acromion fractures-nonweightbearing right upper extremity  Non operative management as recommended by Orthopedics  Follow up with Orthopedics as an outpatient  Right T1-T4 transverse process fractures-non operative management, no need for further imaging or evaluation at this time  Quick dry brace for comfort when ambulatory if needed  Prophylaxis:  SCDs, subcu heparin, Keppra, PPI is a home med    Disposition:   Continue ICU level care      Code Status: Level 1 - Full Code    Counseling / Coordination of Care  Total Critical Care time spent 30 minutes excluding procedures, teaching and family updates  ______________________________________________________________________    Chief Complaint: Intubated and sedated    24 Hour Events: Desaturated yesterday afternoon when she became tachypneic  Required bagging for improvement in saturations  Overnight diuresed well with 40 mg IV lasix  Did become hypotensive with MAPs in the 50s  Required 500 ml fluid bolus with improvement  Repeat CT head showed stability  Greater than 1 liter out of her NGT tube- no bowel movements  Review of Systems   Unable to perform ROS: Intubated     ______________________________________________________________________    Physical Exam:     Physical Exam   Constitutional: She is oriented to person, place, and time  She appears well-developed  HENT:   + L sided craniectomy incision C/D/I with staples   Eyes: Pupils are equal, round, and reactive to light  Neck: Normal range of motion  Neck supple  Cardiovascular: Normal rate, regular rhythm and normal heart sounds  Pulmonary/Chest: Effort normal and breath sounds normal    + On A/C settings  RR 15-20 breaths per minute  Saturations 98% on FiO2 60%  Abdominal: Soft  Bowel sounds are normal  She exhibits distension  There is no tenderness  There is no guarding    + Mild distention, soft- overall improved   Genitourinary:   Genitourinary Comments: + Marroquin in place draining clear yellow urine   Musculoskeletal: Normal range of motion  She exhibits no edema  Neurological: She is alert and oriented to person, place, and time  Skin: Skin is warm and dry  Capillary refill takes less than 2 seconds           ______________________________________________________________________  Vitals:    12/25/18 0400 12/25/18 0432 12/25/18 0500 12/25/18 0600   BP: (!) 96/49  94/51 127/60   Pulse: 86  82 86   Resp: 18  15 20   Temp: 100 4 °F (38 °C)  100 °F (37 8 °C) 100 4 °F (38 °C)   TempSrc:       SpO2: 100% 100% 100% 100%   Weight:       Height:           Temperature:   Temp (24hrs), Av 7 °F (37 6 °C), Min:98 2 °F (36 8 °C), Max:101 8 °F (38 8 °C)    Current Temperature: 100 4 °F (38 °C)  Weights:   IBW: 52 4 kg    Body mass index is 22 49 kg/m²  Weight (last 2 days)     None        Hemodynamic Monitoring:  N/A     Non-Invasive/Invasive Ventilation Settings:  Respiratory    Lab Data (Last 4 hours)    None         O2/Vent Data (Last 4 hours)       0432           Vent Mode AC/VC       Resp Rate (BPM) (BPM) 14       Vt (mL) (mL) 450       FIO2 (%) (%) 70       PEEP (cmH2O) (cmH2O) 12       MV 8 12                 No results found for: PHART, HZO4GPN, PO2ART, PIU3HLH, B2ILKTAV, BEART, SOURCE  SpO2: SpO2: 100 %  Intake and Outputs:  I/O        07 -  0700  07 -  0700    I V  (mL/kg) 793 1 (13 8) 1098 2 (19 1)    NG/ 700    IV Piggyback 450 600    Feedings 1152     Total Intake(mL/kg) 2725 1 (47 3) 2398 2 (41 6)    Urine (mL/kg/hr) 2375 (1 7) 2230 (1 6)    Emesis/NG output 350 1450    Total Output 2725 3680    Net +0 1 -1281 8          Unmeasured Stool Occurrence 1 x 1 x        UOP: 90 ml/hr     Nutrition:        Diet Orders            Start     Ordered    18 1353  Diet NPO  Diet effective now     Question Answer Comment   Diet Type NPO    RD to adjust diet per protocol?  Yes        18 1357        Labs:     Results from last 7 days  Lab Units 18  0615 18  1727 18  1658 18  1317 18  0447 18  0447 18  0516 18  0444  18  1617 18  1153  18  0455 18  2028   WBC Thousand/uL 4 90  --   --  6 81 6 93 9 83 9 62 9 33  --  8 31 9 07  < > 7 70 13 08*   HEMOGLOBIN g/dL 7 9*  --   --  8 2* 8 0* 9 3* 10 0* 9 9*  < > 8 1* 8 8*  < > 10 2* 11 6   I STAT HEMOGLOBIN g/dl  --  9 5* 9 9*  --   --   --   --   --   --   --   --   < >  --   --    HEMATOCRIT % 24 9*  --   --  25 0* 24 9* 28 4* 29 9* 29 4*  < > 24 4* 27 0*  < > 30 2* 35 3   HEMATOCRIT, ISTAT %  --  28* 29*  --   --   --   --   --   --   --   --   < >  --   --    PLATELETS Thousands/uL 175  --   --  191 198 222 209 180  --  175 184  < > 262 273   NEUTROS PCT %  --   --   --   --   --  79*  --  78*  --   --  89*  --  76* 89*   BANDS PCT %  --   --   --   --   --   --  3  --   --   --   --   --   --   --    MONOS PCT %  --   --   --   --   --  8  --  7  --   --  5  --  10 6   MONO PCT %  --   --   --  4 0*  --  7  --   --   --   --   --   --   --    < > = values in this interval not displayed  Results from last 7 days  Lab Units 12/25/18  0615 12/24/18  2126 12/24/18  1727 12/24/18  1658 12/24/18  1317 12/24/18  0447 12/23/18  0447 12/22/18  0516 12/21/18  0444  12/18/18  2030   SODIUM mmol/L 141 139  --   --  137 140 137 137 138  < > 122*   POTASSIUM mmol/L 3 7 3 6  --   --  3 4* 3 8 3 5 3 3* 3 7  < > 3 7   CHLORIDE mmol/L 100 100  --   --  98* 103 103 103 105  < > 88*   CO2 mmol/L 33* 36*  --   --  33* 31 29 25 26  < > 26   CO2, I-STAT mmol/L  --   --  35* 34*  --   --   --   --   --   < >  --    ANION GAP mmol/L 8 3*  --   --  6 6 5 9 7  < > 8   BUN mg/dL 12 9  --   --  8 7 7 6 8  < > 9   CREATININE mg/dL 0 36* 0 41*  --   --  0 35* 0 36* 0 33* 0 32* 0 25*  < > 0 42*   CALCIUM mg/dL 8 7 8 7  --   --  8 2* 8 6 8 2* 7 7* 7 5*  < > 8 7   ALT U/L  --   --   --   --   --   --  40  --   --   --  96*   AST U/L  --   --   --   --   --   --  56*  --   --   --  130*   ALK PHOS U/L  --   --   --   --   --   --  129*  --   --   --  138*   ALBUMIN g/dL  --   --   --   --   --   --  1 5*  --   --   --  3 4*   TOTAL BILIRUBIN mg/dL  --   --   --   --   --   --  0 48  --   --   --  0 42   < > = values in this interval not displayed      Results from last 7 days  Lab Units 12/24/18  1317 12/23/18  0447 12/22/18  0516 12/21/18  0444 12/20/18  0054 12/19/18  0455 12/18/18  2229   MAGNESIUM mg/dL 1 7 1 8  --  2 0 1 8 2 3 1 4*   PHOSPHORUS mg/dL  --  2 6 2 2* 2 2* 2 2* 3 2 3 5 Results from last 7 days  Lab Units 18  0444 18  1153 18  2030   INR  1 20* 1 29* 1 01   PTT seconds  --  31 25*       Results from last 7 days  Lab Units 18  1204 18  0054   TROPONIN I ng/mL <0 02 <0 02       Results from last 7 days  Lab Units 18  0133   LACTIC ACID mmol/L 0 7     ABG:  Lab Results   Component Value Date    PHART 7 465 (H) 2018    KCD4XVO 37 2 2018    PO2ART 124 8 2018    NPN6XJF 26 2 2018    BEART 2 4 2018    SOURCE Brachial, Right 2018     VBG:  Results from last 7 days  Lab Units 18  0504   ABG SOURCE  Brachial, Right       Results from last 7 days  Lab Units 18  0827   PROCALCITONIN ng/ml 0 27*     No results found for: [facility]     Imagin/25 CXR: Pending   KUB: improved colonic distention, NGT in place   CT head: 1     Stable  intracranial  hemorrhage     Resolution  of  pneumocephalus  and  decreased  vasogenic  edema  2     No  midline  shift  or  hydrocephalus  3     New  extradural/subgaleal  collection  measuring  6  x  2  x  8  cm     Possible  seroma     Abscess  less  likely  though  not  excluded  I have personally reviewed pertinent reports         EKG: no new      Allergies: No Known Allergies     Medications:   Scheduled Meds:  Current Facility-Administered Medications:  acetaminophen 650 mg Oral Q6H Day Moreno PA-C    albuterol 2 5 mg Nebulization Q4H PRN Jamil Walton DO    bisacodyl 10 mg Rectal Daily Bianca Mckeon MD    chlorhexidine 15 mL Swish & Spit Q12H [facility] Topher Dugan MD    fentaNYL 100 mcg/hr Intravenous Continuous Topher Dugan MD Last Rate: Stopped (18 0610)   FLUoxetine 80 mg Oral Daily Terrill Severin, PA-C    furosemide 40 mg Intravenous BID (diuretic) Terrill Severin, PA-C    heparin (porcine) 5,000 Units Subcutaneous Q8H 2846 Jacob Leiva MD    hydrALAZINE 5 mg Intravenous Q6H PRN Desire Hickman MD    HYDROmorphone 1 mg Intravenous Q2H PRN Rachael Garcia PA-C    HYDROmorphone 1 mg Intravenous Once Rachael Garcia PA-C    HYDROmorphone 2 mg Intravenous Q3H PRN Rachael Garcia PA-C    influenza vaccine 0 5 mL Intramuscular Prior to discharge Leonor Wagoner DO    insulin lispro 1-5 Units Subcutaneous Q6H 1357 Jacob Leiva MD    ipratropium 0 5 mg Nebulization Q6H Leonor Wagoner DO    labetalol 5 mg Intravenous Q4H PRN Grady Del Cid MD    lamoTRIgine 200 mg Oral BID Rachael Garcia PA-C    levalbuterol 1 25 mg Nebulization Q6H Leonor Wagoner DO    levETIRAcetam 500 mg Oral Q12H [facility] TRISTAN Rollins    levothyroxine 88 mcg Oral Early Morning Rachael Garcia PA-C    lidocaine 2 patch Topical Daily Rachael Garcia PA-C    methocarbamol 750 mg Oral Q6H [facility] Rachael Garcia PA-C    nicotine 14 mg Transdermal Daily Herlinda Esparza PA-C    omeprazole (PRILOSEC) suspension 2 mg/mL 20 mg Oral BID Rachael Garcia PA-C    polyethylene glycol 17 g Oral Daily Brittany Ruiz MD    potassium chloride 20 mEq Intravenous Once Rachael Garcia PA-C    pramipexole 0 25 mg Oral BID Ramon SolomonTRISTAN    primidone 50 mg Oral Q12H [facility] Rachael Garcia PA-C    propofol 5-50 mcg/kg/min Intravenous Titrated Jeannette Dalton DO Last Rate: Stopped (12/25/18 0610)   QUEtiapine 25 mg Oral HS Ramon SolomonTRISTAN    senna-docusate sodium 2 tablet Oral HS Rachael Garcia PA-C      Continuous Infusions:  fentaNYL 100 mcg/hr Last Rate: Stopped (12/25/18 0610)   propofol 5-50 mcg/kg/min Last Rate: Stopped (12/25/18 0610)     PRN Meds:    albuterol 2 5 mg Q4H PRN   hydrALAZINE 5 mg Q6H PRN   HYDROmorphone 1 mg Q2H PRN   HYDROmorphone 2 mg Q3H PRN   influenza vaccine 0 5 mL Prior to discharge   labetalol 5 mg Q4H PRN     VTE Pharmacologic Prophylaxis: Subcutaneous heparin  VTE Mechanical Prophylaxis: sequential compression device     Invasive lines and devices:   Invasive Devices     Peripherally Inserted Central Catheter Line            PICC Line 99/90/57 Left Basilic 2 days          Drain            Urethral Catheter 16 Fr  6 days    NG/OG/Enteral Tube Nasogastric Right nares less than 1 day          Airway            Surgical Airway Shiley Cuffed less than 1 day                     Portions of the record may have been created with voice recognition software  Occasional wrong word or "sound a like" substitutions may have occurred due to the inherent limitations of voice recognition software  Read the chart carefully and recognize, using context, where substitutions have occurred      Shawn Ruvalcaba PA-C

## 2018-12-25 NOTE — RESPIRATORY THERAPY NOTE
RT Ventilator Management Note  Lala Dorman 59 y o  female MRN: [id number]  Unit/Bed#: [facility] 02 Encounter: [id number]      Daily Screen       12/23/2018 0708 12/24/2018 0755          Patient safety screen outcome[de-identified] Failed Failed      Not Ready for Weaning due to[de-identified] Underline problem not resolved Underline problem not resolved              Physical Exam:   Assessment Type: Assess only  General Appearance: Sedated  Respiratory Pattern: Assisted  Chest Assessment: Chest expansion symmetrical  Bilateral Breath Sounds: Coarse      Resp Comments: Pt has done well on the vent through the night  No distress noted at this time

## 2018-12-25 NOTE — PROGRESS NOTES
Patient's temperature elevated to 102 6, tylenol given via OG  Enema given due to no stool  Patient has an odd breath odor assessed by several nurses  CCS Resident [facility] called regarding this  Labs CBC, BMP and Lactate sent  Hemoglobin trending 2 pts lower since 12/22 0516  CBC, BMP and Lactate sent

## 2018-12-25 NOTE — RESPIRATORY THERAPY NOTE
RT Ventilator Management Note  Le Hernandez 59 y o  female MRN: [id number]  [id number]      Daily Screen       12/24/2018 0755 12/25/2018 0737          Patient safety screen outcome[de-identified] Failed Failed      Not Ready for Weaning due to[de-identified] Underline problem not resolved PEEP > 8cmH2O              Physical Exam:   Assessment Type: Pre-treatment  General Appearance: Sedated  Respiratory Pattern: Assisted  Chest Assessment: Chest expansion symmetrical  Bilateral Breath Sounds: Coarse  Cough: Productive      Resp Comments: (P) tolerating AC on vent; sedation currently on hold; breathing looks a little labored; ABG drawn; no wean at this time as pt on +12 cms PEEP

## 2018-12-25 NOTE — PROGRESS NOTES
Patient extremely sensitive to condensation/water in breathing tube and respirations will increase to high 40's  Returns to [facility] rate of 14 post removal of condensation

## 2018-12-25 NOTE — PROGRESS NOTES
Updated Brother via phone   The brother will call the sister as both had called simultaneously on the phone

## 2018-12-25 NOTE — PROGRESS NOTES
CCS team at bedside  Patient had extra dose of dilaudid 1 mg , double stacking breaths   Propofol running at 50

## 2018-12-26 ENCOUNTER — APPOINTMENT (INPATIENT)
Dept: RADIOLOGY | Facility: HOSPITAL | Age: 63
DRG: 003 | End: 2018-12-26
Payer: MEDICARE

## 2018-12-26 LAB
ANION GAP SERPL CALCULATED.3IONS-SCNC: 2 MMOL/L (ref 4–13)
ANION GAP SERPL CALCULATED.3IONS-SCNC: 7 MMOL/L (ref 4–13)
ANION GAP SERPL CALCULATED.3IONS-SCNC: 7 MMOL/L (ref 4–13)
ANION GAP SERPL CALCULATED.3IONS-SCNC: 8 MMOL/L (ref 4–13)
ANISOCYTOSIS BLD QL SMEAR: PRESENT
ARTERIAL PATENCY WRIST A: YES
BASE EXCESS BLDA CALC-SCNC: 6.5 MMOL/L
BASOPHILS # BLD MANUAL: 0 THOUSAND/UL (ref 0–0.1)
BASOPHILS NFR MAR MANUAL: 0 % (ref 0–1)
BUN SERPL-MCNC: 22 MG/DL (ref 5–25)
BUN SERPL-MCNC: 23 MG/DL (ref 5–25)
BUN SERPL-MCNC: 26 MG/DL (ref 5–25)
BUN SERPL-MCNC: 26 MG/DL (ref 5–25)
CALCIUM SERPL-MCNC: 8.2 MG/DL (ref 8.3–10.1)
CALCIUM SERPL-MCNC: 8.7 MG/DL (ref 8.3–10.1)
CALCIUM SERPL-MCNC: 8.7 MG/DL (ref 8.3–10.1)
CALCIUM SERPL-MCNC: 8.8 MG/DL (ref 8.3–10.1)
CHLORIDE SERPL-SCNC: 102 MMOL/L (ref 100–108)
CHLORIDE SERPL-SCNC: 103 MMOL/L (ref 100–108)
CHLORIDE SERPL-SCNC: 104 MMOL/L (ref 100–108)
CHLORIDE SERPL-SCNC: 104 MMOL/L (ref 100–108)
CO2 SERPL-SCNC: 32 MMOL/L (ref 21–32)
CO2 SERPL-SCNC: 33 MMOL/L (ref 21–32)
CO2 SERPL-SCNC: 33 MMOL/L (ref 21–32)
CO2 SERPL-SCNC: 34 MMOL/L (ref 21–32)
CREAT SERPL-MCNC: 0.46 MG/DL (ref 0.6–1.3)
CREAT SERPL-MCNC: 0.48 MG/DL (ref 0.6–1.3)
CREAT SERPL-MCNC: 0.48 MG/DL (ref 0.6–1.3)
CREAT SERPL-MCNC: 0.5 MG/DL (ref 0.6–1.3)
EOSINOPHIL # BLD MANUAL: 0 THOUSAND/UL (ref 0–0.4)
EOSINOPHIL NFR BLD MANUAL: 0 % (ref 0–6)
ERYTHROCYTE [DISTWIDTH] IN BLOOD BY AUTOMATED COUNT: 16.6 % (ref 11.6–15.1)
GFR SERPL CREATININE-BSD FRML MDRD: 104 ML/MIN/1.73SQ M
GFR SERPL CREATININE-BSD FRML MDRD: 105 ML/MIN/1.73SQ M
GFR SERPL CREATININE-BSD FRML MDRD: 105 ML/MIN/1.73SQ M
GFR SERPL CREATININE-BSD FRML MDRD: 107 ML/MIN/1.73SQ M
GLUCOSE SERPL-MCNC: 112 MG/DL (ref 65–140)
GLUCOSE SERPL-MCNC: 113 MG/DL (ref 65–140)
GLUCOSE SERPL-MCNC: 125 MG/DL (ref 65–140)
GLUCOSE SERPL-MCNC: 127 MG/DL (ref 65–140)
GLUCOSE SERPL-MCNC: 127 MG/DL (ref 65–140)
GLUCOSE SERPL-MCNC: 130 MG/DL (ref 65–140)
GLUCOSE SERPL-MCNC: 141 MG/DL (ref 65–140)
HCO3 BLDA-SCNC: 30.9 MMOL/L (ref 22–28)
HCT VFR BLD AUTO: 24.1 % (ref 34.8–46.1)
HGB BLD-MCNC: 7.7 G/DL (ref 11.5–15.4)
HOROWITZ INDEX BLDA+IHG-RTO: 60 MM[HG]
HYPERCHROMIA BLD QL SMEAR: PRESENT
LYMPHOCYTES # BLD AUTO: 0.69 THOUSAND/UL (ref 0.6–4.47)
LYMPHOCYTES # BLD AUTO: 8 % (ref 14–44)
MAGNESIUM SERPL-MCNC: 2.4 MG/DL (ref 1.6–2.6)
MCH RBC QN AUTO: 27.8 PG (ref 26.8–34.3)
MCHC RBC AUTO-ENTMCNC: 32 G/DL (ref 31.4–37.4)
MCV RBC AUTO: 87 FL (ref 82–98)
MONOCYTES # BLD AUTO: 0.34 THOUSAND/UL (ref 0–1.22)
MONOCYTES NFR BLD: 4 % (ref 4–12)
NEUTROPHILS # BLD MANUAL: 7.56 THOUSAND/UL (ref 1.85–7.62)
NEUTS SEG NFR BLD AUTO: 88 % (ref 43–75)
NRBC BLD AUTO-RTO: 0 /100 WBCS
O2 CT BLDA-SCNC: 11.4 ML/DL (ref 16–23)
OXYHGB MFR BLDA: 94.4 % (ref 94–97)
PCO2 BLDA: 43.7 MM HG (ref 36–44)
PEEP RESPIRATORY: 12 CM[H2O]
PH BLDA: 7.47 [PH] (ref 7.35–7.45)
PHOSPHATE SERPL-MCNC: 4.1 MG/DL (ref 2.3–4.1)
PLATELET # BLD AUTO: 235 THOUSANDS/UL (ref 149–390)
PLATELET BLD QL SMEAR: ADEQUATE
PMV BLD AUTO: 9.9 FL (ref 8.9–12.7)
PO2 BLDA: 76.7 MM HG (ref 75–129)
POLYCHROMASIA BLD QL SMEAR: PRESENT
POTASSIUM SERPL-SCNC: 2.9 MMOL/L (ref 3.5–5.3)
POTASSIUM SERPL-SCNC: 3.4 MMOL/L (ref 3.5–5.3)
POTASSIUM SERPL-SCNC: 3.8 MMOL/L (ref 3.5–5.3)
POTASSIUM SERPL-SCNC: 4 MMOL/L (ref 3.5–5.3)
POTASSIUM SERPL-SCNC: 5.4 MMOL/L (ref 3.5–5.3)
PROCALCITONIN SERPL-MCNC: 1.06 NG/ML
RBC # BLD AUTO: 2.77 MILLION/UL (ref 3.81–5.12)
RBC MORPH BLD: PRESENT
SODIUM SERPL-SCNC: 139 MMOL/L (ref 136–145)
SODIUM SERPL-SCNC: 143 MMOL/L (ref 136–145)
SODIUM SERPL-SCNC: 143 MMOL/L (ref 136–145)
SODIUM SERPL-SCNC: 144 MMOL/L (ref 136–145)
SPECIMEN SOURCE: ABNORMAL
TRIGL SERPL-MCNC: 547 MG/DL
VENT AC: 14
VENT- AC: AC
VT SETTING VENT: 480 ML
WBC # BLD AUTO: 8.59 THOUSAND/UL (ref 4.31–10.16)

## 2018-12-26 PROCEDURE — 84132 ASSAY OF SERUM POTASSIUM: CPT | Performed by: PHYSICIAN ASSISTANT

## 2018-12-26 PROCEDURE — C9113 INJ PANTOPRAZOLE SODIUM, VIA: HCPCS | Performed by: PHYSICIAN ASSISTANT

## 2018-12-26 PROCEDURE — 84478 ASSAY OF TRIGLYCERIDES: CPT | Performed by: PHYSICIAN ASSISTANT

## 2018-12-26 PROCEDURE — 36600 WITHDRAWAL OF ARTERIAL BLOOD: CPT

## 2018-12-26 PROCEDURE — 80048 BASIC METABOLIC PNL TOTAL CA: CPT | Performed by: SURGERY

## 2018-12-26 PROCEDURE — 82948 REAGENT STRIP/BLOOD GLUCOSE: CPT

## 2018-12-26 PROCEDURE — 83735 ASSAY OF MAGNESIUM: CPT | Performed by: PHYSICIAN ASSISTANT

## 2018-12-26 PROCEDURE — 84145 PROCALCITONIN (PCT): CPT | Performed by: PHYSICIAN ASSISTANT

## 2018-12-26 PROCEDURE — 82805 BLOOD GASES W/O2 SATURATION: CPT | Performed by: PHYSICIAN ASSISTANT

## 2018-12-26 PROCEDURE — 84100 ASSAY OF PHOSPHORUS: CPT | Performed by: PHYSICIAN ASSISTANT

## 2018-12-26 PROCEDURE — 99291 CRITICAL CARE FIRST HOUR: CPT | Performed by: SURGERY

## 2018-12-26 PROCEDURE — 80048 BASIC METABOLIC PNL TOTAL CA: CPT | Performed by: PHYSICIAN ASSISTANT

## 2018-12-26 PROCEDURE — 71045 X-RAY EXAM CHEST 1 VIEW: CPT

## 2018-12-26 PROCEDURE — 87081 CULTURE SCREEN ONLY: CPT | Performed by: PHYSICIAN ASSISTANT

## 2018-12-26 PROCEDURE — 85007 BL SMEAR W/DIFF WBC COUNT: CPT | Performed by: PHYSICIAN ASSISTANT

## 2018-12-26 PROCEDURE — 94003 VENT MGMT INPAT SUBQ DAY: CPT

## 2018-12-26 PROCEDURE — 85027 COMPLETE CBC AUTOMATED: CPT | Performed by: PHYSICIAN ASSISTANT

## 2018-12-26 PROCEDURE — 94640 AIRWAY INHALATION TREATMENT: CPT

## 2018-12-26 PROCEDURE — 94760 N-INVAS EAR/PLS OXIMETRY 1: CPT

## 2018-12-26 RX ORDER — POTASSIUM CHLORIDE 29.8 MG/ML
40 INJECTION INTRAVENOUS ONCE
Status: COMPLETED | OUTPATIENT
Start: 2018-12-26 | End: 2018-12-26

## 2018-12-26 RX ORDER — IBUPROFEN 400 MG/1
400 TABLET ORAL ONCE
Status: COMPLETED | OUTPATIENT
Start: 2018-12-26 | End: 2018-12-26

## 2018-12-26 RX ORDER — POTASSIUM CHLORIDE 29.8 MG/ML
40 INJECTION INTRAVENOUS ONCE
Status: DISCONTINUED | OUTPATIENT
Start: 2018-12-26 | End: 2018-12-26

## 2018-12-26 RX ADMIN — LABETALOL 20 MG/4 ML (5 MG/ML) INTRAVENOUS SYRINGE 5 MG: at 18:13

## 2018-12-26 RX ADMIN — NICOTINE 14 MG: 14 PATCH TRANSDERMAL at 08:22

## 2018-12-26 RX ADMIN — FUROSEMIDE 40 MG: 10 INJECTION, SOLUTION INTRAMUSCULAR; INTRAVENOUS at 08:11

## 2018-12-26 RX ADMIN — IPRATROPIUM BROMIDE 0.5 MG: 0.5 SOLUTION RESPIRATORY (INHALATION) at 00:16

## 2018-12-26 RX ADMIN — VANCOMYCIN HYDROCHLORIDE 1000 MG: 1 INJECTION, SOLUTION INTRAVENOUS at 22:18

## 2018-12-26 RX ADMIN — METHYLNALTREXONE BROMIDE 8 MG: 12 INJECTION, SOLUTION SUBCUTANEOUS at 10:28

## 2018-12-26 RX ADMIN — PANTOPRAZOLE SODIUM 40 MG: 40 INJECTION, POWDER, FOR SOLUTION INTRAVENOUS at 08:10

## 2018-12-26 RX ADMIN — DEXMEDETOMIDINE HYDROCHLORIDE 0.4 MCG/KG/HR: 100 INJECTION, SOLUTION INTRAVENOUS at 16:48

## 2018-12-26 RX ADMIN — POTASSIUM CHLORIDE 40 MEQ: 400 INJECTION, SOLUTION INTRAVENOUS at 14:32

## 2018-12-26 RX ADMIN — FENTANYL CITRATE 75 MCG/HR: 50 INJECTION, SOLUTION INTRAMUSCULAR; INTRAVENOUS at 23:14

## 2018-12-26 RX ADMIN — CHLORHEXIDINE GLUCONATE 0.12% ORAL RINSE 15 ML: 1.2 LIQUID ORAL at 22:16

## 2018-12-26 RX ADMIN — PRAMIPEXOLE DIHYDROCHLORIDE 0.25 MG: 0.25 TABLET ORAL at 18:06

## 2018-12-26 RX ADMIN — LEVALBUTEROL 1.25 MG: 1.25 SOLUTION, CONCENTRATE RESPIRATORY (INHALATION) at 13:26

## 2018-12-26 RX ADMIN — LEVALBUTEROL 1.25 MG: 1.25 SOLUTION, CONCENTRATE RESPIRATORY (INHALATION) at 00:17

## 2018-12-26 RX ADMIN — METRONIDAZOLE 500 MG: 500 INJECTION, SOLUTION INTRAVENOUS at 16:57

## 2018-12-26 RX ADMIN — DEXMEDETOMIDINE HYDROCHLORIDE 0.6 MCG/KG/HR: 100 INJECTION, SOLUTION INTRAVENOUS at 21:28

## 2018-12-26 RX ADMIN — PRAMIPEXOLE DIHYDROCHLORIDE 0.25 MG: 0.25 TABLET ORAL at 22:23

## 2018-12-26 RX ADMIN — LEVALBUTEROL 1.25 MG: 1.25 SOLUTION, CONCENTRATE RESPIRATORY (INHALATION) at 07:33

## 2018-12-26 RX ADMIN — PROPOFOL 40 MCG/KG/MIN: 10 INJECTION, EMULSION INTRAVENOUS at 14:53

## 2018-12-26 RX ADMIN — FENTANYL CITRATE 75 MCG/HR: 50 INJECTION, SOLUTION INTRAMUSCULAR; INTRAVENOUS at 06:19

## 2018-12-26 RX ADMIN — ACETAMINOPHEN 650 MG: 160 SUSPENSION ORAL at 12:11

## 2018-12-26 RX ADMIN — ACETAMINOPHEN 650 MG: 160 SUSPENSION ORAL at 00:23

## 2018-12-26 RX ADMIN — HEPARIN SODIUM 5000 UNITS: 5000 INJECTION INTRAVENOUS; SUBCUTANEOUS at 22:18

## 2018-12-26 RX ADMIN — HYDROMORPHONE HYDROCHLORIDE 1 MG: 1 INJECTION, SOLUTION INTRAMUSCULAR; INTRAVENOUS; SUBCUTANEOUS at 09:20

## 2018-12-26 RX ADMIN — METRONIDAZOLE 500 MG: 500 INJECTION, SOLUTION INTRAVENOUS at 00:23

## 2018-12-26 RX ADMIN — POTASSIUM CHLORIDE 40 MEQ: 400 INJECTION, SOLUTION INTRAVENOUS at 07:48

## 2018-12-26 RX ADMIN — IPRATROPIUM BROMIDE 0.5 MG: 0.5 SOLUTION RESPIRATORY (INHALATION) at 13:26

## 2018-12-26 RX ADMIN — LEVOTHYROXINE SODIUM 88 MCG: 88 TABLET ORAL at 05:26

## 2018-12-26 RX ADMIN — HEPARIN SODIUM 5000 UNITS: 5000 INJECTION INTRAVENOUS; SUBCUTANEOUS at 14:02

## 2018-12-26 RX ADMIN — CEFEPIME HYDROCHLORIDE 2000 MG: 2 INJECTION, POWDER, FOR SOLUTION INTRAVENOUS at 21:28

## 2018-12-26 RX ADMIN — METRONIDAZOLE 500 MG: 500 INJECTION, SOLUTION INTRAVENOUS at 08:12

## 2018-12-26 RX ADMIN — CHLORHEXIDINE GLUCONATE 0.12% ORAL RINSE 15 ML: 1.2 LIQUID ORAL at 08:09

## 2018-12-26 RX ADMIN — CEFEPIME HYDROCHLORIDE 2000 MG: 2 INJECTION, POWDER, FOR SOLUTION INTRAVENOUS at 09:27

## 2018-12-26 RX ADMIN — ACETAMINOPHEN 650 MG: 160 SUSPENSION ORAL at 08:09

## 2018-12-26 RX ADMIN — SENNOSIDES AND DOCUSATE SODIUM 2 TABLET: 8.6; 5 TABLET ORAL at 22:16

## 2018-12-26 RX ADMIN — BISACODYL 10 MG: 10 SUPPOSITORY RECTAL at 08:11

## 2018-12-26 RX ADMIN — HYDROMORPHONE HYDROCHLORIDE 1 MG: 1 INJECTION, SOLUTION INTRAMUSCULAR; INTRAVENOUS; SUBCUTANEOUS at 20:06

## 2018-12-26 RX ADMIN — POLYETHYLENE GLYCOL 3350 17 G: 17 POWDER, FOR SOLUTION ORAL at 08:09

## 2018-12-26 RX ADMIN — FUROSEMIDE 40 MG: 10 INJECTION, SOLUTION INTRAMUSCULAR; INTRAVENOUS at 16:57

## 2018-12-26 RX ADMIN — IPRATROPIUM BROMIDE 0.5 MG: 0.5 SOLUTION RESPIRATORY (INHALATION) at 07:33

## 2018-12-26 RX ADMIN — LIDOCAINE 2 PATCH: 50 PATCH CUTANEOUS at 08:10

## 2018-12-26 RX ADMIN — VANCOMYCIN HYDROCHLORIDE 1000 MG: 1 INJECTION, SOLUTION INTRAVENOUS at 10:30

## 2018-12-26 RX ADMIN — PROPOFOL 35 MCG/KG/MIN: 10 INJECTION, EMULSION INTRAVENOUS at 04:21

## 2018-12-26 RX ADMIN — ACETAMINOPHEN 650 MG: 160 SUSPENSION ORAL at 18:04

## 2018-12-26 RX ADMIN — IBUPROFEN 400 MG: 400 TABLET ORAL at 22:16

## 2018-12-26 RX ADMIN — LEVALBUTEROL 1.25 MG: 1.25 SOLUTION, CONCENTRATE RESPIRATORY (INHALATION) at 20:19

## 2018-12-26 RX ADMIN — HEPARIN SODIUM 5000 UNITS: 5000 INJECTION INTRAVENOUS; SUBCUTANEOUS at 05:26

## 2018-12-26 RX ADMIN — IPRATROPIUM BROMIDE 0.5 MG: 0.5 SOLUTION RESPIRATORY (INHALATION) at 20:19

## 2018-12-26 NOTE — RESPIRATORY THERAPY NOTE
RT Ventilator Management Note  Eh Boy 59 y o  female MRN: [id number]  Unit/Bed#:  -01 Encounter: [id number]      Daily Screen       12/25/2018 0737 12/26/2018 0730          Patient safety screen outcome[de-identified] Failed Failed      Not Ready for Weaning due to[de-identified] PEEP > 8cmH2O FiO2 >60%;PEEP > 8cmH2O              Physical Exam:   Assessment Type: Assess only  General Appearance: Sedated  Respiratory Pattern: Assisted  Chest Assessment: Chest expansion symmetrical  Bilateral Breath Sounds: Coarse      Resp Comments: (P) no vent changes today, pt appears comfortable on current settings, will continue to monitor

## 2018-12-26 NOTE — UTILIZATION REVIEW
Continued Stay Review    Date:  12-22-18     Vital Signs:    12/22/18 0300 12/22/18 0400 12/22/18 0500   BP: 115/56 (!) 102/47 113/55   Pulse: 68 66 64   Resp: 22 18 20   Temp: 99 7 °F (37 6 °C) 99 3 °F (37 4 °C) 99 3 °F (37 4 °C)   TempSrc:   Probe Probe   SpO2: 99% 99% 99%     Tube feeding  Mechanical ventilation      Medications:     Current Facility-Administered Medications:  acetaminophen 650 mg Oral Q8H PRN Nancyann Goodpasture, MD     albuterol 2 5 mg Nebulization Q4H PRN Lisa Lebronion, DO     calcium chloride 1 g Intravenous Once Aurora Gonzalez MD     chlorhexidine 15 mL Swish & Spit Q12H [address] Orestes White MD     fentaNYL 100 mcg/hr Intravenous Continuous Orestes White MD Last Rate: 100 mcg/hr (12/21/18 1909)   fentanyl citrate (PF) 50 mcg Intravenous Q2H PRN Nancyann Goodpasture, MD     hydrALAZINE 5 mg Intravenous Q6H PRN Nancyann Goodpasture, MD     influenza vaccine 0 5 mL Intramuscular Prior to discharge Lisa Janes, DO     insulin lispro 1-5 Units Subcutaneous HS Maria Del Carmen Rapp MD     insulin lispro 1-5 Units Subcutaneous TID AC Aurora Gonzalez MD     ipratropium 0 5 mg Nebulization Q6H Lisa Bullion, DO     labetalol 5 mg Intravenous Q4H PRN Maria Del Carmen Rapp MD     levalbuterol 1 25 mg Nebulization Q6H Rhineland Bullion, DO     levETIRAcetam 500 mg Oral Q12H [address] TRISTAN Adams     multi-electrolyte 100 mL/hr Intravenous Continuous Maria Del Carmen Rapp MD Last Rate: 100 mL/hr (12/22/18 0245)   potassium chloride 20 mEq Intravenous Once Aurora Gonzalez MD     pramipexole 0 25 mg Oral BID TRISTAN Adams     propofol 5-50 mcg/kg/min Intravenous Titrated David Dalton DO Last Rate: 15 mcg/kg/min (12/22/18 0604)   QUEtiapine 25 mg Oral HS TRISTAN Adams     senna-docusate sodium 1 tablet Oral HS Aurora Gonzalez MD              Scheduled Meds:   Current Facility-Administered Medications:  acetaminophen 650 mg Oral Q6H   albuterol 2 5 mg Nebulization Q4H PRN   bisacodyl 10 mg Rectal Daily   cefepime 2,000 mg Intravenous Q12H   chlorhexidine 15 mL Swish & Spit Q12H [address]   fentaNYL 75 mcg/hr Intravenous Continuous   furosemide 40 mg Intravenous BID (diuretic)   heparin (porcine) 5,000 Units Subcutaneous Q8H [address]   hydrALAZINE 5 mg Intravenous Q6H PRN   HYDROmorphone 1 mg Intravenous Q2H PRN   influenza vaccine 0 5 mL Intramuscular Prior to discharge   insulin lispro 1-5 Units Subcutaneous Q6H LACEY   ipratropium 0 5 mg Nebulization Q6H   labetalol 5 mg Intravenous Q4H PRN   levalbuterol 1 25 mg Nebulization Q6H   levothyroxine 88 mcg Oral Early Morning   lidocaine 2 patch Topical Daily   methylnaltrexone 8 mg Subcutaneous Every Other Day   metroNIDAZOLE 500 mg Intravenous Q8H   nicotine 14 mg Transdermal Daily   pantoprazole 40 mg Intravenous Q24H LACEY   polyethylene glycol 17 g Oral Daily   potassium chloride 40 mEq Intravenous Once   pramipexole 0 25 mg Oral BID   propofol 5-50 mcg/kg/min Intravenous Titrated   senna-docusate sodium 2 tablet Oral HS   vancomycin 20 mg/kg (Adjusted) Intravenous Q12H       Abnormal Labs/Diagnostic Results:    Hemoglobin  10 0    Hematocrit  29 9      Age/Sex: 59 y o  female   Desatted to 80% on PS vent settings  Switched to [facility] - initially at FiO2 of 90%, increased PEEP to 8  CXR showed worsening opacities  Eventually able to titrate FiO2 to 60%  Became agitated and acutely hypertensive at 200/80 - given 5 mg hydralazine at 2000 and BP gradually decreased to low of 90/46 at 1200  Assessment/Plan:  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 (POD #2)  Continue serial neuro exams - localizing motor function at this time  Continue Keppra per Neurosurgery Service recommendation  Hydralazine to maintain sBP < 140 mm HG per Neurosurgery recommendation  COPD  Multiple bilateral rib fractures  Flail chest  Hypoxic respiratory failure - Returned from [facility] to [facility] mode ventilation over 24 hours  Titrate FiO2 to 40-60% and increase PEEP   Reviewed serial CXRs - improvement noted from 12/21  Increased pulmonary secretions but w/o fever or leukocytosis  In context of TBI, multiple chest wall injuries and COPD, will plan for tracheostomy 12/24  Obtain CT chest to exclude PE today      Complex right shoulder  Fracture  Non operative management       Discharge Plan:   Continue icu level of care

## 2018-12-26 NOTE — RESPIRATORY THERAPY NOTE
RT Ventilator Management Note  Grant Kerns 59 y o  female MRN: [id number]  Unit/Bed#:  -01 Encounter: [id number]      Daily Screen       12/25/2018 0737 12/26/2018 0730          Patient safety screen outcome[de-identified] Failed Failed      Not Ready for Weaning due to[de-identified] PEEP > 8cmH2O FiO2 >60%;PEEP > 8cmH2O              Physical Exam:   Assessment Type: Assess only  General Appearance: Sedated  Respiratory Pattern: Assisted  Chest Assessment: Chest expansion symmetrical  Bilateral Breath Sounds: Coarse      Resp Comments: (P) pt appears comfortable on current settings, no weaning attempted, pt on 60% and +12 peep, will continue to monitor

## 2018-12-26 NOTE — PROGRESS NOTES
Vancomycin Assessment    Russel Nolen is a 59 y o  female who is currently receiving vancomycin 1000 mg q12h for MRSA suspected, Pneumonia  Relevant clinical data and objective history reviewed:  Creatinine   Date Value Ref Range Status   12/26/2018 0 48 (L) 0 60 - 1 30 mg/dL Final     Comment:     Standardized to IDMS reference method   12/25/2018 0 44 (L) 0 60 - 1 30 mg/dL Final     Comment:     Standardized to IDMS reference method   12/25/2018 0 36 (L) 0 60 - 1 30 mg/dL Final     Comment:     Standardized to IDMS reference method     BP 97/55   Pulse 76   Temp (!) 101 1 °F (38 4 °C) (Probe)   Resp 17   Ht 5' 3" (1 6 m)   Wt 57 6 kg (126 lb 15 8 oz)   SpO2 96%   BMI 22 49 kg/m²   I/O last 3 completed shifts: In: 2602 7 [I V :1102 7; NG/GT:500; IV Piggyback:1000]  Out: 6512 [Urine:3275; Emesis/NG output:1000]  Lab Results   Component Value Date/Time    BUN 23 12/26/2018 05:19 AM    WBC 8 59 12/26/2018 05:19 AM    HGB 7 7 (L) 12/26/2018 05:19 AM    HCT 24 1 (L) 12/26/2018 05:19 AM    MCV 87 12/26/2018 05:19 AM     12/26/2018 05:19 AM     Temp Readings from Last 3 Encounters:   12/26/18 (!) 101 1 °F (38 4 °C) (Probe)     Vancomycin Days of Therapy: 2    Assessment/Plan  The patient is currently on vancomycin utilizing scheduled dosing  The patient is currently receiving 1000 mg q12h and is clinically appropriate and dose will be continued  Pharmacy will also follow closely for s/sx of nephrotoxicity, infusion reactions and appropriateness of therapy  BMP and CBC will be ordered per protocol  Plan for trough as patient approaches steady state, prior to the 5th  dose at approximately 1000, tomorrow, Thursday 12/27  Due to infection severity, will target a trough of 15-20 (appropriate for most indications)   Pharmacy will continue to follow the patients culture results and clinical progress daily  Thank you,  Yesenia Darby, PharmD, BCCCP  Critical Care Clinical Pharmcist

## 2018-12-26 NOTE — RESPIRATORY THERAPY NOTE
RT Ventilator Management Note  Maura Mejia 59 y o  female MRN: [id number]  Unit/Bed#: [facility] 02 Encounter: [id number]      Daily Screen       12/24/2018 0755 12/25/2018 0737          Patient safety screen outcome[de-identified] Failed Failed      Not Ready for Weaning due to[de-identified] Underline problem not resolved PEEP > 8cmH2O              Physical Exam:   Assessment Type: (P) Assess only  General Appearance: (P) Sedated  Respiratory Pattern: (P) Assisted  Chest Assessment: (P) Chest expansion symmetrical  Bilateral Breath Sounds: (P) Coarse      Resp Comments: (P) Pt remained on a/c settings throughout the night without incident

## 2018-12-26 NOTE — PROGRESS NOTES
Progress Note - Critical Care   Lala Dorman 59 y o  female MRN: [id number]  Unit/Bed#: [address] [facility] -01 Encounter: [id number]    Attending Physician: Regina Magana DO      ______________________________________________________________________  Assessment and Plan:   Principal Problem:    Traumatic brain injury [facility])  Active Problems:    Multiple fractures of ribs, bilateral, initial encounter for closed fracture    Traumatic pneumothorax    Glenoid fracture of shoulder, right, closed, initial encounter    Intraparenchymal hemorrhage of brain ([address] )    Flail chest, initial encounter for closed fracture    Fracture of thoracic transverse process, closed, initial encounter ([address] )    Subdural hematoma ([address] )    Subarachnoid hemorrhage ([address] )    Diabetes mellitus, type II ([address] )    Closed nondisplaced left toe fracture    Hypokalemia    Hypophosphatemia    Acute encephalopathy    Status post craniectomy    Acute respiratory failure with hypoxia ([address] )    Mucus plugging of bronchi    Acute respiratory failure ([address] )    Acute blood loss anemia    Ileus (HCC)  Resolved Problems:    Hyponatremia    71-year-old female found down with TBI/large left subdural hematoma and midline shift postop day 6 from left craniectomy for evacuation of subdural hematoma    Neuro:   Left subdural hematoma/bilatieral SAH/TBI s/p L craniectomy for evacuation- f/u CT head from yesterday shows stability of ICH, no new mass effect or shift  She does have a subgaleal collection, likely seroma  Neuro exam improved  Moving all extremities except the RUE which is consistent with prior  Continue sedation as needed  Patient becomes very agitated off of propofol, causing her to desaturate  Seven day course of Keppra prophylaxis completed yesterday       Analgesia/sedation- continue fentanyl infusion at 75 mcgs and propofol    History of bipolar disorder- lamictal on hold due to depressed neuro exam on these meds    History of depression- due to depressed neuro exam holding prozac    History of essential tremor and RLS- continue home mirapex    CV:   Hypotension- much improved, tolerating diuresis without requiring fluid boluses    History of hypertension- holding home anti-hypertensives    Pulm:   Acute respiratory failure/ARDS s/p tracheostomy POD #2- PF ratio of 126  Continues to become hypoxic with agitation/tachypnea  Possible RLL VAP as RLL shows possible developing consolidation- will follow  IV BS abx started yesterday  F/u SpCx  Continue pulmonary toilette as able  Continue scheduled nebs  Right sided 2nd-11th rib fractures- analgesia, pulmonary toilette    GI:   Ileus- improving, only 200 out from NG in the last 24 hours  Trial tube feedings at [facility] and advance as tolerated  No BM yet despite fleets yesterday  Give relistor today and if no BM will trial soap suds enema later  Ultimately will require a PEG tube once tolerating feeds  :   Renal function normal  UOP adequate via kenny catheter  F/E/N:   Negative 1 9 liters in 24 hours with diuresis  Tolerating diuresis  Continue kenny for strict I/O with an additonal 24 hours IV lasix  May need to transition to diamox if Bicarb continues to climb  Hypokalemia- replete and repeat BMP at noon  IVFs- none at this time due to goal net negative volume status  Will start and advance tube feeds as tolerated  ID:   Fever- continues to spike, Tmax 103 3 yesterday afternoon and again last evening  BS abx started yesterday with Vanc/Cefepime/Flagyl  Source is likely pneumonia, though UA was dirty as well  Will f/u BCx, UCx and SPCx- SpCx with GN coccobacilli and GPC at this time  F/u Vanc trough tonight as well  Heme:   No leukocytosis at this time  Acute blood loss anemia- H/H stable overall  Asymptomatic  No s/s of bleeding clinically  Repeat in AM     Endo:   History of DM II- no coverage needed for goal BS <180  Monitor accuchecks while advancing tube feeds        History of hypothyroidism- continue home synthroid    Msk/Skin:   Left 3rd proximal phalanx fracture of the foot-weight bear as tolerated left lower extremity in a surgical shoe when possible  Follow up with Podiatry      Right glenoid and acromion fractures-nonweightbearing right upper extremity  Non operative management as recommended by Orthopedics  Follow up with Orthopedics as an outpatient      Right T1-T4 transverse process fractures-non operative management, no need for further imaging or evaluation at this time  Quick dry brace for comfort when ambulatory if needed      Prophylaxis:   SCds, SQH, Keppra course completed yesterday, PPI is home med    Disposition:   Continue ICU level care due to tenuous respiratory status and need for sedation    Code Status: Level 1 - Full Code    Counseling / Coordination of Care  Total Critical Care time spent 30 minutes excluding procedures, teaching and family updates  ______________________________________________________________________    Chief Complaint: intubated    24 Hour Events: Propofol was weaned and patient became agitated and desaturated  Required increased FiO2 and was slow to recover  She did move all but her RUE  Crossed midline with her LUE  Febrile during that time  Broad spectrum antibiotics started yesterday to cover possible pneumonia due to increased oxygen/ventilator requirements and up-trending fever curve  Cultures sent yesterday as well  NGT output significantly improved, though no BM at this time  Review of Systems   Unable to perform ROS: Intubated     ______________________________________________________________________    Physical Exam:     Physical Exam   Constitutional: She appears well-developed  HENT:   + L craniectomy/scalp incision C/D/I with staples    Eyes: Pupils are equal, round, and reactive to light  Neck: Normal range of motion  Neck supple  Cardiovascular: Normal rate, regular rhythm and normal heart sounds      Pulmonary/Chest: Effort normal and breath sounds normal    + Currently comfortable on A/C settings, PEEP 12 and FiO2 60%; saturating 97%   Abdominal: Soft  She exhibits distension  There is no tenderness  There is no rebound    + Decreased bowel sounds, soft   Genitourinary:   Genitourinary Comments: +Marroquin catheter draining clear yellow urine   Musculoskeletal: She exhibits no edema, tenderness or deformity  Neurological:   + GCS 7T (2, 1T, 4), moving all extremities except the RUE   Skin: Skin is warm and dry  Capillary refill takes less than 2 seconds  ______________________________________________________________________  Vitals:    18 0300 18 0400 18 0408 18 0500   BP: 103/55 125/66  100/54   Pulse: 76 78  76   Resp: 18 18  17   Temp: 100 4 °F (38 °C) (!) 100 8 °F (38 2 °C)  100 4 °F (38 °C)   TempSrc:       SpO2: 99% 98% 99% 99%   Weight:       Height:           Temperature:   Temp (24hrs), Av 3 °F (38 5 °C), Min:99 3 °F (37 4 °C), Max:103 3 °F (39 6 °C)    Current Temperature: 100 4 °F (38 °C)  Weights:   IBW: 52 4 kg    Body mass index is 22 49 kg/m²    Weight (last 2 days)     None        Hemodynamic Monitoring:  N/A     Non-Invasive/Invasive Ventilation Settings:  Respiratory    Lab Data (Last 4 hours)    None         O2/Vent Data (Last 4 hours)       0408           Vent Mode AC/VC       Resp Rate (BPM) (BPM) 14       Vt (mL) (mL) 480       FIO2 (%) (%) 70       PEEP (cmH2O) (cmH2O) 12       MV 9 3                 Lab Results   Component Value Date    PHART 7 463 (H) 2018    [id number] 48 5 (H) 2018    PO2ART 109 1 2018    [id number] 34 0 (H) 2018    BEART 9 1 2018    SOURCE Brachial, Right 2018     SpO2: SpO2: 99 %, SpO2 Device: O2 Device: Other (comment)  Intake and Outputs:  I/O       701 -  07 -  0700    I V  (mL/kg) 1098 2 (19 1) 140 4 (2 4)    NG/     IV Piggyback 600 700    Total Intake(mL/kg) 2398 2 (41 6) 840 4 (14  6)    Urine (mL/kg/hr) 2230 (1 6) 2625 (1 9)    Emesis/NG output 1450 150    Total Output 3680 2775    Net -1281 8 -1934 6          Unmeasured Stool Occurrence 1 x         UOP: 108 ml/hr     Nutrition:        Diet Orders            Start     Ordered    12/24/18 1353  Diet NPO  Diet effective now     Question Answer Comment   Diet Type NPO    RD to adjust diet per protocol? Yes        12/24/18 1357          Labs:     Results from last 7 days  Lab Units 12/26/18  0519 12/25/18  1538 12/25/18  0615 12/24/18  1727 12/24/18  1658 12/24/18  1317 12/24/18  0447 12/23/18  0447 12/22/18  0516 12/21/18  0444  12/20/18  1153   WBC Thousand/uL 8 59 6 90 4 90  --   --  6 81 6 93 9 83 9 62 9 33  < > 9 07   HEMOGLOBIN g/dL 7 7* 8 6* 7 9*  --   --  8 2* 8 0* 9 3* 10 0* 9 9*  < > 8 8*   I STAT HEMOGLOBIN g/dl  --   --   --  9 5* 9 9*  --   --   --   --   --   --   --    HEMATOCRIT % 24 1* 26 6* 24 9*  --   --  25 0* 24 9* 28 4* 29 9* 29 4*  < > 27 0*   HEMATOCRIT, ISTAT %  --   --   --  28* 29*  --   --   --   --   --   --   --    PLATELETS Thousands/uL 235 240 175  --   --  191 198 222 209 180  < > 184   NEUTROS PCT %  --   --   --   --   --   --   --  79*  --  78*  --  89*   BANDS PCT %  --  1 4  --   --   --   --   --  3  --   --   --    MONOS PCT %  --   --   --   --   --   --   --  8  --  7  --  5   MONO PCT %  --  6 1*  --   --  4 0*  --  7  --   --   --    < > = values in this interval not displayed      Results from last 7 days  Lab Units 12/26/18  0519 12/25/18  1513 12/25/18  0615 12/24/18  2126 12/24/18  1727 12/24/18  1658 12/24/18  1317 12/24/18  0447 12/23/18  0447   SODIUM mmol/L 143 140 141 139  --   --  137 140 137   POTASSIUM mmol/L 2 9* 3 8 3 7 3 6  --   --  3 4* 3 8 3 5   CHLORIDE mmol/L 102 99* 100 100  --   --  98* 103 103   CO2 mmol/L 33* 33* 33* 36*  --   --  33* 31 29   CO2, I-STAT mmol/L  --   --   --   --  35* 34*  --   --   --    ANION GAP mmol/L 8 8 8 3*  --   --  6 6 5   BUN mg/dL 23 15 12 9  --   -- 8 7 7   CREATININE mg/dL 0 48* 0 44* 0 36* 0 41*  --   --  0 35* 0 36* 0 33*   CALCIUM mg/dL 8 7 8 6 8 7 8 7  --   --  8 2* 8 6 8 2*   ALT U/L  --   --   --   --   --   --   --   --  40   AST U/L  --   --   --   --   --   --   --   --  56*   ALK PHOS U/L  --   --   --   --   --   --   --   --  129*   ALBUMIN g/dL  --   --   --   --   --   --   --   --  1 5*   TOTAL BILIRUBIN mg/dL  --   --   --   --   --   --   --   --  0 48       Results from last 7 days  Lab Units 18  0519 18  1513 18  1317 18  0447 18  0516 18  0444 18  0054   MAGNESIUM mg/dL 2 4 1 7 1 7 1 8  --  2 0 1 8   PHOSPHORUS mg/dL 4 1  --   --  2 6 2 2* 2 2* 2 2*        Results from last 7 days  Lab Units 18  0444 18  1153   INR  1 20* 1 29*   PTT seconds  --  31       Results from last 7 days  Lab Units 18  1204 18  0054   TROPONIN I ng/mL <0 02 <0 02       Results from last 7 days  Lab Units 18  1538   LACTIC ACID mmol/L 1 6     ABG:  Lab Results   Component Value Date    PHART 7 463 (H) 2018    [id number] 48 5 (H) 2018    PO2ART 109 1 2018    [id number] 34 0 (H) 2018    BEART 9 1 2018    SOURCE Brachial, Right 2018     VBG:  Results from last 7 days  Lab Units 18  0751   ABG SOURCE  Brachial, Right       Results from last 7 days  Lab Units 18  0827   PROCALCITONIN ng/ml 0 27*     No results found for: [facility]   Imagin/26 CXR: bilateral opacities, developing RLL consolidation I have personally reviewed pertinent reports         EKG: no new    Micro:   BCx: Pending   SpCx:  3+Polys, 2+ Gram Negative coccobacilli, Rare GPC in pairs   UA: positive nitrites, WBCs and innumerable bacteria   UCX: pending      Results from last 7 days  Lab Units 18  0931   GRAM STAIN RESULT  3+ Polys  2+ Gram negative coccobacilli  Rare Gram positive cocci in pairs     Allergies: No Known Allergies     Medications: Scheduled Meds:  Current Facility-Administered Medications:  acetaminophen 650 mg Oral Q6H Day Moreno PA-C    albuterol 2 5 mg Nebulization Q4H PRN Jamil Walton DO    bisacodyl 10 mg Rectal Daily Bianca Mckeon MD    cefepime 2,000 mg Intravenous Q12H Desire Hickman MD Last Rate: 2,000 mg (12/25/18 1957)   chlorhexidine 15 mL Swish & Spit Q12H [address] Topher Dugan MD    fentaNYL 75 mcg/hr Intravenous Continuous Mandi Tee PA-C Last Rate: 75 mcg/hr (12/25/18 1638)   furosemide 40 mg Intravenous BID (diuretic) Terrill Severin, PA-C    heparin (porcine) 5,000 Units Subcutaneous Q8H 3777 Jacob Leiva MD    hydrALAZINE 5 mg Intravenous Q6H PRN Desire Hickman MD    HYDROmorphone 1 mg Intravenous Q2H PRN Terrill Severin, PA-C    HYDROmorphone 1 mg Intravenous Once Terrill Severin, PA-C    influenza vaccine 0 5 mL Intramuscular Prior to discharge Jamil Walton DO    insulin lispro 1-5 Units Subcutaneous Q6H 3777 Jacob Leiva MD    ipratropium 0 5 mg Nebulization Q6H Jamil Walton DO    labetalol 5 mg Intravenous Q4H PRN Gaby Dugan MD    levalbuterol 1 25 mg Nebulization Q6H Jamil Walton DO    levothyroxine 88 mcg Oral Early Morning Terrill Severin, PA-C    lidocaine 2 patch Topical Daily Terrill Severin, PA-C    metroNIDAZOLE 500 mg Intravenous Q8H Desire Hickman MD Last Rate: 500 mg (12/26/18 0023)   nicotine 14 mg Transdermal Daily Day Moreno PA-C    pantoprazole 40 mg Intravenous Q24H [address] Terrill Severin, PA-C    polyethylene glycol 17 g Oral Daily Bianca Mckeon MD    pramipexole 0 25 mg Oral BID TRISTAN Mercedes    propofol 5-50 mcg/kg/min Intravenous Titrated Chaka Dalton DO Last Rate: 35 mcg/kg/min (12/26/18 0421)   senna-docusate sodium 2 tablet Oral HS Terrill Severin, PA-C    vancomycin 20 mg/kg (Adjusted) Intravenous Q12H Desire Hickman MD Last Rate: 1,000 mg (12/25/18 2131)     Continuous Infusions:  fentaNYL 75 mcg/hr Last Rate: 75 mcg/hr (12/25/18 0495)   propofol 5-50 mcg/kg/min Last Rate: 35 mcg/kg/min (12/26/18 6756)     PRN Meds:    albuterol 2 5 mg Q4H PRN   hydrALAZINE 5 mg Q6H PRN   HYDROmorphone 1 mg Q2H PRN   influenza vaccine 0 5 mL Prior to discharge   labetalol 5 mg Q4H PRN     VTE Pharmacologic Prophylaxis: Heparin  VTE Mechanical Prophylaxis: sequential compression device     Invasive lines and devices: Invasive Devices     Peripherally Inserted Central Catheter Line            PICC Line 91/00/77 Left Basilic 3 days          Drain            Urethral Catheter 16 Fr  7 days    NG/OG/Enteral Tube Nasogastric Right nares 1 day          Airway            Surgical Airway Shiley Cuffed 1 day                     Portions of the record may have been created with voice recognition software  Occasional wrong word or "sound a like" substitutions may have occurred due to the inherent limitations of voice recognition software  Read the chart carefully and recognize, using context, where substitutions have occurred      Ayden Parks PA-C

## 2018-12-26 NOTE — UTILIZATION REVIEW
Continued Stay Review    Date: 12-26-18     Vital Signs: BP 93/54   Pulse 72   Temp (!) 100 8 °F (38 2 °C) (Probe)   Resp 17   Ht 5' 3" (1 6 m)   Wt 57 6 kg (126 lb 15 8 oz)   SpO2 95%   BMI 22 49 kg/m²     Medications:     acetaminophen 650 mg Oral Q6H   albuterol 2 5 mg Nebulization Q4H PRN   bisacodyl 10 mg Rectal Daily   cefepime 2,000 mg Intravenous Q12H   chlorhexidine 15 mL Swish & Spit Q12H [address]   fentaNYL 75 mcg/hr Intravenous Continuous   furosemide 40 mg Intravenous BID (diuretic)   heparin (porcine) 5,000 Units Subcutaneous Q8H [address]   hydrALAZINE 5 mg Intravenous Q6H PRN   HYDROmorphone 1 mg Intravenous Q2H PRN   influenza vaccine 0 5 mL Intramuscular Prior to discharge   insulin lispro 1-5 Units Subcutaneous Q6H [address]   ipratropium 0 5 mg Nebulization Q6H   labetalol 5 mg Intravenous Q4H PRN   levalbuterol 1 25 mg Nebulization Q6H   levothyroxine 88 mcg Oral Early Morning   lidocaine 2 patch Topical Daily   methylnaltrexone 8 mg Subcutaneous Every Other Day   metroNIDAZOLE 500 mg Intravenous Q8H   nicotine 14 mg Transdermal Daily   pantoprazole 40 mg Intravenous Q24H LACEY   polyethylene glycol 17 g Oral Daily   potassium chloride 40 mEq Intravenous Once   pramipexole 0 25 mg Oral BID   propofol 5-50 mcg/kg/min Intravenous Titrated   senna-docusate sodium 2 tablet Oral HS   vancomycin 20 mg/kg (Adjusted) Intravenous Q12H       Abnormal Labs/Diagnostic Results    Urine, Indwelling Marroquin Catheter     Urine Culture >100,000 cfu/ml Gram Negative Austin Enteric Like (A)       Hb  7 7   hct  24 1     pH, Arterial 7 467 (H)   pCO2, Arterial 43 7   pO2, Arterial 76 7   HCO3, Arterial 30 9 (H)     Chest x ray  Slight improvement of bilateral infiltrates       Age/Sex: 59 y o  female     Assessment/Plan:    Left subdural hematoma/bilatieral SAH/TBI s/p L craniectomy for evacuation- f/u CT head from yesterday shows stability of ICH, no new mass effect or shift   She does have a subgaleal collection, likely seroma  Acute respiratory failure/ARDS s/p tracheostomy POD #2- PF ratio of 126  Continues to become hypoxic with agitation/tachypnea  Possible RLL VAP as RLL shows possible developing consolidation- will follow  IV BS abx started yesterday  F/u SpCx  Continue pulmonary toilette as able  Continue scheduled nebs  Ileus- improving, only 200 out from NG in the last 24 hours  Trial tube feedings at trickle and advance as tolerated  No BM yet despite fleets yesterday  Give relistor today and if no BM will trial soap suds enema later  Ultimately will require a PEG tube once tolerating feeds  Fever- continues to spike, Tmax 103 3 yesterday afternoon and again last evening  BS abx started yesterday with Vanc/Cefepime/Flagyl  Source is likely pneumonia, though UA was dirty as well  Will f/u BCx, UCx and SPCx- SpCx with GN coccobacilli and GPC at this time  F/u Vanc trough tonight as well       Continue ICU level care due to tenuous respiratory status and need for sedation    Discharge Plan: referral to [facility]

## 2018-12-27 ENCOUNTER — TELEPHONE (OUTPATIENT)
Dept: NEUROSURGERY | Facility: CLINIC | Age: 63
End: 2018-12-27

## 2018-12-27 ENCOUNTER — APPOINTMENT (INPATIENT)
Dept: RADIOLOGY | Facility: HOSPITAL | Age: 63
DRG: 003 | End: 2018-12-27
Payer: MEDICARE

## 2018-12-27 PROBLEM — J96.00 ACUTE RESPIRATORY FAILURE (HCC): Status: RESOLVED | Noted: 2018-12-18 | Resolved: 2018-12-27

## 2018-12-27 PROBLEM — E83.39 HYPOPHOSPHATEMIA: Status: RESOLVED | Noted: 2018-12-22 | Resolved: 2018-12-27

## 2018-12-27 LAB
ANION GAP SERPL CALCULATED.3IONS-SCNC: 9 MMOL/L (ref 4–13)
ANISOCYTOSIS BLD QL SMEAR: PRESENT
BASOPHILS # BLD MANUAL: 0 THOUSAND/UL (ref 0–0.1)
BASOPHILS NFR MAR MANUAL: 0 % (ref 0–1)
BUN SERPL-MCNC: 32 MG/DL (ref 5–25)
CALCIUM SERPL-MCNC: 8.4 MG/DL (ref 8.3–10.1)
CHLORIDE SERPL-SCNC: 104 MMOL/L (ref 100–108)
CO2 SERPL-SCNC: 31 MMOL/L (ref 21–32)
CREAT SERPL-MCNC: 0.58 MG/DL (ref 0.6–1.3)
EOSINOPHIL # BLD MANUAL: 0 THOUSAND/UL (ref 0–0.4)
EOSINOPHIL NFR BLD MANUAL: 0 % (ref 0–6)
ERYTHROCYTE [DISTWIDTH] IN BLOOD BY AUTOMATED COUNT: 17 % (ref 11.6–15.1)
GFR SERPL CREATININE-BSD FRML MDRD: 99 ML/MIN/1.73SQ M
GLUCOSE SERPL-MCNC: 187 MG/DL (ref 65–140)
GLUCOSE SERPL-MCNC: 211 MG/DL (ref 65–140)
GLUCOSE SERPL-MCNC: 217 MG/DL (ref 65–140)
GLUCOSE SERPL-MCNC: 231 MG/DL (ref 65–140)
HCT VFR BLD AUTO: 24.8 % (ref 34.8–46.1)
HGB BLD-MCNC: 7.8 G/DL (ref 11.5–15.4)
HYPERCHROMIA BLD QL SMEAR: PRESENT
LYMPHOCYTES # BLD AUTO: 0.47 THOUSAND/UL (ref 0.6–4.47)
LYMPHOCYTES # BLD AUTO: 5 % (ref 14–44)
MCH RBC QN AUTO: 27.7 PG (ref 26.8–34.3)
MCHC RBC AUTO-ENTMCNC: 31.5 G/DL (ref 31.4–37.4)
MCV RBC AUTO: 88 FL (ref 82–98)
MONOCYTES # BLD AUTO: 0.19 THOUSAND/UL (ref 0–1.22)
MONOCYTES NFR BLD: 2 % (ref 4–12)
MRSA NOSE QL CULT: NORMAL
NEUTROPHILS # BLD MANUAL: 8.71 THOUSAND/UL (ref 1.85–7.62)
NEUTS SEG NFR BLD AUTO: 93 % (ref 43–75)
NRBC BLD AUTO-RTO: 0 /100 WBCS
PLATELET # BLD AUTO: 263 THOUSANDS/UL (ref 149–390)
PLATELET BLD QL SMEAR: ADEQUATE
PMV BLD AUTO: 10.2 FL (ref 8.9–12.7)
POTASSIUM SERPL-SCNC: 3.4 MMOL/L (ref 3.5–5.3)
RBC # BLD AUTO: 2.82 MILLION/UL (ref 3.81–5.12)
RBC MORPH BLD: PRESENT
SODIUM SERPL-SCNC: 144 MMOL/L (ref 136–145)
TOTAL CELLS COUNTED SPEC: 100
TRIGL SERPL-MCNC: 168 MG/DL
WBC # BLD AUTO: 9.37 THOUSAND/UL (ref 4.31–10.16)

## 2018-12-27 PROCEDURE — 85027 COMPLETE CBC AUTOMATED: CPT | Performed by: PHYSICIAN ASSISTANT

## 2018-12-27 PROCEDURE — 94760 N-INVAS EAR/PLS OXIMETRY 1: CPT

## 2018-12-27 PROCEDURE — 71045 X-RAY EXAM CHEST 1 VIEW: CPT

## 2018-12-27 PROCEDURE — 87077 CULTURE AEROBIC IDENTIFY: CPT | Performed by: SURGERY

## 2018-12-27 PROCEDURE — 84478 ASSAY OF TRIGLYCERIDES: CPT | Performed by: PHYSICIAN ASSISTANT

## 2018-12-27 PROCEDURE — 87086 URINE CULTURE/COLONY COUNT: CPT | Performed by: SURGERY

## 2018-12-27 PROCEDURE — 94640 AIRWAY INHALATION TREATMENT: CPT

## 2018-12-27 PROCEDURE — 93005 ELECTROCARDIOGRAM TRACING: CPT

## 2018-12-27 PROCEDURE — 87186 SC STD MICRODIL/AGAR DIL: CPT | Performed by: SURGERY

## 2018-12-27 PROCEDURE — 94003 VENT MGMT INPAT SUBQ DAY: CPT

## 2018-12-27 PROCEDURE — 99291 CRITICAL CARE FIRST HOUR: CPT | Performed by: SURGERY

## 2018-12-27 PROCEDURE — 80048 BASIC METABOLIC PNL TOTAL CA: CPT | Performed by: PHYSICIAN ASSISTANT

## 2018-12-27 PROCEDURE — 82948 REAGENT STRIP/BLOOD GLUCOSE: CPT

## 2018-12-27 PROCEDURE — 85007 BL SMEAR W/DIFF WBC COUNT: CPT | Performed by: PHYSICIAN ASSISTANT

## 2018-12-27 PROCEDURE — C9113 INJ PANTOPRAZOLE SODIUM, VIA: HCPCS | Performed by: PHYSICIAN ASSISTANT

## 2018-12-27 RX ORDER — LABETALOL HYDROCHLORIDE 5 MG/ML
5 INJECTION, SOLUTION INTRAVENOUS EVERY 4 HOURS PRN
Status: DISCONTINUED | OUTPATIENT
Start: 2018-12-27 | End: 2018-12-28

## 2018-12-27 RX ORDER — POTASSIUM CHLORIDE 29.8 MG/ML
40 INJECTION INTRAVENOUS ONCE
Status: COMPLETED | OUTPATIENT
Start: 2018-12-27 | End: 2018-12-28

## 2018-12-27 RX ADMIN — IPRATROPIUM BROMIDE 0.5 MG: 0.5 SOLUTION RESPIRATORY (INHALATION) at 19:36

## 2018-12-27 RX ADMIN — DEXMEDETOMIDINE HYDROCHLORIDE 0.7 MCG/KG/HR: 100 INJECTION, SOLUTION INTRAVENOUS at 12:57

## 2018-12-27 RX ADMIN — METRONIDAZOLE 500 MG: 500 INJECTION, SOLUTION INTRAVENOUS at 09:23

## 2018-12-27 RX ADMIN — LEVALBUTEROL 1.25 MG: 1.25 SOLUTION, CONCENTRATE RESPIRATORY (INHALATION) at 00:22

## 2018-12-27 RX ADMIN — LEVALBUTEROL 1.25 MG: 1.25 SOLUTION, CONCENTRATE RESPIRATORY (INHALATION) at 19:36

## 2018-12-27 RX ADMIN — DEXMEDETOMIDINE HYDROCHLORIDE 0.7 MCG/KG/HR: 100 INJECTION, SOLUTION INTRAVENOUS at 09:48

## 2018-12-27 RX ADMIN — LEVALBUTEROL 1.25 MG: 1.25 SOLUTION, CONCENTRATE RESPIRATORY (INHALATION) at 13:13

## 2018-12-27 RX ADMIN — VANCOMYCIN HYDROCHLORIDE 1000 MG: 1 INJECTION, SOLUTION INTRAVENOUS at 09:00

## 2018-12-27 RX ADMIN — ACETAMINOPHEN 650 MG: 160 SUSPENSION ORAL at 00:25

## 2018-12-27 RX ADMIN — POLYETHYLENE GLYCOL 3350 17 G: 17 POWDER, FOR SOLUTION ORAL at 09:24

## 2018-12-27 RX ADMIN — ACETAMINOPHEN 650 MG: 160 SUSPENSION ORAL at 12:06

## 2018-12-27 RX ADMIN — PRAMIPEXOLE DIHYDROCHLORIDE 0.25 MG: 0.25 TABLET ORAL at 22:26

## 2018-12-27 RX ADMIN — HEPARIN SODIUM 5000 UNITS: 5000 INJECTION INTRAVENOUS; SUBCUTANEOUS at 22:25

## 2018-12-27 RX ADMIN — IBUPROFEN 600 MG: 100 SUSPENSION ORAL at 16:51

## 2018-12-27 RX ADMIN — ACETAMINOPHEN 650 MG: 160 SUSPENSION ORAL at 06:04

## 2018-12-27 RX ADMIN — LIDOCAINE 2 PATCH: 50 PATCH CUTANEOUS at 09:23

## 2018-12-27 RX ADMIN — NICOTINE 14 MG: 14 PATCH TRANSDERMAL at 09:25

## 2018-12-27 RX ADMIN — FENTANYL CITRATE 75 MCG/HR: 50 INJECTION, SOLUTION INTRAMUSCULAR; INTRAVENOUS at 17:45

## 2018-12-27 RX ADMIN — FUROSEMIDE 40 MG: 10 INJECTION, SOLUTION INTRAMUSCULAR; INTRAVENOUS at 16:52

## 2018-12-27 RX ADMIN — HYDROMORPHONE HYDROCHLORIDE 1 MG: 1 INJECTION, SOLUTION INTRAMUSCULAR; INTRAVENOUS; SUBCUTANEOUS at 19:42

## 2018-12-27 RX ADMIN — HEPARIN SODIUM 5000 UNITS: 5000 INJECTION INTRAVENOUS; SUBCUTANEOUS at 05:58

## 2018-12-27 RX ADMIN — LEVALBUTEROL 1.25 MG: 1.25 SOLUTION, CONCENTRATE RESPIRATORY (INHALATION) at 07:31

## 2018-12-27 RX ADMIN — IPRATROPIUM BROMIDE 0.5 MG: 0.5 SOLUTION RESPIRATORY (INHALATION) at 07:31

## 2018-12-27 RX ADMIN — FUROSEMIDE 40 MG: 10 INJECTION, SOLUTION INTRAMUSCULAR; INTRAVENOUS at 09:22

## 2018-12-27 RX ADMIN — DEXMEDETOMIDINE HYDROCHLORIDE 0.7 MCG/KG/HR: 100 INJECTION, SOLUTION INTRAVENOUS at 17:56

## 2018-12-27 RX ADMIN — PANTOPRAZOLE SODIUM 40 MG: 40 INJECTION, POWDER, FOR SOLUTION INTRAVENOUS at 09:24

## 2018-12-27 RX ADMIN — CHLORHEXIDINE GLUCONATE 0.12% ORAL RINSE 15 ML: 1.2 LIQUID ORAL at 09:22

## 2018-12-27 RX ADMIN — IPRATROPIUM BROMIDE 0.5 MG: 0.5 SOLUTION RESPIRATORY (INHALATION) at 00:22

## 2018-12-27 RX ADMIN — PRAMIPEXOLE DIHYDROCHLORIDE 0.25 MG: 0.25 TABLET ORAL at 17:02

## 2018-12-27 RX ADMIN — POTASSIUM CHLORIDE 40 MEQ: 400 INJECTION, SOLUTION INTRAVENOUS at 09:24

## 2018-12-27 RX ADMIN — HEPARIN SODIUM 5000 UNITS: 5000 INJECTION INTRAVENOUS; SUBCUTANEOUS at 16:51

## 2018-12-27 RX ADMIN — CEFEPIME HYDROCHLORIDE 2000 MG: 2 INJECTION, POWDER, FOR SOLUTION INTRAVENOUS at 09:38

## 2018-12-27 RX ADMIN — LEVOTHYROXINE SODIUM 88 MCG: 88 TABLET ORAL at 05:59

## 2018-12-27 RX ADMIN — IPRATROPIUM BROMIDE 0.5 MG: 0.5 SOLUTION RESPIRATORY (INHALATION) at 13:13

## 2018-12-27 RX ADMIN — SENNOSIDES AND DOCUSATE SODIUM 2 TABLET: 8.6; 5 TABLET ORAL at 22:25

## 2018-12-27 RX ADMIN — DEXMEDETOMIDINE HYDROCHLORIDE 0.6 MCG/KG/HR: 100 INJECTION, SOLUTION INTRAVENOUS at 04:21

## 2018-12-27 RX ADMIN — CEFTRIAXONE SODIUM 1000 MG: 10 INJECTION, POWDER, FOR SOLUTION INTRAVENOUS at 20:08

## 2018-12-27 RX ADMIN — CHLORHEXIDINE GLUCONATE 0.12% ORAL RINSE 15 ML: 1.2 LIQUID ORAL at 20:08

## 2018-12-27 RX ADMIN — ACETAMINOPHEN 650 MG: 160 SUSPENSION ORAL at 17:40

## 2018-12-27 RX ADMIN — METRONIDAZOLE 500 MG: 500 INJECTION, SOLUTION INTRAVENOUS at 00:25

## 2018-12-27 NOTE — RESPIRATORY THERAPY NOTE
RT Ventilator Management Note  Isi Ba 59 y o  female MRN: [id number]  Unit/Bed#:  -01 Encounter: [id number]      Daily Screen       12/26/2018 0730 12/27/2018 0732          Patient safety screen outcome[de-identified] Failed Failed      Not Ready for Weaning due to[de-identified] FiO2 >60%;PEEP > 8cmH2O PEEP > 8cmH2O              Physical Exam:   Assessment Type: (P) Assess only  General Appearance: Sedated  Respiratory Pattern: Assisted  Chest Assessment: Chest expansion symmetrical  Bilateral Breath Sounds: Coarse  Suction: Trach      Resp Comments: (P) Pt  sats better  FIO2 weaned back down to 60%   Will continue to monitor

## 2018-12-27 NOTE — PROGRESS NOTES
Progress Note - Critical Care   Monica Kearns 59 y o  female MRN: [id number]  Unit/Bed#: [facility] -01 Encounter: [id number]    Assessment:   Principal Problem:    Traumatic brain injury [facility])  Active Problems:    Multiple fractures of ribs, bilateral, initial encounter for closed fracture    Traumatic pneumothorax    Glenoid fracture of shoulder, right, closed, initial encounter    Intraparenchymal hemorrhage of brain ([address] )    Flail chest, initial encounter for closed fracture    Fracture of thoracic transverse process, closed, initial encounter ([address] )    Subdural hematoma ([address] )    Subarachnoid hemorrhage ([address] )    Diabetes mellitus, type II ([facility] [address] )    Closed nondisplaced left toe fracture    Hypokalemia    Acute encephalopathy    Status post craniectomy    Acute respiratory failure with hypoxia ([address] )    Mucus plugging of bronchi    Acute blood loss anemia    Ileus ([address] )        Plan: Critical Care Management as outlined below:     Neuro:  Left subdural hematoma, bilateral subarachnoid hemorrhage  Severe TBI  Status post left craniectomy  Repeat CT head 2 days ago stable  Currently sedated on fentanyl at 75 and Precedex at 0 6  Moves left upper extremity and bilateral lower extremities to command by report, currently opens eyes to voice but not following commands  History of bipolar disorder, home meds on hold  CV:  Mild hypotension not requiring intervention  History of hypertension, home meds on hold  Continue fluid resuscitation as needed  No evidence of shock  Continue to monitor closely  Lung:  Acute respiratory failure with hypoxia  ARDS  Status post tracheostomy, postop day 3  Currently on AC ventilation 14/480/60/peep 10  Treating for ventilator associated pneumonia  Follow-up sputum culture  Continue aggressive pulmonary toilet and frequent suctioning  Continue chest PT  Multiple right-sided rib fractures, continue rib fracture protocol  GI:  Abdomen mildly distended    No tenderness or rigidity  No signs of peritonitis  Ileus resolved  Tolerating tube feeds at goal   Consider eventual PEG tube placement  Continue bowel regimen  FEN:  930 mL negative in 24 hours  5 7 L positive since admission  Mild hypokalemia, will replete  Remaining electrolytes normal   Continue to monitor and replete as needed  :  Good urine output  BUN mildly elevated at 32  Creatinine normal   Maintain Marroquin catheter for accurate intake and output measurements  Continue to monitor closely  ID:  Febrile overnight with T-max 103 3° and T current 100 8°  No leukocytosis  Continue treatment for pneumonia  Sputum culture with beta lactamase positive H flu, consider changed to ceftriaxone  UA positive, await urine culture  Heme:  Acute blood loss anemia  Hemoglobin stable at 7 8  Platelet count normal   Continue subcutaneous heparin and SCDs for DVT prophylaxis  Endo:  Type 2 diabetes and hypothyroidism  Intermittent elevated glucose although normal mostly  Continue sliding scale coverage as needed  Continue Synthroid  Msk/Skin:  Left 3rd toe fracture  Podiatry following  Weightbearing as tolerated  Right glenoid in acromion fractures, non operative and nonweightbearing right upper extremity  Right T1-T4 transverse process fractures  Non operative, continue pain management  No skin breakdown, continue frequent repositioning and offloading  Disposition:  Continue ICU management     ______________________________________________________________________    Chief Complaint:  None given  HPI/24hr events:  Febrile overnight  Otherwise no significant events     ______________________________________________________________________    Physical Exam:   Physical Exam   Constitutional: Vital signs are normal  She appears lethargic  Non-toxic appearance  She appears ill  No distress  She is sedated, intubated and restrained  HENT:   Head: Normocephalic         Eyes: Pupils are equal, round, and reactive to light  Conjunctivae are normal    Neck: No JVD present  No tracheal deviation present  Cardiovascular: Normal rate, regular rhythm, intact distal pulses and normal pulses  No extrasystoles are present  Pulmonary/Chest: Effort normal  No accessory muscle usage  Tachypnea noted  She is intubated  No respiratory distress  She has decreased breath sounds in the right lower field and the left lower field  She has no wheezes  She has rhonchi (Throughout)  She has no rales  Abdominal: Soft  Normal appearance  She exhibits distension  There is no tenderness  There is no rigidity and no guarding  Neurological: She appears lethargic  GCS eye subscore is 2  GCS verbal subscore is 1  GCS motor subscore is 5  Skin: Skin is warm and dry              ______________________________________________________________________  Vitals:    18 0300 18 0324 18 0400 18 0600   BP: 94/50  100/54 102/54   Pulse: 64  64 68   Resp: 20  20 22   Temp: 100 °F (37 8 °C)  100 4 °F (38 °C) (!) 100 8 °F (38 2 °C)   TempSrc:       SpO2: 99% 100% 100% 98%   Weight:       Height:         Arterial Line BP: 126/80  Arterial Line MAP (mmHg): 102 mmHg     Temperature:   Temp (24hrs), Av °F (38 3 °C), Min:99 7 °F (37 6 °C), Max:103 3 °F (39 6 °C)    Current Temperature: (!) 100 8 °F (38 2 °C)  Weights:   IBW: 52 4 kg    Body mass index is 22 49 kg/m²    Weight (last 2 days)     None        Hemodynamic Monitoring:  N/A     Non-Invasive/Invasive Ventilation Settings:  Respiratory    Lab Data (Last 4 hours)    None         O2/Vent Data (Last 4 hours)       0324           Vent Mode AC/VC       Resp Rate (BPM) (BPM) 14       Vt (mL) (mL) 480       FIO2 (%) (%) 60       PEEP (cmH2O) (cmH2O) 10       MV 11 4                 No results found for: PHART, TJA7RVC, PO2ART, VLD8JCH, Q8SDMXQS, BEART, SOURCE  SpO2: SpO2: 98 %, SpO2 Activity: SpO2 Activity: At Rest, SpO2 Device: O2 Device: Other (comment) (ventilator)  Intake and Outputs:  I/O       12/25 0701 - 12/26 0700 12/26 0701 - 12/27 0700    P  O   0    I V  (mL/kg) 370 4 (6 4) 432 5 (7 5)    NG/GT  280    IV Piggyback 800 600    Feedings  763    Total Intake(mL/kg) 1170 4 (20 3) 2075 5 (36)    Urine (mL/kg/hr) 2625 (1 9) 3005 (2 2)    Emesis/NG output 200 0    Total Output 2825 3005    Net -1654 6 -929 5              UOP:  2 2 mL/Kg/hour   Nutrition:        Diet Orders            Start     Ordered    12/26/18 1007  Diet Enteral/Parenteral; Tube Feeding No Oral Diet; Jevity 1 2 Sae; Cyclic; 63; 16 hours; Prosource Protein Liquid - One Packet  Diet effective now     Comments:  Start tube feedings at 20 ml/hr and advance q4h by 20 ml/hr to goal of 63 ml/hr; hold for residuals >400 mls  Question Answer Comment   Diet Type Enteral/Parenteral    Enteral/Parenteral Tube Feeding No Oral Diet    Tube Feeding Formula: Jevity 1 2 Sae    Bolus/Cyclic/Continuous Cyclic    Tube Feeding Cyclic Rate (mL/hr): 63    Tube Feeding Cyclic: Administer over: 16 hours    Prosource Protein Liquid - No Carb Prosource Protein Liquid - One Packet    RD to adjust diet per protocol?  Yes        12/26/18 1008          Labs:     Results from last 7 days  Lab Units 12/27/18  0455 12/26/18  0519 12/25/18  1538 12/25/18  0615  12/23/18  0447  12/21/18  0444  12/20/18  1153   WBC Thousand/uL 9 37 8 59 6 90 4 90  < > 9 83  < > 9 33  < > 9 07   HEMOGLOBIN g/dL 7 8* 7 7* 8 6* 7 9*  < > 9 3*  < > 9 9*  < > 8 8*   I STAT HEMOGLOBIN   --   --   --   --   < >  --   --   --   --   --    HEMATOCRIT % 24 8* 24 1* 26 6* 24 9*  < > 28 4*  < > 29 4*  < > 27 0*   HEMATOCRIT, ISTAT   --   --   --   --   < >  --   --   --   --   --    PLATELETS Thousands/uL 263 235 240 175  < > 222  < > 180  < > 184   NEUTROS PCT %  --   --   --   --   --  79*  --  78*  --  89*   MONOS PCT %  --   --   --   --   --  8  --  7  --  5   MONO PCT %  --  4 6 1*  < >  --   < >  --   --   --    < > = values in this interval not displayed  Results from last 7 days  Lab Units 12/27/18  0455 12/26/18  2244 12/26/18  1700 12/26/18  1355  12/24/18  1727 12/24/18  1658  12/23/18  0447  12/20/18  1029   POTASSIUM mmol/L 3 4* 3 8 4 0 3 4*  < >  --   --   < > 3 5  < >  --    CHLORIDE mmol/L 104  --  104 104  < >  --   --   < > 103  < >  --    CO2 mmol/L 31  --  32 33*  < >  --   --   < > 29  < >  --    CO2, I-STAT mmol/L  --   --   --   --   --  35* 34*  --   --   --  22   BUN mg/dL 32*  --  26* 26*  < >  --   --   < > 7  < >  --    CREATININE mg/dL 0 58*  --  0 50* 0 48*  < >  --   --   < > 0 33*  < >  --    CALCIUM mg/dL 8 4  --  8 7 8 8  < >  --   --   < > 8 2*  < >  --    ALK PHOS U/L  --   --   --   --   --   --   --   --  129*  --   --    ALT U/L  --   --   --   --   --   --   --   --  40  --   --    AST U/L  --   --   --   --   --   --   --   --  56*  --   --    GLUCOSE, ISTAT mg/dl  --   --   --   --   --  125 140  --   --   --  117   < > = values in this interval not displayed  Results from last 7 days  Lab Units 12/26/18  0519 12/25/18  1513 12/24/18  1317   MAGNESIUM mg/dL 2 4 1 7 1 7       Results from last 7 days  Lab Units 12/26/18  0519 12/23/18  0447 12/22/18  0516   PHOSPHORUS mg/dL 4 1 2 6 2 2*        Results from last 7 days  Lab Units 12/21/18  0444 12/20/18  1153   INR  1 20* 1 29*   PTT seconds  --  31       Results from last 7 days  Lab Units 12/25/18  1538   LACTIC ACID mmol/L 1 6       0  Lab Value Date/Time   TROPONINI <0 02 12/20/2018 1204   TROPONINI <0 02 12/20/2018 0054     Imaging:  Portable chest x-ray today shows continued bilateral infiltrates  Unchanged from yesterday  Await official read  I have personally reviewed pertinent reports     and I have personally reviewed pertinent films in PACS    Micro:  Lab Results   Component Value Date    BLOODCX No Growth at 24 hrs  12/25/2018    BLOODCX No Growth at 24 hrs  12/25/2018    URINECX >100,000 cfu/ml Gram Negative Austin Enteric Like (A) 12/25/2018 SPUTUMCULTUR 4+ Growth of Haemophilus influenzae (AA) 12/25/2018     Allergies: No Known Allergies  Medications:   Scheduled Meds:  Current Facility-Administered Medications:  acetaminophen 650 mg Oral Q6H Jonathan Schwab PA-C    albuterol 2 5 mg Nebulization Q4H PRN Ricardo Hickey DO    bisacodyl 10 mg Rectal Daily Lacey Garay MD    cefepime 2,000 mg Intravenous Q12H Ted Collazo MD Last Rate: Stopped (12/26/18 2158)   chlorhexidine 15 mL Swish & Spit Q12H [address] Rosa Buenrostro MD    dexmedetomidine 0 1-0 7 mcg/kg/hr Intravenous Titrated Maryellen Jaquez PA-C Last Rate: 0 6 mcg/kg/hr (12/27/18 0421)   fentaNYL 75 mcg/hr Intravenous Continuous Mandi Tee PA-C Last Rate: 75 mcg/hr (12/26/18 2314)   furosemide 40 mg Intravenous BID (diuretic) Maryellen Jaquez PA-C    heparin (porcine) 5,000 Units Subcutaneous [facility] Ted Collazo MD    hydrALAZINE 5 mg Intravenous Q6H PRN Ted Collazo MD    HYDROmorphone 1 mg Intravenous Q2H PRN Maryellen Jaquez PA-C    influenza vaccine 0 5 mL Intramuscular Prior to discharge Ricardo Hickey DO    insulin lispro 1-5 Units Subcutaneous Q6H [address] MD Abbie    ipratropium 0 5 mg Nebulization Q6H Ricardo Hickey, DO    labetalol 5 mg Intravenous Q4H PRN Betty Oconnor MD    levalbuterol 1 25 mg Nebulization Q6H Ricardo Hickey, DO    levothyroxine 88 mcg Oral Early Morning Maryellen Jaquez PA-C    lidocaine 2 patch Topical Daily Maryellen Jaquez PA-C    methylnaltrexone 8 mg Subcutaneous Every Other Day Maryellen Jaquez PA-C    metroNIDAZOLE 500 mg Intravenous Q8H Ted Collazo MD Last Rate: 500 mg (12/27/18 0025)   nicotine 14 mg Transdermal Daily Jonathan Schwab PA-C    pantoprazole 40 mg Intravenous Q24H [address] Maryellen Jaquez PA-C    polyethylene glycol 17 g Oral Daily Lacey Garay MD    pramipexole 0 25 mg Oral BID TRISTAN Roy    senna-docusate sodium 2 tablet Oral HS Maryellen Jaquez PA-C vancomycin 20 mg/kg (Adjusted) Intravenous Q12H Nancyann Goodpasture, MD Last Rate: Stopped (12/26/18 2318)     Continuous Infusions:  dexmedetomidine 0 1-0 7 mcg/kg/hr Last Rate: 0 6 mcg/kg/hr (12/27/18 0421)   fentaNYL 75 mcg/hr Last Rate: 75 mcg/hr (12/26/18 2314)     PRN Meds:    albuterol 2 5 mg Q4H PRN   hydrALAZINE 5 mg Q6H PRN   HYDROmorphone 1 mg Q2H PRN   influenza vaccine 0 5 mL Prior to discharge   labetalol 5 mg Q4H PRN     VTE Pharmacologic Prophylaxis: Heparin  VTE Mechanical Prophylaxis: sequential compression device  Invasive lines and devices: Invasive Devices     Peripherally Inserted Central Catheter Line            PICC Line 16/15/31 Left Basilic 4 days          Drain            Urethral Catheter 16 Fr  8 days    NG/OG/Enteral Tube Nasogastric Right nares 2 days          Airway            Surgical Airway Shiley Cuffed 2 days                   Counseling / Coordination of Care  Total Critical Care time spent 41 minutes excluding procedures, teaching and family updates  Code Status: Level 1 - Full Code    Portions of the record may have been created with voice recognition software  Occasional wrong word or "sound a like" substitutions may have occurred due to the inherent limitations of voice recognition software  Read the chart carefully and recognize, using context, where substitutions have occurred      Whitley Tang PA-C

## 2018-12-27 NOTE — CONSULTS
Vancomycin Sign Off    Russel Nolen is a 58 y/o female who was receiving vancomycin therapy for MRSA suspected pneumonia  Vancomycin has been discontinued by the primary service  Pharmacy will sign off at this point  Please call with questions  Thank you,  Yesenia Darby, PharmD, [facility] Pharmacist

## 2018-12-27 NOTE — RESPIRATORY THERAPY NOTE
RT Ventilator Management Note  Maura Mejia 59 y o  female MRN: [id number]  Unit/Bed#:  -01 Encounter: [id number]      Daily Screen       12/25/2018 0737 12/26/2018 0730          Patient safety screen outcome[de-identified] Failed Failed      Not Ready for Weaning due to[de-identified] PEEP > 8cmH2O FiO2 >60%;PEEP > 8cmH2O              Physical Exam:   Assessment Type: Assess only  General Appearance: Sedated  Respiratory Pattern: Assisted  Chest Assessment: Chest expansion symmetrical  Bilateral Breath Sounds: Coarse  Cough: Productive  Suction: Trach  O2 Device: Ventilator      Resp Comments: Pt  remains stable on AC mode  No problems throughhout the night  Will continue to monitor and assess per protocol

## 2018-12-27 NOTE — TELEPHONE ENCOUNTER
01/04/2019-PT DISCHARGED TO [facility]  PT'S 2WK POV ON 01/07/2019 WAS CANCELLED-PT SEEN INPT  [T IS SCHEDULED ON 02/06/2019 FOR 6WK POV APT  01/03/2019-PT STILL IN HOSPITAL    01/02/2019-PT STILL IN HOSPITAL    12/28/2018-PT [facility]    12/27/2018-TOSHIA (12/24/2018) PT HAS 2 WEEK POV [facility]/ART WITH CTH AND CTA  ALSO HAS 6 WK POV WITH DR COFFMAN  WILL SEE PT PRN      [facility]  01/07/2019-2WK POV CT HEAD & CTA HEAD  02/06/2019-6WK POV

## 2018-12-27 NOTE — RESPIRATORY THERAPY NOTE
RT Ventilator Management Note  Kwame Anderson 59 y o  female MRN: [id number]  Unit/Bed#:  -01 Encounter: [id number]      Daily Screen       12/26/2018 0730 12/27/2018 0732          Patient safety screen outcome[de-identified] Failed Failed      Not Ready for Weaning due to[de-identified] FiO2 >60%;PEEP > 8cmH2O PEEP > 8cmH2O              Physical Exam:   Assessment Type: (P) Pre-treatment  General Appearance: (P) Sedated  Respiratory Pattern: (P) Assisted  Chest Assessment: (P) Chest expansion symmetrical  Bilateral Breath Sounds: (P) Coarse  Cough: Productive  Suction: (P) Trach  O2 Device: Ventilator      Resp Comments: (P) Pt  stable on current vent settings  Pt  slightly tachypneic with coarse BS  Small amount of secretions suctioned

## 2018-12-27 NOTE — SOCIAL WORK
Pt not medically stable for d/c at this time  [facility] aware   Potential d/c to their facility on Monday if stable

## 2018-12-28 LAB
ANION GAP SERPL CALCULATED.3IONS-SCNC: 5 MMOL/L (ref 4–13)
ANION GAP SERPL CALCULATED.3IONS-SCNC: 9 MMOL/L (ref 4–13)
ATRIAL RATE: 70 BPM
BACTERIA SPT RESP CULT: ABNORMAL
BACTERIA UR CULT: ABNORMAL
BASOPHILS # BLD MANUAL: 0 THOUSAND/UL (ref 0–0.1)
BASOPHILS NFR MAR MANUAL: 0 % (ref 0–1)
BUN SERPL-MCNC: 28 MG/DL (ref 5–25)
BUN SERPL-MCNC: 28 MG/DL (ref 5–25)
CA-I BLD-SCNC: 1.08 MMOL/L (ref 1.12–1.32)
CALCIUM SERPL-MCNC: 8.6 MG/DL (ref 8.3–10.1)
CALCIUM SERPL-MCNC: 9.6 MG/DL (ref 8.3–10.1)
CHLORIDE SERPL-SCNC: 100 MMOL/L (ref 100–108)
CHLORIDE SERPL-SCNC: 105 MMOL/L (ref 100–108)
CO2 SERPL-SCNC: 35 MMOL/L (ref 21–32)
CO2 SERPL-SCNC: 35 MMOL/L (ref 21–32)
CREAT SERPL-MCNC: 0.44 MG/DL (ref 0.6–1.3)
CREAT SERPL-MCNC: 0.6 MG/DL (ref 0.6–1.3)
EOSINOPHIL # BLD MANUAL: 0 THOUSAND/UL (ref 0–0.4)
EOSINOPHIL NFR BLD MANUAL: 0 % (ref 0–6)
ERYTHROCYTE [DISTWIDTH] IN BLOOD BY AUTOMATED COUNT: 17.2 % (ref 11.6–15.1)
GFR SERPL CREATININE-BSD FRML MDRD: 109 ML/MIN/1.73SQ M
GFR SERPL CREATININE-BSD FRML MDRD: 98 ML/MIN/1.73SQ M
GLUCOSE SERPL-MCNC: 142 MG/DL (ref 65–140)
GLUCOSE SERPL-MCNC: 201 MG/DL (ref 65–140)
GLUCOSE SERPL-MCNC: 238 MG/DL (ref 65–140)
GLUCOSE SERPL-MCNC: 259 MG/DL (ref 65–140)
GRAM STN SPEC: ABNORMAL
HCT VFR BLD AUTO: 26.6 % (ref 34.8–46.1)
HGB BLD-MCNC: 8.2 G/DL (ref 11.5–15.4)
HYPERCHROMIA BLD QL SMEAR: PRESENT
LYMPHOCYTES # BLD AUTO: 0.68 THOUSAND/UL (ref 0.6–4.47)
LYMPHOCYTES # BLD AUTO: 6 % (ref 14–44)
MAGNESIUM SERPL-MCNC: 2 MG/DL (ref 1.6–2.6)
MCH RBC QN AUTO: 27.5 PG (ref 26.8–34.3)
MCHC RBC AUTO-ENTMCNC: 30.8 G/DL (ref 31.4–37.4)
MCV RBC AUTO: 89 FL (ref 82–98)
MONOCYTES # BLD AUTO: 0.57 THOUSAND/UL (ref 0–1.22)
MONOCYTES NFR BLD: 5 % (ref 4–12)
NEUTROPHILS # BLD MANUAL: 10.15 THOUSAND/UL (ref 1.85–7.62)
NEUTS SEG NFR BLD AUTO: 89 % (ref 43–75)
NRBC BLD AUTO-RTO: 0 /100 WBCS
P AXIS: 60 DEGREES
PLATELET # BLD AUTO: 318 THOUSANDS/UL (ref 149–390)
PLATELET BLD QL SMEAR: ADEQUATE
PMV BLD AUTO: 10.7 FL (ref 8.9–12.7)
POIKILOCYTOSIS BLD QL SMEAR: PRESENT
POTASSIUM SERPL-SCNC: 3.2 MMOL/L (ref 3.5–5.3)
POTASSIUM SERPL-SCNC: 3.5 MMOL/L (ref 3.5–5.3)
PR INTERVAL: 100 MS
PROCALCITONIN SERPL-MCNC: 0.66 NG/ML
QRS AXIS: 47 DEGREES
QRSD INTERVAL: 88 MS
QT INTERVAL: 450 MS
QTC INTERVAL: 486 MS
RBC # BLD AUTO: 2.98 MILLION/UL (ref 3.81–5.12)
RBC MORPH BLD: PRESENT
SODIUM SERPL-SCNC: 144 MMOL/L (ref 136–145)
SODIUM SERPL-SCNC: 145 MMOL/L (ref 136–145)
T WAVE AXIS: 270 DEGREES
VENTRICULAR RATE: 70 BPM
WBC # BLD AUTO: 11.4 THOUSAND/UL (ref 4.31–10.16)

## 2018-12-28 PROCEDURE — 94640 AIRWAY INHALATION TREATMENT: CPT

## 2018-12-28 PROCEDURE — 83735 ASSAY OF MAGNESIUM: CPT | Performed by: PHYSICIAN ASSISTANT

## 2018-12-28 PROCEDURE — 80048 BASIC METABOLIC PNL TOTAL CA: CPT | Performed by: PHYSICIAN ASSISTANT

## 2018-12-28 PROCEDURE — 82948 REAGENT STRIP/BLOOD GLUCOSE: CPT

## 2018-12-28 PROCEDURE — 99291 CRITICAL CARE FIRST HOUR: CPT | Performed by: SURGERY

## 2018-12-28 PROCEDURE — 84145 PROCALCITONIN (PCT): CPT | Performed by: PHYSICIAN ASSISTANT

## 2018-12-28 PROCEDURE — C9113 INJ PANTOPRAZOLE SODIUM, VIA: HCPCS | Performed by: PHYSICIAN ASSISTANT

## 2018-12-28 PROCEDURE — 82330 ASSAY OF CALCIUM: CPT | Performed by: PHYSICIAN ASSISTANT

## 2018-12-28 PROCEDURE — 94760 N-INVAS EAR/PLS OXIMETRY 1: CPT

## 2018-12-28 PROCEDURE — 94003 VENT MGMT INPAT SUBQ DAY: CPT

## 2018-12-28 PROCEDURE — 85027 COMPLETE CBC AUTOMATED: CPT | Performed by: PHYSICIAN ASSISTANT

## 2018-12-28 PROCEDURE — 93010 ELECTROCARDIOGRAM REPORT: CPT | Performed by: INTERNAL MEDICINE

## 2018-12-28 PROCEDURE — 85007 BL SMEAR W/DIFF WBC COUNT: CPT | Performed by: PHYSICIAN ASSISTANT

## 2018-12-28 RX ORDER — BISACODYL 10 MG
10 SUPPOSITORY, RECTAL RECTAL DAILY PRN
Status: DISCONTINUED | OUTPATIENT
Start: 2018-12-28 | End: 2018-12-31

## 2018-12-28 RX ORDER — POLYETHYLENE GLYCOL 3350 17 G/17G
17 POWDER, FOR SOLUTION ORAL DAILY PRN
Status: DISCONTINUED | OUTPATIENT
Start: 2018-12-28 | End: 2018-12-28

## 2018-12-28 RX ORDER — POLYETHYLENE GLYCOL 3350 17 G/17G
17 POWDER, FOR SOLUTION ORAL DAILY PRN
Status: DISCONTINUED | OUTPATIENT
Start: 2018-12-28 | End: 2018-12-31

## 2018-12-28 RX ORDER — POTASSIUM CHLORIDE 14.9 MG/ML
20 INJECTION INTRAVENOUS ONCE
Status: COMPLETED | OUTPATIENT
Start: 2018-12-28 | End: 2018-12-28

## 2018-12-28 RX ORDER — LABETALOL HYDROCHLORIDE 5 MG/ML
5 INJECTION, SOLUTION INTRAVENOUS EVERY 4 HOURS PRN
Status: DISCONTINUED | OUTPATIENT
Start: 2018-12-28 | End: 2018-12-28

## 2018-12-28 RX ADMIN — PANTOPRAZOLE SODIUM 40 MG: 40 INJECTION, POWDER, FOR SOLUTION INTRAVENOUS at 08:13

## 2018-12-28 RX ADMIN — HYDROMORPHONE HYDROCHLORIDE 1 MG: 1 INJECTION, SOLUTION INTRAMUSCULAR; INTRAVENOUS; SUBCUTANEOUS at 14:36

## 2018-12-28 RX ADMIN — PRAMIPEXOLE DIHYDROCHLORIDE 0.25 MG: 0.25 TABLET ORAL at 23:04

## 2018-12-28 RX ADMIN — LISINOPRIL: 10 TABLET ORAL at 14:27

## 2018-12-28 RX ADMIN — CHLORHEXIDINE GLUCONATE 0.12% ORAL RINSE 15 ML: 1.2 LIQUID ORAL at 08:18

## 2018-12-28 RX ADMIN — HYDROMORPHONE HYDROCHLORIDE 1 MG: 1 INJECTION, SOLUTION INTRAMUSCULAR; INTRAVENOUS; SUBCUTANEOUS at 11:12

## 2018-12-28 RX ADMIN — LEVOTHYROXINE SODIUM 88 MCG: 88 TABLET ORAL at 06:03

## 2018-12-28 RX ADMIN — LEVALBUTEROL 1.25 MG: 1.25 SOLUTION, CONCENTRATE RESPIRATORY (INHALATION) at 19:08

## 2018-12-28 RX ADMIN — HYDROMORPHONE HYDROCHLORIDE 1 MG: 1 INJECTION, SOLUTION INTRAMUSCULAR; INTRAVENOUS; SUBCUTANEOUS at 23:00

## 2018-12-28 RX ADMIN — DEXMEDETOMIDINE HYDROCHLORIDE 0.9 MCG/KG/HR: 100 INJECTION, SOLUTION INTRAVENOUS at 23:52

## 2018-12-28 RX ADMIN — POTASSIUM CHLORIDE 20 MEQ: 200 INJECTION, SOLUTION INTRAVENOUS at 08:10

## 2018-12-28 RX ADMIN — HEPARIN SODIUM 5000 UNITS: 5000 INJECTION INTRAVENOUS; SUBCUTANEOUS at 06:03

## 2018-12-28 RX ADMIN — BISACODYL 10 MG: 10 SUPPOSITORY RECTAL at 08:18

## 2018-12-28 RX ADMIN — DEXMEDETOMIDINE HYDROCHLORIDE 0.6 MCG/KG/HR: 100 INJECTION, SOLUTION INTRAVENOUS at 13:45

## 2018-12-28 RX ADMIN — LABETALOL HYDROCHLORIDE 5 MG: 5 INJECTION, SOLUTION INTRAVENOUS at 05:23

## 2018-12-28 RX ADMIN — FUROSEMIDE 40 MG: 10 INJECTION, SOLUTION INTRAMUSCULAR; INTRAVENOUS at 15:25

## 2018-12-28 RX ADMIN — ACETAMINOPHEN 650 MG: 160 SUSPENSION ORAL at 18:07

## 2018-12-28 RX ADMIN — IPRATROPIUM BROMIDE 0.5 MG: 0.5 SOLUTION RESPIRATORY (INHALATION) at 19:08

## 2018-12-28 RX ADMIN — LABETALOL HYDROCHLORIDE 5 MG: 5 INJECTION, SOLUTION INTRAVENOUS at 12:35

## 2018-12-28 RX ADMIN — FENTANYL CITRATE 75 MCG/HR: 50 INJECTION, SOLUTION INTRAMUSCULAR; INTRAVENOUS at 11:13

## 2018-12-28 RX ADMIN — HEPARIN SODIUM 5000 UNITS: 5000 INJECTION INTRAVENOUS; SUBCUTANEOUS at 14:26

## 2018-12-28 RX ADMIN — CHLORHEXIDINE GLUCONATE 0.12% ORAL RINSE 15 ML: 1.2 LIQUID ORAL at 21:37

## 2018-12-28 RX ADMIN — HYDROMORPHONE HYDROCHLORIDE 1 MG: 1 INJECTION, SOLUTION INTRAMUSCULAR; INTRAVENOUS; SUBCUTANEOUS at 01:14

## 2018-12-28 RX ADMIN — LEVALBUTEROL 1.25 MG: 1.25 SOLUTION, CONCENTRATE RESPIRATORY (INHALATION) at 01:54

## 2018-12-28 RX ADMIN — IPRATROPIUM BROMIDE 0.5 MG: 0.5 SOLUTION RESPIRATORY (INHALATION) at 13:12

## 2018-12-28 RX ADMIN — DEXMEDETOMIDINE HYDROCHLORIDE 0.6 MCG/KG/HR: 100 INJECTION, SOLUTION INTRAVENOUS at 07:17

## 2018-12-28 RX ADMIN — DEXMEDETOMIDINE HYDROCHLORIDE 0.6 MCG/KG/HR: 100 INJECTION, SOLUTION INTRAVENOUS at 00:01

## 2018-12-28 RX ADMIN — NICOTINE 14 MG: 14 PATCH TRANSDERMAL at 08:18

## 2018-12-28 RX ADMIN — HYDROMORPHONE HYDROCHLORIDE 1 MG: 1 INJECTION, SOLUTION INTRAMUSCULAR; INTRAVENOUS; SUBCUTANEOUS at 20:47

## 2018-12-28 RX ADMIN — HYDROMORPHONE HYDROCHLORIDE 1 MG: 1 INJECTION, SOLUTION INTRAMUSCULAR; INTRAVENOUS; SUBCUTANEOUS at 17:31

## 2018-12-28 RX ADMIN — IPRATROPIUM BROMIDE 0.5 MG: 0.5 SOLUTION RESPIRATORY (INHALATION) at 07:35

## 2018-12-28 RX ADMIN — HYDRALAZINE HYDROCHLORIDE 5 MG: 20 INJECTION INTRAMUSCULAR; INTRAVENOUS at 08:11

## 2018-12-28 RX ADMIN — PRAMIPEXOLE DIHYDROCHLORIDE 0.25 MG: 0.25 TABLET ORAL at 18:08

## 2018-12-28 RX ADMIN — FUROSEMIDE 40 MG: 10 INJECTION, SOLUTION INTRAMUSCULAR; INTRAVENOUS at 08:13

## 2018-12-28 RX ADMIN — LEVALBUTEROL 1.25 MG: 1.25 SOLUTION, CONCENTRATE RESPIRATORY (INHALATION) at 07:36

## 2018-12-28 RX ADMIN — LIDOCAINE 2 PATCH: 50 PATCH CUTANEOUS at 08:18

## 2018-12-28 RX ADMIN — CEFTRIAXONE SODIUM 1000 MG: 10 INJECTION, POWDER, FOR SOLUTION INTRAVENOUS at 21:30

## 2018-12-28 RX ADMIN — SENNOSIDES AND DOCUSATE SODIUM 2 TABLET: 8.6; 5 TABLET ORAL at 21:37

## 2018-12-28 RX ADMIN — POTASSIUM CHLORIDE 20 MEQ: 200 INJECTION, SOLUTION INTRAVENOUS at 08:57

## 2018-12-28 RX ADMIN — METHYLNALTREXONE BROMIDE 8 MG: 12 INJECTION, SOLUTION SUBCUTANEOUS at 08:19

## 2018-12-28 RX ADMIN — ACETAMINOPHEN 650 MG: 160 SUSPENSION ORAL at 12:25

## 2018-12-28 RX ADMIN — HEPARIN SODIUM 5000 UNITS: 5000 INJECTION INTRAVENOUS; SUBCUTANEOUS at 21:36

## 2018-12-28 RX ADMIN — ACETAMINOPHEN 650 MG: 160 SUSPENSION ORAL at 06:02

## 2018-12-28 RX ADMIN — HYDRALAZINE HYDROCHLORIDE 5 MG: 20 INJECTION INTRAMUSCULAR; INTRAVENOUS at 17:29

## 2018-12-28 RX ADMIN — LEVALBUTEROL 1.25 MG: 1.25 SOLUTION, CONCENTRATE RESPIRATORY (INHALATION) at 13:12

## 2018-12-28 RX ADMIN — IPRATROPIUM BROMIDE 0.5 MG: 0.5 SOLUTION RESPIRATORY (INHALATION) at 01:54

## 2018-12-28 RX ADMIN — ACETAMINOPHEN 650 MG: 160 SUSPENSION ORAL at 01:13

## 2018-12-28 RX ADMIN — DEXMEDETOMIDINE HYDROCHLORIDE 0.7 MCG/KG/HR: 100 INJECTION, SOLUTION INTRAVENOUS at 19:43

## 2018-12-28 NOTE — RESPIRATORY THERAPY NOTE
RT Ventilator Management Note  Brian Delacruz 59 y o  female MRN: [id number]  Unit/Bed#:  -01 Encounter: [id number]      Daily Screen       12/26/2018 0730 12/27/2018 0732          Patient safety screen outcome[de-identified] Failed Failed      Not Ready for Weaning due to[de-identified] FiO2 >60%;PEEP > 8cmH2O PEEP > 8cmH2O              Physical Exam:   Assessment Type: Assess only  General Appearance: Sedated  Respiratory Pattern: Assisted  Chest Assessment: Chest expansion symmetrical  Bilateral Breath Sounds: Coarse  Cough: Productive  Suction: Trach  O2 Device: ventilator      Resp Comments: pt continues on ac/vc settings, no vent changes made at this time, will continue to monitor pt

## 2018-12-28 NOTE — RESPIRATORY THERAPY NOTE
RT Ventilator Management Note  Luli King 59 y o  female MRN: [id number]  Unit/Bed#:  -01 Encounter: [id number]      Daily Screen       12/27/2018 0732 12/28/2018 0740          Patient safety screen outcome[de-identified] Failed Failed      Not Ready for Weaning due to[de-identified] PEEP > 8cmH2O PEEP > 8cmH2O              Physical Exam:   Assessment Type: (P) Pre-treatment  General Appearance: (P) Drowsy  Respiratory Pattern: (P) Assisted  Chest Assessment: (P) Chest expansion symmetrical  Bilateral Breath Sounds: (P) Diminished  Suction: (P) Trach  O2 Device: vent      Resp Comments: (P) Pt  stable on current vent settings  Minimal secretions noted  No changes at this time

## 2018-12-28 NOTE — PROGRESS NOTES
Progress Note - Critical Care   Jorge Case 59 y o  female MRN: [id number]  Unit/Bed#: [address] [facility] -01 Encounter: [id number]    Attending Physician: Regulo Singh DO  _____________________________________________________________________  Assessment and Plan:   Principal Problem:    Traumatic brain injury [facility])  Active Problems:    Multiple fractures of ribs, bilateral, initial encounter for closed fracture    Traumatic pneumothorax    Glenoid fracture of shoulder, right, closed, initial encounter    Intraparenchymal hemorrhage of brain ([address] )    Flail chest, initial encounter for closed fracture    Fracture of thoracic transverse process, closed, initial encounter ([address] )    Subdural hematoma ([address] )    Subarachnoid hemorrhage ([address] )    Diabetes mellitus, type II ([address] )    Closed nondisplaced left toe fracture    Hypokalemia    Acute encephalopathy    Status post craniectomy    Acute respiratory failure with hypoxia ([address] )    Mucus plugging of bronchi    Acute blood loss anemia    Ileus ([address] )  Resolved Problems:    Hyponatremia    Hypophosphatemia    Acute respiratory failure ([address] )      Neurology:  GCS: 9 (E: 4, V: 1, M: 5)  Sedatives: Precedex @ 0 6  Analgesia: Fentanyl @ 75  Seizure Prophylaxis: None  Delirium precautions: CAM-ICU, regulate sleep/wake cycle  A/P: Left subdural hematoma, bilateral subarachnoid hemorrhage  Severe TBI    Status post left craniectomy  - Stable CT head scan  - No further neurosurgical intervention    Cardiovascular:   Temp:  [99 3 °F (37 4 °C)-102 6 °F (39 2 °C)] 101 5 °F (38 6 °C)  HR:  [] 76  Resp:  [19-46] 26  BP: (105-176)/(52-93) 155/80  SpO2:  [89 %-100 %] 100 %    Results from last 7 days  Lab Units 12/25/18  1538   LACTIC ACID mmol/L 1 6     Medications: None  A/P: No acute issues  Maintain MAP > 65    Pulmonary:   Temp:  [99 3 °F (37 4 °C)-102 6 °F (39 2 °C)] 101 5 °F (38 6 °C)  HR:  [] 76  Resp:  [19-46] 26  BP: (478-176)/(22-93) 155/80  SpO2:  [89 %-100 %] 100 %   Vent Mode: AC/VC  FiO2 (%):  [] 60  ABG: ABG:  Lab Results   Component Value Date    PHART 7 467 (H) 12/26/2018    NJH9HYB 43 7 12/26/2018    PO2ART 76 7 12/26/2018    DGR5IXO 30 9 (H) 12/26/2018    BEART 6 5 12/26/2018    SOURCE Brachial, Right 12/26/2018     VBG:    Results from last 7 days  Lab Units 12/26/18  0621   ABG SOURCE  Brachial, Right       A/P:   Acute respiratory failure with hypoxia s/p tracheostomy POD# 3  - AC ventilation 14/480/60/10  -Treating for VAP  Maintain O2 sat > 92%    GI:  Medications: None  GI ppx: None  Bowel Regimen: Colace  A/P:   No acute issues, tolerating tube feeds at goal  Consider PEG    :     Results from last 7 days  Lab Units 12/28/18  0426 12/27/18  0455 12/26/18  1700 12/26/18  1355 12/26/18  1215 12/26/18  0519 12/25/18  1513   BUN mg/dL 28* 32* 26* 26* 22 23 15   CREATININE mg/dL 0 44* 0 58* 0 50* 0 48* 0 46* 0 48* 0 44*     I/O       12/26 0701 - 12/27 0700 12/27 0701 - 12/28 0700    P  O  0     I V  (mL/kg) 432 5 (7 5) 313 2 (5 4)    NG/ 320    IV Piggyback 600 200    Feedings 763 991    Total Intake(mL/kg) 2075 5 (36) 1824 2 (31 7)    Urine (mL/kg/hr) 3005 (2 2) 3490 (2 5)    Emesis/NG output 0     Total Output 3005 3490    Net -929 5 -1665 8              [place]: Straight cath overnight  I/O: 1824/3490  UOP (mL/kg/hr): 3490 (2 5)  Fluid Balance: +4075  A/P: No acute issues  Lasix 40 BID    F/E/N:   Fluids:   Electrolytes: Replete as needed, K replaced this morning    Results from last 7 days  Lab Units 12/28/18  0426 12/27/18  0455 12/26/18  2244 12/26/18  1700 12/26/18  1355 12/26/18  1215 12/26/18  0519 12/25/18  1513  12/24/18  1317  12/23/18  0447 12/22/18  0516   SODIUM mmol/L 145 144  --  143 144 139 143 140  < > 137  < > 137 137   POTASSIUM mmol/L 3 2* 3 4* 3 8 4 0 3 4* 5 4* 2 9* 3 8  < > 3 4*  < > 3 5 3 3*   CHLORIDE mmol/L 105 104  --  104 104 103 102 99*  < > 98*  < > 103 103   CO2 mmol/L 35* 31  --  32 33* 34* 33* 33*  < > 33*  < > 29 25   CO2, I-STAT   --   --   --   --   --   --   --   --   < >  --   --   --   --    ANION GAP mmol/L 5 9  --  7 7 2* 8 8  < > 6  < > 5 9   CALCIUM mg/dL 8 6 8 4  --  8 7 8 8 8 2* 8 7 8 6  < > 8 2*  < > 8 2* 7 7*   MAGNESIUM mg/dL  --   --   --   --   --   --  2 4 1 7  --  1 7  --  1 8  --    PHOSPHORUS mg/dL  --   --   --   --   --   --  4 1  --   --   --   --  2 6 2 2*   < > = values in this interval not displayed  Nutrition: Diet Enteral/Parenteral; Tube Feeding No Oral Diet; Jevity 1 2 Sae; Cyclic; 63; 16 hours; Prosource Protein Liquid - One Packet    ID:     Results from last 7 days  Lab Units 18  0426 18  0455 18  0519 18  1538 18  0615 18  1317 18  0447   WBC Thousand/uL 11 40* 9 37 8 59 6 90 4 90 6 81 6 93     CULTURES:     Results from last 7 days  Lab Units 18  1133 18  1226 18  0931 18  0920 18  0919 18  0903   BLOOD CULTURE   --   --   --  No Growth at 48 hrs  No Growth at 48 hrs    --    SPUTUM CULTURE   --   --  4+ Growth of Haemophilus influenzae*  1+ Growth of Streptococcus pneumoniae*  1+ Growth of   --   --   --    GRAM STAIN RESULT   --   --  3+ Polys  2+ Gram negative coccobacilli  Rare Gram positive cocci in pairs  --   --   --    URINE CULTURE  >100,000 cfu/ml Escherichia coli*  --   --   --   --  >100,000 cfu/ml Gram Negative [facility] Enteric Like*   MRSA CULTURE ONLY   --  No Methicillin Resistant Staphlyococcus aureus (MRSA) isolated  --   --   --   --      Temp (24hrs), Av 2 °F (38 4 °C), Min:99 3 °F (37 4 °C), Max:102 6 °F (39 2 °C)    Antibiotics (Day/of): Ceftriaxone (3/8)  A/P:   VAP, UTI  - febrile overnight at 102 6  - mild leukocytosis  - follow cultures - urine pan sensitive, adequate coverage with current abx    HEME:    Results from last 7 days  Lab Units 18  0426 18  0455 18  0519 18  1538 18  0615 18  1727 18  1658 18  1317 18  0447 HEMOGLOBIN g/dL 8 2* 7 8* 7 7* 8 6* 7 9*  --   --  8 2* 8 0*   I STAT HEMOGLOBIN g/dl  --   --   --   --   --  9 5* 9 9*  --   --    HEMATOCRIT % 26 6* 24 8* 24 1* 26 6* 24 9*  --   --  25 0* 24 9*   HEMATOCRIT, ISTAT %  --   --   --   --   --  28* 29*  --   --    PLATELETS Thousands/uL 318 263 235 240 175  --   --  191 198         VTE Pharmacologic Prophylaxis: Heparin  VTE Mechanical Prophylaxis: sequential compression device  A/P: No acute issues  Monitor hgb    ENDO:   POCG:    Results from last 7 days  Lab Units 12/24/18  1727 12/24/18  1658   GLUCOSE, ISTAT mg/dl 125 140       Medications: SSI  A/P: No acute issues     MSK/SKIN:   PT/OT  Routine skin care  Frequent offloading     LDA:  Invasive Devices     Peripherally Inserted Central Catheter Line            PICC Line 16/88/49 Left Basilic 5 days          Drain            NG/OG/Enteral Tube Nasogastric Right nares 3 days          Airway            Surgical Airway Shiley Cuffed 3 days                Disposition: ICU care    Code Status: Level 1 - Full Code    Counseling / Coordination of Care    ______________________________________________________________________    Chief Complaint: None    24 Hour Events: No acute events overnight  Patient was straight cath once and received a dose of labetolol for hypertension  ______________________________________________________________________    Physical Exam:     Physical Exam   Constitutional: No distress  HENT:   Right Ear: External ear normal    Left Ear: External ear normal    Left sided craniectomy  Sunken, closed with staples, appears non infected   Eyes: Pupils are equal, round, and reactive to light  EOM are normal  No scleral icterus  Neck: Neck supple  No tracheal deviation present  Cardiovascular: Normal rate and regular rhythm  Exam reveals no friction rub  No murmur heard  Pulmonary/Chest: Effort normal and breath sounds normal  She has no wheezes  She has no rales  Abdominal: Soft   She exhibits no distension  Musculoskeletal: She exhibits no edema or deformity  Spontaneous movement of LUE, withdraw from pain in BLE, no movement in RUE   Neurological: She is alert  No cranial nerve deficit  She exhibits normal muscle tone  Eyes open and look around spontaneously, does not appear to follow  Spontaneous movement of LUE, does not follow commands   Skin: She is not diaphoretic      ______________________________________________________________________  Vitals:    18 0430 18 0520 18 0530 18 0600   BP: 140/71 (!) 174/93 160/92 155/80   Pulse: 76 84  76   Resp: (!) 27 (!) 23 19 (!) 26   Temp: (!) 101 1 °F (38 4 °C) (!) 101 5 °F (38 6 °C)  (!) 101 5 °F (38 6 °C)   TempSrc:       SpO2: 100% 99%  100%   Weight:       Height:           Temperature:   Temp (24hrs), Av 2 °F (38 4 °C), Min:99 3 °F (37 4 °C), Max:102 6 °F (39 2 °C)    Current Temperature: (!) 101 5 °F (38 6 °C)  Weights:   IBW: 52 4 kg    Body mass index is 22 49 kg/m²  Weight (last 2 days)     None        Hemodynamic Monitoring:       Non-Invasive/Invasive Ventilation Settings:  Respiratory    Lab Data (Last 4 hours)    None         O2/Vent Data (Last 4 hours)       0400           Vent Mode AC/VC       Resp Rate (BPM) (BPM) 14       Vt (mL) (mL) 480       FIO2 (%) (%) 60       PEEP (cmH2O) (cmH2O) 10       MV 9 71                 No results found for: PHART, EOB9LKG, PO2ART, KZD4TLI, G3SIFAID, BEART, SOURCE    Intake and Outputs:  I/O        07 -  0700  07 -  0700    P  O  0     I V  (mL/kg) 432 5 (7 5) 313 2 (5 4)    NG/ 320    IV Piggyback 600 200    Feedings 763 991    Total Intake(mL/kg) 2075 5 (36) 1824 2 (31 7)    Urine (mL/kg/hr) 3005 (2 2) 3490 (2 5)    Emesis/NG output 0     Total Output 3005 3490    Net -925 6 -8273 8                Nutrition:        Diet Orders            Start     Ordered    18 1007  Diet Enteral/Parenteral; Tube Feeding No Oral Diet; Jevity 1 2 Sae; Cyclic; 63; 16 hours; Prosource Protein Liquid - One Packet  Diet effective now     Comments:  Start tube feedings at 20 ml/hr and advance q4h by 20 ml/hr to goal of 63 ml/hr; hold for residuals >400 mls  Question Answer Comment   Diet Type Enteral/Parenteral    Enteral/Parenteral Tube Feeding No Oral Diet    Tube Feeding Formula: Jevity 1 2 Sae    Bolus/Cyclic/Continuous Cyclic    Tube Feeding Cyclic Rate (mL/hr): 63    Tube Feeding Cyclic: Administer over: 16 hours    Prosource Protein Liquid - No Carb Prosource Protein Liquid - One Packet    RD to adjust diet per protocol?  Yes        12/26/18 1008          Labs:     Results from last 7 days  Lab Units 12/28/18  0426 12/27/18  0455 12/26/18  0519 12/25/18  1538 12/25/18  0615 12/24/18  1727 12/24/18  1658 12/24/18  1317 12/24/18  0447 12/23/18  0447 12/22/18  0516   WBC Thousand/uL 11 40* 9 37 8 59 6 90 4 90  --   --  6 81 6 93 9 83 9 62   HEMOGLOBIN g/dL 8 2* 7 8* 7 7* 8 6* 7 9*  --   --  8 2* 8 0* 9 3* 10 0*   I STAT HEMOGLOBIN g/dl  --   --   --   --   --  9 5* 9 9*  --   --   --   --    HEMATOCRIT % 26 6* 24 8* 24 1* 26 6* 24 9*  --   --  25 0* 24 9* 28 4* 29 9*   HEMATOCRIT, ISTAT %  --   --   --   --   --  28* 29*  --   --   --   --    PLATELETS Thousands/uL 318 263 235 240 175  --   --  191 198 222 209   NEUTROS PCT %  --   --   --   --   --   --   --   --   --  79*  --    BANDS PCT %  --   --   --  1 4  --   --   --   --   --  3   MONOS PCT %  --   --   --   --   --   --   --   --   --  8  --    MONO PCT %  --  2* 4 6 1*  --   --  4 0*  --  7       Results from last 7 days  Lab Units 12/28/18  0426 12/27/18  0455 12/26/18  2244 12/26/18  1700 12/26/18  1355 12/26/18  1215 12/26/18  0519 12/25/18  1513  12/23/18  0447   SODIUM mmol/L 145 144  --  143 144 139 143 140  < > 137   POTASSIUM mmol/L 3 2* 3 4* 3 8 4 0 3 4* 5 4* 2 9* 3 8  < > 3 5   CHLORIDE mmol/L 105 104  --  104 104 103 102 99*  < > 103   CO2 mmol/L 35* 31  --  32 33* 34* 33* 33*  < > 29   CO2, I-STAT   --   --   --   --   --   --   --   --   < >  --    ANION GAP mmol/L 5 9  --  7 7 2* 8 8  < > 5   BUN mg/dL 28* 32*  --  26* 26* 22 23 15  < > 7   CREATININE mg/dL 0 44* 0 58*  --  0 50* 0 48* 0 46* 0 48* 0 44*  < > 0 33*   CALCIUM mg/dL 8 6 8 4  --  8 7 8 8 8 2* 8 7 8 6  < > 8 2*   ALT U/L  --   --   --   --   --   --   --   --   --  40   AST U/L  --   --   --   --   --   --   --   --   --  56*   ALK PHOS U/L  --   --   --   --   --   --   --   --   --  129*   ALBUMIN g/dL  --   --   --   --   --   --   --   --   --  1 5*   TOTAL BILIRUBIN mg/dL  --   --   --   --   --   --   --   --   --  0 48   < > = values in this interval not displayed  Results from last 7 days  Lab Units 12/26/18  0519 12/25/18  1513 12/24/18  1317 12/23/18  0447 12/22/18  0516   MAGNESIUM mg/dL 2 4 1 7 1 7 1 8  --    PHOSPHORUS mg/dL 4 1  --   --  2 6 2 2*                Results from last 7 days  Lab Units 12/25/18  1538   LACTIC ACID mmol/L 1 6     ABG:  Lab Results   Component Value Date    PHART 7 467 (H) 12/26/2018    MII6DWH 43 7 12/26/2018    PO2ART 76 7 12/26/2018    YCB1BFR 30 9 (H) 12/26/2018    BEART 6 5 12/26/2018    SOURCE Brachial, Right 12/26/2018     VBG:    Results from last 7 days  Lab Units 12/26/18  0621   ABG SOURCE  Brachial, Right       Results from last 7 days  Lab Units 12/28/18  0429 12/26/18  1215 12/23/18  0827   PROCALCITONIN ng/ml 0 66* 1 06* 0 27*     No results found for: [facility]   Imaging:   Cta Head And Neck W Wo Contrast    Result Date: 12/18/2018  Impression: 1  Atherosclerotic plaque at the right carotid bifurcation resulting in moderate (50-69%) stenosis  No evidence of carotid or vertebral artery dissection or pseudoaneurysm to suggest acute vascular injury  2   Displacement of left MCA branches by left temporal parietal hematoma  No evidence of aneurysm or vascular malformation  No significant stenosis in the major vessels of the [place]   3   Patent dural venous sinuses  Workstation performed: [id number]     Xr Chest Portable    Result Date: 12/23/2018  Impression: 1  Low position of endotracheal tube which should be withdrawn approximately 2 to 3 cm  2   Left lower lobe consolidation  3   Diffuse patchy opacities in both lungs, either pneumonia or, more likely, pulmonary edema  Workstation performed: [id number]     Xr Chest Portable    Result Date: 12/23/2018  Impression: 1  Tip of endotracheal tube 9 mm above the radha  I recommend that the tube be pulled back approximately 3 cm  2   Opacification in both lungs, most likely pulmonary edema or, possibly, bilateral pneumonia  Workstation performed: [id number]     Xr Chest Portable    Result Date: 12/19/2018  Impression: Satisfactory intubation  Bibasilar subsegmental atelectasis  No pneumothorax  Workstation performed: [id number]     Xr Shoulder 2+ Vw Right    Result Date: 12/19/2018  Impression: Right-sided rib fractures again noted  Possible acromion fracture  The small glenoid fracture seen on CT scan of the chest is not clearly appreciated on this exam  Workstation performed: [id number]     Xr Foot 3+ Vw Left    Result Date: 12/21/2018  Impression: Nondisplaced transverse fracture involves the proximal aspect of the proximal to the 3rd digit  Workstation performed: [id number]     Xr Abdomen 1 View Kub    Result Date: 12/24/2018  Impression: Increased bowel gas predominantly in the colon suggesting colonic ileus  Maximal transverse diameter, located in the right transverse colon is approximately 9 8 cm Workstation performed: [id number]     Ct Head Wo Contrast    Result Date: 12/21/2018  Impression: 1  Postsurgical changes of left craniectomy and evacuation of left temporal intraparenchymal hemorrhage  2   A small amount of intraparenchymal/extra-axial hemorrhage remains within the anterior left temporal lobe and stable thin subdural hemorrhage along the left frontal region   3   Increased blood products along the cerebral falx when compared with prior exam  4   Persistent edema predominantly within the left temporal lobe and effacement of the left-sided cerebral sulci however there is interval resolution of midline shift and decreased effacement of the left lateral ventricle  5   Continued washout and interval evolution of intraventricular and subarachnoid blood products  Workstation performed: [id number]     Ct Head Wo Contrast    Result Date: 12/20/2018  Impression: 1  Left temporal lobe intraparenchymal hemorrhage with slight increase in surrounding edema as manifest by increased effacement of the left occipital horn when compared with prior examination  Slight increase in left to right midline shift now measuring up to 6 mm, previously 4 mm  2   Stable subdural, subarachnoid and intraventricular blood products  No large delayed intracranial hemorrhage  The study was marked in [facility] for immediate notification  Workstation performed: [id number]     Ct Head Wo Contrast    Result Date: 12/19/2018  Impression: 1  Stable size of large intraparenchymal hemorrhage centered in the left temporal lobe with surrounding mass effect and edema and adjacent left temporal to frontal subdural hemorrhage  Stable midline shift from left to right  2   Increased conspicuity of bilateral posterior subarachnoid and intraventricular blood products are likely due to redistribution  Workstation performed: [id number]     Trauma - Ct Head Wo Contrast    Result Date: 12/18/2018  Impression: 1  Large parenchymal hemorrhage on the left with associated left-sided subdural hematoma and bilateral subarachnoid hemorrhage including intraventricular hemorrhage  2   Mass effect and midline shift to the right measuring 4 mm  I personally discussed this study with Marcy Farley on 12/18/2018 at 8:45 PM  Workstation performed: [id number]     Trauma - Ct Spine Cervical Wo Contrast    Result Date: 12/18/2018  Impression: 1    No cervical spine fracture or traumatic malalignment  2   Small right apical pneumothorax  3   Nondisplaced fractures of the right T1 and T2 transverse processes  Fracture of the posterior right second rib  I personally discussed this study with Marcy Farley on 12/18/2018 at 8:45 PM   Workstation performed: [id number]     Ct Shoulder Right Wo Contrast    Result Date: 12/20/2018  Impression: Acute fracture anterior glenoid rim with 1 to 2 mm anteromedial displacement  Acute comminuted fracture posterior acromion with 6 mm mediolateral distraction and 1 to 2 mm inferior cortical offset  No dislocation  Interval progression of right lower lobe and right upper lobe dependent airspace disease  This may represent subsegmental atelectasis, aspiration, and/or pulmonary contusion  Interval enlargement of small right pleural effusion  No other significant interval change  The examination demonstrates a significant  finding and was documented as such [facility] for liaison and referring practitioner notification  Workstation performed: [id number]     Xr Chest 1 View    Result Date: 12/20/2018  Impression: Multiple right-sided rib fractures  Consolidation at the base may represent atelectasis or pneumonia  Workstation performed: [id number]     Trauma - Ct Chest Abdomen Pelvis W Contrast    Result Date: 12/18/2018  Impression: 1  Multiple right-sided rib fractures with tiny right apical pneumothorax  Fractures extend from the second through eleventh ribs at the costovertebral junctions and fourth through eighth ribs laterally  This is consistent with flail chest  A right anterior second rib fracture is severely displaced/angulated  2   Fracture of the anterior right glenoid, acromium, and first through fourth thoracic transverse processes on the right  3   Focal irregular opacity in the right upper lobe with adjacent 4 mm nodule   While contusion is possible given overlying trauma, the appearance of the lesion is most suspicious for a primary lung cancer and follow-up PET/CT is recommended  4   Borderline upper abdominal lymph nodes with density/stranding at the base of the small bowel mesentery  While mesenteric contusion can have this appearance in the setting of trauma  There are calcifications which suggest chronicity  Lymphoproliferative disorder cannot be excluded and follow-up and 3-6 months is recommended  5   Chronic interstitial lung disease of uncertain etiology  Given esophageal dilatation, this could represent NSIP in the setting of scleroderma  I personally discussed this study with Sandra Chun on 12/18/2018 at 8:45 PM  Workstation performed: [id number]     Xr Trauma Multiple    Result Date: 12/19/2018  Impression: Multiple right-sided rib fractures  Consolidation at the base may represent atelectasis or pneumonia  Workstation performed: [id number]     Xr Chest Portable Icu    Result Date: 12/24/2018  Impression: Endotracheal tube tip 2 9 cm above the radha No interval change in appearance of diffuse bilateral pulmonary airspace disease Workstation performed: [id number]     Xr Chest Portable Icu    Result Date: 12/20/2018  Impression: Right subclavian catheter extending superiorly into the neck requiring repositioning  Workstation performed: [id number]     Xr Chest Portable Icu    Result Date: 12/20/2018  Impression: Mild central vascular congestion  Basilar opacities and effusions  Stable rib fractures  Workstation performed: [id number]     Xr Chest Portable Icu    Result Date: 12/19/2018  Impression: Right basilar opacity partially obscuring the hemidiaphragm may represent atelectasis or infiltrate  Possible small basilar effusion   Workstation performed: [id number]     Cta Chest Pe Study    Result Date: 12/22/2018  Impression: No evidence of pulmonary embolism Moderate right effusion small left effusion There is development of diffuse lung opacities with reticulation, groundglass density with a few areas of basilar consolidation  This may be due to pulmonary edema  However infiltrates are difficult to exclude  The ET tube is low-lying with tip lying in the radha with likely projection into the right main bronchus  Small mediastinal lymph nodes larger from the previous study probably reactive  I personally discussed this study with Andrew Monday on 12/22/2018 at 11:46 AM  Workstation performed: [id number]      I have personally reviewed pertinent reports  Micro:    Results from last 7 days  Lab Units 12/27/18  1133 12/26/18  1226 12/25/18  0931 12/25/18  0920 12/25/18  0919 12/25/18  4794   BLOOD CULTURE   --   --   --  No Growth at 48 hrs  No Growth at 48 hrs    --    SPUTUM CULTURE   --   --  4+ Growth of Haemophilus influenzae*  1+ Growth of Streptococcus pneumoniae*  1+ Growth of   --   --   --    GRAM STAIN RESULT   --   --  3+ Polys  2+ Gram negative coccobacilli  Rare Gram positive cocci in pairs  --   --   --    URINE CULTURE  >100,000 cfu/ml Escherichia coli*  --   --   --   --  >100,000 cfu/ml Gram Negative [facility] Enter[facility] Like*   MRSA CULTURE ONLY   --  No Methicillin Resistant Staphlyococcus aureus (MRSA) isolated  --   --   --   --      Allergies: No Known Allergies  Medications:   Scheduled Meds:    Current Facility-Administered Medications:  acetaminophen 650 mg Oral Q6H Radha Alvarado PA-C    albuterol 2 5 mg Nebulization Q4H PRN Franklin Harrison DO    bisacodyl 10 mg Rectal Daily Hayden Gracia MD    cefTRIAXone 1,000 mg Intravenous Q24H Radha Alvarado PA-C Last Rate: 1,000 mg (12/27/18 2038)   chlorhexidine 15 mL Swish & Spit Q12H [address] Charlette Bhatt MD    dexmedetomidine 0 1-0 7 mcg/kg/hr Intravenous Titrated Manny Kolb PA-C Last Rate: 0 6 mcg/kg/hr (12/28/18 0001)   fentaNYL 75 mcg/hr Intravenous Continuous Mandi Tee PA-C Last Rate: 75 mcg/hr (12/27/18 8985)   furosemide 40 mg Intravenous BID (diuretic) Manny Kolb PA-C    heparin (porcine) 5,000 Units Subcutaneous Q8H 3777 Jacob Leiva MD    hydrALAZINE 5 mg Intravenous Q6H PRN Ted Collazo MD    HYDROmorphone 1 mg Intravenous Q2H PRN HARSHAD MIKE    influenza vaccine 0 5 mL Intramuscular Prior to discharge Ricardo Hickey, DO    insulin lispro 1-5 Units Subcutaneous Q6H 3777 Jacob Leiva MD    ipratropium 0 5 mg Nebulization Q6H Ricardo Hickey, DO    labetalol 5 mg Intravenous Q4H PRN Ted Collazo MD    levalbuterol 1 25 mg Nebulization Q6H Ricardo Hickey, DO    levothyroxine 88 mcg Oral Early Morning HARSHAD MIKE    lidocaine 2 patch Topical Daily HARSHAD MIKE    methylnaltrexone 8 mg Subcutaneous Every Other Day HARSHAD MIKE    nicotine 14 mg Transdermal Daily Jonathan Schwab PA-C    pantoprazole 40 mg Intravenous Q24H [address] HARSHAD MIKE    polyethylene glycol 17 g Oral Daily Lacey Garay MD    potassium chloride 20 mEq Intravenous Once Ted Collazo MD    potassium chloride 20 mEq Intravenous Once Ted Collazo MD    pramipexole 0 25 mg Oral BID TRISTAN Roy    senna-docusate sodium 2 tablet Oral HS HARSHAD MIKE      Continuous Infusions:    dexmedetomidine 0 1-0 7 mcg/kg/hr Last Rate: 0 6 mcg/kg/hr (12/28/18 0001)   fentaNYL 75 mcg/hr Last Rate: 75 mcg/hr (12/27/18 1745)     PRN Meds:    albuterol 2 5 mg Q4H PRN   hydrALAZINE 5 mg Q6H PRN   HYDROmorphone 1 mg Q2H PRN   influenza vaccine 0 5 mL Prior to discharge   labetalol 5 mg Q4H PRN       Invasive lines and devices: Invasive Devices     Peripherally Inserted Central Catheter Line            PICC Line 07/32/32 Left Basilic 5 days          Drain            NG/OG/Enteral Tube Nasogastric Right nares 3 days          Airway            Surgical Airway Shiley Cuffed 3 days                     Portions of the record may have been created with voice recognition software    Occasional wrong word or "sound a like" substitutions may have occurred due to the inherent limitations of voice recognition software  Read the chart carefully and recognize, using context, where substitutions have occurred      Kevin Krause MD

## 2018-12-28 NOTE — NUTRITION
Replete K+  Rec increase Jevity 1 2 as tolerated to goal rate of 87 ml/hr X 16 hours (0600 - 2200) to provide qd: 1392 ml,1670 Kcal,77 gm PRO,236 gm CHO,55 gm Fat,1123 ml Free H2O,2 8 mg CHO/kg/min

## 2018-12-28 NOTE — RESPIRATORY THERAPY NOTE
RT Ventilator Management Note  Harris Salinas 59 y o  female MRN: [id number]  Unit/Bed#:  -01 Encounter: [id number]      Daily Screen       12/27/2018 0732 12/28/2018 0740          Patient safety screen outcome[de-identified] Failed Failed      Not Ready for Weaning due to[de-identified] PEEP > 8cmH2O PEEP > 8cmH2O              Physical Exam:   Assessment Type: (P) Assess only  General Appearance: Drowsy  Respiratory Pattern: Assisted  Chest Assessment: Chest expansion symmetrical  Bilateral Breath Sounds: Diminished  Suction: Trach  O2 Device: vent      Resp Comments: (P) Pt  doing well today on vent  Sats have been high 90's  No changes at this time   Will continue to monitor

## 2018-12-29 ENCOUNTER — APPOINTMENT (INPATIENT)
Dept: RADIOLOGY | Facility: HOSPITAL | Age: 63
DRG: 003 | End: 2018-12-29
Payer: MEDICARE

## 2018-12-29 LAB
ALBUMIN SERPL BCP-MCNC: 2.6 G/DL (ref 3.5–5)
ALP SERPL-CCNC: 126 U/L (ref 46–116)
ALT SERPL W P-5'-P-CCNC: 20 U/L (ref 12–78)
ANION GAP SERPL CALCULATED.3IONS-SCNC: 8 MMOL/L (ref 4–13)
ANISOCYTOSIS BLD QL SMEAR: PRESENT
AST SERPL W P-5'-P-CCNC: 25 U/L (ref 5–45)
BASOPHILS # BLD MANUAL: 0 THOUSAND/UL (ref 0–0.1)
BASOPHILS NFR MAR MANUAL: 0 % (ref 0–1)
BILIRUB SERPL-MCNC: 0.82 MG/DL (ref 0.2–1)
BUN SERPL-MCNC: 30 MG/DL (ref 5–25)
CALCIUM SERPL-MCNC: 9.1 MG/DL (ref 8.3–10.1)
CHLORIDE SERPL-SCNC: 105 MMOL/L (ref 100–108)
CO2 SERPL-SCNC: 34 MMOL/L (ref 21–32)
CREAT SERPL-MCNC: 0.51 MG/DL (ref 0.6–1.3)
EOSINOPHIL # BLD MANUAL: 0 THOUSAND/UL (ref 0–0.4)
EOSINOPHIL NFR BLD MANUAL: 0 % (ref 0–6)
ERYTHROCYTE [DISTWIDTH] IN BLOOD BY AUTOMATED COUNT: 17.1 % (ref 11.6–15.1)
GFR SERPL CREATININE-BSD FRML MDRD: 103 ML/MIN/1.73SQ M
GLUCOSE SERPL-MCNC: 146 MG/DL (ref 65–140)
GLUCOSE SERPL-MCNC: 213 MG/DL (ref 65–140)
GLUCOSE SERPL-MCNC: 226 MG/DL (ref 65–140)
GLUCOSE SERPL-MCNC: 264 MG/DL (ref 65–140)
HCT VFR BLD AUTO: 29.3 % (ref 34.8–46.1)
HGB BLD-MCNC: 9 G/DL (ref 11.5–15.4)
LYMPHOCYTES # BLD AUTO: 1.97 THOUSAND/UL (ref 0.6–4.47)
LYMPHOCYTES # BLD AUTO: 11 % (ref 14–44)
MAGNESIUM SERPL-MCNC: 2.1 MG/DL (ref 1.6–2.6)
MCH RBC QN AUTO: 27.4 PG (ref 26.8–34.3)
MCHC RBC AUTO-ENTMCNC: 30.7 G/DL (ref 31.4–37.4)
MCV RBC AUTO: 89 FL (ref 82–98)
METAMYELOCYTES NFR BLD MANUAL: 2 % (ref 0–1)
MONOCYTES # BLD AUTO: 0.54 THOUSAND/UL (ref 0–1.22)
MONOCYTES NFR BLD: 3 % (ref 4–12)
MYELOCYTES NFR BLD MANUAL: 1 % (ref 0–1)
NEUTROPHILS # BLD MANUAL: 14.9 THOUSAND/UL (ref 1.85–7.62)
NEUTS SEG NFR BLD AUTO: 83 % (ref 43–75)
NRBC BLD AUTO-RTO: 0 /100 WBCS
NRBC BLD AUTO-RTO: 1 /100 WBC (ref 0–2)
PHOSPHATE SERPL-MCNC: 3.1 MG/DL (ref 2.3–4.1)
PLATELET # BLD AUTO: 393 THOUSANDS/UL (ref 149–390)
PLATELET BLD QL SMEAR: ADEQUATE
PMV BLD AUTO: 10.5 FL (ref 8.9–12.7)
POLYCHROMASIA BLD QL SMEAR: PRESENT
POTASSIUM SERPL-SCNC: 3.2 MMOL/L (ref 3.5–5.3)
PROCALCITONIN SERPL-MCNC: 0.4 NG/ML
PROT SERPL-MCNC: 6.5 G/DL (ref 6.4–8.2)
RBC # BLD AUTO: 3.28 MILLION/UL (ref 3.81–5.12)
RBC MORPH BLD: PRESENT
SODIUM SERPL-SCNC: 147 MMOL/L (ref 136–145)
TARGETS BLD QL SMEAR: PRESENT
WBC # BLD AUTO: 17.95 THOUSAND/UL (ref 4.31–10.16)

## 2018-12-29 PROCEDURE — 94640 AIRWAY INHALATION TREATMENT: CPT

## 2018-12-29 PROCEDURE — 94003 VENT MGMT INPAT SUBQ DAY: CPT

## 2018-12-29 PROCEDURE — 80053 COMPREHEN METABOLIC PANEL: CPT | Performed by: PHYSICIAN ASSISTANT

## 2018-12-29 PROCEDURE — 85007 BL SMEAR W/DIFF WBC COUNT: CPT | Performed by: PHYSICIAN ASSISTANT

## 2018-12-29 PROCEDURE — C9113 INJ PANTOPRAZOLE SODIUM, VIA: HCPCS | Performed by: PHYSICIAN ASSISTANT

## 2018-12-29 PROCEDURE — 84100 ASSAY OF PHOSPHORUS: CPT | Performed by: PHYSICIAN ASSISTANT

## 2018-12-29 PROCEDURE — 71045 X-RAY EXAM CHEST 1 VIEW: CPT

## 2018-12-29 PROCEDURE — 84145 PROCALCITONIN (PCT): CPT | Performed by: PHYSICIAN ASSISTANT

## 2018-12-29 PROCEDURE — 70450 CT HEAD/BRAIN W/O DYE: CPT

## 2018-12-29 PROCEDURE — 82948 REAGENT STRIP/BLOOD GLUCOSE: CPT

## 2018-12-29 PROCEDURE — 99291 CRITICAL CARE FIRST HOUR: CPT | Performed by: EMERGENCY MEDICINE

## 2018-12-29 PROCEDURE — 94760 N-INVAS EAR/PLS OXIMETRY 1: CPT

## 2018-12-29 PROCEDURE — 83735 ASSAY OF MAGNESIUM: CPT | Performed by: PHYSICIAN ASSISTANT

## 2018-12-29 PROCEDURE — 85027 COMPLETE CBC AUTOMATED: CPT | Performed by: PHYSICIAN ASSISTANT

## 2018-12-29 RX ORDER — POTASSIUM CHLORIDE 29.8 MG/ML
40 INJECTION INTRAVENOUS ONCE
Status: COMPLETED | OUTPATIENT
Start: 2018-12-29 | End: 2018-12-29

## 2018-12-29 RX ORDER — LEVETIRACETAM 100 MG/ML
500 SOLUTION ORAL EVERY 12 HOURS SCHEDULED
Status: DISCONTINUED | OUTPATIENT
Start: 2018-12-29 | End: 2019-01-03 | Stop reason: HOSPADM

## 2018-12-29 RX ADMIN — FENTANYL CITRATE 100 MCG/HR: 50 INJECTION, SOLUTION INTRAMUSCULAR; INTRAVENOUS at 01:19

## 2018-12-29 RX ADMIN — CHLORHEXIDINE GLUCONATE 0.12% ORAL RINSE 15 ML: 1.2 LIQUID ORAL at 08:20

## 2018-12-29 RX ADMIN — LEVALBUTEROL 1.25 MG: 1.25 SOLUTION, CONCENTRATE RESPIRATORY (INHALATION) at 08:29

## 2018-12-29 RX ADMIN — NICOTINE 14 MG: 14 PATCH TRANSDERMAL at 08:19

## 2018-12-29 RX ADMIN — PRAMIPEXOLE DIHYDROCHLORIDE 0.25 MG: 0.25 TABLET ORAL at 18:42

## 2018-12-29 RX ADMIN — CEFTRIAXONE SODIUM 1000 MG: 10 INJECTION, POWDER, FOR SOLUTION INTRAVENOUS at 20:45

## 2018-12-29 RX ADMIN — PRAMIPEXOLE DIHYDROCHLORIDE 0.25 MG: 0.25 TABLET ORAL at 22:08

## 2018-12-29 RX ADMIN — POLYETHYLENE GLYCOL 3350 17 G: 17 POWDER, FOR SOLUTION ORAL at 08:24

## 2018-12-29 RX ADMIN — ACETAMINOPHEN 650 MG: 160 SUSPENSION ORAL at 00:02

## 2018-12-29 RX ADMIN — HEPARIN SODIUM 5000 UNITS: 5000 INJECTION INTRAVENOUS; SUBCUTANEOUS at 05:26

## 2018-12-29 RX ADMIN — DEXMEDETOMIDINE HYDROCHLORIDE 0.5 MCG/KG/HR: 100 INJECTION, SOLUTION INTRAVENOUS at 14:04

## 2018-12-29 RX ADMIN — HYDRALAZINE HYDROCHLORIDE 5 MG: 20 INJECTION INTRAMUSCULAR; INTRAVENOUS at 06:13

## 2018-12-29 RX ADMIN — FUROSEMIDE 40 MG: 10 INJECTION, SOLUTION INTRAMUSCULAR; INTRAVENOUS at 08:19

## 2018-12-29 RX ADMIN — POTASSIUM CHLORIDE 40 MEQ: 400 INJECTION, SOLUTION INTRAVENOUS at 11:57

## 2018-12-29 RX ADMIN — LEVALBUTEROL 1.25 MG: 1.25 SOLUTION, CONCENTRATE RESPIRATORY (INHALATION) at 00:36

## 2018-12-29 RX ADMIN — ACETAMINOPHEN 650 MG: 160 SUSPENSION ORAL at 13:06

## 2018-12-29 RX ADMIN — SENNOSIDES AND DOCUSATE SODIUM 2 TABLET: 8.6; 5 TABLET ORAL at 22:08

## 2018-12-29 RX ADMIN — ACETAMINOPHEN 650 MG: 160 SUSPENSION ORAL at 06:14

## 2018-12-29 RX ADMIN — LIDOCAINE 2 PATCH: 50 PATCH CUTANEOUS at 08:19

## 2018-12-29 RX ADMIN — LEVALBUTEROL 1.25 MG: 1.25 SOLUTION, CONCENTRATE RESPIRATORY (INHALATION) at 20:35

## 2018-12-29 RX ADMIN — IPRATROPIUM BROMIDE 0.5 MG: 0.5 SOLUTION RESPIRATORY (INHALATION) at 08:29

## 2018-12-29 RX ADMIN — INSULIN LISPRO 4 UNITS: 100 INJECTION, SOLUTION INTRAVENOUS; SUBCUTANEOUS at 08:25

## 2018-12-29 RX ADMIN — DEXMEDETOMIDINE HYDROCHLORIDE 0.7 MCG/KG/HR: 100 INJECTION, SOLUTION INTRAVENOUS at 08:50

## 2018-12-29 RX ADMIN — PANTOPRAZOLE SODIUM 40 MG: 40 INJECTION, POWDER, FOR SOLUTION INTRAVENOUS at 08:20

## 2018-12-29 RX ADMIN — INSULIN LISPRO 6 UNITS: 100 INJECTION, SOLUTION INTRAVENOUS; SUBCUTANEOUS at 18:45

## 2018-12-29 RX ADMIN — HEPARIN SODIUM 5000 UNITS: 5000 INJECTION INTRAVENOUS; SUBCUTANEOUS at 13:06

## 2018-12-29 RX ADMIN — POTASSIUM CHLORIDE 40 MEQ: 400 INJECTION, SOLUTION INTRAVENOUS at 08:15

## 2018-12-29 RX ADMIN — ACETAMINOPHEN 650 MG: 160 SUSPENSION ORAL at 18:40

## 2018-12-29 RX ADMIN — LEVETIRACETAM 500 MG: 100 SOLUTION ORAL at 20:46

## 2018-12-29 RX ADMIN — IPRATROPIUM BROMIDE 0.5 MG: 0.5 SOLUTION RESPIRATORY (INHALATION) at 00:36

## 2018-12-29 RX ADMIN — DEXMEDETOMIDINE HYDROCHLORIDE 0.5 MCG/KG/HR: 100 INJECTION, SOLUTION INTRAVENOUS at 22:09

## 2018-12-29 RX ADMIN — LISINOPRIL: 10 TABLET ORAL at 08:37

## 2018-12-29 RX ADMIN — HEPARIN SODIUM 5000 UNITS: 5000 INJECTION INTRAVENOUS; SUBCUTANEOUS at 22:08

## 2018-12-29 RX ADMIN — IBUPROFEN 400 MG: 100 SUSPENSION ORAL at 14:19

## 2018-12-29 RX ADMIN — CHLORHEXIDINE GLUCONATE 0.12% ORAL RINSE 15 ML: 1.2 LIQUID ORAL at 20:45

## 2018-12-29 RX ADMIN — IPRATROPIUM BROMIDE 0.5 MG: 0.5 SOLUTION RESPIRATORY (INHALATION) at 20:35

## 2018-12-29 RX ADMIN — IPRATROPIUM BROMIDE 0.5 MG: 0.5 SOLUTION RESPIRATORY (INHALATION) at 13:38

## 2018-12-29 RX ADMIN — LEVALBUTEROL 1.25 MG: 1.25 SOLUTION, CONCENTRATE RESPIRATORY (INHALATION) at 13:38

## 2018-12-29 RX ADMIN — LEVETIRACETAM 500 MG: 100 SOLUTION ORAL at 14:19

## 2018-12-29 RX ADMIN — LEVOTHYROXINE SODIUM 88 MCG: 88 TABLET ORAL at 05:26

## 2018-12-29 RX ADMIN — FUROSEMIDE 40 MG: 10 INJECTION, SOLUTION INTRAMUSCULAR; INTRAVENOUS at 16:10

## 2018-12-29 RX ADMIN — FENTANYL CITRATE 75 MCG/HR: 50 INJECTION, SOLUTION INTRAMUSCULAR; INTRAVENOUS at 14:04

## 2018-12-29 NOTE — PROGRESS NOTES
Progress Note - Critical Care   Grant Kerns 59 y o  female MRN: [id number]  Unit/Bed#: 3150 [facility] -01 Encounter: [id number]    Assessment:     Principal Problem:    Traumatic brain injury [facility])  Active Problems:    Multiple fractures of ribs, bilateral, initial encounter for closed fracture    Traumatic pneumothorax    Glenoid fracture of shoulder, right, closed, initial encounter    Intraparenchymal hemorrhage of brain ([facility] )    Flail chest, initial encounter for closed fracture    Fracture of thoracic transverse process, closed, initial encounter ([facility] )    Subdural hematoma ([facility] )    Subarachnoid hemorrhage ([facility] )    Diabetes mellitus, type II ([facility] )    Closed nondisplaced left toe fracture    Hypokalemia    Acute encephalopathy    Status post craniectomy    Acute respiratory failure with hypoxia ([facility] )    Mucus plugging of bronchi    Acute blood loss anemia    Ileus (HCC)      Plan:          Neuro:    L SDH / b/l SAH - s/p L craniectomy, repeat CTH this morning given decline in GCS, complete 7 days of keppra, Q2 neurochecks      Analgesia - fentanyl gtt 100/hr , tylenol 650mg Q6 sched, dilaudid 1mg Q2 PRN (5/24), lidoderm patch   Sedation - precedex 0 9 - sedation hold for neuro assessment    Continue home dose mirapex    Bipolar - home meds held    Delirium precautions, sleep hygiene, daily CAM-ICU                 CV:    Monitor hemodyamics w goal MAP > 65   HTN - hydralazine 5mg Q6 PRN for hypertension (3/24), labetalol 5mg Q4 PRN (2/24)                 Lung:    AHRF - wean vent as able, chlorhexidine    Multiple bilateral rib fx, flail chest - pain control, pulm toilet   Pulmonary edema - lasix BID      VAP - sputum cx sensitive to ceftriaxone                 GI:    Bowel regimen - senokot QD   GERD - protonix 40mg QD                  FEN:    Fluid - no mIVF    Nutrition - cyclic TF at goal    Electrolytes - Monitor/trend - replete based on deficiencies                  :    Stable renal function - monitor / trend while on lasix 40mg BID    Marroquin placed - monitor I/Os                ID:    E coli UTI - abx # 6, continue ceftriaxone    VAP w + sputum cx for 4+ H flu and 1+ strep pneumoniae sensitive to ceftriaxone   Follow fever curve, procal trending down, leukocytosis increased                  Heme:    Acute blood loss anemia - no sx of ongoing bleeding, H/H stabilized, continue to monitor    DVT ppx - SQH                 Endo:    DM 2 w hyperglycemia - change SSI Alg 2 to Alg 4 for improved coverage    Hypothyroid - levothyroxine                             Msk/Skin:    Repetitive repositioning and off-loading to prevent skin break down and ulcerative formation    T1 - T4 TP fx - analgesia, non-op   R glenoid fx - non-op, NWB RUE per ortho    L 3rd toe fx - podiatry following, WBAT              Disposition:    Continue ICU care       Chief Complaint: Unable to provide 2/2 acuity of condition     HPI/24hr events: repeat CTH this morning, pending result, persistently febrile    Physical Exam: Physical Exam   Constitutional: She is oriented to person, place, and time  She appears well-developed and well-nourished  No distress  HENT:   Head: Normocephalic and atraumatic  Eyes: Pupils are equal, round, and reactive to light  Neck: Normal range of motion  Neck supple  No tracheal deviation present  Cardiovascular: Normal rate, regular rhythm and intact distal pulses  Exam reveals no gallop and no friction rub  No murmur heard  Pulmonary/Chest: Effort normal and breath sounds normal  No respiratory distress  She has no wheezes  She has no rales  On vent AC 14/490/50/5   Abdominal: Soft  Bowel sounds are normal  She exhibits no distension and no mass  There is no tenderness  There is no guarding  Genitourinary:   Genitourinary Comments: Marroquin removed    Musculoskeletal: She exhibits no edema or deformity  Neurological: She is alert and oriented to person, place, and time     GCS 9T  E4 V1T M4   Withdraws slightly to pain  She does track slightly when she hears her voice  Not following commands or making any attempts to speak  Craniectomy staples to the L flap   Skin: Skin is warm and dry  She is not diaphoretic  L 2nd toe deformity    Psychiatric: She has a normal mood and affect  Her behavior is normal    Nursing note and vitals reviewed  Vitals:    18 0500 18 0600 18 0613 18 0645   BP: 158/74 (!) 174/74 170/73 139/60   Pulse: 72 74  74   Resp: 17 (!) 23  (!) 24   Temp: (!) 101 5 °F (38 6 °C) (!) 101 1 °F (38 4 °C)  (!) 101 1 °F (38 4 °C)   TempSrc:       SpO2: 97% 100%  99%   Weight:  57 8 kg (127 lb 6 8 oz)     Height:         Arterial Line BP: 126/80  Arterial Line MAP (mmHg): 102 mmHg    Temperature:   Temp (24hrs), Av 3 °F (39 1 °C), Min:101 1 °F (38 4 °C), Max:103 3 °F (39 6 °C)    Current: Temperature: (!) 101 1 °F (38 4 °C)    Weights:   IBW: 52 4 kg    Body mass index is 22 57 kg/m²    Weight (last 2 days)     Date/Time   Weight    18 0600  57 8 (127 43)              Hemodynamic Monitoring:  N/A     Non-Invasive/Invasive Ventilation Settings:  Respiratory    Lab Data (Last 4 hours)    None         O2/Vent Data (Last 4 hours)    None              No results found for: PHART, YAM4QNW, PO2ART, PMW5EKI, J6PLXRUH, BEART, SOURCE  SpO2: SpO2: 99 %    Intake and Outputs:  I/O        07 -  0700 701 -  07 -  0700    I V  (mL/kg) 409 9 (7 1) 536 6 (9 3)     NG/ 50     IV Piggyback 200 250     Feedings 991 1003     Total Intake(mL/kg) 1920 9 (33 3) 1839 6 (31 8)     Urine (mL/kg/hr) 3490 (2 5) 3200 (2 3)     Emesis/NG output  0     Total Output 3490 3200      Net -1569 2 -1360 4             Unmeasured Urine Occurrence  1 x         UOP: 150/hour   Nutrition:        Diet Orders            Start     Ordered    18 1007  Diet Enteral/Parenteral; Tube Feeding No Oral Diet; Jevity 1 2 Sae; Cyclic; 63; 16 hours; Prosource Protein Liquid - One Packet  Diet effective now     Comments:  Start tube feedings at 20 ml/hr and advance q4h by 20 ml/hr to goal of 63 ml/hr; hold for residuals >400 mls  Question Answer Comment   Diet Type Enteral/Parenteral    Enteral/Parenteral Tube Feeding No Oral Diet    Tube Feeding Formula: Jevity 1 2 Sae    Bolus/Cyclic/Continuous Cyclic    Tube Feeding Cyclic Rate (mL/hr): 63    Tube Feeding Cyclic: Administer over: 16 hours    Prosource Protein Liquid - No Carb Prosource Protein Liquid - One Packet    RD to adjust diet per protocol? Yes        12/26/18 1008        TF currently running at 63/hour with a goal of 63  Formula:jevity 1 2     Labs:     Results from last 7 days  Lab Units 12/29/18  0550 12/28/18  0426 12/27/18  0455  12/23/18  0447   WBC Thousand/uL 17 95* 11 40* 9 37  < > 9 83   HEMOGLOBIN g/dL 9 0* 8 2* 7 8*  < > 9 3*   I STAT HEMOGLOBIN   --   --   --   < >  --    HEMATOCRIT % 29 3* 26 6* 24 8*  < > 28 4*   HEMATOCRIT, ISTAT   --   --   --   < >  --    PLATELETS Thousands/uL 393* 318 263  < > 222   NEUTROS PCT %  --   --   --   --  79*   MONOS PCT %  --   --   --   --  8   MONO PCT % 3* 5 2*  < >  --    < > = values in this interval not displayed     Results from last 7 days  Lab Units 12/29/18  0550 12/28/18  1639 12/28/18  0426  12/24/18  1727 12/24/18  1658  12/23/18  0447   SODIUM mmol/L 147* 144 145  < >  --   --   < > 137   POTASSIUM mmol/L 3 2* 3 5 3 2*  < >  --   --   < > 3 5   CHLORIDE mmol/L 105 100 105  < >  --   --   < > 103   CO2 mmol/L 34* 35* 35*  < >  --   --   < > 29   CO2, I-STAT mmol/L  --   --   --   --  35* 34*  < >  --    BUN mg/dL 30* 28* 28*  < >  --   --   < > 7   CREATININE mg/dL 0 51* 0 60 0 44*  < >  --   --   < > 0 33*   CALCIUM mg/dL 9 1 9 6 8 6  < >  --   --   < > 8 2*   ALK PHOS U/L 126*  --   --   --   --   --   --  129*   ALT U/L 20  --   --   --   --   --   --  40   AST U/L 25  --   --   --   --   --   --  56*   GLUCOSE, ISTAT mg/dl  --   --   --   --  125 140  --   -- < > = values in this interval not displayed  Results from last 7 days  Lab Units 12/29/18  0550 12/28/18  1639 12/26/18  0519   MAGNESIUM mg/dL 2 1 2 0 2 4       Results from last 7 days  Lab Units 12/29/18  0550 12/26/18  0519 12/23/18  0447   PHOSPHORUS mg/dL 3 1 4 1 2 6            Results from last 7 days  Lab Units 12/25/18  1538   LACTIC ACID mmol/L 1 6       0  Lab Value Date/Time   TROPONINI <0 02 12/20/2018 1204   TROPONINI <0 02 12/20/2018 0054       Imaging:    XR chest portable   Final Result      1  Persistent bilateral groundglass opacities without significant change from prior exam    2   Distal tip of the right-sided PICC line is in the region the SVC with tip coiled and directed cephalad  Workstation performed: [id number]         XR chest portable   Final Result   Slight improvement in bilateral infiltrates  Workstation performed: [id number]         XR chest portable   Final Result      No significant interval change in the diffuse airspace opacities bilaterally, likely reflecting diffuse alveolar edema  Superimposed pneumonia is not excluded  Small right pleural effusion  Workstation performed: [id number]         XR abdomen 1 view kub   Final Result   Improvement  The distention of the small bowel loops which was seen on the previous study has resolved               Workstation performed: [id number]         XR chest portable   Final Result      No change in diffuse bilateral airspace disease concerning for the alveolar phase of pulmonary edema  Superimposed infiltrates not excluded  Workstation performed: [id number]         CT head wo contrast   Final Result         1  Stable intracranial hemorrhage  Resolution of pneumocephalus and decreased vasogenic edema  2   No midline shift or hydrocephalus  3   New extradural/subgaleal collection measuring 6 x 2 x 8 cm  Possible seroma  Abscess less likely though not excluded  Workstation performed: [id number]         XR chest portable   Final Result      Pulmonary edema, unchanged since a portable chest radiograph from earlier in the day  Workstation performed: [id number]         VAS lower limb venous duplex study, complete bilateral   Final Result      XR chest portable   Final Result      Stable diffuse pulmonary edema  Workstation performed: [id number]         XR chest portable   Final Result      Appropriately positioned endotracheal tube      Persistent bilateral pulmonary airspace disease without significant change            Workstation performed: [id number]         XR abdomen 1 view kub   Final Result      Increased bowel gas predominantly in the colon suggesting colonic ileus  Maximal transverse diameter, located in the right transverse colon is approximately 9 8 cm               Workstation performed: [id number]         XR chest portable ICU   Final Result      Endotracheal tube tip 2 9 cm above the radha      No interval change in appearance of diffuse bilateral pulmonary airspace disease            Workstation performed: [id number]         XR chest portable   Final Result      1  Low position of endotracheal tube which should be withdrawn approximately 2 to 3 cm  2   Left lower lobe consolidation  3   Diffuse patchy opacities in both lungs, either pneumonia or, more likely, pulmonary edema  Workstation performed: [id number]         XR chest portable   Final Result      1  Tip of endotracheal tube 9 mm above the radha  I recommend that the tube be pulled back approximately 3 cm  2   Opacification in both lungs, most likely pulmonary edema or, possibly, bilateral pneumonia              Workstation performed: [id number]         CTA chest pe study   Final Result   No evidence of pulmonary embolism      Moderate right effusion small left effusion      There is development of diffuse lung opacities with reticulation, groundglass density with a few areas of basilar consolidation  This may be due to pulmonary edema  However infiltrates are difficult to exclude  The ET tube is low-lying with tip lying in the radha with likely projection into the right main bronchus  Small mediastinal lymph nodes larger from the previous study probably reactive          I personally discussed this study with Berna Boston on 12/22/2018 at 11:46 AM                            Workstation performed: [id number]         XR foot 3+ vw left   Final Result      Nondisplaced transverse fracture involves the proximal aspect of the proximal to the 3rd digit  Workstation performed: [id number]         CT head wo contrast   Final Result      1  Postsurgical changes of left craniectomy and evacuation of left temporal intraparenchymal hemorrhage  2   A small amount of intraparenchymal/extra-axial hemorrhage remains within the anterior left temporal lobe and stable thin subdural hemorrhage along the left frontal region  3   Increased blood products along the cerebral falx when compared with prior exam    4   Persistent edema predominantly within the left temporal lobe and effacement of the left-sided cerebral sulci however there is interval resolution of midline shift and decreased effacement of the left lateral ventricle  5   Continued washout and interval evolution of intraventricular and subarachnoid blood products  Workstation performed: [id number]         XR chest portable ICU   Final Result      Right subclavian catheter extending superiorly into the neck requiring repositioning  Workstation performed: [id number]         XR chest 1 view   Final Result      Multiple right-sided rib fractures  Consolidation at the base may represent atelectasis or pneumonia  Workstation performed: [id number]         XR chest portable ICU   Final Result      Mild central vascular congestion        Basilar opacities and effusions  Stable rib fractures  Workstation performed: [id number]         CT shoulder right wo contrast   Final Result      Acute fracture anterior glenoid rim with 1 to 2 mm anteromedial displacement  Acute comminuted fracture posterior acromion with 6 mm mediolateral distraction and 1 to 2 mm inferior cortical offset  No dislocation  Interval progression of right lower lobe and right upper lobe dependent airspace disease  This may represent subsegmental atelectasis, aspiration, and/or pulmonary contusion  Interval enlargement of small right pleural effusion  No other significant interval change  The examination demonstrates a significant  finding and was documented as such in [facility] for liaison and referring practitioner notification  Workstation performed: [id number]         CT head wo contrast   Final Result      1  Left temporal lobe intraparenchymal hemorrhage with slight increase in surrounding edema as manifest by increased effacement of the left occipital horn when compared with prior examination  Slight increase in left to right midline shift now    measuring up to 6 mm, previously 4 mm  2   Stable subdural, subarachnoid and intraventricular blood products  No large delayed intracranial hemorrhage  The study was marked in [facility] for immediate notification  Workstation performed: [id number]         XR chest portable ICU   Final Result      Right basilar opacity partially obscuring the hemidiaphragm may represent atelectasis or infiltrate  Possible small basilar effusion  Workstation performed: [id number]         XR shoulder 2+ vw right   Final Result      Right-sided rib fractures again noted  Possible acromion fracture        The small glenoid fracture seen on CT scan of the chest is not clearly appreciated on this exam             Workstation performed: [id number]         XR trauma multiple   Final Result Multiple right-sided rib fractures  Consolidation at the base may represent atelectasis or pneumonia  Workstation performed: [id number]         XR chest portable   Final Result      Satisfactory intubation  Bibasilar subsegmental atelectasis  No pneumothorax  Workstation performed: [id number]         CT head wo contrast   Final Result      1  Stable size of large intraparenchymal hemorrhage centered in the left temporal lobe with surrounding mass effect and edema and adjacent left temporal to frontal subdural hemorrhage  Stable midline shift from left to right  2   Increased conspicuity of bilateral posterior subarachnoid and intraventricular blood products are likely due to redistribution  Workstation performed: [id number]         TRAUMA - CT chest abdomen pelvis w contrast   Final Result      1  Multiple right-sided rib fractures with tiny right apical pneumothorax  Fractures extend from the second through eleventh ribs at the costovertebral junctions and fourth through eighth ribs laterally  This is consistent with flail chest  A right    anterior second rib fracture is severely displaced/angulated  2   Fracture of the anterior right glenoid, acromium, and first through fourth thoracic transverse processes on the right  3   Focal irregular opacity in the right upper lobe with adjacent 4 mm nodule  While contusion is possible given overlying trauma, the appearance of the lesion is most suspicious for a primary lung cancer and follow-up PET/CT is recommended  4   Borderline upper abdominal lymph nodes with density/stranding at the base of the small bowel mesentery  While mesenteric contusion can have this appearance in the setting of trauma  There are calcifications which suggest chronicity  Lymphoproliferative disorder cannot be excluded and follow-up and 3-6 months is recommended        5   Chronic interstitial lung disease of uncertain etiology  Given esophageal dilatation, this could represent NSIP in the setting of scleroderma  I personally discussed this study with Reji Edvin on 12/18/2018 at 8:45 PM                   Workstation performed: [id number]         CTA head and neck w wo contrast   Final Result         1  Atherosclerotic plaque at the right carotid bifurcation resulting in moderate (50-69%) stenosis  No evidence of carotid or vertebral artery dissection or pseudoaneurysm to suggest acute vascular injury  2   Displacement of left MCA branches by left temporal parietal hematoma  No evidence of aneurysm or vascular malformation  No significant stenosis in the major vessels of the [facility]  3   Patent dural venous sinuses  Workstation performed: [id number]         TRAUMA - CT spine cervical wo contrast   Final Result      1  No cervical spine fracture or traumatic malalignment  2   Small right apical pneumothorax  3   Nondisplaced fractures of the right T1 and T2 transverse processes  Fracture of the posterior right second rib  I personally discussed this study with Reji Pardo on 12/18/2018 at 8:45 PM                          Workstation performed: [id number]         TRAUMA - CT head wo contrast   Final Result      1  Large parenchymal hemorrhage on the left with associated left-sided subdural hematoma and bilateral subarachnoid hemorrhage including intraventricular hemorrhage  2   Mass effect and midline shift to the right measuring 4 mm  I personally discussed this study with Reji Edvin on 12/18/2018 at 8:45 PM                      Workstation performed: [id number]         CT head wo contrast    (Results Pending)   CTA head w wo contrast    (Results Pending)   XR chest portable ICU    (Results Pending)   CT head wo contrast    (Results Pending)   XR chest portable    (Results Pending)      I have personally reviewed pertinent reports     and I have personally reviewed pertinent films in PACS    EKG: as per tele nsr no ectopy     Micro:  Lab Results   Component Value Date    BLOODCX No Growth at 72 hrs  12/25/2018    BLOODCX No Growth at 72 hrs  12/25/2018    URINECX >100,000 cfu/ml Gram Negative [place] Enteric Like (A) 12/25/2018    URINECX >100,000 cfu/ml Escherichia coli (A) 12/25/2018    SPUTUMCULTUR 4+ Growth of Haemophilus influenzae (AA) 12/25/2018    SPUTUMCULTUR 1+ Growth of Streptococcus pneumoniae (AA) 12/25/2018    SPUTUMCULTUR 1+ Growth of  12/25/2018       Allergies: No Known Allergies    Medications:   Scheduled Meds:    Current Facility-Administered Medications:  acetaminophen 650 mg Oral Q6H Herlinda Esparza PA-C    albuterol 2 5 mg Nebulization Q4H PRN Leonor Wagoner, DO    bisacodyl 10 mg Rectal Daily PRN [place] HARSHAD APARICIO    cefTRIAXone 1,000 mg Intravenous Q24H Herlinda Esparza PA-C Last Rate: Stopped (12/28/18 2200)   chlorhexidine 15 mL Swish & Spit Q12H [address] Fariha Millard MD    dexmedetomidine 0 1-0 9 mcg/kg/hr Intravenous Titrated Grady Del Cid MD Last Rate: 0 9 mcg/kg/hr (12/28/18 2352)   fentaNYL 100 mcg/hr Intravenous Continuous Grady Del Cid MD Last Rate: 100 mcg/hr (12/29/18 0119)   furosemide 40 mg Intravenous BID (diuretic) Rachael Garcia PA-C    heparin (porcine) 5,000 Units Subcutaneous Q8H [address], MD    hydrALAZINE 5 mg Intravenous Q6H PRN Kevin Krause MD    HYDROmorphone 1 mg Intravenous Q2H PRN Rachael Garcia PA-C    influenza vaccine 0 5 mL Intramuscular Prior to discharge Leonor Wagoner,     insulin lispro 2-12 Units Subcutaneous Q6H [address] Chrissy Plasencia PA-C    ipratropium 0 5 mg Nebulization Q6H Leonor Wagoner, DO    levalbuterol 1 25 mg Nebulization Q6H Leonor Wagoner, DO    levothyroxine 88 mcg Oral Early Morning Rachael Garcia PA-C    lidocaine 2 patch Topical Daily Rachael Garcia PA-C    lisinopril-hydrochlorothiazide (PRINZIDE 10/12  5) combo dose  Oral Daily Dre Vale, [place]    methylnaltrexone 8 mg Subcutaneous Every Other Day Alethea Tyler PA-C    nicotine 14 mg Transdermal Daily Neto Coelho PA-C    pantoprazole 40 mg Intravenous Q24H [address] Aletheacassie Tyler PA-C    polyethylene glycol 17 g Oral Daily PRN Dre Vale PA-C    potassium chloride 40 mEq Intravenous Once Ponce Wright PA-C    potassium chloride 40 mEq Intravenous Once Ponce Wright PA-C    pramipexole 0 25 mg Oral BID TRISTAN Robertson    senna-docusate sodium 2 tablet Oral HS Aletheacassie Tyler PA-C      Continuous Infusions:    dexmedetomidine 0 1-0 9 mcg/kg/hr Last Rate: 0 9 mcg/kg/hr (12/28/18 5532)   fentaNYL 100 mcg/hr Last Rate: 100 mcg/hr (12/29/18 0119)     PRN Meds:    albuterol 2 5 mg Q4H PRN   bisacodyl 10 mg Daily PRN   hydrALAZINE 5 mg Q6H PRN   HYDROmorphone 1 mg Q2H PRN   influenza vaccine 0 5 mL Prior to discharge   polyethylene glycol 17 g Daily PRN       VTE Pharmacologic Prophylaxis: Sequential compression device (Venodyne)  and Heparin  VTE Mechanical Prophylaxis: sequential compression device    Invasive lines and devices: Invasive Devices     Peripherally Inserted Central Catheter Line            PICC Line 40/64/36 Left Basilic 6 days          Drain            NG/OG/Enteral Tube Nasogastric Right nares 4 days    External Urinary Catheter less than 1 day          Airway            Surgical Airway Shiley Cuffed 4 days                   Counseling / Coordination of Care  Total Critical Care time spent 46 minutes excluding procedures, teaching and family updates  Code Status: Level 1 - Full Code     Portions of the record may have been created with voice recognition software  Occasional wrong word or "sound a like" substitutions may have occurred due to the inherent limitations of voice recognition software  Read the chart carefully and recognize, using context, where substitutions have occurred       Ponce Wright PA-C

## 2018-12-29 NOTE — RESPIRATORY THERAPY NOTE
RT Ventilator Management Note  Gisell Johns 59 y o  female MRN: [id number]  Unit/Bed#: [facility] 205-01 Encounter: [id number]      Daily Screen       12/27/2018 0732 12/28/2018 0740          Patient safety screen outcome[de-identified] Failed Failed      Not Ready for Weaning due to[de-identified] PEEP > 8cmH2O PEEP > 8cmH2O              Physical Exam:   Assessment Type: (P) Assess only  Cough: (P) None      Resp Comments: (P) Pt  doing well on A/C  No changes throughout the night

## 2018-12-29 NOTE — RESPIRATORY THERAPY NOTE
RT Ventilator Management Note  Baldev Koheler 59 y o  female MRN: [id number]  Unit/Bed#:  -01 Encounter: [id number]      Daily Screen       12/28/2018 0740 12/29/2018 0829          Patient safety screen outcome[de-identified] Failed Failed      Not Ready for Weaning due to[de-identified] PEEP > 8cmH2O Poor inspiratory effort              Physical Exam:   Assessment Type: Assess only  General Appearance: Alert  Respiratory Pattern: Assisted  Chest Assessment: Chest expansion symmetrical  Bilateral Breath Sounds: Diminished, Coarse  Cough: Strong, Productive  Suction: Trach      Resp Comments: Pt tolerating AC settings well  Attempted PSV 10/8, but RR to high 30's within minutes  Pt returned to [facility]

## 2018-12-30 ENCOUNTER — APPOINTMENT (INPATIENT)
Dept: RADIOLOGY | Facility: HOSPITAL | Age: 63
DRG: 003 | End: 2018-12-30
Payer: MEDICARE

## 2018-12-30 LAB
ANION GAP SERPL CALCULATED.3IONS-SCNC: 7 MMOL/L (ref 4–13)
BACTERIA BLD CULT: NORMAL
BACTERIA BLD CULT: NORMAL
BASOPHILS # BLD MANUAL: 0 THOUSAND/UL (ref 0–0.1)
BASOPHILS NFR MAR MANUAL: 0 % (ref 0–1)
BUN SERPL-MCNC: 34 MG/DL (ref 5–25)
CA-I BLD-SCNC: 1.15 MMOL/L (ref 1.12–1.32)
CALCIUM SERPL-MCNC: 9 MG/DL (ref 8.3–10.1)
CHLORIDE SERPL-SCNC: 108 MMOL/L (ref 100–108)
CO2 SERPL-SCNC: 34 MMOL/L (ref 21–32)
CREAT SERPL-MCNC: 0.42 MG/DL (ref 0.6–1.3)
EOSINOPHIL # BLD MANUAL: 0 THOUSAND/UL (ref 0–0.4)
EOSINOPHIL NFR BLD MANUAL: 0 % (ref 0–6)
ERYTHROCYTE [DISTWIDTH] IN BLOOD BY AUTOMATED COUNT: 17.2 % (ref 11.6–15.1)
GFR SERPL CREATININE-BSD FRML MDRD: 110 ML/MIN/1.73SQ M
GIANT PLATELETS BLD QL SMEAR: PRESENT
GLUCOSE SERPL-MCNC: 140 MG/DL (ref 65–140)
GLUCOSE SERPL-MCNC: 193 MG/DL (ref 65–140)
GLUCOSE SERPL-MCNC: 235 MG/DL (ref 65–140)
GLUCOSE SERPL-MCNC: 239 MG/DL (ref 65–140)
GLUCOSE SERPL-MCNC: 252 MG/DL (ref 65–140)
GLUCOSE SERPL-MCNC: 309 MG/DL (ref 65–140)
HCT VFR BLD AUTO: 29.6 % (ref 34.8–46.1)
HGB BLD-MCNC: 9.1 G/DL (ref 11.5–15.4)
LYMPHOCYTES # BLD AUTO: 1.23 THOUSAND/UL (ref 0.6–4.47)
LYMPHOCYTES # BLD AUTO: 8 % (ref 14–44)
MAGNESIUM SERPL-MCNC: 2.3 MG/DL (ref 1.6–2.6)
MCH RBC QN AUTO: 27.7 PG (ref 26.8–34.3)
MCHC RBC AUTO-ENTMCNC: 30.7 G/DL (ref 31.4–37.4)
MCV RBC AUTO: 90 FL (ref 82–98)
MONOCYTES # BLD AUTO: 0.46 THOUSAND/UL (ref 0–1.22)
MONOCYTES NFR BLD: 3 % (ref 4–12)
NEUTROPHILS # BLD MANUAL: 13.1 THOUSAND/UL (ref 1.85–7.62)
NEUTS SEG NFR BLD AUTO: 85 % (ref 43–75)
NRBC BLD AUTO-RTO: 0 /100 WBCS
PHOSPHATE SERPL-MCNC: 1.2 MG/DL (ref 2.3–4.1)
PLATELET # BLD AUTO: 436 THOUSANDS/UL (ref 149–390)
PLATELET BLD QL SMEAR: ADEQUATE
PMV BLD AUTO: 10.8 FL (ref 8.9–12.7)
POLYCHROMASIA BLD QL SMEAR: PRESENT
POTASSIUM SERPL-SCNC: 3.2 MMOL/L (ref 3.5–5.3)
RBC # BLD AUTO: 3.29 MILLION/UL (ref 3.81–5.12)
RBC MORPH BLD: PRESENT
SODIUM SERPL-SCNC: 149 MMOL/L (ref 136–145)
VARIANT LYMPHS # BLD AUTO: 4 %
WBC # BLD AUTO: 15.41 THOUSAND/UL (ref 4.31–10.16)

## 2018-12-30 PROCEDURE — 85027 COMPLETE CBC AUTOMATED: CPT | Performed by: PHYSICIAN ASSISTANT

## 2018-12-30 PROCEDURE — 85007 BL SMEAR W/DIFF WBC COUNT: CPT | Performed by: PHYSICIAN ASSISTANT

## 2018-12-30 PROCEDURE — 82948 REAGENT STRIP/BLOOD GLUCOSE: CPT

## 2018-12-30 PROCEDURE — 94640 AIRWAY INHALATION TREATMENT: CPT

## 2018-12-30 PROCEDURE — 71045 X-RAY EXAM CHEST 1 VIEW: CPT

## 2018-12-30 PROCEDURE — 82330 ASSAY OF CALCIUM: CPT | Performed by: PHYSICIAN ASSISTANT

## 2018-12-30 PROCEDURE — A9585 GADOBUTROL INJECTION: HCPCS | Performed by: PHYSICIAN ASSISTANT

## 2018-12-30 PROCEDURE — 99291 CRITICAL CARE FIRST HOUR: CPT | Performed by: EMERGENCY MEDICINE

## 2018-12-30 PROCEDURE — 83735 ASSAY OF MAGNESIUM: CPT | Performed by: PHYSICIAN ASSISTANT

## 2018-12-30 PROCEDURE — 70553 MRI BRAIN STEM W/O & W/DYE: CPT

## 2018-12-30 PROCEDURE — 99024 POSTOP FOLLOW-UP VISIT: CPT | Performed by: NEUROLOGICAL SURGERY

## 2018-12-30 PROCEDURE — 80048 BASIC METABOLIC PNL TOTAL CA: CPT | Performed by: PHYSICIAN ASSISTANT

## 2018-12-30 PROCEDURE — 94003 VENT MGMT INPAT SUBQ DAY: CPT

## 2018-12-30 PROCEDURE — C9113 INJ PANTOPRAZOLE SODIUM, VIA: HCPCS | Performed by: PHYSICIAN ASSISTANT

## 2018-12-30 PROCEDURE — 84100 ASSAY OF PHOSPHORUS: CPT | Performed by: PHYSICIAN ASSISTANT

## 2018-12-30 PROCEDURE — 94760 N-INVAS EAR/PLS OXIMETRY 1: CPT

## 2018-12-30 RX ORDER — FUROSEMIDE 10 MG/ML
40 INJECTION INTRAMUSCULAR; INTRAVENOUS DAILY
Status: DISCONTINUED | OUTPATIENT
Start: 2018-12-31 | End: 2019-01-03 | Stop reason: HOSPADM

## 2018-12-30 RX ORDER — POTASSIUM CHLORIDE 14.9 MG/ML
20 INJECTION INTRAVENOUS ONCE
Status: COMPLETED | OUTPATIENT
Start: 2018-12-30 | End: 2018-12-30

## 2018-12-30 RX ORDER — IBUPROFEN 600 MG/1
600 TABLET ORAL ONCE
Status: COMPLETED | OUTPATIENT
Start: 2018-12-30 | End: 2018-12-30

## 2018-12-30 RX ADMIN — SENNOSIDES AND DOCUSATE SODIUM 2 TABLET: 8.6; 5 TABLET ORAL at 21:02

## 2018-12-30 RX ADMIN — IPRATROPIUM BROMIDE 0.5 MG: 0.5 SOLUTION RESPIRATORY (INHALATION) at 19:33

## 2018-12-30 RX ADMIN — LEVALBUTEROL 1.25 MG: 1.25 SOLUTION, CONCENTRATE RESPIRATORY (INHALATION) at 13:29

## 2018-12-30 RX ADMIN — PRAMIPEXOLE DIHYDROCHLORIDE 0.25 MG: 0.25 TABLET ORAL at 22:37

## 2018-12-30 RX ADMIN — CHLORHEXIDINE GLUCONATE 0.12% ORAL RINSE 15 ML: 1.2 LIQUID ORAL at 20:02

## 2018-12-30 RX ADMIN — INSULIN LISPRO 20 UNITS: 100 INJECTION, SOLUTION INTRAVENOUS; SUBCUTANEOUS at 18:13

## 2018-12-30 RX ADMIN — LIDOCAINE 2 PATCH: 50 PATCH CUTANEOUS at 08:27

## 2018-12-30 RX ADMIN — POTASSIUM CHLORIDE 20 MEQ: 200 INJECTION, SOLUTION INTRAVENOUS at 08:11

## 2018-12-30 RX ADMIN — ACETAMINOPHEN 650 MG: 160 SUSPENSION ORAL at 18:11

## 2018-12-30 RX ADMIN — LEVALBUTEROL 1.25 MG: 1.25 SOLUTION, CONCENTRATE RESPIRATORY (INHALATION) at 01:12

## 2018-12-30 RX ADMIN — CHLORHEXIDINE GLUCONATE 0.12% ORAL RINSE 15 ML: 1.2 LIQUID ORAL at 08:11

## 2018-12-30 RX ADMIN — INSULIN LISPRO 4 UNITS: 100 INJECTION, SOLUTION INTRAVENOUS; SUBCUTANEOUS at 12:47

## 2018-12-30 RX ADMIN — PRAMIPEXOLE DIHYDROCHLORIDE 0.25 MG: 0.25 TABLET ORAL at 18:13

## 2018-12-30 RX ADMIN — FUROSEMIDE 40 MG: 10 INJECTION, SOLUTION INTRAMUSCULAR; INTRAVENOUS at 08:11

## 2018-12-30 RX ADMIN — POTASSIUM PHOSPHATE, MONOBASIC AND POTASSIUM PHOSPHATE, DIBASIC 30 MMOL: 224; 236 INJECTION, SOLUTION INTRAVENOUS at 08:46

## 2018-12-30 RX ADMIN — PANTOPRAZOLE SODIUM 40 MG: 40 INJECTION, POWDER, FOR SOLUTION INTRAVENOUS at 08:11

## 2018-12-30 RX ADMIN — IPRATROPIUM BROMIDE 0.5 MG: 0.5 SOLUTION RESPIRATORY (INHALATION) at 13:29

## 2018-12-30 RX ADMIN — HEPARIN SODIUM 5000 UNITS: 5000 INJECTION INTRAVENOUS; SUBCUTANEOUS at 05:04

## 2018-12-30 RX ADMIN — DEXMEDETOMIDINE HYDROCHLORIDE 0.1 MCG/KG/HR: 100 INJECTION, SOLUTION INTRAVENOUS at 07:57

## 2018-12-30 RX ADMIN — LEVETIRACETAM 500 MG: 100 SOLUTION ORAL at 08:28

## 2018-12-30 RX ADMIN — LEVETIRACETAM 500 MG: 100 SOLUTION ORAL at 21:02

## 2018-12-30 RX ADMIN — FENTANYL CITRATE 75 MCG/HR: 50 INJECTION, SOLUTION INTRAMUSCULAR; INTRAVENOUS at 08:11

## 2018-12-30 RX ADMIN — IBUPROFEN 600 MG: 600 TABLET, FILM COATED ORAL at 21:02

## 2018-12-30 RX ADMIN — LISINOPRIL: 10 TABLET ORAL at 08:28

## 2018-12-30 RX ADMIN — INSULIN LISPRO 1 UNITS: 100 INJECTION, SOLUTION INTRAVENOUS; SUBCUTANEOUS at 22:49

## 2018-12-30 RX ADMIN — METHYLNALTREXONE BROMIDE 8 MG: 12 INJECTION, SOLUTION SUBCUTANEOUS at 08:28

## 2018-12-30 RX ADMIN — IPRATROPIUM BROMIDE 0.5 MG: 0.5 SOLUTION RESPIRATORY (INHALATION) at 01:12

## 2018-12-30 RX ADMIN — INSULIN LISPRO 4 UNITS: 100 INJECTION, SOLUTION INTRAVENOUS; SUBCUTANEOUS at 00:15

## 2018-12-30 RX ADMIN — ACETAMINOPHEN 650 MG: 160 SUSPENSION ORAL at 12:46

## 2018-12-30 RX ADMIN — IPRATROPIUM BROMIDE 0.5 MG: 0.5 SOLUTION RESPIRATORY (INHALATION) at 07:57

## 2018-12-30 RX ADMIN — LEVALBUTEROL 1.25 MG: 1.25 SOLUTION, CONCENTRATE RESPIRATORY (INHALATION) at 07:57

## 2018-12-30 RX ADMIN — CEFTRIAXONE SODIUM 1000 MG: 10 INJECTION, POWDER, FOR SOLUTION INTRAVENOUS at 20:02

## 2018-12-30 RX ADMIN — NICOTINE 14 MG: 14 PATCH TRANSDERMAL at 08:22

## 2018-12-30 RX ADMIN — LEVOTHYROXINE SODIUM 88 MCG: 88 TABLET ORAL at 05:03

## 2018-12-30 RX ADMIN — HEPARIN SODIUM 5000 UNITS: 5000 INJECTION INTRAVENOUS; SUBCUTANEOUS at 21:02

## 2018-12-30 RX ADMIN — LEVALBUTEROL 1.25 MG: 1.25 SOLUTION, CONCENTRATE RESPIRATORY (INHALATION) at 19:33

## 2018-12-30 RX ADMIN — HEPARIN SODIUM 5000 UNITS: 5000 INJECTION INTRAVENOUS; SUBCUTANEOUS at 15:00

## 2018-12-30 RX ADMIN — ACETAMINOPHEN 650 MG: 160 SUSPENSION ORAL at 06:00

## 2018-12-30 RX ADMIN — ACETAMINOPHEN 650 MG: 160 SUSPENSION ORAL at 00:05

## 2018-12-30 RX ADMIN — GADOBUTROL 5 ML: 604.72 INJECTION INTRAVENOUS at 11:25

## 2018-12-30 NOTE — PROGRESS NOTES
Progress Note - Critical Care   Cleo Alvarado 59 y o  female MRN: [id number]  Unit/Bed#: 3150 [facility] -01 Encounter: [id number]    Assessment:     Principal Problem:    Traumatic brain injury [facility])  Active Problems:    Multiple fractures of ribs, bilateral, initial encounter for closed fracture    Traumatic pneumothorax    Glenoid fracture of shoulder, right, closed, initial encounter    Intraparenchymal hemorrhage of brain ([facility] [address] )    Flail chest, initial encounter for closed fracture    Fracture of thoracic transverse process, closed, initial encounter ([facility] [address] )    Subdural hematoma ([facility] )    Subarachnoid hemorrhage ([facility] [address] )    Diabetes mellitus, type II ([facility] [address] )    Closed nondisplaced left toe fracture    Hypokalemia    Acute encephalopathy    Status post craniectomy    Acute respiratory failure with hypoxia ([facility] [address] )    Mucus plugging of bronchi    Acute blood loss anemia    Ileus (HCC)      Plan:          Neuro:    L SDH / b/l SAH - s/p L craniectomy, repeat CTH w possible seroma v CSF collection, question infection, unable to undergo LP w IR yesterday, pending MRI brain w/wo for stroke / clarification of subgaleal collection, continue neurochecks, GCS remains 9T    Analgesia / sedation - continue to wean precedex and fentanyl                  CV:    Monitor hemodynamics w goal MAP > 65    HTN - hydralazine 5mg Q6 PRN, labetalol 5mg Q4 PRN                  Lung:    AHRF - wean vent as able, wean PEEP and FiO2, pulm toilet    Multiple b/l rib fx and flail chest - pulm toilet, analgesia    Pulmonary edema - lasix BID    VAP - ceftriaxone, suction, mobilize secretions                  GI:    Bowel regimen - senokot, PRN miralax, she is having bowel movements    GERD - protonix 40mg QD                 FEN:    Electrolytes - Monitor/trend - replete based on deficiencies    Fluid - no mIVF while TF at goal    Nutrition - cyclic TF at goal                  :    Marroquin removed, monitor I/Os and renal function    Consider holding lasix today as she is negative 2L                  ID:    E coli UTI - abx day 7, ceftriaxone    H flu / strep pneumoniae VAP - abx day 7, continue ceftriaxone                  Heme:    ABLA 2/2 trauma - bleeding stabilized, continue to monitor    Leukocytosis - monitor in setting of active infections, down from yesterday   thormobocytosis - likely reactive, trend    DVT ppx - SQH                 Endo:    DM 2 w hyperglycemia - place on insulin gtt if remains hyperglycemic on current reg Alg4              hypothyroid - levothyroxine                 Msk/Skin:    Repetitive repositioning and off-loading to prevent skin break down and ulcerative formation    L toe fx - podiatry following, WBAT    T1 - T4 TP fx - analgesia, non-op   R glenoid fx - non op, NWB RUE                 Disposition:    Continue ICU care       Chief Complaint: Unable to provide 2/2 acuity of condition     HPI/24hr events: remained febrile, unable to get LP, did not go for MRI     Physical Exam: Physical Exam   Constitutional: She is oriented to person, place, and time  She appears well-developed and well-nourished  No distress  HENT:   Head: Normocephalic and atraumatic  Eyes: Pupils are equal, round, and reactive to light  Neck: Normal range of motion  Neck supple  No tracheal deviation present  Cardiovascular: Normal rate, regular rhythm, normal heart sounds and intact distal pulses  Exam reveals no gallop and no friction rub  No murmur heard  Pulmonary/Chest: Effort normal and breath sounds normal  No respiratory distress  She has no wheezes  She has no rales  Abdominal: Soft  Bowel sounds are normal  She exhibits no distension and no mass  There is tenderness (grimaces to palpation, soft abd )  There is no guarding  Genitourinary:   Genitourinary Comments: purewick   Musculoskeletal: She exhibits no edema or deformity  Neurological: She is alert and oriented to person, place, and time     gcs remains 9T   Does not follow commands but grimaces and w/d slightly to pain but doesn't localize  She intermittently spont moves the LUE   Eyes open and track    Skin: Skin is warm and dry  She is not diaphoretic  Scalp incision staples and wound c/d/i, dressing applied    Psychiatric: She has a normal mood and affect  Her behavior is normal    Nursing note and vitals reviewed  Vitals:    18 0402 18 0500 18 0600 18 0700   BP: 107/54 140/67 138/64 137/61   Pulse: 66 70 68 76   Resp: 17 19 16 19   Temp: 99 7 °F (37 6 °C) 99 7 °F (37 6 °C) 99 7 °F (37 6 °C) 99 °F (37 2 °C)   TempSrc:       SpO2: 100% 100% 100% 100%   Weight:       Height:         Arterial Line BP: 126/80  Arterial Line MAP (mmHg): 102 mmHg    Temperature:   Temp (24hrs), Av 1 °F (38 4 °C), Min:99 °F (37 2 °C), Max:102 6 °F (39 2 °C)    Current: Temperature: 99 °F (37 2 °C)    Weights:   IBW: 52 4 kg    Body mass index is 22 57 kg/m²    Weight (last 2 days)     Date/Time   Weight    18 0600  57 8 (127 43)              Hemodynamic Monitoring:  N/A     Non-Invasive/Invasive Ventilation Settings:  Respiratory    Lab Data (Last 4 hours)    None         O2/Vent Data (Last 4 hours)    None              No results found for: PHART, ELB8BFK, PO2ART, RES5WCI, W0CAYJPN, BEART, SOURCE  SpO2: SpO2: 100 %    Intake and Outputs:  I/O       701 -  07 -  07 -  0700    I V  (mL/kg) 536 6 (9 3) 429 4 (7 4)     NG/GT 50 70     IV Piggyback 250 150     Feedings 1003 271     Total Intake(mL/kg) 1839 6 (31 8) 920 4 (15 9)     Urine (mL/kg/hr) 3200 (2 3) 3258 (2 3)     Emesis/NG output 0 0     Total Output 3200 3258      Net -1360 4 -2337 6             Unmeasured Urine Occurrence 1 x 2 x         UOP:  75/hour   Nutrition:        Diet Orders            Start     Ordered    18 1007  Diet Enteral/Parenteral; Tube Feeding No Oral Diet; Jevity 1 2 Sae; Cyclic; 63; 16 hours; Prosource Protein Liquid - One Packet  Diet effective now     Comments:  Start tube feedings at 20 ml/hr and advance q4h by 20 ml/hr to goal of 63 ml/hr; hold for residuals >400 mls  Question Answer Comment   Diet Type Enteral/Parenteral    Enteral/Parenteral Tube Feeding No Oral Diet    Tube Feeding Formula: Jevity 1 2 Sae    Bolus/Cyclic/Continuous Cyclic    Tube Feeding Cyclic Rate (mL/hr): 63    Tube Feeding Cyclic: Administer over: 16 hours    Prosource Protein Liquid - No Carb Prosource Protein Liquid - One Packet    RD to adjust diet per protocol? Yes        12/26/18 1008        TF currently running at 63/hour with a goal of 63  Formula:leroyiity 1 2    Labs:     Results from last 7 days  Lab Units 12/30/18  0504 12/29/18  0550 12/28/18  0426   WBC Thousand/uL 15 41* 17 95* 11 40*   HEMOGLOBIN g/dL 9 1* 9 0* 8 2*   HEMATOCRIT % 29 6* 29 3* 26 6*   PLATELETS Thousands/uL 436* 393* 318   MONO PCT % 3* 3* 5      Results from last 7 days  Lab Units 12/30/18  0504 12/29/18  0550 12/28/18  1639  12/24/18  1727 12/24/18  1658   SODIUM mmol/L 149* 147* 144  < >  --   --    POTASSIUM mmol/L 3 2* 3 2* 3 5  < >  --   --    CHLORIDE mmol/L 108 105 100  < >  --   --    CO2 mmol/L 34* 34* 35*  < >  --   --    CO2, I-STAT mmol/L  --   --   --   --  35* 34*   BUN mg/dL 34* 30* 28*  < >  --   --    CREATININE mg/dL 0 42* 0 51* 0 60  < >  --   --    CALCIUM mg/dL 9 0 9 1 9 6  < >  --   --    ALK PHOS U/L  --  126*  --   --   --   --    ALT U/L  --  20  --   --   --   --    AST U/L  --  25  --   --   --   --    GLUCOSE, ISTAT mg/dl  --   --   --   --  125 140   < > = values in this interval not displayed      Results from last 7 days  Lab Units 12/30/18  0504 12/29/18  0550 12/28/18  1639   MAGNESIUM mg/dL 2 3 2 1 2 0       Results from last 7 days  Lab Units 12/30/18  0504 12/29/18  0550 12/26/18  0519   PHOSPHORUS mg/dL 1 2* 3 1 4 1            Results from last 7 days  Lab Units 12/25/18  1538   LACTIC ACID mmol/L 1 6       0  Lab Value Date/Time   TROPONINI <0 02 12/20/2018 1204 TROPONINI <0 02 12/20/2018 0054       Imaging:   CT head wo contrast   Final Result      Decreasing left temporal parenchymal hemorrhages with persistent mild to moderate vasogenic edema and mild regional mass effect  Decreasing bilateral subarachnoid hemorrhage  Small amount of layering intraventricular hemorrhage, noted previously in retrospect  No hydrocephalus  Overall similar or minimally decreased size of left extradural/subgaleal fluid collection described on the previous study  However, the collection does appears to communicate with the extra-axial space anteriorly in the left frontal region (series 2    images 25 through 30)  This could represent collection of CSF fluid as opposed to a seroma  No new hemorrhage or acute intracranial abnormality seen  Workstation performed: [id number]         XR chest portable   Final Result      1  Persistent bilateral groundglass opacities without significant change from prior exam    2   Distal tip of the right-sided PICC line is in the region the SVC with tip coiled and directed cephalad  Workstation performed: [id number]         XR chest portable   Final Result   Slight improvement in bilateral infiltrates  Workstation performed: [id number]         XR chest portable   Final Result      No significant interval change in the diffuse airspace opacities bilaterally, likely reflecting diffuse alveolar edema  Superimposed pneumonia is not excluded  Small right pleural effusion  Workstation performed: [id number]         XR abdomen 1 view kub   Final Result   Improvement  The distention of the small bowel loops which was seen on the previous study has resolved               Workstation performed: [id number]         XR chest portable   Final Result      No change in diffuse bilateral airspace disease concerning for the alveolar phase of pulmonary edema  Superimposed infiltrates not excluded  Workstation performed: [id number]         CT head wo contrast   Final Result         1  Stable intracranial hemorrhage  Resolution of pneumocephalus and decreased vasogenic edema  2   No midline shift or hydrocephalus  3   New extradural/subgaleal collection measuring 6 x 2 x 8 cm  Possible seroma  Abscess less likely though not excluded  Workstation performed: [id number]         XR chest portable   Final Result      Pulmonary edema, unchanged since a portable chest radiograph from earlier in the day  Workstation performed: [id number]         VAS lower limb venous duplex study, complete bilateral   Final Result      XR chest portable   Final Result      Stable diffuse pulmonary edema  Workstation performed: [id number]         XR chest portable   Final Result      Appropriately positioned endotracheal tube      Persistent bilateral pulmonary airspace disease without significant change            Workstation performed: [id number]         XR abdomen 1 view kub   Final Result      Increased bowel gas predominantly in the colon suggesting colonic ileus  Maximal transverse diameter, located in the right transverse colon is approximately 9 8 cm               Workstation performed: [id number]         XR chest portable ICU   Final Result      Endotracheal tube tip 2 9 cm above the radha      No interval change in appearance of diffuse bilateral pulmonary airspace disease            Workstation performed: [id number]         XR chest portable   Final Result      1  Low position of endotracheal tube which should be withdrawn approximately 2 to 3 cm  2   Left lower lobe consolidation  3   Diffuse patchy opacities in both lungs, either pneumonia or, more likely, pulmonary edema  Workstation performed: [id number]         XR chest portable   Final Result      1  Tip of endotracheal tube 9 mm above the radha    I recommend that the tube be pulled back approximately 3 cm  2   Opacification in both lungs, most likely pulmonary edema or, possibly, bilateral pneumonia  Workstation performed: [id number]         CTA chest pe study   Final Result   No evidence of pulmonary embolism      Moderate right effusion small left effusion      There is development of diffuse lung opacities with reticulation, groundglass density with a few areas of basilar consolidation  This may be due to pulmonary edema  However infiltrates are difficult to exclude  The ET tube is low-lying with tip lying in the radha with likely projection into the right main bronchus  Small mediastinal lymph nodes larger from the previous study probably reactive          I personally discussed this study with Kylah Leung on 12/22/2018 at 11:46 AM                            Workstation performed: [id number]         XR foot 3+ vw left   Final Result      Nondisplaced transverse fracture involves the proximal aspect of the proximal to the 3rd digit  Workstation performed: [id number]         CT head wo contrast   Final Result      1  Postsurgical changes of left craniectomy and evacuation of left temporal intraparenchymal hemorrhage  2   A small amount of intraparenchymal/extra-axial hemorrhage remains within the anterior left temporal lobe and stable thin subdural hemorrhage along the left frontal region  3   Increased blood products along the cerebral falx when compared with prior exam    4   Persistent edema predominantly within the left temporal lobe and effacement of the left-sided cerebral sulci however there is interval resolution of midline shift and decreased effacement of the left lateral ventricle  5   Continued washout and interval evolution of intraventricular and subarachnoid blood products        Workstation performed: [id number]         XR chest portable ICU   Final Result      Right subclavian catheter extending superiorly into the neck requiring repositioning  Workstation performed: [id number]         XR chest 1 view   Final Result      Multiple right-sided rib fractures  Consolidation at the base may represent atelectasis or pneumonia  Workstation performed: [id number]         XR chest portable ICU   Final Result      Mild central vascular congestion  Basilar opacities and effusions  Stable rib fractures  Workstation performed: [id number]         CT shoulder right wo contrast   Final Result      Acute fracture anterior glenoid rim with 1 to 2 mm anteromedial displacement  Acute comminuted fracture posterior acromion with 6 mm mediolateral distraction and 1 to 2 mm inferior cortical offset  No dislocation  Interval progression of right lower lobe and right upper lobe dependent airspace disease  This may represent subsegmental atelectasis, aspiration, and/or pulmonary contusion  Interval enlargement of small right pleural effusion  No other significant interval change  The examination demonstrates a significant  finding and was documented as such in [facility] for liaison and referring practitioner notification  Workstation performed: [id number]         CT head wo contrast   Final Result      1  Left temporal lobe intraparenchymal hemorrhage with slight increase in surrounding edema as manifest by increased effacement of the left occipital horn when compared with prior examination  Slight increase in left to right midline shift now    measuring up to 6 mm, previously 4 mm  2   Stable subdural, subarachnoid and intraventricular blood products  No large delayed intracranial hemorrhage  The study was marked in [facility] for immediate notification  Workstation performed: [id number]         XR chest portable ICU   Final Result      Right basilar opacity partially obscuring the hemidiaphragm may represent atelectasis or infiltrate   Possible small basilar effusion  Workstation performed: [id number]         XR shoulder 2+ vw right   Final Result      Right-sided rib fractures again noted  Possible acromion fracture  The small glenoid fracture seen on CT scan of the chest is not clearly appreciated on this exam             Workstation performed: [id number]         XR trauma multiple   Final Result      Multiple right-sided rib fractures  Consolidation at the base may represent atelectasis or pneumonia  Workstation performed: [id number]         XR chest portable   Final Result      Satisfactory intubation  Bibasilar subsegmental atelectasis  No pneumothorax  Workstation performed: [id number]         CT head wo contrast   Final Result      1  Stable size of large intraparenchymal hemorrhage centered in the left temporal lobe with surrounding mass effect and edema and adjacent left temporal to frontal subdural hemorrhage  Stable midline shift from left to right  2   Increased conspicuity of bilateral posterior subarachnoid and intraventricular blood products are likely due to redistribution  Workstation performed: [id number]         TRAUMA - CT chest abdomen pelvis w contrast   Final Result      1  Multiple right-sided rib fractures with tiny right apical pneumothorax  Fractures extend from the second through eleventh ribs at the costovertebral junctions and fourth through eighth ribs laterally  This is consistent with flail chest  A right    anterior second rib fracture is severely displaced/angulated  2   Fracture of the anterior right glenoid, acromium, and first through fourth thoracic transverse processes on the right  3   Focal irregular opacity in the right upper lobe with adjacent 4 mm nodule  While contusion is possible given overlying trauma, the appearance of the lesion is most suspicious for a primary lung cancer and follow-up PET/CT is recommended        4   Borderline upper abdominal lymph nodes with density/stranding at the base of the small bowel mesentery  While mesenteric contusion can have this appearance in the setting of trauma  There are calcifications which suggest chronicity  Lymphoproliferative disorder cannot be excluded and follow-up and 3-6 months is recommended  5   Chronic interstitial lung disease of uncertain etiology  Given esophageal dilatation, this could represent NSIP in the setting of scleroderma  I personally discussed this study with Catarino Griffin on 12/18/2018 at 8:45 PM                   Workstation performed: [id number]         CTA head and neck w wo contrast   Final Result         1  Atherosclerotic plaque at the right carotid bifurcation resulting in moderate (50-69%) stenosis  No evidence of carotid or vertebral artery dissection or pseudoaneurysm to suggest acute vascular injury  2   Displacement of left MCA branches by left temporal parietal hematoma  No evidence of aneurysm or vascular malformation  No significant stenosis in the major vessels of the [place]  3   Patent dural venous sinuses  Workstation performed: [id number]         TRAUMA - CT spine cervical wo contrast   Final Result      1  No cervical spine fracture or traumatic malalignment  2   Small right apical pneumothorax  3   Nondisplaced fractures of the right T1 and T2 transverse processes  Fracture of the posterior right second rib  I personally discussed this study with Catarino Griffin on 12/18/2018 at 8:45 PM                          Workstation performed: [id number]         TRAUMA - CT head wo contrast   Final Result      1  Large parenchymal hemorrhage on the left with associated left-sided subdural hematoma and bilateral subarachnoid hemorrhage including intraventricular hemorrhage  2   Mass effect and midline shift to the right measuring 4 mm        I personally discussed this study with SHARATH VILLANUEVA SOLANO on 12/18/2018 at 8:45 PM                      Workstation performed: [id number]         CT head wo contrast    (Results Pending)   CTA head w wo contrast    (Results Pending)   XR chest portable ICU    (Results Pending)   XR chest portable    (Results Pending)   IR lumbar puncture    (Results Pending)   MRI brain w wo contrast    (Results Pending)      I have personally reviewed pertinent reports  and I have personally reviewed pertinent films in PACS    EKG: as per tele nsr no ectopy     Micro:  Lab Results   Component Value Date    BLOODCX No Growth After 4 Days  12/25/2018    BLOODCX No Growth After 4 Days   12/25/2018    URINECX >100,000 cfu/ml Gram Negative [place] Enteric Like (A) 12/25/2018    URINECX >100,000 cfu/ml Escherichia coli (A) 12/25/2018    SPUTUMCULTUR 4+ Growth of Haemophilus influenzae (AA) 12/25/2018    SPUTUMCULTUR 1+ Growth of Streptococcus pneumoniae (AA) 12/25/2018    SPUTUMCULTUR 1+ Growth of  12/25/2018       Allergies: No Known Allergies    Medications:   Scheduled Meds:    Current Facility-Administered Medications:  acetaminophen 650 mg Oral Q6H Whitley Tang PA-C    albuterol 2 5 mg Nebulization Q4H PRN Lisa Marrero DO    bisacodyl 10 mg Rectal Daily PRN Navid Shepherd PA-C    cefTRIAXone 1,000 mg Intravenous Q24H Whitley Tang PA-C Last Rate: Stopped (12/29/18 2115)   chlorhexidine 15 mL Swish & Spit Q12H [facility] Orestes White MD    dexmedetomidine 0 1-0 9 mcg/kg/hr Intravenous Titrated Maria Del Carmen Rapp MD Last Rate: 0 1 mcg/kg/hr (12/30/18 6977)   fentaNYL 75 mcg/hr Intravenous Continuous Aurora Gonzalez MD Last Rate: 75 mcg/hr (12/29/18 1404)   furosemide 40 mg Intravenous BID (diuretic) Debbi Bone PA-C    heparin (porcine) 5,000 Units Subcutaneous Q8H [address], MD    hydrALAZINE 5 mg Intravenous Q6H PRN Nancyann Goodpasture, MD    HYDROmorphone 1 mg Intravenous Q2H PRN Debbi Bone PA-C    influenza vaccine 0 5 mL Intramuscular Prior to discharge Ankit Hernandes,     insulin lispro 2-12 Units Subcutaneous Q6H [address] Elias Gaming PA-C    ipratropium 0 5 mg Nebulization Q6H Ankit Hernandes,     levalbuterol 1 25 mg Nebulization Q6H Ankit Hernandes,     levETIRAcetam 500 mg Oral Q12H [address] Jessa Gonzalez MD    levothyroxine 88 mcg Oral Early Morning Lester Tran PA-C    lidocaine 2 patch Topical Daily Lester Tran PA-C    lisinopril-hydrochlorothiazide (PRINZIDE 10/12  5) combo dose  Oral Daily Shruthi Benito PA-C    methylnaltrexone 8 mg Subcutaneous Every Other Day Lester Tran PA-C    nicotine 14 mg Transdermal Daily Elpidio Parrish PA-C    pantoprazole 40 mg Intravenous Q24H [address] Lester Tran PA-C    polyethylene glycol 17 g Oral Daily PRN Shruthi Benito PA-C    pramipexole 0 25 mg Oral BID TRISTAN Choe    senna-docusate sodium 2 tablet Oral HS Lester Tran PA-C      Continuous Infusions:    dexmedetomidine 0 1-0 9 mcg/kg/hr Last Rate: 0 1 mcg/kg/hr (12/30/18 0757)   fentaNYL 75 mcg/hr Last Rate: 75 mcg/hr (12/29/18 1404)     PRN Meds:    albuterol 2 5 mg Q4H PRN   bisacodyl 10 mg Daily PRN   hydrALAZINE 5 mg Q6H PRN   HYDROmorphone 1 mg Q2H PRN   influenza vaccine 0 5 mL Prior to discharge   polyethylene glycol 17 g Daily PRN       VTE Pharmacologic Prophylaxis: Sequential compression device (Venodyne)  and Heparin  VTE Mechanical Prophylaxis: sequential compression device    Invasive lines and devices: Invasive Devices     Peripherally Inserted Central Catheter Line            PICC Line 96/80/99 Left Basilic 7 days          Drain            NG/OG/Enteral Tube Nasogastric Right nares 5 days    External Urinary Catheter 1 day          Airway            Surgical Airway Shiley Cuffed 5 days                   Counseling / Coordination of Care  Total Critical Care time spent 48 minutes excluding procedures, teaching and family updates         Code Status: Level 1 - Full Code     Portions of the record may have been created with voice recognition software  Occasional wrong word or "sound a like" substitutions may have occurred due to the inherent limitations of voice recognition software  Read the chart carefully and recognize, using context, where substitutions have occurred       Suki Garcia PA-C

## 2018-12-30 NOTE — PLAN OF CARE
DISCHARGE PLANNING     Discharge to home or other facility with appropriate resources Progressing        DISCHARGE PLANNING - CARE MANAGEMENT     Discharge to post-acute care or home with appropriate resources Progressing        INFECTION - ADULT     Absence or prevention of progression during hospitalization Progressing     Absence of fever/infection during neutropenic period Progressing        NEUROSENSORY - ADULT     Achieves stable or improved neurological status Progressing     Absence of seizures Progressing     Remains free of injury related to seizures activity Progressing     Achieves maximal functionality and self care Progressing        Nutrition/Hydration-ADULT     Nutrient/Hydration intake appropriate for improving, restoring or maintaining nutritional needs Progressing        PAIN - ADULT     Verbalizes/displays adequate comfort level or baseline comfort level Progressing        Potential for Falls     Patient will remain free of falls Progressing        Prexisting or High Potential for Compromised Skin Integrity     Skin integrity is maintained or improved Progressing        SAFETY ADULT     Maintain or return to baseline ADL function Progressing     Maintain or return mobility status to optimal level Progressing        SAFETY,RESTRAINT: NV/NON-SELF DESTRUCTIVE BEHAVIOR     Remains free of harm/injury (restraint for non violent/non self-detsructive behavior) Progressing     Returns to optimal restraint-free functioning Progressing

## 2018-12-30 NOTE — RESPIRATORY THERAPY NOTE
RT Ventilator Management Note  Kwame Anderson 59 y o  female MRN: [id number]  Unit/Bed#:  -01 Encounter: [id number]      Daily Screen       12/29/2018 1139 12/29/2018 1619          Patient safety screen outcome[de-identified] Failed Failed      Not Ready for Weaning due to[de-identified] - Poor inspiratory effort              Physical Exam:   Assessment Type: Assess only  General Appearance: Sedated  Respiratory Pattern: Normal, Assisted  Chest Assessment: Chest expansion symmetrical  Bilateral Breath Sounds: Diminished      Resp Comments: (P) pt appears comfortable on current settings, pt awake, will attempt to wean, will continue to monitor

## 2018-12-30 NOTE — RESPIRATORY THERAPY NOTE
RT Ventilator Management Note  Sree Pineda 59 y o  female MRN: [id number]  Unit/Bed#:  -01 Encounter: [id number]      Daily Screen       12/30/2018 1551 12/30/2018 1621          Patient safety screen outcome[de-identified] Passed -      Spont breathing trial outcome[de-identified] - Failed      Spont breathing trial reason failed: - RR > 35 bpm;RSBI > 100              Physical Exam:   Assessment Type: (P) Assess only  General Appearance: (P) Awake  Respiratory Pattern: (P) Assisted  Chest Assessment: (P) Chest expansion symmetrical  Bilateral Breath Sounds: (P) Diminished      Resp Comments: At end of 30 min 5/8/50% PSV trial, RR 40, Vt 350's, RSBI 120's, VE 12 9, HR 98, SpO2 100  Pt returned to [facility] settings

## 2018-12-30 NOTE — PROGRESS NOTES
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 and improvement after keppra was reinitiated    Plan:  Cont keppra indefinitely  Agree with MRI brain to r/o stroke etc   Given improvement in exam do not recommend LP at this time    VTE Prophylaxis: SQ DVT prophy and SCDs    Subjective/Objective   Chief Complaint: left crani for iph    Vitals: Blood pressure 137/61, pulse 76, temperature 99 °F (37 2 °C), resp  rate 19, height 5' 3" (1 6 m), weight 57 8 kg (127 lb 6 8 oz), SpO2 100 %  ,Body mass index is 22 57 kg/m²  Hemodynamic Monitoring: MAP: Arterial Line MAP (mmHg): 102 mmHg    Physical Exam:     Physical Exam   Constitutional: No distress  HENT:   Left cranial flap, incision c/d/i  Trached, NG in place   Eyes: Pupils are equal, round, and reactive to light  Neck: Normal range of motion  Cardiovascular: Normal rate  Pulmonary/Chest: Effort normal    Neurological: She is alert  Non verbal  R Plegia  Left purposeful       Neurologic Exam     Cranial Nerves     CN III, IV, VI   Pupils are equal, round, and reactive to light  Invasive Devices     Peripherally Inserted Central Catheter Line            PICC Line 53/10/69 Left Basilic 8 days          Drain            NG/OG/Enteral Tube Nasogastric Right nares 5 days    External Urinary Catheter 1 day          Airway            Surgical Airway Shiley Cuffed 5 days                          Lab Results: I have personally reviewed pertinent results  Imaging Studies: I have personally reviewed pertinent reports        Other Studies:

## 2018-12-30 NOTE — RESPIRATORY THERAPY NOTE
RT Ventilator Management Note  Markie Moreno 59 y o  female MRN: [id number]  Unit/Bed#:  -01 Encounter: [id number]      Daily Screen       12/29/2018 1139 12/29/2018 1619          Patient safety screen outcome[de-identified] Failed Failed      Not Ready for Weaning due to[de-identified] - Poor inspiratory effort              Physical Exam:   Assessment Type: (P) Assess only  General Appearance: (P) Sedated  Respiratory Pattern: (P) Normal, Assisted  Chest Assessment: (P) Chest expansion symmetrical  Bilateral Breath Sounds: (P) Diminished      Resp Comments: (P) Pt remained on a/c settings throughout the night without incident

## 2018-12-30 NOTE — RESPIRATORY THERAPY NOTE
RT Ventilator Management Note  Kwame Anderson 59 y o  female MRN: [id number]  Unit/Bed#:  -01 Encounter: [id number]      Daily Screen       12/29/2018 1619 12/30/2018 0840          Patient safety screen outcome[de-identified] Failed Passed      Not Ready for Weaning due to[de-identified] Poor inspiratory effort -      Spont breathing trial % for 30 min: - Yes      Spont breathing trial outcome[de-identified] - Passed      Name of Medical Team Notified[de-identified] - [facility]      Preparing to extubate/ Notify Nurse: - No (comment)      Extubation order obtained: - No (comment)      Patient extubated: - No              Physical Exam:   Assessment Type: Assess only  General Appearance: Sedated  Respiratory Pattern: Normal, Assisted  Chest Assessment: Chest expansion symmetrical  Bilateral Breath Sounds: Diminished      Resp Comments: (P) pt tolerated wean on cpap/ps x 30 minutes well, placed back on AC settings, pt going for an MRI this AM

## 2018-12-31 LAB
ANION GAP SERPL CALCULATED.3IONS-SCNC: 6 MMOL/L (ref 4–13)
ANISOCYTOSIS BLD QL SMEAR: PRESENT
BASOPHILS # BLD MANUAL: 0 THOUSAND/UL (ref 0–0.1)
BASOPHILS NFR MAR MANUAL: 0 % (ref 0–1)
BUN SERPL-MCNC: 31 MG/DL (ref 5–25)
CA-I BLD-SCNC: 1.13 MMOL/L (ref 1.12–1.32)
CALCIUM SERPL-MCNC: 8.6 MG/DL (ref 8.3–10.1)
CHLORIDE SERPL-SCNC: 111 MMOL/L (ref 100–108)
CO2 SERPL-SCNC: 36 MMOL/L (ref 21–32)
CREAT SERPL-MCNC: 0.42 MG/DL (ref 0.6–1.3)
EOSINOPHIL # BLD MANUAL: 0.42 THOUSAND/UL (ref 0–0.4)
EOSINOPHIL NFR BLD MANUAL: 3 % (ref 0–6)
ERYTHROCYTE [DISTWIDTH] IN BLOOD BY AUTOMATED COUNT: 17.3 % (ref 11.6–15.1)
GFR SERPL CREATININE-BSD FRML MDRD: 110 ML/MIN/1.73SQ M
GLUCOSE SERPL-MCNC: 161 MG/DL (ref 65–140)
GLUCOSE SERPL-MCNC: 170 MG/DL (ref 65–140)
GLUCOSE SERPL-MCNC: 214 MG/DL (ref 65–140)
GLUCOSE SERPL-MCNC: 268 MG/DL (ref 65–140)
GLUCOSE SERPL-MCNC: 278 MG/DL (ref 65–140)
HCT VFR BLD AUTO: 29.1 % (ref 34.8–46.1)
HGB BLD-MCNC: 8.7 G/DL (ref 11.5–15.4)
LYMPHOCYTES # BLD AUTO: 1.82 THOUSAND/UL (ref 0.6–4.47)
LYMPHOCYTES # BLD AUTO: 13 % (ref 14–44)
MAGNESIUM SERPL-MCNC: 2.2 MG/DL (ref 1.6–2.6)
MCH RBC QN AUTO: 27.2 PG (ref 26.8–34.3)
MCHC RBC AUTO-ENTMCNC: 29.9 G/DL (ref 31.4–37.4)
MCV RBC AUTO: 91 FL (ref 82–98)
MONOCYTES # BLD AUTO: 0.42 THOUSAND/UL (ref 0–1.22)
MONOCYTES NFR BLD: 3 % (ref 4–12)
NEUTROPHILS # BLD MANUAL: 11.18 THOUSAND/UL (ref 1.85–7.62)
NEUTS SEG NFR BLD AUTO: 80 % (ref 43–75)
NRBC BLD AUTO-RTO: 0 /100 WBCS
PHOSPHATE SERPL-MCNC: 3.5 MG/DL (ref 2.3–4.1)
PLATELET # BLD AUTO: 532 THOUSANDS/UL (ref 149–390)
PLATELET BLD QL SMEAR: ABNORMAL
PMV BLD AUTO: 10.7 FL (ref 8.9–12.7)
POIKILOCYTOSIS BLD QL SMEAR: PRESENT
POLYCHROMASIA BLD QL SMEAR: PRESENT
POTASSIUM SERPL-SCNC: 3.3 MMOL/L (ref 3.5–5.3)
PROCALCITONIN SERPL-MCNC: 0.21 NG/ML
RBC # BLD AUTO: 3.2 MILLION/UL (ref 3.81–5.12)
RBC MORPH BLD: PRESENT
SODIUM SERPL-SCNC: 153 MMOL/L (ref 136–145)
TARGETS BLD QL SMEAR: PRESENT
VARIANT LYMPHS # BLD AUTO: 1 %
WBC # BLD AUTO: 13.98 THOUSAND/UL (ref 4.31–10.16)

## 2018-12-31 PROCEDURE — 84145 PROCALCITONIN (PCT): CPT | Performed by: SURGERY

## 2018-12-31 PROCEDURE — 82330 ASSAY OF CALCIUM: CPT | Performed by: PHYSICIAN ASSISTANT

## 2018-12-31 PROCEDURE — 85027 COMPLETE CBC AUTOMATED: CPT | Performed by: PHYSICIAN ASSISTANT

## 2018-12-31 PROCEDURE — 83735 ASSAY OF MAGNESIUM: CPT | Performed by: SURGERY

## 2018-12-31 PROCEDURE — C9113 INJ PANTOPRAZOLE SODIUM, VIA: HCPCS | Performed by: PHYSICIAN ASSISTANT

## 2018-12-31 PROCEDURE — 94760 N-INVAS EAR/PLS OXIMETRY 1: CPT

## 2018-12-31 PROCEDURE — 99024 POSTOP FOLLOW-UP VISIT: CPT | Performed by: PHYSICIAN ASSISTANT

## 2018-12-31 PROCEDURE — 80048 BASIC METABOLIC PNL TOTAL CA: CPT | Performed by: PHYSICIAN ASSISTANT

## 2018-12-31 PROCEDURE — 94003 VENT MGMT INPAT SUBQ DAY: CPT

## 2018-12-31 PROCEDURE — 94640 AIRWAY INHALATION TREATMENT: CPT

## 2018-12-31 PROCEDURE — 84100 ASSAY OF PHOSPHORUS: CPT | Performed by: PHYSICIAN ASSISTANT

## 2018-12-31 PROCEDURE — 99291 CRITICAL CARE FIRST HOUR: CPT | Performed by: EMERGENCY MEDICINE

## 2018-12-31 PROCEDURE — 82948 REAGENT STRIP/BLOOD GLUCOSE: CPT

## 2018-12-31 PROCEDURE — 85007 BL SMEAR W/DIFF WBC COUNT: CPT | Performed by: PHYSICIAN ASSISTANT

## 2018-12-31 RX ORDER — POTASSIUM CHLORIDE 29.8 MG/ML
40 INJECTION INTRAVENOUS ONCE
Status: COMPLETED | OUTPATIENT
Start: 2018-12-31 | End: 2018-12-31

## 2018-12-31 RX ORDER — AMOXICILLIN 250 MG
2 CAPSULE ORAL 2 TIMES DAILY
Status: DISCONTINUED | OUTPATIENT
Start: 2018-12-31 | End: 2019-01-01

## 2018-12-31 RX ORDER — BISACODYL 10 MG
10 SUPPOSITORY, RECTAL RECTAL DAILY
Status: DISCONTINUED | OUTPATIENT
Start: 2018-12-31 | End: 2019-01-01

## 2018-12-31 RX ORDER — LAMOTRIGINE 25 MG/1
25 TABLET ORAL DAILY
Status: DISCONTINUED | OUTPATIENT
Start: 2018-12-31 | End: 2019-01-03 | Stop reason: HOSPADM

## 2018-12-31 RX ORDER — POTASSIUM CHLORIDE 14.9 MG/ML
20 INJECTION INTRAVENOUS ONCE
Status: COMPLETED | OUTPATIENT
Start: 2018-12-31 | End: 2018-12-31

## 2018-12-31 RX ORDER — POLYETHYLENE GLYCOL 3350 17 G/17G
17 POWDER, FOR SOLUTION ORAL DAILY
Status: DISCONTINUED | OUTPATIENT
Start: 2018-12-31 | End: 2019-01-01

## 2018-12-31 RX ORDER — OXYCODONE HYDROCHLORIDE 10 MG/1
10 TABLET ORAL EVERY 4 HOURS PRN
Status: DISCONTINUED | OUTPATIENT
Start: 2018-12-31 | End: 2019-01-03 | Stop reason: HOSPADM

## 2018-12-31 RX ORDER — OXYCODONE HYDROCHLORIDE 5 MG/1
5 TABLET ORAL EVERY 4 HOURS PRN
Status: DISCONTINUED | OUTPATIENT
Start: 2018-12-31 | End: 2019-01-03 | Stop reason: HOSPADM

## 2018-12-31 RX ADMIN — INSULIN LISPRO 2 UNITS: 100 INJECTION, SOLUTION INTRAVENOUS; SUBCUTANEOUS at 23:05

## 2018-12-31 RX ADMIN — LEVALBUTEROL 1.25 MG: 1.25 SOLUTION, CONCENTRATE RESPIRATORY (INHALATION) at 00:24

## 2018-12-31 RX ADMIN — IPRATROPIUM BROMIDE 0.5 MG: 0.5 SOLUTION RESPIRATORY (INHALATION) at 19:39

## 2018-12-31 RX ADMIN — NYSTATIN 500000 UNITS: 100000 SUSPENSION ORAL at 18:21

## 2018-12-31 RX ADMIN — HEPARIN SODIUM 5000 UNITS: 5000 INJECTION INTRAVENOUS; SUBCUTANEOUS at 23:00

## 2018-12-31 RX ADMIN — LEVALBUTEROL 1.25 MG: 1.25 SOLUTION, CONCENTRATE RESPIRATORY (INHALATION) at 13:29

## 2018-12-31 RX ADMIN — IPRATROPIUM BROMIDE 0.5 MG: 0.5 SOLUTION RESPIRATORY (INHALATION) at 07:49

## 2018-12-31 RX ADMIN — IPRATROPIUM BROMIDE 0.5 MG: 0.5 SOLUTION RESPIRATORY (INHALATION) at 13:29

## 2018-12-31 RX ADMIN — SENNOSIDES AND DOCUSATE SODIUM 2 TABLET: 8.6; 5 TABLET ORAL at 08:25

## 2018-12-31 RX ADMIN — LAMOTRIGINE 25 MG: 25 TABLET ORAL at 12:24

## 2018-12-31 RX ADMIN — FUROSEMIDE 40 MG: 10 INJECTION, SOLUTION INTRAMUSCULAR; INTRAVENOUS at 08:25

## 2018-12-31 RX ADMIN — CHLORHEXIDINE GLUCONATE 0.12% ORAL RINSE 15 ML: 1.2 LIQUID ORAL at 08:23

## 2018-12-31 RX ADMIN — PRAMIPEXOLE DIHYDROCHLORIDE 0.25 MG: 0.25 TABLET ORAL at 18:22

## 2018-12-31 RX ADMIN — ACETAMINOPHEN 650 MG: 160 SUSPENSION ORAL at 01:13

## 2018-12-31 RX ADMIN — LEVOTHYROXINE SODIUM 88 MCG: 88 TABLET ORAL at 06:23

## 2018-12-31 RX ADMIN — HEPARIN SODIUM 5000 UNITS: 5000 INJECTION INTRAVENOUS; SUBCUTANEOUS at 14:21

## 2018-12-31 RX ADMIN — ACETAMINOPHEN 650 MG: 160 SUSPENSION ORAL at 18:21

## 2018-12-31 RX ADMIN — ACETAMINOPHEN 650 MG: 160 SUSPENSION ORAL at 12:16

## 2018-12-31 RX ADMIN — ACETAMINOPHEN 650 MG: 160 SUSPENSION ORAL at 06:23

## 2018-12-31 RX ADMIN — LEVALBUTEROL 1.25 MG: 1.25 SOLUTION, CONCENTRATE RESPIRATORY (INHALATION) at 07:49

## 2018-12-31 RX ADMIN — NYSTATIN 500000 UNITS: 100000 SUSPENSION ORAL at 22:59

## 2018-12-31 RX ADMIN — IPRATROPIUM BROMIDE 0.5 MG: 0.5 SOLUTION RESPIRATORY (INHALATION) at 00:24

## 2018-12-31 RX ADMIN — NICOTINE 14 MG: 14 PATCH TRANSDERMAL at 08:25

## 2018-12-31 RX ADMIN — INSULIN LISPRO 5 UNITS: 100 INJECTION, SOLUTION INTRAVENOUS; SUBCUTANEOUS at 06:28

## 2018-12-31 RX ADMIN — INSULIN LISPRO 10 UNITS: 100 INJECTION, SOLUTION INTRAVENOUS; SUBCUTANEOUS at 17:45

## 2018-12-31 RX ADMIN — LEVETIRACETAM 500 MG: 100 SOLUTION ORAL at 21:01

## 2018-12-31 RX ADMIN — POTASSIUM CHLORIDE 40 MEQ: 400 INJECTION, SOLUTION INTRAVENOUS at 08:26

## 2018-12-31 RX ADMIN — INSULIN LISPRO 5 UNITS: 100 INJECTION, SOLUTION INTRAVENOUS; SUBCUTANEOUS at 01:12

## 2018-12-31 RX ADMIN — PANTOPRAZOLE SODIUM 40 MG: 40 INJECTION, POWDER, FOR SOLUTION INTRAVENOUS at 08:23

## 2018-12-31 RX ADMIN — INSULIN LISPRO 15 UNITS: 100 INJECTION, SOLUTION INTRAVENOUS; SUBCUTANEOUS at 12:16

## 2018-12-31 RX ADMIN — CEFTRIAXONE SODIUM 1000 MG: 10 INJECTION, POWDER, FOR SOLUTION INTRAVENOUS at 21:00

## 2018-12-31 RX ADMIN — BISACODYL 10 MG: 10 SUPPOSITORY RECTAL at 08:23

## 2018-12-31 RX ADMIN — HEPARIN SODIUM 5000 UNITS: 5000 INJECTION INTRAVENOUS; SUBCUTANEOUS at 06:23

## 2018-12-31 RX ADMIN — SENNOSIDES AND DOCUSATE SODIUM 2 TABLET: 8.6; 5 TABLET ORAL at 18:21

## 2018-12-31 RX ADMIN — NYSTATIN 500000 UNITS: 100000 SUSPENSION ORAL at 12:25

## 2018-12-31 RX ADMIN — LEVALBUTEROL 1.25 MG: 1.25 SOLUTION, CONCENTRATE RESPIRATORY (INHALATION) at 19:39

## 2018-12-31 RX ADMIN — POLYETHYLENE GLYCOL 3350 17 G: 17 POWDER, FOR SOLUTION ORAL at 08:25

## 2018-12-31 RX ADMIN — HYDROMORPHONE HYDROCHLORIDE 1 MG: 1 INJECTION, SOLUTION INTRAMUSCULAR; INTRAVENOUS; SUBCUTANEOUS at 17:13

## 2018-12-31 RX ADMIN — LEVETIRACETAM 500 MG: 100 SOLUTION ORAL at 08:24

## 2018-12-31 RX ADMIN — POTASSIUM CHLORIDE 20 MEQ: 200 INJECTION, SOLUTION INTRAVENOUS at 12:15

## 2018-12-31 RX ADMIN — PRAMIPEXOLE DIHYDROCHLORIDE 0.25 MG: 0.25 TABLET ORAL at 23:00

## 2018-12-31 RX ADMIN — FENTANYL CITRATE 50 MCG/HR: 50 INJECTION, SOLUTION INTRAMUSCULAR; INTRAVENOUS at 09:06

## 2018-12-31 RX ADMIN — LIDOCAINE 2 PATCH: 50 PATCH CUTANEOUS at 08:25

## 2018-12-31 RX ADMIN — CHLORHEXIDINE GLUCONATE 0.12% ORAL RINSE 15 ML: 1.2 LIQUID ORAL at 21:00

## 2018-12-31 RX ADMIN — LISINOPRIL: 10 TABLET ORAL at 08:23

## 2018-12-31 NOTE — RESPIRATORY THERAPY NOTE
RT Ventilator Management Note  Maura Mejia 59 y o  female MRN: [id number]  Unit/Bed#:  -01 Encounter: [id number]      Daily Screen       12/30/2018 1551 12/30/2018 1621          Patient safety screen outcome[de-identified] Passed -      Spont breathing trial outcome[de-identified] - Failed      Spont breathing trial reason failed: - RR > 35 bpm;RSBI > 100              Physical Exam:   Assessment Type: (P) Assess only  General Appearance: (P) Awake  Respiratory Pattern: (P) Normal, Assisted  Chest Assessment: (P) Chest expansion symmetrical  Bilateral Breath Sounds: (P) Diminished      Resp Comments: (P) Pt remained on a/c settings throughout the night without incident

## 2018-12-31 NOTE — PROGRESS NOTES
Progress Note - Neurosurgery   Harris Salinas 59 y o  female MRN: [id number]  Unit/Bed#:  -01 Encounter: [id number]    Assessment:  1  POD 11 s/p Left hemicraniectomy for evacuation of left temporal intraparenchymal hematoma, Expansatile duraplasty  2  s/p Tracheostomy tube placement  3  Subacute Traumatic Large Left Temporal IPH  4  S/p Fall (Unwitnessed)  5  Subacute Traumatic Left Temporal to frontal SDH  6  Subacute Traumatic Bilateral posterior SAH  7  Subacute Traumatic IVH  8  Subacute Traumatic Nondisplaced Fractures  Right Transverse processes T1 - T4  9  Subacute Traumatic Multiple rib fractures on the right   10  Subacute Traumatic Right Glenoid fracture  11  Subacute Traumatic Right Acromion fracture  12  Small right Apical Pneumothorax   13  RUL lung nodule  14  Diabetes Mellitus Type II  15  Hypernatremia  16  Hypokalemia     Plan:  · Exam revealed GCS 9-10T E4 V1T M4-5 Pt alert, has trach collar on ventilator,  Does not withdraw RUE, moves LUE spontaneously and intermittently mildly RLE, withdraws BLE  Strength 3/5 LUE, Reflexes intact 3+ LUE, BLE, 2+ RUE  ? Left Hemicraniotomy site with staples intact, mild erythema posteriorly  Left scalp flap is soft, has dependent edema, mildly sunken     · Imaging reviewed personally and by attending  Results listed below  ? MRI brain w wo contrast 12/30/18 Status post extensive left hemicraniectomy for subacute left hemispheric hemorrhage decompression  The subacute hemorrhage is primarily within the temporal lobe  Subdural, extradural and extracranial fluid collection identified within the anterior left hemisphere extending into the left temporalis region  There is acute to early subacute ischemia identified within the left anterior frontal white matter  Scattered subarachnoid hemorrhage  Small amount of intraventricular hemorrhage layering in the occipital horns    ? CT head wo contrast 12/29/18 Decreasing left temporal IPH with persistent mild to moderate vasogenic edema with mild regional mass effect  Decreasing bilat SAH, small amount of layering IVH, no hydrocephalus  Decreased left extradural/subgaleal fluid collection that could represent CSF fluid vs seroma  ? CT of head wo contrast 12/21/18 shows postop changes of left Craniectomy and evacuation of left temporal IPH  A small amount of IPH hemorrhage remains within anterior left temporal lobe and stable thin SDH along left frontal region  Increased blood products along the cerebral falx when compared with prior exam  Persistent edema predominantly within the left temporal lobe and effacement of the left-sided cerebral sulci however there is interval resolution of midline shift and decreased effacement of the left lateral ventricle  Continued washout and interval evolution of IVH/SAH blood products  ? Ongoing medical management by primary team - Critical care  § Fever- Pt had high temp for past 24 hrs, 101 5 this am at 7 15 am  Pt also has Pneumonia that was being tx with Ceftriaxone, last dose yesterday  Patient's neurological exam remains stable at this time  Will defer LP at this time  § PT on Fentanyl 1250 mcg in NaCl 0 9% 125 ml drip at 50 mcg/hr   § Pt had Tracheostomy  Being weaned off of ventilator  § Continue Keppra 500 MG Q 12 Hrs indefinitely for seizure ppx  § Labs: Continue to monitor  § Na 12/31/18 elevated at 153  § K 12/31/18 decreased at 3 3- KCL 20 and 40 mEq IV given  § Hgb 12/31/18  low at 8 7   § WBC 12/31/18 elevated at 13 98   § DM II - Insulin sliding scale in place  · PT/ OT Evaluation and mobilization when able to  · DVT ppx: SCDs, heparin  · Continue regular neurological checks  · STAT CT of head wo contrast if neurological decline GCS > 2pts/hr  · Supportive management with pain control for the nondisplaced fx for the Right T1 and T2 transverse process fxs since the transverse processes are not integral to the stability of the thoracic vertebrae/thoracic spine   No brace at this time due to the concurrent multiple rib fractures  · No further neurosurgical intervention at this time  Neurosurgery will continue to see patient as needed during the remainder of her hospitalization  Follow up appointment scheduled with neurosurgery on Jan 7th 2018 in 2 weeks with repeat CT of Head wo contrast and CTA Head w wo contrast  She also has a 6 week POV scheduled with Dr Brian Nixon on Feb 6th   Please call with any questions or concerns  Subjective/Objective   Chief Complaint: Pt is nonverbal/ Follow up POD 11 S/P  Left hemicraniectomy for evacuation of left temporal intraparenchymal hematoma, Expansatile duraplasty    Subjective: Pt is a 60 yo female being followed POD 11 status post left hemicraniotomy for evacuation of left temporal intraparenchymal hematoma, Expansatile duraplasty  Patient is more alert today but is still nonverbal and not following commands  She spontaneously moves her left arm frequently which has a safety pad covering her left hand to prevent her from removing her NG tube, self harm or harm to others  Objective: Pt awake and alert, sitting in chair, NAD  I/O       12/29 0701 - 12/30 0700 12/30 0701 - 12/31 0700 12/31 0701 - 01/01 0700    I V  (mL/kg) 429 4 (7 4) 168 4 (2 9)     NG/GT 70 90     IV Piggyback 150 350     Feedings 271 934     Total Intake(mL/kg) 920 4 (15 9) 1542 4 (26 7)     Urine (mL/kg/hr) 3258 (2 3) 2390 (1 7)     Emesis/NG output 0      Total Output 3258 2390      Net -2337 6 -847  6             Unmeasured Urine Occurrence 2 x            Invasive Devices     Peripherally Inserted Central Catheter Line            PICC Line 84/98/13 Left Basilic 8 days          Drain            NG/OG/Enteral Tube Nasogastric Right nares 6 days    External Urinary Catheter 2 days          Airway            Surgical Airway Shiley Cuffed 6 days                Physical Exam:  Vitals: Blood pressure 142/65, pulse 90, temperature (!) 101 5 °F (38 6 °C), resp  rate (!) 23, height 5' 3" (1 6 m), weight 57 8 kg (127 lb 6 8 oz), SpO2 100 %  ,Body mass index is 22 57 kg/m²  Hemodynamic Monitoring: MAP: Arterial Line MAP (mmHg): 102 mmHg    General appearance: alert, appears stated age with no distress  Head: Left hemicraniotomy flap is soft, with dependent edema and mildly sunken  Incision with staples, CDI  Eyes:  PERRL, size 5+ mm   Neck: supple, symmetrical with trach collar on ventilator- being weaned  Oral: Oral thrush noted  Lungs: non labored breathing on ventilator  Heart: regular heart rate  Neurologic:   Mental status: GCS 9-10 T E4 V1T M4-5, opens eyes spontaneously, has trach, withdraws to pain/purposeful movements on LUE  Cranial nerves: limited, no facial weakness noted  Sensory: withdraws to painful stimuli  Motor: Does not withdraw RUE, withdraws BLE to painful stimuli, spontaneously  Moving LUE, Strength 3/5  Reflexes: 3+ BLE and LUE, 2+ RUE    Lab Results:    Results from last 7 days  Lab Units 12/31/18  0422 12/30/18  0504 12/29/18  0550   WBC Thousand/uL 13 98* 15 41* 17 95*   HEMOGLOBIN g/dL 8 7* 9 1* 9 0*   HEMATOCRIT % 29 1* 29 6* 29 3*   PLATELETS Thousands/uL 532* 436* 393*   MONO PCT % 3* 3* 3*       Results from last 7 days  Lab Units 12/31/18  0422 12/30/18  0504 12/29/18  0550  12/24/18  1727 12/24/18  1658   POTASSIUM mmol/L 3 3* 3 2* 3 2*  < >  --   --    CHLORIDE mmol/L 111* 108 105  < >  --   --    CO2 mmol/L 36* 34* 34*  < >  --   --    CO2, I-STAT mmol/L  --   --   --   --  35* 34*   BUN mg/dL 31* 34* 30*  < >  --   --    CREATININE mg/dL 0 42* 0 42* 0 51*  < >  --   --    CALCIUM mg/dL 8 6 9 0 9 1  < >  --   --    ALK PHOS U/L  --   --  126*  --   --   --    ALT U/L  --   --  20  --   --   --    AST U/L  --   --  25  --   --   --    GLUCOSE, ISTAT mg/dl  --   --   --   --  125 140   < > = values in this interval not displayed      Results from last 7 days  Lab Units 12/31/18  0422 12/30/18  0504 12/29/18  0550   MAGNESIUM mg/dL 2 2 2 3 2 1 Results from last 7 days  Lab Units 12/31/18  0422 12/30/18  0504 12/29/18  0550   PHOSPHORUS mg/dL 3 5 1 2* 3 1         No results found for: TROPONINT  ABG:  Lab Results   Component Value Date    PHART 7 467 (H) 12/26/2018    BEB9QBT 43 7 12/26/2018    PO2ART 76 7 12/26/2018    ZPG0WMC 30 9 (H) 12/26/2018    BEART 6 5 12/26/2018    SOURCE Brachial, Right 12/26/2018       Imaging Studies: I have personally reviewed pertinent reports  and I have personally reviewed pertinent films in PACS    Cta Head And Neck W Wo Contrast    Result Date: 12/18/2018  Impression: 1  Atherosclerotic plaque at the right carotid bifurcation resulting in moderate (50-69%) stenosis  No evidence of carotid or vertebral artery dissection or pseudoaneurysm to suggest acute vascular injury  2   Displacement of left MCA branches by left temporal parietal hematoma  No evidence of aneurysm or vascular malformation  No significant stenosis in the major vessels of the [place]  3   Patent dural venous sinuses  Workstation performed: [id number]     Xr Chest Portable    Result Date: 12/23/2018  Impression: 1  Low position of endotracheal tube which should be withdrawn approximately 2 to 3 cm  2   Left lower lobe consolidation  3   Diffuse patchy opacities in both lungs, either pneumonia or, more likely, pulmonary edema  Workstation performed: [id number]     Xr Chest Portable    Result Date: 12/23/2018  Impression: 1  Tip of endotracheal tube 9 mm above the radha  I recommend that the tube be pulled back approximately 3 cm  2   Opacification in both lungs, most likely pulmonary edema or, possibly, bilateral pneumonia  Workstation performed: [id number]     Xr Chest Portable    Result Date: 12/19/2018  Impression: Satisfactory intubation  Bibasilar subsegmental atelectasis  No pneumothorax   Workstation performed: [id number]     Xr Shoulder 2+ Vw Right    Result Date: 12/19/2018  Impression: Right-sided rib fractures again noted  Possible acromion fracture  The small glenoid fracture seen on CT scan of the chest is not clearly appreciated on this exam  Workstation performed: [id number]     Xr Foot 3+ Vw Left    Result Date: 12/21/2018  Impression: Nondisplaced transverse fracture involves the proximal aspect of the proximal to the 3rd digit  Workstation performed: [id number]     Xr Abdomen 1 View Kub    Result Date: 12/24/2018  Impression: Increased bowel gas predominantly in the colon suggesting colonic ileus  Maximal transverse diameter, located in the right transverse colon is approximately 9 8 cm Workstation performed: [id number]     Ct Head Wo Contrast    Result Date: 12/21/2018  Impression: 1  Postsurgical changes of left craniectomy and evacuation of left temporal intraparenchymal hemorrhage  2   A small amount of intraparenchymal/extra-axial hemorrhage remains within the anterior left temporal lobe and stable thin subdural hemorrhage along the left frontal region  3   Increased blood products along the cerebral falx when compared with prior exam  4   Persistent edema predominantly within the left temporal lobe and effacement of the left-sided cerebral sulci however there is interval resolution of midline shift and decreased effacement of the left lateral ventricle  5   Continued washout and interval evolution of intraventricular and subarachnoid blood products  Workstation performed: [id number]     Ct Head Wo Contrast    Result Date: 12/20/2018  Impression: 1  Left temporal lobe intraparenchymal hemorrhage with slight increase in surrounding edema as manifest by increased effacement of the left occipital horn when compared with prior examination  Slight increase in left to right midline shift now measuring up to 6 mm, previously 4 mm  2   Stable subdural, subarachnoid and intraventricular blood products  No large delayed intracranial hemorrhage  The study was marked in [facility] for immediate notification   Workstation performed: [id number]     Ct Head Wo Contrast    Result Date: 12/19/2018  Impression: 1  Stable size of large intraparenchymal hemorrhage centered in the left temporal lobe with surrounding mass effect and edema and adjacent left temporal to frontal subdural hemorrhage  Stable midline shift from left to right  2   Increased conspicuity of bilateral posterior subarachnoid and intraventricular blood products are likely due to redistribution  Workstation performed: [id number]     Trauma - Ct Head Wo Contrast    Result Date: 12/18/2018  Impression: 1  Large parenchymal hemorrhage on the left with associated left-sided subdural hematoma and bilateral subarachnoid hemorrhage including intraventricular hemorrhage  2   Mass effect and midline shift to the right measuring 4 mm  I personally discussed this study with Ad Ulrich on 12/18/2018 at 8:45 PM  Workstation performed: [id number]     Trauma - Ct Spine Cervical Wo Contrast    Result Date: 12/18/2018  Impression: 1  No cervical spine fracture or traumatic malalignment  2   Small right apical pneumothorax  3   Nondisplaced fractures of the right T1 and T2 transverse processes  Fracture of the posterior right second rib  I personally discussed this study with Ad Ulrich on 12/18/2018 at 8:45 PM   Workstation performed: [id number]     Ct Shoulder Right Wo Contrast    Result Date: 12/20/2018  Impression: Acute fracture anterior glenoid rim with 1 to 2 mm anteromedial displacement  Acute comminuted fracture posterior acromion with 6 mm mediolateral distraction and 1 to 2 mm inferior cortical offset  No dislocation  Interval progression of right lower lobe and right upper lobe dependent airspace disease  This may represent subsegmental atelectasis, aspiration, and/or pulmonary contusion  Interval enlargement of small right pleural effusion  No other significant interval change   The examination demonstrates a significant  finding and was documented as such in Epic for liaison and referring practitioner notification  Workstation performed: [id number]     Xr Chest 1 View    Result Date: 12/20/2018  Impression: Multiple right-sided rib fractures  Consolidation at the base may represent atelectasis or pneumonia  Workstation performed: [id number]     Trauma - Ct Chest Abdomen Pelvis W Contrast    Result Date: 12/18/2018  Impression: 1  Multiple right-sided rib fractures with tiny right apical pneumothorax  Fractures extend from the second through eleventh ribs at the costovertebral junctions and fourth through eighth ribs laterally  This is consistent with flail chest  A right anterior second rib fracture is severely displaced/angulated  2   Fracture of the anterior right glenoid, acromium, and first through fourth thoracic transverse processes on the right  3   Focal irregular opacity in the right upper lobe with adjacent 4 mm nodule  While contusion is possible given overlying trauma, the appearance of the lesion is most suspicious for a primary lung cancer and follow-up PET/CT is recommended  4   Borderline upper abdominal lymph nodes with density/stranding at the base of the small bowel mesentery  While mesenteric contusion can have this appearance in the setting of trauma  There are calcifications which suggest chronicity  Lymphoproliferative disorder cannot be excluded and follow-up and 3-6 months is recommended  5   Chronic interstitial lung disease of uncertain etiology  Given esophageal dilatation, this could represent NSIP in the setting of scleroderma  I personally discussed this study with Eber Craig on 12/18/2018 at 8:45 PM  Workstation performed: [id number]     Xr Trauma Multiple    Result Date: 12/19/2018  Impression: Multiple right-sided rib fractures  Consolidation at the base may represent atelectasis or pneumonia   Workstation performed: [id number]     Xr Chest Portable Icu    Result Date: 12/24/2018  Impression: Endotracheal tube tip 2 9 cm above the radha No interval change in appearance of diffuse bilateral pulmonary airspace disease Workstation performed: [id number]     Xr Chest Portable Icu    Result Date: 12/20/2018  Impression: Right subclavian catheter extending superiorly into the neck requiring repositioning  Workstation performed: [id number]     Xr Chest Portable Icu    Result Date: 12/20/2018  Impression: Mild central vascular congestion  Basilar opacities and effusions  Stable rib fractures  Workstation performed: [id number]     Xr Chest Portable Icu    Result Date: 12/19/2018  Impression: Right basilar opacity partially obscuring the hemidiaphragm may represent atelectasis or infiltrate  Possible small basilar effusion  Workstation performed: [id number]     Cta Chest Pe Study    Result Date: 12/22/2018  Impression: No evidence of pulmonary embolism Moderate right effusion small left effusion There is development of diffuse lung opacities with reticulation, groundglass density with a few areas of basilar consolidation  This may be due to pulmonary edema  However infiltrates are difficult to exclude  The ET tube is low-lying with tip lying in the radha with likely projection into the right main bronchus  Small mediastinal lymph nodes larger from the previous study probably reactive  I personally discussed this study with Tamela Valdez on 12/22/2018 at 11:46 AM  Workstation performed: [id number]    EKG, Pathology, and Other Studies: I have personally reviewed pertinent reports        VTE Pharmacologic Prophylaxis: Heparin    VTE Mechanical Prophylaxis: sequential compression device

## 2018-12-31 NOTE — NUTRITION
Replete K+  Recommend increase Jevity 1 2 as tolerated to goal rate of 87 ml/hr X 16 hours (no PROSOURCE)  to provide qd: 1392 ml,1670 Kcal,77 gm PRO,236 gm CHO,55 gm Fat,1123 H2O,2 8 mg CHO/kg/min

## 2018-12-31 NOTE — RESPIRATORY THERAPY NOTE
RT Ventilator Management Note  Gisell Johns 59 y o  female MRN: [id number]  Unit/Bed#:  -01 Encounter: [id number]      Daily Screen       12/30/2018 1621 12/31/2018 0900          Patient safety screen outcome[de-identified] - Passed      Spont breathing trial outcome[de-identified] Failed -      Spont breathing trial reason failed: RR > 35 bpm;RSBI > 100 -              Physical Exam:   Assessment Type: Assess only  General Appearance: Awake  Respiratory Pattern: Normal, Assisted  Chest Assessment: Chest expansion symmetrical  Bilateral Breath Sounds: Diminished      Resp Comments: (P) Pt was sitting up in pink chair and was placed on PSV 8/8, 40%  Pending transport today  Will continue to monitor

## 2018-12-31 NOTE — RESPIRATORY THERAPY NOTE
RT Ventilator Management Note  Marianne Neil 59 y o  female MRN: [id number]  Unit/Bed#:  -01 Encounter: [id number]      Daily Screen       12/30/2018 1621 12/31/2018 0900          Patient safety screen outcome[de-identified] - Passed      Spont breathing trial outcome[de-identified] Failed -      Spont breathing trial reason failed: RR > 35 bpm;RSBI > 100 -              Physical Exam:   Assessment Type: Assess only  General Appearance: Awake  Respiratory Pattern: Normal, Assisted  Chest Assessment: Chest expansion symmetrical  Bilateral Breath Sounds: Diminished      Resp Comments: (P) Patient was tachypneic with RR in 40s, RSBI 115  Patient was placed back on [facility] mode with previous AC settings  Patient weaned for 4 5 hours today    Plan is to attempt wean again later this afternoon per HARSHAD

## 2018-12-31 NOTE — UTILIZATION REVIEW
Continued Stay Review    Date:12/31/2018    Vital Signs: /58 (BP Location: Right arm)   Pulse 94   Temp (!) 100 7 °F (38 2 °C) (Rectal)   Resp (!) 28   Ht 5' 3" (1 6 m)   Wt 57 8 kg (127 lb 6 8 oz)   SpO2 97%   BMI 22 57 kg/m²     Medications:   Scheduled Meds:   Current Facility-Administered Medications:  acetaminophen 650 mg Oral Q6H Jonathan Schwab PA-C    albuterol 2 5 mg Nebulization Q4H PRN Ricardo Hickey DO    bisacodyl 10 mg Rectal Daily Eda Mccarthy PA-C    cefTRIAXone 1,000 mg Intravenous Q24H Eda Mccarthy PA-C Last Rate: 1,000 mg (12/30/18 2002)   chlorhexidine 15 mL Swish & Spit Q12H [address] Rosa Buenrostro MD    fentaNYL 25 mcg/hr Intravenous Continuous Eda Mccarthy PA-C Last Rate: 50 mcg/hr (12/31/18 0906)   furosemide 40 mg Intravenous Daily Janina Paredes PA-C    heparin (porcine) 5,000 Units Subcutaneous Q8H [address], [place]    hydrALAZINE 5 mg Intravenous Q6H PRN Ted Collazo MD    HYDROmorphone 1 mg Intravenous Q2H PRN Maryellen Jaquez PA-C    influenza vaccine 0 5 mL Intramuscular Prior to discharge Ricardo Hickey DO    insulin lispro 1-5 Units Subcutaneous  Betty Oconnor MD    insulin lispro 5-25 Units Subcutaneous Q6H [address] Chrissy Plasencia PA-C    ipratropium 0 5 mg Nebulization Q6H Ricardo Hickey,     lamoTRIgine 25 mg Oral Daily Eda Mccarthy PA-C    levalbuterol 1 25 mg Nebulization Q6H Ricardo Hickey, DO    levETIRAcetam 500 mg Oral Q12H [address] Omayra Gonzalez MD    levothyroxine 88 mcg Oral Early Morning Maryellen Jaquez PA-C    lidocaine 2 patch Topical Daily Maryellen Jaquez PA-C    lisinopril-hydrochlorothiazide (PRINZIDE 10/12  5) combo dose  Oral Daily [facility]    methylnaltrexone 8 mg Subcutaneous Every Other Day Maryellen Jaquez PA-C    nicotine 14 mg Transdermal Daily Jonathan Schwab PA-C    nystatin 500,000 Units Swish & Swallow 4x Daily Eda Mccarthy PA-C    omeprazole (PRILOSEC) suspension 2 mg/mL 20 mg Oral Daily Pete Forman PA-C    oxyCODONE 5 mg Oral Q4H PRN Pete Forman PA-C    Or        oxyCODONE 10 mg Oral Q4H PRN Pete Forman PA-C    polyethylene glycol 17 g Oral Daily Pete Forman PA-C    potassium chloride 20 mEq Intravenous Once Pete Forman PA-C Last Rate: 20 mEq (12/31/18 1215)   pramipexole 0 25 mg Oral BID Dorotha Plump, CRNP    senna-docusate sodium 2 tablet Oral BID Pete Forman PA-C      Continuous Infusions:   fentaNYL 25 mcg/hr Last Rate: 50 mcg/hr (12/31/18 0906)     PRN Meds: albuterol    hydrALAZINE    HYDROmorphone    influenza vaccine    oxyCODONE **OR** oxyCODONE    Abnormal Labs/Diagnostic Results:bs 278--- wbc 13 98--hgb 8 7/29 1  Na 153--k+ 3 3--cl 111-c02 36--bun 31-- cr 0 42  bs 161    Age/Sex: 59 y o  female     Assessment/Plan:tbi--multiple fractures of ribs   Traumatic pneumothorax--glenoid fx  Left sdh b/l sah and s/p left craniectomy pod @11  Frequent neuros--ulices 9Daily cam   mri of brain - no clear evidence of infectious process  Attempt to wean from precedex  Flu strep pneumoniae vap- ceftriaxone  vent  t 101 5  Discharge Plan: poss eventual d/c to [facility] or [facility]

## 2018-12-31 NOTE — PROGRESS NOTES
Progress Note - Critical Care   Anayeli Macdonald 59 y o  female MRN: [id number]  Unit/Bed#: 3150 [facility] -01 Encounter: [id number]    Assessment:   Principal Problem:    Traumatic brain injury [facility])  Active Problems:    Multiple fractures of ribs, bilateral, initial encounter for closed fracture    Traumatic pneumothorax    Glenoid fracture of shoulder, right, closed, initial encounter    Intraparenchymal hemorrhage of brain ([facility] [address] )    Flail chest, initial encounter for closed fracture    Fracture of thoracic transverse process, closed, initial encounter ([facility] [address] )    Subdural hematoma ([facility] [address] )    Subarachnoid hemorrhage ([facility] [address] )    Diabetes mellitus, type II ([facility] [address] )    Closed nondisplaced left toe fracture    Hypokalemia    Acute encephalopathy    Status post craniectomy    Acute respiratory failure with hypoxia ([facility] [address] )    Mucus plugging of bronchi    Acute blood loss anemia    Ileus (HCC)    Plan:   Neuro:   · L SDH, b/l SAH s/p L craniectomy POD #11: L craniectomy precautions  Frequent neuro checks, daily CAM-ICU as able  Continue keppra 500mg BID  MRI B yesterday without clear evidence of infectious process  · Analgesia/sedation: Fentanyl 50mcg/hr, precedex 0 1 mcg/kg/hr  Attempt to wean from precedex today  CV:   · Hypertension: lisinopril/hctz daily, prn hydralazine  Goal MAP >65    Lung:   · Acute hypoxic respiratory failure: tolerated 40min PSV yesterday, attempt today again as able  · B/l rib fractures, flail chest: Pain control, pulmonary toilet  · Pulmonary edema: Lasix decreased to 40mg daily yesterday, continue today  · VAP: antibiotics as below  Chest PT, pulmonary toilet  GI:   · GERD: Protonix daily  · Increase bowel regimen, last bowel movement x6 days    FEN:   · Hypernatremia: Start FWF 200mg q4  Deficit to reach goal of 145 1 8L    · Hypokalemia: continue repletion  · No maintenance IV fluids  · Replete electrolytes for goal Mag >2 0, Phos >3 0, K >4 0  · TFs at goal    :   · Monitor I/Os  · No kenny    ID: · H  Flu/strep pneumoniae VAP: Ceftriaxone day   · Monitor fever curve/white count, persistent fever of unexplained origin  WBC count downtrending  Heme:   · SQH/SCDs    Endo:   · DM2: SSI algorithm 1    Msk/Skin:   · Frequent turns and repositioning, offloading  · L toe fx: podiatry following, WBAT   · T1-T4 TP fx: non-operative management, pain control  · R glenoid fx: NWB RUE    Disposition:   · ICU    ______________________________________________________________________  Chief Complaint: unable to provide    HPI/24hr events: Tolerated PSV trial yesterday for approximately 40 minutes, failed secondary to tachypnea  MRI yesterday for fever evaluation, no definitive area of abscess/infection  ______________________________________________________________________  Temperature:   Temp (24hrs), Av 2 °F (38 4 °C), Min:99 °F (37 2 °C), Max:102 2 °F (39 °C)    Current Temperature: (!) 101 5 °F (38 6 °C)    Vitals:    18 0443 18 0515 18 0615 18 0715   BP:  151/77 137/71 142/65   Pulse:  82 90 90   Resp:  (!) 26 15 (!) 23   Temp:  (!) 101 1 °F (38 4 °C) (!) 101 5 °F (38 6 °C) (!) 101 5 °F (38 6 °C)   TempSrc:       SpO2: 100% 100% 100% 100%   Weight:       Height:         Arterial Line BP: 126/80  Arterial Line MAP (mmHg): 102 mmHg     Weights:   IBW: 52 4 kg    Body mass index is 22 57 kg/m²  Weight (last 2 days)     Date/Time   Weight    18 0600  57 8 (127 43)            Height: 5' 3" (160 cm)    Ventilator Settings:   Vent Mode: AC/VC  Resp Rate (BPM): 14 BPM  Vt (mL): 480 mL  FIO2 (%): 50 %  PEEP (cmH2O): 8 cmH2O  SpO2: 100 %    No results found for: PHART, UDR3EGA, PO2ART, DDL2HEA, H1TFNHQM, BEART, SOURCE  SpO2: SpO2: 100 %    ______________________________________________________________________  Physical Exam:  Kessler Agitation Sedation Scale (RASS): Alert and calm  Physical Exam   Constitutional: Vital signs are normal   Non-toxic appearance   She does not have a sickly appearance  She does not appear ill  No distress  She is restrained  Trach in place, site C/D/I   HENT:   NGT in place  L craniectomy, incision C/D/I  Eyes: Pupils are equal, round, and reactive to light  Neck: No tracheal deviation present  Cardiovascular: Normal rate, regular rhythm and normal heart sounds  Pulses:       Radial pulses are 2+ on the right side, and 2+ on the left side  Dorsalis pedis pulses are 2+ on the right side, and 2+ on the left side  Pulmonary/Chest:   Coarse breath sounds bilaterally   Abdominal: Soft  She exhibits distension  There is no tenderness  Musculoskeletal:   No peripheral edema   Neurological: She is alert  GCS eye subscore is 4  GCS verbal subscore is 1  GCS motor subscore is 4  Does not follow commands with extremities, but spontaneously moving LUE  RUE flaccid  Tracks movement  Skin: Skin is warm, dry and intact  She is not diaphoretic     Multiple chronic abrasions to b/l UE   ______________________________________________________________________  Intake and Outputs:  I/O       12/29 0701 - 12/30 0700 12/30 0701 - 12/31 0700    I V  (mL/kg) 429 4 (7 4)     NG/GT 70 90    IV Piggyback 150 350    Feedings 271 934    Total Intake(mL/kg) 920 4 (15 9) 1374 (23 8)    Urine (mL/kg/hr) 3258 (2 3) 2390 (1 7)    Emesis/NG output 0     Total Output 3258 2390    Net -2337 6 -1016          Unmeasured Urine Occurrence 2 x         UOP: 100cc/hour   Nutrition:        Diet Orders            Start     Ordered    12/31/18 0741  Diet Enteral/Parenteral; Tube Feeding No Oral Diet; Jevity 1 2 Sae; Cyclic; 63; 16 hours; Prosource Protein Liquid - One Packet; 200  Diet effective now     Question Answer Comment   Diet Type Enteral/Parenteral    Enteral/Parenteral Tube Feeding No Oral Diet    Tube Feeding Formula: Jevity 1 2 Sae    Bolus/Cyclic/Continuous Cyclic    Tube Feeding Cyclic Rate (mL/hr): 63    Tube Feeding Cyclic: Administer over: 16 hours    Prosource Protein Liquid - No Carb Prosource Protein Liquid - One Packet    Tube Feeding water flush (mL): 200    RD to adjust diet per protocol? Yes        12/31/18 0741        Formula: Jevity 1 2, currently running at 63ml/hr, with a goal of 63ml/hr x16 hours  Labs:     Results from last 7 days  Lab Units 12/31/18  0422 12/30/18  0504 12/29/18  0550   WBC Thousand/uL 13 98* 15 41* 17 95*   HEMOGLOBIN g/dL 8 7* 9 1* 9 0*   HEMATOCRIT % 29 1* 29 6* 29 3*   PLATELETS Thousands/uL 532* 436* 393*   MONO PCT % 3* 3* 3*      Results from last 7 days  Lab Units 12/31/18  0422 12/30/18  0504 12/29/18  0550  12/24/18  1727 12/24/18  1658   POTASSIUM mmol/L 3 3* 3 2* 3 2*  < >  --   --    CHLORIDE mmol/L 111* 108 105  < >  --   --    CO2 mmol/L 36* 34* 34*  < >  --   --    CO2, I-STAT mmol/L  --   --   --   --  35* 34*   BUN mg/dL 31* 34* 30*  < >  --   --    CREATININE mg/dL 0 42* 0 42* 0 51*  < >  --   --    CALCIUM mg/dL 8 6 9 0 9 1  < >  --   --    ALK PHOS U/L  --   --  126*  --   --   --    ALT U/L  --   --  20  --   --   --    AST U/L  --   --  25  --   --   --    GLUCOSE, ISTAT mg/dl  --   --   --   --  125 140   < > = values in this interval not displayed  Results from last 7 days  Lab Units 12/31/18  0422 12/30/18  0504 12/29/18  0550   MAGNESIUM mg/dL 2 2 2 3 2 1       Results from last 7 days  Lab Units 12/31/18  0422 12/30/18  0504 12/29/18  0550   PHOSPHORUS mg/dL 3 5 1 2* 3 1            Results from last 7 days  Lab Units 12/25/18  1538   LACTIC ACID mmol/L 1 6       0  Lab Value Date/Time   TROPONINI <0 02 12/20/2018 1204   TROPONINI <0 02 12/20/2018 0054     Imaging: MRI brain: Status post extensive left hemicraniectomy for subacute left hemispheric hemorrhage decompression  The subacute hemorrhage is primarily within the temporal lobe    Partial extension of the left temporal parietal lobe through the craniectomy site      Subdural, extradural and extracranial fluid collection identified within the anterior left hemisphere extending into the left temporalis region      There is acute to early subacute ischemia identified within the left anterior frontal white matter on series 3 image 23      Scattered subarachnoid hemorrhage  Small amount of intraventricular hemorrhage layering in the occipital horns  This is unchanged from recent CT scan  I have personally reviewed pertinent reports  Micro:  Blood Culture:   Lab Results   Component Value Date    BLOODCX No Growth After 5 Days  12/25/2018    BLOODCX No Growth After 5 Days  12/25/2018     Urine Culture:   Lab Results   Component Value Date    URINECX >100,000 cfu/ml Gram Negative [place] Enteric Like (A) 12/25/2018    URINECX >100,000 cfu/ml Escherichia coli (A) 12/25/2018     Sputum Culture: No components found for: SPUTUMCX  Wound Culure: No results found for: WOUNDCULT    Lab Results   Component Value Date    BLOODCX No Growth After 5 Days  12/25/2018    BLOODCX No Growth After 5 Days   12/25/2018    URINECX >100,000 cfu/ml Gram Negative [place] Enteric Like (A) 12/25/2018    URINECX >100,000 cfu/ml Escherichia coli (A) 12/25/2018    SPUTUMCULTUR 4+ Growth of Haemophilus influenzae (AA) 12/25/2018    SPUTUMCULTUR 1+ Growth of Streptococcus pneumoniae (AA) 12/25/2018    SPUTUMCULTUR 1+ Growth of  12/25/2018     Allergies: No Known Allergies  Medications:   Scheduled Meds:    Current Facility-Administered Medications:  acetaminophen 650 mg Oral Q6H Day Moreno PA-C    albuterol 2 5 mg Nebulization Q4H PRN Arch Anton,     bisacodyl 10 mg Rectal Daily Elin Paz PA-C    cefTRIAXone 1,000 mg Intravenous Q24H Day Moreno PA-C Last Rate: 1,000 mg (12/30/18 2002)   chlorhexidine 15 mL Swish & Spit Q12H [address] Topher Dugan MD    dexmedetomidine 0 1-0 9 mcg/kg/hr Intravenous Titrated Gaby Dugan MD Last Rate: 0 1 mcg/kg/hr (12/30/18 0147)   fentaNYL 50 mcg/hr Intravenous Continuous Gaby Dugan MD Last Rate: 50 mcg/hr (12/30/18 1130)   furosemide 40 mg Intravenous Daily Ana Lilia Gentile PA-C    heparin (porcine) 5,000 Units Subcutaneous [facility] Kevin Krause MD    hydrALAZINE 5 mg Intravenous Q6H PRN Kevin Krause MD    HYDROmorphone 1 mg Intravenous Q2H PRN Rachael Garcia PA-C    influenza vaccine 0 5 mL Intramuscular Prior to discharge Leonor Wagoner, DO    insulin lispro 1-5 Units Subcutaneous HS Grady Del Cid MD    insulin lispro 5-25 Units Subcutaneous Q6H [address] Chrissy Plasencia PA-C    ipratropium 0 5 mg Nebulization Q6H Leonor Gravkarley, DO    levalbuterol 1 25 mg Nebulization Q6H Glendya Gravkarley, DO    levETIRAcetam 500 mg Oral Q12H [address] Vernon Gonzalez MD    levothyroxine 88 mcg Oral Early Morning Rachael Garcia PA-C    lidocaine 2 patch Topical Daily Rachael Garcia PA-C    lisinopril-hydrochlorothiazide (PRINZIDE 10/12  5) combo dose  O[facility]    methylnaltrexone 8 mg Subcutaneous Every Other Day Rachael Garcia PA-C    nicotine 14 mg Transdermal Daily Herlinda Esparza PA-C    pantoprazole 40 mg Intravenous Q24H [address] Rachael Garcia PA-C    polyethylene glycol 17 g Oral Daily Felipe Sagastume PA-C    potassium chloride 20 mEq Intravenous Once Lulla MaroonHARSHAD    potassium chloride 40 mEq Intravenous Once Lulla HARSHAD Sagastume    pramipexole 0 25 mg Oral BID TRISTAN Rollins    senna-docusate sodium 2 tablet Oral BID Felipe Sagastume PA-C      Continuous Infusions:    dexmedetomidine 0 1-0 9 mcg/kg/hr Last Rate: 0 1 mcg/kg/hr (12/30/18 6137)   fentaNYL 50 mcg/hr Last Rate: 50 mcg/hr (12/30/18 1130)     PRN Meds:    albuterol 2 5 mg Q4H PRN   hydrALAZINE 5 mg Q6H PRN   HYDROmorphone 1 mg Q2H PRN   influenza vaccine 0 5 mL Prior to discharge     VTE Pharmacologic Prophylaxis:   Pharmacologic: Heparin  Mechanical VTE Prophylaxis in Place: Yes    Invasive lines and devices:   Invasive Devices     Peripherally Inserted Central Catheter Line            PICC Line 10/47/19 Left Basilic 8 days Drain            NG/OG/Enteral Tube Nasogastric Right nares 6 days    External Urinary Catheter 2 days          Airway            Surgical Airway Shiley Cuffed 6 days                   Counseling / Coordination of Care  Total Critical Care time spent 32 minutes excluding procedures, teaching and family updates        Code Status: Level 1 - Full Code      Danie Esparza PA-C

## 2018-12-31 NOTE — RESPIRATORY THERAPY NOTE
RT Ventilator Management Note  Anayeli Macdonald 59 y o  female MRN: [id number]  Unit/Bed#:  -01 Encounter: [id number]      Daily Screen       12/30/2018 1621 12/31/2018 0900          Patient safety screen outcome[de-identified] - Passed      Spont breathing trial outcome[de-identified] Failed -      Spont breathing trial reason failed: RR > 35 bpm;RSBI > 100 -              Physical Exam:   Assessment Type: Assess only  General Appearance: Awake  Respiratory Pattern: Assisted  Chest Assessment: Chest expansion symmetrical  Bilateral Breath Sounds: Coarse  O2 Device: vent      Resp Comments: (P) Pt  was placed back on PSV for a couple hours as tolerated and then will rest on AC overnight

## 2019-01-01 ENCOUNTER — ANESTHESIA EVENT (INPATIENT)
Dept: PERIOP | Facility: HOSPITAL | Age: 64
DRG: 003 | End: 2019-01-01
Payer: MEDICARE

## 2019-01-01 PROBLEM — E46 PROTEIN-CALORIE MALNUTRITION (HCC): Status: ACTIVE | Noted: 2018-12-18

## 2019-01-01 LAB
ANION GAP SERPL CALCULATED.3IONS-SCNC: 7 MMOL/L (ref 4–13)
BASOPHILS # BLD AUTO: 0.09 THOUSANDS/ΜL (ref 0–0.1)
BASOPHILS NFR BLD AUTO: 1 % (ref 0–1)
BUN SERPL-MCNC: 34 MG/DL (ref 5–25)
CA-I BLD-SCNC: 1.07 MMOL/L (ref 1.12–1.32)
CALCIUM SERPL-MCNC: 8.7 MG/DL (ref 8.3–10.1)
CHLORIDE SERPL-SCNC: 109 MMOL/L (ref 100–108)
CO2 SERPL-SCNC: 30 MMOL/L (ref 21–32)
CREAT SERPL-MCNC: 0.42 MG/DL (ref 0.6–1.3)
EOSINOPHIL # BLD AUTO: 0.24 THOUSAND/ΜL (ref 0–0.61)
EOSINOPHIL NFR BLD AUTO: 2 % (ref 0–6)
ERYTHROCYTE [DISTWIDTH] IN BLOOD BY AUTOMATED COUNT: 17.6 % (ref 11.6–15.1)
GFR SERPL CREATININE-BSD FRML MDRD: 110 ML/MIN/1.73SQ M
GLUCOSE SERPL-MCNC: 103 MG/DL (ref 65–140)
GLUCOSE SERPL-MCNC: 175 MG/DL (ref 65–140)
GLUCOSE SERPL-MCNC: 180 MG/DL (ref 65–140)
GLUCOSE SERPL-MCNC: 188 MG/DL (ref 65–140)
GLUCOSE SERPL-MCNC: 190 MG/DL (ref 65–140)
GLUCOSE SERPL-MCNC: 217 MG/DL (ref 65–140)
GLUCOSE SERPL-MCNC: 283 MG/DL (ref 65–140)
HCT VFR BLD AUTO: 29.9 % (ref 34.8–46.1)
HGB BLD-MCNC: 8.8 G/DL (ref 11.5–15.4)
IMM GRANULOCYTES # BLD AUTO: 0.26 THOUSAND/UL (ref 0–0.2)
IMM GRANULOCYTES NFR BLD AUTO: 2 % (ref 0–2)
LYMPHOCYTES # BLD AUTO: 2.23 THOUSANDS/ΜL (ref 0.6–4.47)
LYMPHOCYTES NFR BLD AUTO: 16 % (ref 14–44)
MAGNESIUM SERPL-MCNC: 2.4 MG/DL (ref 1.6–2.6)
MCH RBC QN AUTO: 27.2 PG (ref 26.8–34.3)
MCHC RBC AUTO-ENTMCNC: 29.4 G/DL (ref 31.4–37.4)
MCV RBC AUTO: 92 FL (ref 82–98)
MONOCYTES # BLD AUTO: 0.81 THOUSAND/ΜL (ref 0.17–1.22)
MONOCYTES NFR BLD AUTO: 6 % (ref 4–12)
NEUTROPHILS # BLD AUTO: 10.32 THOUSANDS/ΜL (ref 1.85–7.62)
NEUTS SEG NFR BLD AUTO: 73 % (ref 43–75)
NRBC BLD AUTO-RTO: 0 /100 WBCS
PHOSPHATE SERPL-MCNC: 3.2 MG/DL (ref 2.3–4.1)
PLATELET # BLD AUTO: 601 THOUSANDS/UL (ref 149–390)
PMV BLD AUTO: 11.5 FL (ref 8.9–12.7)
POTASSIUM SERPL-SCNC: 4.1 MMOL/L (ref 3.5–5.3)
RBC # BLD AUTO: 3.24 MILLION/UL (ref 3.81–5.12)
SODIUM SERPL-SCNC: 146 MMOL/L (ref 136–145)
WBC # BLD AUTO: 13.95 THOUSAND/UL (ref 4.31–10.16)

## 2019-01-01 PROCEDURE — 82330 ASSAY OF CALCIUM: CPT | Performed by: PHYSICIAN ASSISTANT

## 2019-01-01 PROCEDURE — 80048 BASIC METABOLIC PNL TOTAL CA: CPT | Performed by: PHYSICIAN ASSISTANT

## 2019-01-01 PROCEDURE — 94760 N-INVAS EAR/PLS OXIMETRY 1: CPT

## 2019-01-01 PROCEDURE — 94003 VENT MGMT INPAT SUBQ DAY: CPT

## 2019-01-01 PROCEDURE — 83735 ASSAY OF MAGNESIUM: CPT | Performed by: PHYSICIAN ASSISTANT

## 2019-01-01 PROCEDURE — 82948 REAGENT STRIP/BLOOD GLUCOSE: CPT

## 2019-01-01 PROCEDURE — 84100 ASSAY OF PHOSPHORUS: CPT | Performed by: PHYSICIAN ASSISTANT

## 2019-01-01 PROCEDURE — 85025 COMPLETE CBC W/AUTO DIFF WBC: CPT | Performed by: PHYSICIAN ASSISTANT

## 2019-01-01 PROCEDURE — 99291 CRITICAL CARE FIRST HOUR: CPT | Performed by: EMERGENCY MEDICINE

## 2019-01-01 PROCEDURE — 94640 AIRWAY INHALATION TREATMENT: CPT

## 2019-01-01 RX ORDER — BISACODYL 10 MG
10 SUPPOSITORY, RECTAL RECTAL DAILY PRN
Status: DISCONTINUED | OUTPATIENT
Start: 2019-01-01 | End: 2019-01-02

## 2019-01-01 RX ORDER — CEFAZOLIN SODIUM 1 G/50ML
1000 SOLUTION INTRAVENOUS
Status: DISPENSED | OUTPATIENT
Start: 2019-01-02 | End: 2019-01-03

## 2019-01-01 RX ORDER — POLYETHYLENE GLYCOL 3350 17 G/17G
17 POWDER, FOR SOLUTION ORAL DAILY PRN
Status: DISCONTINUED | OUTPATIENT
Start: 2019-01-01 | End: 2019-01-03 | Stop reason: HOSPADM

## 2019-01-01 RX ORDER — AMOXICILLIN 250 MG
1 CAPSULE ORAL
Status: DISCONTINUED | OUTPATIENT
Start: 2019-01-01 | End: 2019-01-02

## 2019-01-01 RX ADMIN — LEVALBUTEROL 1.25 MG: 1.25 SOLUTION, CONCENTRATE RESPIRATORY (INHALATION) at 19:01

## 2019-01-01 RX ADMIN — NYSTATIN 500000 UNITS: 100000 SUSPENSION ORAL at 22:38

## 2019-01-01 RX ADMIN — ACETAMINOPHEN 650 MG: 160 SUSPENSION ORAL at 12:22

## 2019-01-01 RX ADMIN — NYSTATIN 500000 UNITS: 100000 SUSPENSION ORAL at 18:29

## 2019-01-01 RX ADMIN — INSULIN LISPRO 1 UNITS: 100 INJECTION, SOLUTION INTRAVENOUS; SUBCUTANEOUS at 22:37

## 2019-01-01 RX ADMIN — INSULIN LISPRO 5 UNITS: 100 INJECTION, SOLUTION INTRAVENOUS; SUBCUTANEOUS at 18:31

## 2019-01-01 RX ADMIN — LEVETIRACETAM 500 MG: 100 SOLUTION ORAL at 20:13

## 2019-01-01 RX ADMIN — BISACODYL 10 MG: 10 SUPPOSITORY RECTAL at 09:06

## 2019-01-01 RX ADMIN — HEPARIN SODIUM 5000 UNITS: 5000 INJECTION INTRAVENOUS; SUBCUTANEOUS at 05:48

## 2019-01-01 RX ADMIN — INSULIN LISPRO 5 UNITS: 100 INJECTION, SOLUTION INTRAVENOUS; SUBCUTANEOUS at 06:13

## 2019-01-01 RX ADMIN — LEVALBUTEROL 1.25 MG: 1.25 SOLUTION, CONCENTRATE RESPIRATORY (INHALATION) at 13:51

## 2019-01-01 RX ADMIN — INSULIN LISPRO 5 UNITS: 100 INJECTION, SOLUTION INTRAVENOUS; SUBCUTANEOUS at 01:01

## 2019-01-01 RX ADMIN — LEVALBUTEROL 1.25 MG: 1.25 SOLUTION, CONCENTRATE RESPIRATORY (INHALATION) at 07:36

## 2019-01-01 RX ADMIN — LEVOTHYROXINE SODIUM 88 MCG: 88 TABLET ORAL at 05:50

## 2019-01-01 RX ADMIN — CALCIUM GLUCONATE 2 G: 98 INJECTION, SOLUTION INTRAVENOUS at 07:24

## 2019-01-01 RX ADMIN — ALTEPLASE 2 MG: 2.2 INJECTION, POWDER, LYOPHILIZED, FOR SOLUTION INTRAVENOUS at 05:47

## 2019-01-01 RX ADMIN — INSULIN LISPRO 15 UNITS: 100 INJECTION, SOLUTION INTRAVENOUS; SUBCUTANEOUS at 12:24

## 2019-01-01 RX ADMIN — FUROSEMIDE 40 MG: 10 INJECTION, SOLUTION INTRAMUSCULAR; INTRAVENOUS at 09:07

## 2019-01-01 RX ADMIN — ACETAMINOPHEN 650 MG: 160 SUSPENSION ORAL at 18:29

## 2019-01-01 RX ADMIN — SENNOSIDES AND DOCUSATE SODIUM 2 TABLET: 8.6; 5 TABLET ORAL at 09:11

## 2019-01-01 RX ADMIN — LAMOTRIGINE 25 MG: 25 TABLET ORAL at 09:08

## 2019-01-01 RX ADMIN — ACETAMINOPHEN 650 MG: 160 SUSPENSION ORAL at 06:01

## 2019-01-01 RX ADMIN — IPRATROPIUM BROMIDE 0.5 MG: 0.5 SOLUTION RESPIRATORY (INHALATION) at 07:36

## 2019-01-01 RX ADMIN — CHLORHEXIDINE GLUCONATE 0.12% ORAL RINSE 15 ML: 1.2 LIQUID ORAL at 22:37

## 2019-01-01 RX ADMIN — HEPARIN SODIUM 5000 UNITS: 5000 INJECTION INTRAVENOUS; SUBCUTANEOUS at 22:37

## 2019-01-01 RX ADMIN — STANDARDIZED SENNA CONCENTRATE AND DOCUSATE SODIUM 1 TABLET: 8.6; 5 TABLET, FILM COATED ORAL at 22:38

## 2019-01-01 RX ADMIN — PRAMIPEXOLE DIHYDROCHLORIDE 0.25 MG: 0.25 TABLET ORAL at 22:39

## 2019-01-01 RX ADMIN — NICOTINE 14 MG: 14 PATCH TRANSDERMAL at 09:10

## 2019-01-01 RX ADMIN — LISINOPRIL: 10 TABLET ORAL at 09:09

## 2019-01-01 RX ADMIN — Medication 20 MG: at 05:50

## 2019-01-01 RX ADMIN — IPRATROPIUM BROMIDE 0.5 MG: 0.5 SOLUTION RESPIRATORY (INHALATION) at 13:51

## 2019-01-01 RX ADMIN — NYSTATIN 500000 UNITS: 100000 SUSPENSION ORAL at 12:23

## 2019-01-01 RX ADMIN — PRAMIPEXOLE DIHYDROCHLORIDE 0.25 MG: 0.25 TABLET ORAL at 20:13

## 2019-01-01 RX ADMIN — CHLORHEXIDINE GLUCONATE 0.12% ORAL RINSE 15 ML: 1.2 LIQUID ORAL at 09:06

## 2019-01-01 RX ADMIN — LIDOCAINE 2 PATCH: 50 PATCH CUTANEOUS at 09:08

## 2019-01-01 RX ADMIN — LEVETIRACETAM 500 MG: 100 SOLUTION ORAL at 09:08

## 2019-01-01 RX ADMIN — IPRATROPIUM BROMIDE 0.5 MG: 0.5 SOLUTION RESPIRATORY (INHALATION) at 01:06

## 2019-01-01 RX ADMIN — ACETAMINOPHEN 650 MG: 160 SUSPENSION ORAL at 03:10

## 2019-01-01 RX ADMIN — HEPARIN SODIUM 5000 UNITS: 5000 INJECTION INTRAVENOUS; SUBCUTANEOUS at 15:06

## 2019-01-01 RX ADMIN — POLYETHYLENE GLYCOL 3350 17 G: 17 POWDER, FOR SOLUTION ORAL at 09:11

## 2019-01-01 RX ADMIN — LEVALBUTEROL 1.25 MG: 1.25 SOLUTION, CONCENTRATE RESPIRATORY (INHALATION) at 01:06

## 2019-01-01 RX ADMIN — NYSTATIN 500000 UNITS: 100000 SUSPENSION ORAL at 09:10

## 2019-01-01 RX ADMIN — IPRATROPIUM BROMIDE 0.5 MG: 0.5 SOLUTION RESPIRATORY (INHALATION) at 19:01

## 2019-01-01 NOTE — ANESTHESIA PREPROCEDURE EVALUATION
Review of Systems/Medical History  Patient summary reviewed  Chart reviewed  No history of anesthetic complications     Cardiovascular  EKG reviewed, Hypertension on > 1 medication,    Pulmonary  Ventilator dependent respiratory failure (s/p tracheostomy),        GI/Hepatic    GERD ,        Negative  ROS        Endo/Other  Diabetes type 2 Insulin, History of thyroid disease , hypothyroidism,      GYN       Hematology  Anemia ,     Musculoskeletal    Comment: B/l rib fx  T1-4 TP fx  Right glenoid fx      Neurology    Cerebral bleeding with side effects (IPH/SDH and SAH s/p craniectomy 12/20/18) , subdural hemorrhage, intracranial hemorrhage and intracerebral hemorrhage,   Comment: TBI Psychology   Negative psychology ROS              Physical Exam    Airway  Comment: In-situ trach  Mallampati score: II  TM Distance: >3 FB  Neck ROM: full     Dental   No notable dental hx     Cardiovascular  Rhythm: regular, Rate: normal, Cardiovascular exam normal    Pulmonary  Pulmonary exam normal Breath sounds clear to auscultation,     Other Findings        Anesthesia Plan  ASA Score- 4     Anesthesia Type- general with ASA Monitors  Additional Monitors:   Airway Plan:     Comment: In-situ trach  Plan Factors-    Induction- intravenous  Postoperative Plan-     Informed Consent- Anesthetic plan and risks discussed with sibling  I personally reviewed this patient with the CRNA  Discussed and agreed on the Anesthesia Plan with the CRNA         NPO verified  NKDA  Plan:  GA    Patient is not consentable  Anesthesia consent obtained from patient's sister Eric Angel via telephone  Risks/benefits discussed and all questions answered

## 2019-01-01 NOTE — PROGRESS NOTES
Progress Note - Critical Care   Ian Bermudez 59 y o  female MRN: [id number]  Unit/Bed#: [place] [facility] -01 Encounter: [id number]    Assessment:   Principal Problem:    Traumatic brain injury [facility])  Active Problems:    Multiple fractures of ribs, bilateral, initial encounter for closed fracture    Traumatic pneumothorax    Glenoid fracture of shoulder, right, closed, initial encounter    Intraparenchymal hemorrhage of brain ([facility] [address] )    Flail chest, initial encounter for closed fracture    Fracture of thoracic transverse process, closed, initial encounter ([facility] [address] )    Subdural hematoma ([facility] [address] )    Subarachnoid hemorrhage ([facility] [address] )    Diabetes mellitus, type II ([facility] [address] )    Closed nondisplaced left toe fracture    Hypokalemia    Acute encephalopathy    Status post craniectomy    Acute respiratory failure with hypoxia ([facility] [address] )    Mucus plugging of bronchi    Acute blood loss anemia    Ileus (HCC)    Plan:   Neuro:   · L SDH, b/l SAH s/p L craniectomy POD #12: L craniectomy precautions, helmet when out of bed  Serial neuro examinations  Keppra 500mg BID  · Analgesia/sedation: discontinue fentanyl infusion  Encourage use of PO pain medications  Oxycodone 5/10mg for mild and moderate pain (0 doses/24 hours), dilaudid 1mg q4 prn for breakthrough (1 dose/24 hours)  · Daily CAM-ICU, delirium precautions    CV:   · Hypertension: lisinopril/hctz daily, prn hydralazine    Lung:   · Acute hypoxic respiratory failure: Continue daily PSV trials as able  Extend daily     · B/l rib fractures, flail chest: Pain control, pulmonary toilet  · Pulmonary edema: Lasix 40mg daily  · VAP: completed course of antibiotics yesterday    GI:   · GERD: omeprazole daily  · Dysphagia: plan for PEG placement this week with general surgery    FEN:   · Hypernatremia: improved, continue  q4  · Hypokalemia: improved  · Lasix 40mg daily  · Replete electrolytes as needed for goal Mag >2 0, Phos >3 0, K >4 0  · TFs at goal via NGT    :   · Urinary retention: straight cath x1, urinary retention protocol  No kenny at this time  · Monitor I/Os    ID:   · H  Flu/strep pneumoniae VAP: completed course of antibiotics  Monitor fever curve/white count  No obvious source of infection at this time    Heme:   · SQH/SCDs    Endo:   · DM2: SSI algorithm 1    Msk/Skin:   · L toe fx: podiatry following, WBAT  · T1-T4 TP fx: pain control  · R glenoid fx: pain control, NWB RUE  · Frequent turns and repositioning, offloading  Out of bed to chair    Disposition:   · ICU, dispo planning    ______________________________________________________________________  Chief Complaint: Unable to provide    HPI/24hr events: Weaned from precedex, remains on fentanyl  Tolerated PSV yesterday for 4 hours  Straight cath x1 overnight    ______________________________________________________________________  Temperature:   Temp (24hrs), Av 3 °F (37 9 °C), Min:99 7 °F (37 6 °C), Max:101 5 °F (38 6 °C)    Current Temperature: 99 7 °F (37 6 °C)    Vitals:    19 0400 19 0429 19 0500 19 0600   BP: 121/66  130/67 134/70   Pulse: 80  80 86   Resp: 17  20 (!) 25   Temp: 100 °F (37 8 °C)  99 7 °F (37 6 °C) 99 7 °F (37 6 °C)   TempSrc:       SpO2: 98% 98% 98% 98%   Weight:       Height:         Arterial Line BP: 126/80  Arterial Line MAP (mmHg): 102 mmHg     Weights:   IBW: 52 4 kg    Body mass index is 22 57 kg/m²  Weight (last 2 days)     None        Height: 5' 3" (160 cm)    Ventilator Settings:   Vent Mode: AC/VC  Resp Rate (BPM): 14 BPM  Vt (mL): 480 mL  FIO2 (%): 40 %  PEEP (cmH2O): 8 cmH2O  SpO2: 98 %    No results found for: PHART, QMN0MWM, PO2ART, CJR9XUD, U6PQSKXA, BEART, SOURCE  SpO2: SpO2: 98 %  ______________________________________________________________________  Physical Exam:  Kessler Agitation Sedation Scale (RASS): Alert and calm  Physical Exam   Constitutional: Vital signs are normal  No distress  Tracheostomy in place, site C/D/I  LUE in [facility]      HENT:   L craniectomy, surgical incision C/D/I   Eyes: Pupils are equal, round, and reactive to light  Cardiovascular: Normal rate, regular rhythm and normal heart sounds  Pulses:       Radial pulses are 2+ on the right side, and 2+ on the left side  Dorsalis pedis pulses are 2+ on the right side, and 2+ on the left side  Pulmonary/Chest:   Decreased breath sounds at the bases   Abdominal: Soft  Normal appearance  She exhibits no distension  There is no tenderness  Genitourinary:   Genitourinary Comments: +purewick   Musculoskeletal:   No peripheral edema  Multiple healed abrasions across all extremities   Neurological: She is alert  GCS eye subscore is 4  GCS verbal subscore is 1  GCS motor subscore is 5  Moving LUE spontaneously as well as b/l lower extremities  Does not follow commands  Tracks examiner  Localizes LUE to painful stimuli  Skin: Skin is warm and dry  She is not diaphoretic     Multiple healed abrasions across extremities   ______________________________________________________________________  Intake and Outputs:  I/O       12/30 0701 - 12/31 0700 12/31 0701 - 01/01 0700    I V  (mL/kg) 168 4 (2 9) 84 5 (1 5)    NG/GT 90 1060    IV Piggyback 350 250    Feedings 934 989    Total Intake(mL/kg) 1542 4 (26 7) 2383 5 (41 2)    Urine (mL/kg/hr) 2390 (1 7) 1045 (0 8)    Total Output 2390 1045    Net -847 6 +1338 5          Unmeasured Urine Occurrence  1 x        UOP: 43cc/hour   Nutrition:        Diet Orders            Start     Ordered    12/31/18 2005  Diet Enteral/Parenteral; Tube Feeding No Oral Diet; Jevity 1 2 Sae; Cyclic; 87; 16 hours; Prosource Protein Liquid - One Packet; 200; Every 4 hours  Diet effective now     Question Answer Comment   Diet Type Enteral/Parenteral    Enteral/Parenteral Tube Feeding No Oral Diet    Tube Feeding Formula: Jevity 1 2 Sae    Bolus/Cyclic/Continuous Cyclic    Tube Feeding Cyclic Rate (mL/hr): 87    Tube Feeding Cyclic: Administer over: 16 hours    Prosource Protein Liquid - No Carb Prosource Protein Liquid - One Packet    Tube Feeding water flush (mL): 200    Water flush frequency: Every 4 hours    RD to adjust diet per protocol? Yes        12/31/18 2005        Formula: Jevity 1 2, currently running at 87ml/hr, with a goal of 21MI/RK (cyclic N50 hours)  Labs:     Results from last 7 days  Lab Units 01/01/19  0535 12/31/18  0422 12/30/18  0504   WBC Thousand/uL 13 95* 13 98* 15 41*   HEMOGLOBIN g/dL 8 8* 8 7* 9 1*   HEMATOCRIT % 29 9* 29 1* 29 6*   PLATELETS Thousands/uL 601* 532* 436*   NEUTROS PCT % 73  --   --    MONOS PCT % 6  --   --    MONO PCT %  --  3* 3*        Results from last 7 days  Lab Units 01/01/19  0536 12/31/18  0422 12/30/18  0504 12/29/18  0550   POTASSIUM mmol/L 4 1 3 3* 3 2* 3 2*   CHLORIDE mmol/L 109* 111* 108 105   CO2 mmol/L 30 36* 34* 34*   BUN mg/dL 34* 31* 34* 30*   CREATININE mg/dL 0 42* 0 42* 0 42* 0 51*   CALCIUM mg/dL 8 7 8 6 9 0 9 1   ALK PHOS U/L  --   --   --  126*   ALT U/L  --   --   --  20   AST U/L  --   --   --  25       Results from last 7 days  Lab Units 01/01/19  0536 12/31/18  0422 12/30/18  0504   MAGNESIUM mg/dL 2 4 2 2 2 3       Results from last 7 days  Lab Units 01/01/19  0536 12/31/18  0422 12/30/18  0504   PHOSPHORUS mg/dL 3 2 3 5 1 2*            Results from last 7 days  Lab Units 12/25/18  1538   LACTIC ACID mmol/L 1 6       0  Lab Value Date/Time   TROPONINI <0 02 12/20/2018 1204   TROPONINI <0 02 12/20/2018 0054     Imaging: No new imaging I have personally reviewed pertinent reports  Micro:  Blood Culture:   Lab Results   Component Value Date    BLOODCX No Growth After 5 Days  12/25/2018    BLOODCX No Growth After 5 Days   12/25/2018     Urine Culture:   Lab Results   Component Value Date    URINECX >100,000 cfu/ml Gram Negative [place] Enteric Like (A) 12/25/2018    URINECX >100,000 cfu/ml Escherichia coli (A) 12/25/2018     Sputum Culture: No components found for: SPUTUMCX  Wound Culure: No results found for: WOUNDCULT    Lab Results   Component Value Date    BLOODCX No Growth After 5 Days  12/25/2018    BLOODCX No Growth After 5 Days  12/25/2018    URINECX >100,000 cfu/ml Gram Negative [place] Enteric Like (A) 12/25/2018    URINECX >100,000 cfu/ml Escherichia coli (A) 12/25/2018    SPUTUMCULTUR 4+ Growth of Haemophilus influenzae (AA) 12/25/2018    SPUTUMCULTUR 1+ Growth of Streptococcus pneumoniae (AA) 12/25/2018    SPUTUMCULTUR 1+ Growth of  12/25/2018     Allergies: No Known Allergies  Medications:   Scheduled Meds:    Current Facility-Administered Medications:  acetaminophen 650 mg Oral Q6H Elisa Curtis PA-C   albuterol 2 5 mg Nebulization Q4H PRN Walker Ada, DO   bisacodyl 10 mg Rectal Daily Tyrese Rivera PA-C   calcium gluconate 2 g Intravenous Once Tyrese Rivera PA-C   chlorhexidine 15 mL Swish & Spit Q12H [address] Marjorie Esteban MD   furosemide 40 mg Intravenous Daily Izabela Arambula PA-C   heparin (porcine) 5,000 Units Subcutaneous Q8H [address], MD   hydrALAZINE 5 mg Intravenous Q6H PRN Emory Suarez MD   HYDROmorphone 1 mg Intravenous Q2H PRN Haven Chen PA-C   influenza vaccine 0 5 mL Intramuscular Prior to discharge Walker Ada, DO   insulin lispro 1-5 Units Subcutaneous HS Nasra Calvo MD   insulin lispro 5-25 Units Subcutaneous Q6H [address] Chrissy Plasencia PA-C   ipratropium 0 5 mg Nebulization Q6H Walker Ada, DO   lamoTRIgine 25 mg Oral Daily Tyrese Rivera PA-C   levalbuterol 1 25 mg Nebulization Q6H Walker Ada, DO   levETIRAcetam 500 mg Oral Q12H [address] Paul Gonzalez MD   levothyroxine 88 mcg Oral Early Morning Haven Chen PA-C   lidocaine 2 patch Topical Daily Haven Chen PA-C   lisinopril-hydrochlorothiazide (PRINZIDE 10/12  5) combo dose  Oral Daily [place], [place]   nicotine 14 mg Transdermal Daily Elisa Curtis PA-C   nystatin 500,000 Units Swish & Swallow 4x Daily Tyrese Rivera PA-C   omeprazole (PRILOSEC) suspension 2 mg/mL 20 mg Oral Daily Sara Gross PA-C   oxyCODONE 5 mg Oral Q4H PRN Sara Gross PA-C   Or       oxyCODONE 10 mg Oral Q4H PRN Sara Gross PA-C   polyethylene glycol 17 g Oral Daily Sara Gross PA-C   pramipexole 0 25 mg Oral BID Margarita Connell, CRNP   senna-docusate sodium 2 tablet Oral BID Sara Gross PA-C     Continuous Infusions:     PRN Meds:    albuterol 2 5 mg Q4H PRN   hydrALAZINE 5 mg Q6H PRN   HYDROmorphone 1 mg Q2H PRN   influenza vaccine 0 5 mL Prior to discharge   oxyCODONE 5 mg Q4H PRN   Or     oxyCODONE 10 mg Q4H PRN     VTE Pharmacologic Prophylaxis:   Pharmacologic: Heparin  Mechanical VTE Prophylaxis in Place: Yes    Invasive lines and devices: Invasive Devices     Peripherally Inserted Central Catheter Line            PICC Line 73/36/06 Left Basilic 9 days          Drain            NG/OG/Enteral Tube Nasogastric Right nares 7 days    External Urinary Catheter 3 days          Airway            Surgical Airway Shiley Cuffed 7 days                Counseling / Coordination of Care  Total Critical Care time spent 0 minutes excluding procedures, teaching and family updates        Code Status: Level 1 - Full Code      Sara Gross PA-C

## 2019-01-01 NOTE — RESPIRATORY THERAPY NOTE
RT Ventilator Management Note  Baldev Koehler 59 y o  female MRN: [id number]  Unit/Bed#: [facility] -01 Encounter: [id number]      Daily Screen       12/31/2018 0900 1/1/2019 0741          Patient safety screen outcome[de-identified] Passed Passed              Physical Exam:   Assessment Type: Assess only  General Appearance: Awake  Respiratory Pattern: Assisted, Normal  Chest Assessment: Chest expansion symmetrical  Bilateral Breath Sounds: Coarse      Resp Comments: (P) pt placed back on AC settings, failed cpap/ps wean, increased RR, low VT, DR/RN informed

## 2019-01-01 NOTE — RESPIRATORY THERAPY NOTE
RT Ventilator Management Note  Anayeli Macdonald 59 y o  female MRN: [id number]  Unit/Bed#:  -01 Encounter: [id number]      Daily Screen       1/1/2019 0741 1/1/2019 0902          Patient safety screen outcome[de-identified] Passed -      Not Ready for Weaning due to[de-identified] - Underline problem not resolved;Poor inspiratory effort      Spont breathing trial % for 30 min: - Yes      Spont breathing trial outcome[de-identified] - Failed      Spont breathing trial reason failed: - RR > 35 bpm;Tidal volume < 4ml/Kg of ideal body weight or VE <5 or  >15 LPM;RSBI > 100      Previous settings resumed: - Yes      Name of Medical Team Notified[de-identified] - [facility]              Physical Exam:   Assessment Type: Assess only  General Appearance: Awake  Respiratory Pattern: Assisted, Normal  Chest Assessment: Chest expansion symmetrical  Bilateral Breath Sounds: Coarse      Resp Comments: (P) no other vent changes today, pt awake, appears comfortable on current settings will continue to monitor

## 2019-01-01 NOTE — PROGRESS NOTES
Progress Note - Critical Care   Luli King 59 y o  female MRN: [id number]  Unit/Bed#: 3150 [facility] -01 Encounter: [id number]    Assessment:   Principal Problem:    Traumatic brain injury [facility])  Active Problems:    Multiple fractures of ribs, bilateral, initial encounter for closed fracture    Traumatic pneumothorax    Glenoid fracture of shoulder, right, closed, initial encounter    Intraparenchymal hemorrhage of brain ([facility] [address] )    Flail chest, initial encounter for closed fracture    Fracture of thoracic transverse process, closed, initial encounter ([facility] [address] )    Subdural hematoma ([facility] [address] )    Subarachnoid hemorrhage ([facility] [address] )    Diabetes mellitus, type II ([facility] [address] )    Closed nondisplaced left toe fracture    Hypokalemia    Acute encephalopathy    Status post craniectomy    Acute respiratory failure with hypoxia ([facility] [address] )    Mucus plugging of bronchi    Acute blood loss anemia    Ileus (HCC)    Plan:   Neuro:   · L SDH, b/l SAH s/p L craniectomy POD #11: L craniectomy precautions  Frequent neuro checks, daily CAM-ICU as able  Continue keppra 500mg BID  MRI B yesterday without clear evidence of infectious process  · Analgesia/sedation: Fentanyl 25mcg/hr    CV:   · Hypertension: lisinopril/hctz daily, prn hydralazine  Goal MAP >65  · /61   Pulse 82   Temp 100 °F (37 8 °C)   Resp 22   Ht 5' 3" (1 6 m)   Wt 57 8 kg (127 lb 6 8 oz)   SpO2 98%   BMI 22 57 kg/m²     Lung:   · Acute hypoxic respiratory failure: Wean as tolerated  · B/l rib fractures, flail chest: Pain control, pulmonary toilet  · Pulmonary edema: Lasix 40 mg QD  · VAP: antibiotics as below  Chest PT, pulmonary toilet    Respiratory    Lab Data (Last 4 hours)    None         O2/Vent Data (Last 4 hours)      01/01 0107 01/01 0429         Vent Mode AC/VC AC/VC      Resp Rate (BPM) (BPM) 14 14      Vt (mL) (mL) 480 480      FIO2 (%) (%) 40 40      PEEP (cmH2O) (cmH2O) 8 8      MV 9 3 11                  GI:   · GERD: Prilosec daily  · Bowel regimen: Relistor, Miralax, Dulcolax  · Diet: Cyclic Jevity 1 2 @ 87 cc/hr    FEN:   · No maintenance IV fluids  · Replete PRN    :   · Monitor I/Os  · No kenny  I/O       701 -  0700  07 -  0700    I V  (mL/kg) 168 4 (2 9) 59 5 (1)    NG/GT 90 860    IV Piggyback 350 250    Feedings 934 989    Total Intake(mL/kg) 1542 4 (26 7) 2158 5 (37 3)    Urine (mL/kg/hr) 2390 (1 7) 645 (0 5)    Total Output 2390 645    Net -847 6 +1513 5          Unmeasured Urine Occurrence  1 x          ID:   · VAP: Ceftriaxone IV  · Monitor fever curve/white count, persistent fever of unexplained origin  WBC count downtrending  Heme:   · SQH/SCDs    Endo:   · DM2: SSI algorithm 1    Msk/Skin:   · Frequent turns and repositioning, offloading  · L toe fx: podiatry following, WBAT   · T1-T4 TP fx: non-operative management, pain control  · R glenoid fx: NWB RUE    Disposition:   · ICU    ______________________________________________________________________  Chief Complaint: unable to provide    ______________________________________________________________________  Temperature:   Temp (24hrs), Av 5 °F (38 1 °C), Min:100 °F (37 8 °C), Max:101 5 °F (38 6 °C)    Current Temperature: 100 °F (37 8 °C)    Vitals:    19 0107 19 0200 19 0300 19 042   BP:  118/61 120/61    Pulse:  84 82    Resp:  21 22    Temp:  100 °F (37 8 °C) 100 °F (37 8 °C)    TempSrc:       SpO2: 97% 97% 97% 98%   Weight:       Height:         Arterial Line BP: 126/80  Arterial Line MAP (mmHg): 102 mmHg     Weights:   IBW: 52 4 kg    Body mass index is 22 57 kg/m²    Weight (last 2 days)     None        Height: 5' 3" (160 cm)    Ventilator Settings:   Vent Mode: AC/VC  Resp Rate (BPM): 14 BPM  Vt (mL): 480 mL  FIO2 (%): 40 %  PEEP (cmH2O): 8 cmH2O  SpO2: 98 %    No results found for: PHART, ABG3MKU, PO2ART, KLQ2DZF, U0FMEEQC, BEART, SOURCE  SpO2: SpO2: 98 %    ______________________________________________________________________  Physical Exam:  Kessler Agitation Sedation Scale (RASS): Drowsy  Physical Exam   Constitutional: Vital signs are normal   Non-toxic appearance  She does not have a sickly appearance  She does not appear ill  No distress  She is restrained  Trach in place, site C/D/I   HENT:   NGT in place  L craniectomy, incision C/D/I  Eyes: Pupils are equal, round, and reactive to light  Neck: No tracheal deviation present  Cardiovascular: Normal rate, regular rhythm and normal heart sounds  Pulses:       Radial pulses are 2+ on the right side, and 2+ on the left side  Dorsalis pedis pulses are 2+ on the right side, and 2+ on the left side  Pulmonary/Chest:   Coarse breath sounds bilaterally   Abdominal: Soft  She exhibits distension  There is no tenderness  Musculoskeletal:   No peripheral edema   Neurological: She is alert  GCS eye subscore is 4  GCS verbal subscore is 1  GCS motor subscore is 5  Skin: Skin is warm, dry and intact  She is not diaphoretic     Multiple chronic abrasions to b/l UE   ______________________________________________________________________  Intake and Outputs:  I/O       12/29 0701 - 12/30 0700 12/30 0701 - 12/31 0700    I V  (mL/kg) 429 4 (7 4)     NG/GT 70 90    IV Piggyback 150 350    Feedings 271 934    Total Intake(mL/kg) 920 4 (15 9) 1374 (23 8)    Urine (mL/kg/hr) 3258 (2 3) 2390 (1 7)    Emesis/NG output 0     Total Output 3258 2390    Net -2337 6 -1016          Unmeasured Urine Occurrence 2 x         Nutrition:        Diet Orders            Start     Ordered    12/31/18 2005  Diet Enteral/Parenteral; Tube Feeding No Oral Diet; Jevity 1 2 Sae; Cyclic; 87; 16 hours; Prosource Protein Liquid - One Packet; 200; Every 4 hours  Diet effective now     Question Answer Comment   Diet Type Enteral/Parenteral    Enteral/Parenteral Tube Feeding No Oral Diet    Tube Feeding Formula: Jevity 1 2 Sae    Bolus/Cyclic/Continuous Cyclic    Tube Feeding Cyclic Rate (mL/hr): 87    Tube Feeding Cyclic: Administer over: 16 hours    Prosource Protein Liquid - No Carb Prosource Protein Liquid - One Packet    Tube Feeding water flush (mL): 200    Water flush frequency: Every 4 hours    RD to adjust diet per protocol? Yes        12/31/18 2005        Formula: Jevity 1 2, currently running at 63ml/hr, with a goal of 63ml/hr x16 hours  Labs:     Results from last 7 days  Lab Units 12/31/18  0422 12/30/18  0504 12/29/18  0550   WBC Thousand/uL 13 98* 15 41* 17 95*   HEMOGLOBIN g/dL 8 7* 9 1* 9 0*   HEMATOCRIT % 29 1* 29 6* 29 3*   PLATELETS Thousands/uL 532* 436* 393*   MONO PCT % 3* 3* 3*        Results from last 7 days  Lab Units 12/31/18  0422 12/30/18  0504 12/29/18  0550   POTASSIUM mmol/L 3 3* 3 2* 3 2*   CHLORIDE mmol/L 111* 108 105   CO2 mmol/L 36* 34* 34*   BUN mg/dL 31* 34* 30*   CREATININE mg/dL 0 42* 0 42* 0 51*   CALCIUM mg/dL 8 6 9 0 9 1   ALK PHOS U/L  --   --  126*   ALT U/L  --   --  20   AST U/L  --   --  25       Results from last 7 days  Lab Units 12/31/18  0422 12/30/18  0504 12/29/18  0550   MAGNESIUM mg/dL 2 2 2 3 2 1       Results from last 7 days  Lab Units 12/31/18  0422 12/30/18  0504 12/29/18  0550   PHOSPHORUS mg/dL 3 5 1 2* 3 1            Results from last 7 days  Lab Units 12/25/18  1538   LACTIC ACID mmol/L 1 6       0  Lab Value Date/Time   TROPONINI <0 02 12/20/2018 1204   TROPONINI <0 02 12/20/2018 0054     Imaging: MRI brain: Status post extensive left hemicraniectomy for subacute left hemispheric hemorrhage decompression  The subacute hemorrhage is primarily within the temporal lobe  Partial extension of the left temporal parietal lobe through the craniectomy site      Subdural, extradural and extracranial fluid collection identified within the anterior left hemisphere extending into the left temporalis region      There is acute to early subacute ischemia identified within the left anterior frontal white matter on series 3 image 23      Scattered subarachnoid hemorrhage  Small amount of intraventricular hemorrhage layering in the occipital horns  This is unchanged from recent CT scan  I have personally reviewed pertinent reports  Micro:  Blood Culture:   Lab Results   Component Value Date    BLOODCX No Growth After 5 Days  12/25/2018    BLOODCX No Growth After 5 Days  12/25/2018     Urine Culture:   Lab Results   Component Value Date    URINECX >100,000 cfu/ml Gram Negative [place] Enteric Like (A) 12/25/2018    URINECX >100,000 cfu/ml Escherichia coli (A) 12/25/2018     Sputum Culture: No components found for: SPUTUMCX  Wound Culure: No results found for: WOUNDCULT    Lab Results   Component Value Date    BLOODCX No Growth After 5 Days  12/25/2018    BLOODCX No Growth After 5 Days   12/25/2018    URINECX >100,000 cfu/ml Gram Negative [place] Enteric Like (A) 12/25/2018    URINECX >100,000 cfu/ml Escherichia coli (A) 12/25/2018    SPUTUMCULTUR 4+ Growth of Haemophilus influenzae (AA) 12/25/2018    SPUTUMCULTUR 1+ Growth of Streptococcus pneumoniae (AA) 12/25/2018    SPUTUMCULTUR 1+ Growth of  12/25/2018     Allergies: No Known Allergies  Medications:   Scheduled Meds:    Current Facility-Administered Medications:  acetaminophen 650 mg Oral Q6H Taras Bello PA-C    albuterol 2 5 mg Nebulization Q4H PRN Epi Carpenter DO    bisacodyl 10 mg Rectal Daily Matty Dimas PA-C    chlorhexidine 15 mL Swish & Spit Q12H [address] Ney Johnson MD    fentaNYL 25 mcg/hr Intravenous Continuous Matty Dimas PA-C Last Rate: 25 mcg/hr (12/31/18 1333)   furosemide 40 mg Intravenous Daily Sabrina Alexandra PA-C    heparin (porcine) 5,000 Units Subcutaneous Q8H [address], MD    hydrALAZINE 5 mg Intravenous Q6H PRN Kyle Perez MD    HYDROmorphone 1 mg Intravenous Q2H PRN Pineda Wong PA-C    influenza vaccine 0 5 mL Intramuscular Prior to discharge Epi Carpenter DO    insulin lispro 1-5 Units Subcutaneous HS Estrellita Lyman MD    insulin lispro 5-25 Units Subcutaneous Q6H [facility] Svetlana Abreu PA-C    ipratropium 0 5 mg Nebulization Q6H [facility], DO    lamoTRIgine 25 mg Oral Daily Valentino HARSHAD Harry    levalbuterol 1 25 mg Nebulization Q6H [facility], DO    levETIRAcetam 500 mg Oral Q12H [facility] Kenroy Gonzalez MD    levothyroxine 88 mcg Oral Early Morning Percilla EngHARSHAD    lidocaine 2 patch Topical Daily Percilla EngHARSHAD    lisinopril-hydrochlorothiazide (PRINZIDE 10/12  5) combo dose  Oral Daily Jodine HARSHAD    methylnaltrexone 8 mg Subcutaneous Every Other Day Niko Li PA-C    nicotine 14 mg Transdermal Daily Rajesh Camilo PA-C    nystatin 500,000 Units Swish & Swallow 4x Daily Valentino Fordyce, PA-C    omeprazole (PRILOSEC) suspension 2 mg/mL 20 mg Oral Daily Valentino Fordyce, PA-C    oxyCODONE 5 mg Oral Q4H PRN Valentino Fordyce, PA-C    Or        oxyCODONE 10 mg Oral Q4H PRN Valentino Fordyce, PA-C    polyethylene glycol 17 g Oral Daily Valentino Fordyce, PA-C    pramipexole 0 25 mg Oral BID TRISTAN Ji    senna-docusate sodium 2 tablet Oral BID Valentino Fordyce, PA-C      Continuous Infusions:    fentaNYL 25 mcg/hr Last Rate: 25 mcg/hr (12/31/18 1333)     PRN Meds:    albuterol 2 5 mg Q4H PRN   hydrALAZINE 5 mg Q6H PRN   HYDROmorphone 1 mg Q2H PRN   influenza vaccine 0 5 mL Prior to discharge   oxyCODONE 5 mg Q4H PRN   Or     oxyCODONE 10 mg Q4H PRN     VTE Pharmacologic Prophylaxis:   Pharmacologic: Heparin  Mechanical VTE Prophylaxis in Place: Yes    Invasive lines and devices: Invasive Devices     Peripherally Inserted Central Catheter Line            PICC Line 63/50/04 Left Basilic 9 days          Drain            NG/OG/Enteral Tube Nasogastric Right nares 7 days    External Urinary Catheter 3 days          Airway            Surgical Airway Shiley Cuffed 7 days                   Counseling / Coordination of Care  Total Critical Care time spent 32 minutes excluding procedures, teaching and family updates  Code Status: Level 1 - Full Code      Aranza Garber MD

## 2019-01-01 NOTE — RESPIRATORY THERAPY NOTE
RT Ventilator Management Note  Claudio Newby 59 y o  female MRN: [id number]  Unit/Bed#:  -01 Encounter: [id number]      Daily Screen       12/30/2018 1621 12/31/2018 0900          Patient safety screen outcome[de-identified] - Passed      Spont breathing trial outcome[de-identified] Failed -      Spont breathing trial reason failed: RR > 35 bpm;RSBI > 100 -              Physical Exam:   Assessment Type: (P) Assess only  General Appearance: (P) Awake  Respiratory Pattern: (P) Assisted, Normal  Chest Assessment: (P) Chest expansion symmetrical  Bilateral Breath Sounds: (P) Coarse      Resp Comments: (P) Pt remained on a/c settings throughout the night without incident

## 2019-01-02 ENCOUNTER — ANESTHESIA (INPATIENT)
Dept: PERIOP | Facility: HOSPITAL | Age: 64
DRG: 003 | End: 2019-01-02
Payer: MEDICARE

## 2019-01-02 ENCOUNTER — APPOINTMENT (INPATIENT)
Dept: RADIOLOGY | Facility: HOSPITAL | Age: 64
DRG: 003 | End: 2019-01-02
Payer: MEDICARE

## 2019-01-02 PROBLEM — K56.7 ILEUS (HCC): Status: RESOLVED | Noted: 2018-12-24 | Resolved: 2019-01-02

## 2019-01-02 PROBLEM — T17.500A MUCUS PLUGGING OF BRONCHI: Status: RESOLVED | Noted: 2018-12-22 | Resolved: 2019-01-02

## 2019-01-02 LAB
ANION GAP SERPL CALCULATED.3IONS-SCNC: 7 MMOL/L (ref 4–13)
BASOPHILS # BLD AUTO: 0.07 THOUSANDS/ΜL (ref 0–0.1)
BASOPHILS NFR BLD AUTO: 1 % (ref 0–1)
BUN SERPL-MCNC: 28 MG/DL (ref 5–25)
CA-I BLD-SCNC: 1.17 MMOL/L (ref 1.12–1.32)
CALCIUM SERPL-MCNC: 9.1 MG/DL (ref 8.3–10.1)
CHLORIDE SERPL-SCNC: 110 MMOL/L (ref 100–108)
CO2 SERPL-SCNC: 32 MMOL/L (ref 21–32)
CREAT SERPL-MCNC: 0.44 MG/DL (ref 0.6–1.3)
EOSINOPHIL # BLD AUTO: 0.21 THOUSAND/ΜL (ref 0–0.61)
EOSINOPHIL NFR BLD AUTO: 2 % (ref 0–6)
ERYTHROCYTE [DISTWIDTH] IN BLOOD BY AUTOMATED COUNT: 17.2 % (ref 11.6–15.1)
GFR SERPL CREATININE-BSD FRML MDRD: 109 ML/MIN/1.73SQ M
GLUCOSE SERPL-MCNC: 124 MG/DL (ref 65–140)
GLUCOSE SERPL-MCNC: 157 MG/DL (ref 65–140)
GLUCOSE SERPL-MCNC: 188 MG/DL (ref 65–140)
GLUCOSE SERPL-MCNC: 198 MG/DL (ref 65–140)
HCT VFR BLD AUTO: 28.3 % (ref 34.8–46.1)
HGB BLD-MCNC: 8.6 G/DL (ref 11.5–15.4)
IMM GRANULOCYTES # BLD AUTO: 0.19 THOUSAND/UL (ref 0–0.2)
IMM GRANULOCYTES NFR BLD AUTO: 1 % (ref 0–2)
LYMPHOCYTES # BLD AUTO: 2.53 THOUSANDS/ΜL (ref 0.6–4.47)
LYMPHOCYTES NFR BLD AUTO: 18 % (ref 14–44)
MAGNESIUM SERPL-MCNC: 2 MG/DL (ref 1.6–2.6)
MCH RBC QN AUTO: 27.5 PG (ref 26.8–34.3)
MCHC RBC AUTO-ENTMCNC: 30.4 G/DL (ref 31.4–37.4)
MCV RBC AUTO: 90 FL (ref 82–98)
MONOCYTES # BLD AUTO: 0.85 THOUSAND/ΜL (ref 0.17–1.22)
MONOCYTES NFR BLD AUTO: 6 % (ref 4–12)
NEUTROPHILS # BLD AUTO: 10.44 THOUSANDS/ΜL (ref 1.85–7.62)
NEUTS SEG NFR BLD AUTO: 72 % (ref 43–75)
NRBC BLD AUTO-RTO: 0 /100 WBCS
PHOSPHATE SERPL-MCNC: 3.2 MG/DL (ref 2.3–4.1)
PLATELET # BLD AUTO: 647 THOUSANDS/UL (ref 149–390)
PMV BLD AUTO: 11.1 FL (ref 8.9–12.7)
POTASSIUM SERPL-SCNC: 3.1 MMOL/L (ref 3.5–5.3)
RBC # BLD AUTO: 3.13 MILLION/UL (ref 3.81–5.12)
SODIUM SERPL-SCNC: 149 MMOL/L (ref 136–145)
WBC # BLD AUTO: 14.29 THOUSAND/UL (ref 4.31–10.16)

## 2019-01-02 PROCEDURE — G8987 SELF CARE CURRENT STATUS: HCPCS

## 2019-01-02 PROCEDURE — 82330 ASSAY OF CALCIUM: CPT | Performed by: PHYSICIAN ASSISTANT

## 2019-01-02 PROCEDURE — 84100 ASSAY OF PHOSPHORUS: CPT | Performed by: PHYSICIAN ASSISTANT

## 2019-01-02 PROCEDURE — 97163 PT EVAL HIGH COMPLEX 45 MIN: CPT

## 2019-01-02 PROCEDURE — 43246 EGD PLACE GASTROSTOMY TUBE: CPT | Performed by: SURGERY

## 2019-01-02 PROCEDURE — 99291 CRITICAL CARE FIRST HOUR: CPT | Performed by: EMERGENCY MEDICINE

## 2019-01-02 PROCEDURE — 74018 RADEX ABDOMEN 1 VIEW: CPT

## 2019-01-02 PROCEDURE — 0DH64UZ INSERTION OF FEEDING DEVICE INTO STOMACH, PERCUTANEOUS ENDOSCOPIC APPROACH: ICD-10-PCS | Performed by: SURGERY

## 2019-01-02 PROCEDURE — 85025 COMPLETE CBC W/AUTO DIFF WBC: CPT | Performed by: PHYSICIAN ASSISTANT

## 2019-01-02 PROCEDURE — 83735 ASSAY OF MAGNESIUM: CPT | Performed by: PHYSICIAN ASSISTANT

## 2019-01-02 PROCEDURE — 94760 N-INVAS EAR/PLS OXIMETRY 1: CPT

## 2019-01-02 PROCEDURE — 97167 OT EVAL HIGH COMPLEX 60 MIN: CPT

## 2019-01-02 PROCEDURE — 94640 AIRWAY INHALATION TREATMENT: CPT

## 2019-01-02 PROCEDURE — G8979 MOBILITY GOAL STATUS: HCPCS

## 2019-01-02 PROCEDURE — 80048 BASIC METABOLIC PNL TOTAL CA: CPT | Performed by: PHYSICIAN ASSISTANT

## 2019-01-02 PROCEDURE — 82948 REAGENT STRIP/BLOOD GLUCOSE: CPT

## 2019-01-02 PROCEDURE — G8978 MOBILITY CURRENT STATUS: HCPCS

## 2019-01-02 PROCEDURE — G8988 SELF CARE GOAL STATUS: HCPCS

## 2019-01-02 PROCEDURE — 94003 VENT MGMT INPAT SUBQ DAY: CPT

## 2019-01-02 PROCEDURE — 99024 POSTOP FOLLOW-UP VISIT: CPT | Performed by: PHYSICIAN ASSISTANT

## 2019-01-02 RX ORDER — ROCURONIUM BROMIDE 10 MG/ML
INJECTION, SOLUTION INTRAVENOUS AS NEEDED
Status: DISCONTINUED | OUTPATIENT
Start: 2019-01-02 | End: 2019-01-02 | Stop reason: SURG

## 2019-01-02 RX ORDER — DEXTROSE AND SODIUM CHLORIDE 5; .45 G/100ML; G/100ML
75 INJECTION, SOLUTION INTRAVENOUS CONTINUOUS
Status: DISCONTINUED | OUTPATIENT
Start: 2019-01-02 | End: 2019-01-03 | Stop reason: HOSPADM

## 2019-01-02 RX ORDER — ACETAMINOPHEN 650 MG/1
650 SUPPOSITORY RECTAL ONCE
Status: COMPLETED | OUTPATIENT
Start: 2019-01-02 | End: 2019-01-02

## 2019-01-02 RX ORDER — BISACODYL 10 MG
10 SUPPOSITORY, RECTAL RECTAL DAILY PRN
Status: DISCONTINUED | OUTPATIENT
Start: 2019-01-02 | End: 2019-01-03 | Stop reason: HOSPADM

## 2019-01-02 RX ORDER — DEXAMETHASONE SODIUM PHOSPHATE 4 MG/ML
INJECTION, SOLUTION INTRA-ARTICULAR; INTRALESIONAL; INTRAMUSCULAR; INTRAVENOUS; SOFT TISSUE AS NEEDED
Status: DISCONTINUED | OUTPATIENT
Start: 2019-01-02 | End: 2019-01-02 | Stop reason: SURG

## 2019-01-02 RX ORDER — SODIUM CHLORIDE, SODIUM LACTATE, POTASSIUM CHLORIDE, CALCIUM CHLORIDE 600; 310; 30; 20 MG/100ML; MG/100ML; MG/100ML; MG/100ML
INJECTION, SOLUTION INTRAVENOUS CONTINUOUS PRN
Status: DISCONTINUED | OUTPATIENT
Start: 2019-01-02 | End: 2019-01-02 | Stop reason: SURG

## 2019-01-02 RX ORDER — CEFAZOLIN SODIUM 1 G/3ML
INJECTION, POWDER, FOR SOLUTION INTRAMUSCULAR; INTRAVENOUS AS NEEDED
Status: DISCONTINUED | OUTPATIENT
Start: 2019-01-02 | End: 2019-01-02 | Stop reason: SURG

## 2019-01-02 RX ORDER — SODIUM CHLORIDE, SODIUM GLUCONATE, SODIUM ACETATE, POTASSIUM CHLORIDE, MAGNESIUM CHLORIDE, SODIUM PHOSPHATE, DIBASIC, AND POTASSIUM PHOSPHATE .53; .5; .37; .037; .03; .012; .00082 G/100ML; G/100ML; G/100ML; G/100ML; G/100ML; G/100ML; G/100ML
500 INJECTION, SOLUTION INTRAVENOUS ONCE
Status: COMPLETED | OUTPATIENT
Start: 2019-01-02 | End: 2019-01-02

## 2019-01-02 RX ORDER — POTASSIUM CHLORIDE 20MEQ/15ML
40 LIQUID (ML) ORAL ONCE
Status: COMPLETED | OUTPATIENT
Start: 2019-01-02 | End: 2019-01-02

## 2019-01-02 RX ORDER — ONDANSETRON 2 MG/ML
INJECTION INTRAMUSCULAR; INTRAVENOUS AS NEEDED
Status: DISCONTINUED | OUTPATIENT
Start: 2019-01-02 | End: 2019-01-02 | Stop reason: SURG

## 2019-01-02 RX ORDER — POTASSIUM CHLORIDE 29.8 MG/ML
40 INJECTION INTRAVENOUS ONCE
Status: COMPLETED | OUTPATIENT
Start: 2019-01-02 | End: 2019-01-02

## 2019-01-02 RX ORDER — FENTANYL CITRATE 50 UG/ML
INJECTION, SOLUTION INTRAMUSCULAR; INTRAVENOUS AS NEEDED
Status: DISCONTINUED | OUTPATIENT
Start: 2019-01-02 | End: 2019-01-02 | Stop reason: SURG

## 2019-01-02 RX ORDER — LIDOCAINE HYDROCHLORIDE 10 MG/ML
INJECTION, SOLUTION INFILTRATION; PERINEURAL AS NEEDED
Status: DISCONTINUED | OUTPATIENT
Start: 2019-01-02 | End: 2019-01-02 | Stop reason: HOSPADM

## 2019-01-02 RX ADMIN — NICOTINE 14 MG: 14 PATCH TRANSDERMAL at 09:20

## 2019-01-02 RX ADMIN — DEXTROSE AND SODIUM CHLORIDE 75 ML/HR: 5; .45 INJECTION, SOLUTION INTRAVENOUS at 09:54

## 2019-01-02 RX ADMIN — LEVALBUTEROL 1.25 MG: 1.25 SOLUTION, CONCENTRATE RESPIRATORY (INHALATION) at 19:38

## 2019-01-02 RX ADMIN — IPRATROPIUM BROMIDE 0.5 MG: 0.5 SOLUTION RESPIRATORY (INHALATION) at 07:43

## 2019-01-02 RX ADMIN — NYSTATIN 500000 UNITS: 100000 SUSPENSION ORAL at 21:36

## 2019-01-02 RX ADMIN — POTASSIUM CHLORIDE 40 MEQ: 20 SOLUTION ORAL at 06:59

## 2019-01-02 RX ADMIN — CEFAZOLIN 1000 MG: 1 INJECTION, POWDER, FOR SOLUTION INTRAVENOUS at 14:09

## 2019-01-02 RX ADMIN — Medication 20 MG: at 05:48

## 2019-01-02 RX ADMIN — CHLORHEXIDINE GLUCONATE 0.12% ORAL RINSE 15 ML: 1.2 LIQUID ORAL at 21:36

## 2019-01-02 RX ADMIN — LEVETIRACETAM 500 MG: 100 SOLUTION ORAL at 21:38

## 2019-01-02 RX ADMIN — OXYCODONE HYDROCHLORIDE 5 MG: 5 TABLET ORAL at 09:51

## 2019-01-02 RX ADMIN — NYSTATIN 500000 UNITS: 100000 SUSPENSION ORAL at 18:05

## 2019-01-02 RX ADMIN — POTASSIUM CHLORIDE 40 MEQ: 400 INJECTION, SOLUTION INTRAVENOUS at 06:59

## 2019-01-02 RX ADMIN — HYDROMORPHONE HYDROCHLORIDE 1 MG: 1 INJECTION, SOLUTION INTRAMUSCULAR; INTRAVENOUS; SUBCUTANEOUS at 21:35

## 2019-01-02 RX ADMIN — DEXAMETHASONE SODIUM PHOSPHATE 4 MG: 4 INJECTION, SOLUTION INTRAMUSCULAR; INTRAVENOUS at 14:12

## 2019-01-02 RX ADMIN — LEVOTHYROXINE SODIUM 88 MCG: 88 TABLET ORAL at 05:48

## 2019-01-02 RX ADMIN — CHLORHEXIDINE GLUCONATE 0.12% ORAL RINSE 15 ML: 1.2 LIQUID ORAL at 09:26

## 2019-01-02 RX ADMIN — FENTANYL CITRATE 50 MCG: 50 INJECTION, SOLUTION INTRAMUSCULAR; INTRAVENOUS at 14:05

## 2019-01-02 RX ADMIN — VASOPRESSIN 1 UNITS: 20 INJECTION, SOLUTION INTRAMUSCULAR; SUBCUTANEOUS at 14:33

## 2019-01-02 RX ADMIN — LAMOTRIGINE 25 MG: 25 TABLET ORAL at 09:18

## 2019-01-02 RX ADMIN — HEPARIN SODIUM 5000 UNITS: 5000 INJECTION INTRAVENOUS; SUBCUTANEOUS at 15:34

## 2019-01-02 RX ADMIN — HYDROMORPHONE HYDROCHLORIDE 1 MG: 1 INJECTION, SOLUTION INTRAMUSCULAR; INTRAVENOUS; SUBCUTANEOUS at 05:25

## 2019-01-02 RX ADMIN — IPRATROPIUM BROMIDE 0.5 MG: 0.5 SOLUTION RESPIRATORY (INHALATION) at 00:17

## 2019-01-02 RX ADMIN — SODIUM CHLORIDE, SODIUM LACTATE, POTASSIUM CHLORIDE, AND CALCIUM CHLORIDE: .6; .31; .03; .02 INJECTION, SOLUTION INTRAVENOUS at 14:07

## 2019-01-02 RX ADMIN — HEPARIN SODIUM 5000 UNITS: 5000 INJECTION INTRAVENOUS; SUBCUTANEOUS at 05:48

## 2019-01-02 RX ADMIN — VASOPRESSIN 1 UNITS: 20 INJECTION, SOLUTION INTRAMUSCULAR; SUBCUTANEOUS at 14:28

## 2019-01-02 RX ADMIN — IPRATROPIUM BROMIDE 0.5 MG: 0.5 SOLUTION RESPIRATORY (INHALATION) at 13:27

## 2019-01-02 RX ADMIN — INSULIN LISPRO 10 UNITS: 100 INJECTION, SOLUTION INTRAVENOUS; SUBCUTANEOUS at 00:15

## 2019-01-02 RX ADMIN — HYDROMORPHONE HYDROCHLORIDE 1 MG: 1 INJECTION, SOLUTION INTRAMUSCULAR; INTRAVENOUS; SUBCUTANEOUS at 02:15

## 2019-01-02 RX ADMIN — ACETAMINOPHEN 650 MG: 650 SUPPOSITORY RECTAL at 15:35

## 2019-01-02 RX ADMIN — LEVALBUTEROL 1.25 MG: 1.25 SOLUTION, CONCENTRATE RESPIRATORY (INHALATION) at 13:27

## 2019-01-02 RX ADMIN — FENTANYL CITRATE 25 MCG: 50 INJECTION, SOLUTION INTRAMUSCULAR; INTRAVENOUS at 14:24

## 2019-01-02 RX ADMIN — ACETAMINOPHEN 650 MG: 160 SUSPENSION ORAL at 00:14

## 2019-01-02 RX ADMIN — ONDANSETRON 4 MG: 2 INJECTION INTRAMUSCULAR; INTRAVENOUS at 14:13

## 2019-01-02 RX ADMIN — LIDOCAINE 2 PATCH: 50 PATCH CUTANEOUS at 09:19

## 2019-01-02 RX ADMIN — ACETAMINOPHEN 650 MG: 160 SUSPENSION ORAL at 05:49

## 2019-01-02 RX ADMIN — NYSTATIN 500000 UNITS: 100000 SUSPENSION ORAL at 12:44

## 2019-01-02 RX ADMIN — LEVALBUTEROL 1.25 MG: 1.25 SOLUTION, CONCENTRATE RESPIRATORY (INHALATION) at 00:17

## 2019-01-02 RX ADMIN — INSULIN LISPRO 5 UNITS: 100 INJECTION, SOLUTION INTRAVENOUS; SUBCUTANEOUS at 12:45

## 2019-01-02 RX ADMIN — LEVETIRACETAM 500 MG: 100 SOLUTION ORAL at 09:18

## 2019-01-02 RX ADMIN — PRAMIPEXOLE DIHYDROCHLORIDE 0.25 MG: 0.25 TABLET ORAL at 18:06

## 2019-01-02 RX ADMIN — DEXTROSE AND SODIUM CHLORIDE 75 ML/HR: 5; .45 INJECTION, SOLUTION INTRAVENOUS at 20:26

## 2019-01-02 RX ADMIN — LEVALBUTEROL 1.25 MG: 1.25 SOLUTION, CONCENTRATE RESPIRATORY (INHALATION) at 07:43

## 2019-01-02 RX ADMIN — ACETAMINOPHEN 650 MG: 160 SUSPENSION ORAL at 18:04

## 2019-01-02 RX ADMIN — HYDROMORPHONE HYDROCHLORIDE 1 MG: 1 INJECTION, SOLUTION INTRAMUSCULAR; INTRAVENOUS; SUBCUTANEOUS at 12:45

## 2019-01-02 RX ADMIN — ROCURONIUM BROMIDE 20 MG: 10 INJECTION INTRAVENOUS at 14:13

## 2019-01-02 RX ADMIN — IPRATROPIUM BROMIDE 0.5 MG: 0.5 SOLUTION RESPIRATORY (INHALATION) at 19:38

## 2019-01-02 RX ADMIN — SODIUM CHLORIDE, SODIUM GLUCONATE, SODIUM ACETATE, POTASSIUM CHLORIDE, MAGNESIUM CHLORIDE, SODIUM PHOSPHATE, DIBASIC, AND POTASSIUM PHOSPHATE 500 ML: .53; .5; .37; .037; .03; .012; .00082 INJECTION, SOLUTION INTRAVENOUS at 06:54

## 2019-01-02 RX ADMIN — OXYCODONE HYDROCHLORIDE 10 MG: 10 TABLET ORAL at 15:34

## 2019-01-02 RX ADMIN — NYSTATIN 500000 UNITS: 100000 SUSPENSION ORAL at 09:20

## 2019-01-02 RX ADMIN — HEPARIN SODIUM 5000 UNITS: 5000 INJECTION INTRAVENOUS; SUBCUTANEOUS at 21:36

## 2019-01-02 RX ADMIN — INSULIN LISPRO 5 UNITS: 100 INJECTION, SOLUTION INTRAVENOUS; SUBCUTANEOUS at 18:06

## 2019-01-02 NOTE — ANESTHESIA POSTPROCEDURE EVALUATION
Post-Op Assessment Note      CV Status:  Stable    Mental Status:  Somnolent    Hydration Status:  Euvolemic    PONV Controlled:  Controlled    Airway Patency:  Patent  Airway: intubated    Post Op Vitals Reviewed: Yes          Staff: CRNA, Anesthesiologist       Comments: pt transported to ICU with anesthesiologist and CRNA  pt with trach in situ  ICU RN given report bedside and patient hemodynamically stable             BP   106/54   Temp  100 2   Pulse  69   Resp   17   SpO2   100

## 2019-01-02 NOTE — RESPIRATORY THERAPY NOTE
RT Ventilator Management Note  Markie Moreno 59 y o  female MRN: [id number]  Unit/Bed#:  -01 Encounter: [id number]      Daily Screen       1/1/2019 0741 1/1/2019 0902          Patient safety screen outcome[de-identified] Passed -      Not Ready for Weaning due to[de-identified] - Underline problem not resolved;Poor inspiratory effort      Spont breathing trial % for 30 min: - Yes      Spont breathing trial outcome[de-identified] - Failed      Spont breathing trial reason failed: - RR > 35 bpm;Tidal volume < 4ml/Kg of ideal body weight or VE <5 or  >15 LPM;RSBI > 100      Previous settings resumed: - Yes      Name of Medical Team Notified[de-identified] - CCS              Physical Exam:   Assessment Type: (P) Assess only      Resp Comments: (P) Pt  doing well on A/C  No changes throughout the night

## 2019-01-02 NOTE — PHYSICAL THERAPY NOTE
Physical Therapy Evaluation     Patient's Name: Brian Delacruz    Admitting Diagnosis  Acute respiratory failure ([facility] [address] ) [J96 00]  Trauma [T14 90XA]    Problem List  Patient Active Problem List   Diagnosis    Multiple fractures of ribs, bilateral, initial encounter for closed fracture    Traumatic pneumothorax    Glenoid fracture of shoulder, right, closed, initial encounter    Intraparenchymal hemorrhage of brain ([address] )    Traumatic brain injury ([address] )    Flail chest, initial encounter for closed fracture    Fracture of thoracic transverse process, closed, initial encounter ([address] )    Subdural hematoma ([address] )    Subarachnoid hemorrhage ([address] )    Diabetes mellitus, type II ([address] )    Closed nondisplaced left toe fracture    Hypokalemia    Acute encephalopathy    Status post craniectomy    Acute respiratory failure with hypoxia ([facility] [address] )    Acute blood loss anemia    Protein-calorie malnutrition ([address] )       Past Medical History  Past Medical History:   Diagnosis Date    Diabetes mellitus, type II ([facility] [address] )        Past Surgical History  Past Surgical History:   Procedure Laterality Date    CRANIECTOMY Left 12/20/2018    Procedure: Left hemicraniectomy with evacuation of clot;   Surgeon: Nicole Mckay MD;  [facility]: [facility];  Service: Neurosurgery    PEG W/TRACHEOSTOMY PLACEMENT N/A 12/24/2018    Procedure: TRACHEOSTOMY;  Surgeon: Susy Art MD;  Location: [facility];  Service: General        01/02/19 1145   Note Type   Note type Eval/Treat   Pain Assessment   Pain Assessment FLACC   Pain Rating: FLACC (Rest) - Face 1   Pain Rating: FLACC (Rest) - Legs 1   Pain Rating: FLACC (Rest) - Activity 1   Pain Rating: FLACC (Rest) - Cry 0   Pain Rating: FLACC (Rest) - Consolability 1   Score: FLACC (Rest) 4   Home Living   Type of Home Apartment   Home Layout Elevator  (2nd floor, )   Prior Function   Level of [place] Independent with ADLs and functional mobility   Lives With Alone  (sister lives in same building) Receives Help From Family   ADL Assistance Independent   IADLs Independent   Comments Per chart pt lives alone in 2nd floor apartment with elevator, sister lives in same building  Pt has 4 brothers and all siblings are close   Restrictions/Precautions   RUE Weight Bearing Per Order NWB   Other Precautions Telemetry;Multiple lines; Bed Alarm; Chair Alarm; Fall Risk;Pain;Spinal precautions; Agitated;WBS;O2  (craniectomy helmet)   General   Family/Caregiver Present No   Cognition   Orientation Level Unable to assess   RLE Assessment   RLE Assessment (0/5 )   LLE Assessment   LLE Assessment (grossly at least 3-/5 with movement, not following commands )   Coordination   Movements are Fluid and Coordinated 0   Bed Mobility   Supine to Sit 2  Maximal assistance   Additional items Assist x 2  (long sit in bed)   Sit to Supine 3  Moderate assistance   Additional items Assist x 2  (pushing against support when fatigued)   Transfers   Sit to Stand Unable to assess   Ambulation/Elevation   Gait pattern Not appropriate; Not tested  (agitiated, very inconstantly following commands during long )   Balance   Static Sitting Zero   Endurance Deficit   Endurance Deficit Yes   Endurance Deficit Description limited by fatigue   Activity Tolerance   Activity Tolerance Patient limited by fatigue;Patient limited by pain   Nurse Made Aware yes, nsg KJ gave clearance to work with pt   Assessment   Prognosis Good   Problem List Decreased strength;Decreased endurance; Impaired balance;Decreased mobility; Decreased safety awareness;Pain;Orthopedic restrictions;Decreased coordination   Assessment Pt is 59 y o  female seen for PT evaluation s/p admit to [facility] on 12/18/2018 w/ Traumatic brain injury ([facility] [address] )  PT consulted to assess pt's functional mobility and d/c needs  Order placed for PT eval and tx, w/ helmet during mobility, NWB RUE order   Comorbidities affecting pt's physical performance at time of assessment include: non verbal, cognitive deficit, agitation  PTA, pt was ambulates unrestricted distances and all terrain  Personal factors affecting pt at time of IE include: inaccessible home environment, communication issues, inability to ambulate household distances, decreased cognition, limited home support, positive fall history, decreased initiation and engagement, impulsivity, unable to perform physical activity, limited insight into impairments, inability to perform IADLs, inability to perform ADLs and inability to live alone  Please find objective findings from PT assessment regarding body systems outlined above with impairments and limitations including weakness, decreased ROM, impaired balance, decreased endurance, impaired coordination, pain, decreased activity tolerance, decreased functional mobility tolerance, decreased safety awareness, impaired judgement, fall risk, impaired tone and decreased cognition  Pt inconsistently answering questions during evaluation with head nod  Pt not following mobility instruction with LUE/LLE  Required Ax2 to pull into long sit in chair position  Impulsively pushed back with fatigue during each of the 3 sitting trials  Not appropriate for EOB today 2* to impulsivity and agitation without command following  The following objective measures performed on IE also reveal limitations: Barthel Index: 5/100  Pt's clinical presentation is currently unstable/unpredictable seen in pt's presentation of critical care monitoring, craniectomy helmet, trach, 02, pain  Pt to benefit from continued PT tx to address deficits as defined above and maximize level of functional independent mobility and consistency  From PT/mobility standpoint, recommendation at time of d/c would be IP rehab pending progress in order to facilitate return to PLOF     Barriers to Discharge Inaccessible home environment;Decreased caregiver support   Goals   Patient Goals None stated, trach at this time non verbal   STG Expiration Date 01/14/19   Short Term Goal #1 1  Follow mobility commands >50% of the time  2  Improve LLE strength to 4/5  3  Complete long sit with minAx1  4  PT to see for OOB when appropriate  Plan   Treatment/Interventions Gait training;Bed mobility;LE strengthening/ROM; Functional transfer training;Patient/family training;Spoke to case management;Spoke to nursing;OT   PT Frequency 2-3x/wk   Recommendation   Recommendation Other (Comment)  (rehab)   Equipment Recommended (TBD)   PT - OK to Discharge Yes  (to rehab when medically stable )   Barthel Index   Feeding 0   Bathing 0   Grooming Score 0   Dressing Score 0   Bladder Score 0   Bowels Score 0   Toilet Use Score 0   Transfers (Bed/Chair) Score 5   Mobility (Level Surface) Score 0   Stairs Score 0   Barthel Index Score 5           Bk Hernandez PT

## 2019-01-02 NOTE — PROGRESS NOTES
Pt seen by surgical critical care team  Scheduled for Peg insertion today  Pt NPO, but may get meds per anesthesiologist  Started on IV fluid through PICC line  Inc urine and stool  Agitated this morning

## 2019-01-02 NOTE — PLAN OF CARE
Problem: PHYSICAL THERAPY ADULT  Goal: Performs mobility at highest level of function for planned discharge setting  See evaluation for individualized goals  Treatment/Interventions: Gait training, Bed mobility, LE strengthening/ROM, Functional transfer training, Patient/family training, Spoke to case management, Spoke to nursing, OT  Equipment Recommended:  (TBD)       See flowsheet documentation for full assessment, interventions and recommendations  Prognosis: Good  Problem List: Decreased strength, Decreased endurance, Impaired balance, Decreased mobility, Decreased safety awareness, Pain, Orthopedic restrictions, Decreased coordination  Assessment: Pt is 59 y o  female seen for PT evaluation s/p admit to [facility] on 12/18/2018 w/ Traumatic brain injury ([facility] [address] )  PT consulted to assess pt's functional mobility and d/c needs  Order placed for PT eval and tx, w/ helmet during mobility, NWB RUE order  Comorbidities affecting pt's physical performance at time of assessment include: non verbal, cognitive deficit, agitation  PTA, pt was ambulates unrestricted distances and all terrain  Personal factors affecting pt at time of IE include: inaccessible home environment, communication issues, inability to ambulate household distances, decreased cognition, limited home support, positive fall history, decreased initiation and engagement, impulsivity, unable to perform physical activity, limited insight into impairments, inability to perform IADLs, inability to perform ADLs and inability to live alone  Please find objective findings from PT assessment regarding body systems outlined above with impairments and limitations including weakness, decreased ROM, impaired balance, decreased endurance, impaired coordination, pain, decreased activity tolerance, decreased functional mobility tolerance, decreased safety awareness, impaired judgement, fall risk, impaired tone and decreased cognition   Pt inconsistently answering questions during evaluation with head nod  Pt not following mobility instruction with LUE/LLE  Required Ax2 to pull into long sit in chair position  Impulsively pushed back with fatigue during each of the 3 sitting trials  Not appropriate for EOB today 2* to impulsivity and agitation without command following  The following objective measures performed on IE also reveal limitations: Barthel Index: 5/100  Pt's clinical presentation is currently unstable/unpredictable seen in pt's presentation of critical care monitoring, craniectomy helmet, trach, 02, pain  Pt to benefit from continued PT tx to address deficits as defined above and maximize level of functional independent mobility and consistency  From PT/mobility standpoint, recommendation at time of d/c would be IP rehab pending progress in order to facilitate return to PLOF  Barriers to Discharge: Inaccessible home environment, Decreased caregiver support     Recommendation: Other (Comment) (rehab)     PT - OK to Discharge: Yes (to rehab when medically stable )    See flowsheet documentation for full assessment

## 2019-01-02 NOTE — ASSESSMENT & PLAN NOTE
Malnutrition Findings:           BMI Findings: Body mass index is 22 57 kg/m²     Was tolerating TF at goal  TF currently held  To OR for PEG placement today  Consent obtained from sister, Rivera Corey

## 2019-01-02 NOTE — RESPIRATORY THERAPY NOTE
RT Ventilator Management Note  Sree Pineda 59 y o  female MRN: [id number]  Unit/Bed#:  -01 Encounter: [id number]      Daily Screen       1/1/2019 0902 1/2/2019 0745          Patient safety screen outcome[de-identified] - Passed      Not Ready for Weaning due to[de-identified] Underline problem not resolved;Poor inspiratory effort Underline problem not resolved      Spont breathing trial % for 30 min: Yes -      Spont breathing trial outcome[de-identified] Failed -      Spont breathing trial reason failed: RR > 35 bpm;Tidal volume < 4ml/Kg of ideal body weight or VE <5 or  >15 LPM;RSBI > 100 -      Previous settings resumed: Yes -      Name of Medical Team Notified[de-identified] CCS -              Physical Exam:   Assessment Type: Assess only  General Appearance: Awake  Respiratory Pattern: Assisted  Chest Assessment: Chest expansion symmetrical  Bilateral Breath Sounds: Clear, Diminished      Resp Comments: (P) pt returned from OR, placed back on vent previous settings no changes, will continue to monitor

## 2019-01-02 NOTE — PROGRESS NOTES
Progress Note - Neurosurgery   Giorgi Reasons 59 y o  female MRN: [id number]  Unit/Bed#:  -01 Encounter: [id number]    Assessment:  1  POD 13 s/p Left hemicraniectomy for evacuation of left temporal intraparenchymal hematoma, Expansatile duraplasty  2  s/p Tracheostomy tube placement  3  Subacute Traumatic Large Left Temporal IPH  4  S/p Fall (Unwitnessed)  5  Subacute Traumatic Left Temporal to frontal SDH  6  Subacute Traumatic Bilateral posterior SAH  7  Subacute Traumatic IVH  8  Subacute Traumatic Nondisplaced Fractures  Right Transverse processes T1 - T4  9  Subacute Traumatic Multiple rib fractures on the right   10  Subacute Traumatic Right Glenoid fracture  11  Subacute Traumatic Right Acromion fracture  12  Small right Apical Pneumothorax   13  RUL lung nodule  14  Diabetes Mellitus Type II  15  Hypernatremia  16  Hypokalemia    Plan:  · Exam revealed GCS 9-10T E4 V1T M4-5 Pt alert, tracks appropriately around the room, has trach collar on ventilator, moves LUE spontaneously and intermittently mildly RLE, withdraws BLE  Does not withdraw on RUE  Strength 3/5 LUE, Reflexes intact 3+ LUE, BLE, 2+ RUE  ? Left Hemicraniotomy site with staples intact  Left scalp flap is soft, has dependent edema, mildly sunken     · Imaging reviewed personally and by attending  Results listed below  ? MRI brain w wo contrast 12/30/18 Status post extensive left hemicraniectomy for subacute left hemispheric hemorrhage decompression   The subacute hemorrhage is primarily within the temporal lobe  Subdural, extradural and extracranial fluid collection identified within the anterior left hemisphere extending into the left temporalis region  There is acute to early subacute ischemia identified within the left anterior frontal white matter  Scattered subarachnoid hemorrhage   Small amount of intraventricular hemorrhage layering in the occipital horns    ? CT head wo contrast 12/29/18 Decreasing left temporal IPH with persistent mild to moderate vasogenic edema with mild regional mass effect  Decreasing bilat SAH, small amount of layering IVH, no hydrocephalus  Decreased left extradural/subgaleal fluid collection that could represent CSF fluid vs seroma  ? CT of head wo contrast 12/21/18 shows postop changes of left Craniectomy and evacuation of left temporal IPH  A small amount of IPH hemorrhage remains within anterior left temporal lobe and stable thin SDH along left frontal region  Increased blood products along the cerebral falx when compared with prior exam  Persistent edema predominantly within the left temporal lobe and effacement of the left-sided cerebral sulci however there is interval resolution of midline shift and decreased effacement of the left lateral ventricle  Continued washout and interval evolution of IVH/SAH blood products  ? Ongoing medical management by primary team - Critical care  § Fever- Pt had high temp for past few days, 101 5 this am at 1200 today  Pt also has  Ventilator induced Pneumonia that was  tx with Ceftriaxone that she completed  Patient's neurological exam remains stable at this time  Will defer LP at this time  § Pt had Tracheostomy  Being weaned off of ventilator  § Pt getting a PEG tube inserted today  § Continue Keppra 500 MG Q 12 Hrs indefinitely for seizure ppx  § Labs: Continue to monitor  § Hypernatremia: Na 1/2/19 elevated at 149  § Hypokalemia K 1/2/19 decreased at 3 1- KCL 40 mEq IV given  § Hgb 1/2/19  low at 8 6   § WBC 1/2/18 elevated at 14 29  § DM II - Insulin sliding scale in place      · PT/ OT Evaluation and mobilization when able to  · DVT ppx: SCDs, heparin  · Continue regular neurological checks  · STAT CT of head wo contrast if neurological decline GCS > 2pts/hr  · Supportive management with pain control for the nondisplaced fx for the Right T1 and T2 transverse process fxs since the transverse processes are not integral to the stability of the thoracic vertebrae/thoracic spine  No brace at this time due to the concurrent multiple rib fractures  · No further neurosurgical intervention at this time  Neurosurgery will continue to see patient as needed during the remainder of her hospitalization  Follow up appointment scheduled with neurosurgery on Jan 7th 2018 in 2 weeks with repeat CT of Head wo contrast and CTA Head w wo contrast  She also has a 6 week POV scheduled with Dr Alvin Morse on Feb 6th   Please call with any questions or concerns  · Dispo: LTAC accepted pt, the plan is for her to be transferred there tomorrow  Subjective/Objective   Chief Complaint: Pt is nonverbal/ Inpatient follow up POD 13 s/p Left hemicraniectomy for evacuation of left temporal intraparenchymal hematoma, Expansatile duraplasty    Subjective:  Pt is a 60 yo female who is being followed inpatient POD 13 s/p Left hemicraniectomy for evacuation of left temporal intraparenchymal hematoma, Expansatile duraplasty s/p unwitnessed fall presumably from ladder  Pt remains alert and awake, trachs appropriately in the room but remains non the-verbal secondary to s/p tracheostomy and she does not follow commands  Per the nurse, pt is agitated today  She continues to spontaneously move her left hand and bilateral legs, left leg > right leg  Objective: Pt sitting in bed, alert and awake  NAD       I/O       12/31 0701 - 01/01 0700 01/01 0701 - 01/02 0700 01/02 0701 - 01/03 0700    I V  (mL/kg) 89 9 (1 6) 30 (0 5)     NG/GT 1120 1150     IV Piggyback 250 100     Feedings 989 1044     Total Intake(mL/kg) 2448 9 (42 4) 2324 (40 2)     Urine (mL/kg/hr) 1045 (0 8) 781 (0 6)     Total Output 1045 781      Net +1403 9 +1543             Unmeasured Urine Occurrence 1 x 1 x     Unmeasured Stool Occurrence  1 x           Invasive Devices     Peripherally Inserted Central Catheter Line            PICC Line 75/86/45 Left Basilic 10 days          Drain            NG/OG/Enteral Tube Nasogastric Right nares 8 days Airway            Surgical Airway Shiley Cuffed 8 days                Physical Exam:  Vitals: Blood pressure (!) 114/45, pulse 80, temperature (!) 101 1 °F (38 4 °C), resp  rate 15, height 5' 3" (1 6 m), weight 57 8 kg (127 lb 6 8 oz), SpO2 100 %  ,Body mass index is 22 57 kg/m²  Hemodynamic Monitoring: MAP: Arterial Line MAP (mmHg): 102 mmHg    General appearance: alert, appears stated age, and no distress  Head: Normocephalic, without obvious abnormality, atraumatic  Eyes: EOMI, PERRL, size 5+ mm  Neck: supple, symmetrical, with trach collar in place on ventilator  Lungs: non labored breathing on ventilator   Heart: regular heart rate  Neurologic:    Mental status: Alert, GCS 9-10T E4 V1T M4-5 opens eyes spontaneously, has trach collar, withdraws to pain/purposeful movement on LUE, does not follow commands  Cranial nerves: limited  Sensory: withdraws to pain  Motor: spontaneously moving LUE, BLE, withdraws to pain BLE, does not withdraw in RUE  Reflexes: Reflexes intact 3+ LUE, BLE, 2+ RUE  Lab Results:    Results from last 7 days  Lab Units 01/02/19  0535 01/01/19  0535 12/31/18  0422   WBC Thousand/uL 14 29* 13 95* 13 98*   HEMOGLOBIN g/dL 8 6* 8 8* 8 7*   HEMATOCRIT % 28 3* 29 9* 29 1*   PLATELETS Thousands/uL 647* 601* 532*   NEUTROS PCT % 72 73  --    MONOS PCT % 6 6  --    MONO PCT %  --   --  3*       Results from last 7 days  Lab Units 01/02/19  0535 01/01/19  0536 12/31/18  0422  12/29/18  0550   POTASSIUM mmol/L 3 1* 4 1 3 3*  < > 3 2*   CHLORIDE mmol/L 110* 109* 111*  < > 105   CO2 mmol/L 32 30 36*  < > 34*   BUN mg/dL 28* 34* 31*  < > 30*   CREATININE mg/dL 0 44* 0 42* 0 42*  < > 0 51*   CALCIUM mg/dL 9 1 8 7 8 6  < > 9 1   ALK PHOS U/L  --   --   --   --  126*   ALT U/L  --   --   --   --  20   AST U/L  --   --   --   --  25   < > = values in this interval not displayed      Results from last 7 days  Lab Units 01/02/19  0535 01/01/19  0536 12/31/18  0422   MAGNESIUM mg/dL 2 0 2 4 2 2 Results from last 7 days  Lab Units 01/02/19  0535 01/01/19  0536 12/31/18  0422   PHOSPHORUS mg/dL 3 2 3 2 3 5         No results found for: TROPONINT  ABG:  Lab Results   Component Value Date    PHART 7 467 (H) 12/26/2018    IYV7VVS 43 7 12/26/2018    PO2ART 76 7 12/26/2018    WXK2HVT 30 9 (H) 12/26/2018    BEART 6 5 12/26/2018    SOURCE Brachial, Right 12/26/2018       Imaging Studies: I have personally reviewed pertinent reports  and I have personally reviewed pertinent films in PACS  Cta Head And Neck W Wo Contrast    Result Date: 12/18/2018  Impression: 1  Atherosclerotic plaque at the right carotid bifurcation resulting in moderate (50-69%) stenosis  No evidence of carotid or vertebral artery dissection or pseudoaneurysm to suggest acute vascular injury  2   Displacement of left MCA branches by left temporal parietal hematoma  No evidence of aneurysm or vascular malformation  No significant stenosis in the major vessels of the [place]  3   Patent dural venous sinuses  Workstation performed: [id number]     Xr Chest Portable    Result Date: 12/23/2018  Impression: 1  Low position of endotracheal tube which should be withdrawn approximately 2 to 3 cm  2   Left lower lobe consolidation  3   Diffuse patchy opacities in both lungs, either pneumonia or, more likely, pulmonary edema  Workstation performed: [id number]     Xr Chest Portable    Result Date: 12/23/2018  Impression: 1  Tip of endotracheal tube 9 mm above the radha  I recommend that the tube be pulled back approximately 3 cm  2   Opacification in both lungs, most likely pulmonary edema or, possibly, bilateral pneumonia  Workstation performed: [id number]     Xr Chest Portable    Result Date: 12/19/2018  Impression: Satisfactory intubation  Bibasilar subsegmental atelectasis  No pneumothorax   Workstation performed: [id number]     Xr Shoulder 2+ Vw Right    Result Date: 12/19/2018  Impression: Right-sided rib fractures again noted  Possible acromion fracture  The small glenoid fracture seen on CT scan of the chest is not clearly appreciated on this exam  Workstation performed: [id number]     Xr Foot 3+ Vw Left    Result Date: 12/21/2018  Impression: Nondisplaced transverse fracture involves the proximal aspect of the proximal to the 3rd digit  Workstation performed: [id number]     Xr Abdomen 1 View Kub    Result Date: 12/24/2018  Impression: Increased bowel gas predominantly in the colon suggesting colonic ileus  Maximal transverse diameter, located in the right transverse colon is approximately 9 8 cm Workstation performed: [id number]     Ct Head Wo Contrast    Result Date: 12/21/2018  Impression: 1  Postsurgical changes of left craniectomy and evacuation of left temporal intraparenchymal hemorrhage  2   A small amount of intraparenchymal/extra-axial hemorrhage remains within the anterior left temporal lobe and stable thin subdural hemorrhage along the left frontal region  3   Increased blood products along the cerebral falx when compared with prior exam  4   Persistent edema predominantly within the left temporal lobe and effacement of the left-sided cerebral sulci however there is interval resolution of midline shift and decreased effacement of the left lateral ventricle  5   Continued washout and interval evolution of intraventricular and subarachnoid blood products  Workstation performed: [id number]     Ct Head Wo Contrast    Result Date: 12/20/2018  Impression: 1  Left temporal lobe intraparenchymal hemorrhage with slight increase in surrounding edema as manifest by increased effacement of the left occipital horn when compared with prior examination  Slight increase in left to right midline shift now measuring up to 6 mm, previously 4 mm  2   Stable subdural, subarachnoid and intraventricular blood products  No large delayed intracranial hemorrhage  The study was marked in [facility] for immediate notification   Workstation performed: [id number]     Ct Head Wo Contrast    Result Date: 12/19/2018  Impression: 1  Stable size of large intraparenchymal hemorrhage centered in the left temporal lobe with surrounding mass effect and edema and adjacent left temporal to frontal subdural hemorrhage  Stable midline shift from left to right  2   Increased conspicuity of bilateral posterior subarachnoid and intraventricular blood products are likely due to redistribution  Workstation performed: [id number]     Trauma - Ct Head Wo Contrast    Result Date: 12/18/2018  Impression: 1  Large parenchymal hemorrhage on the left with associated left-sided subdural hematoma and bilateral subarachnoid hemorrhage including intraventricular hemorrhage  2   Mass effect and midline shift to the right measuring 4 mm  I personally discussed this study with Addie Abdullahi on 12/18/2018 at 8:45 PM  Workstation performed: [id number]     Trauma - Ct Spine Cervical Wo Contrast    Result Date: 12/18/2018  Impression: 1  No cervical spine fracture or traumatic malalignment  2   Small right apical pneumothorax  3   Nondisplaced fractures of the right T1 and T2 transverse processes  Fracture of the posterior right second rib  I personally discussed this study with Addie Abdullahi on 12/18/2018 at 8:45 PM   Workstation performed: [id number]     Ct Shoulder Right Wo Contrast    Result Date: 12/20/2018  Impression: Acute fracture anterior glenoid rim with 1 to 2 mm anteromedial displacement  Acute comminuted fracture posterior acromion with 6 mm mediolateral distraction and 1 to 2 mm inferior cortical offset  No dislocation  Interval progression of right lower lobe and right upper lobe dependent airspace disease  This may represent subsegmental atelectasis, aspiration, and/or pulmonary contusion  Interval enlargement of small right pleural effusion  No other significant interval change   The examination demonstrates a significant  finding and was documented as such in Epic for liaison and referring practitioner notification  Workstation performed: [id number]     Xr Chest 1 View    Result Date: 12/20/2018  Impression: Multiple right-sided rib fractures  Consolidation at the base may represent atelectasis or pneumonia  Workstation performed: [id number]     Trauma - Ct Chest Abdomen Pelvis W Contrast    Result Date: 12/18/2018  Impression: 1  Multiple right-sided rib fractures with tiny right apical pneumothorax  Fractures extend from the second through eleventh ribs at the costovertebral junctions and fourth through eighth ribs laterally  This is consistent with flail chest  A right anterior second rib fracture is severely displaced/angulated  2   Fracture of the anterior right glenoid, acromium, and first through fourth thoracic transverse processes on the right  3   Focal irregular opacity in the right upper lobe with adjacent 4 mm nodule  While contusion is possible given overlying trauma, the appearance of the lesion is most suspicious for a primary lung cancer and follow-up PET/CT is recommended  4   Borderline upper abdominal lymph nodes with density/stranding at the base of the small bowel mesentery  While mesenteric contusion can have this appearance in the setting of trauma  There are calcifications which suggest chronicity  Lymphoproliferative disorder cannot be excluded and follow-up and 3-6 months is recommended  5   Chronic interstitial lung disease of uncertain etiology  Given esophageal dilatation, this could represent NSIP in the setting of scleroderma  I personally discussed this study with Joe Bustamanter on 12/18/2018 at 8:45 PM  Workstation performed: [id number]     Xr Trauma Multiple    Result Date: 12/19/2018  Impression: Multiple right-sided rib fractures  Consolidation at the base may represent atelectasis or pneumonia   Workstation performed: [id number]     Xr Chest Portable Icu    Result Date: 12/24/2018  Impression: Endotracheal tube tip 2 9 cm above the radha No interval change in appearance of diffuse bilateral pulmonary airspace disease Workstation performed: [id number]     Xr Chest Portable Icu    Result Date: 12/20/2018  Impression: Right subclavian catheter extending superiorly into the neck requiring repositioning  Workstation performed: [id number]     Xr Chest Portable Icu    Result Date: 12/20/2018  Impression: Mild central vascular congestion  Basilar opacities and effusions  Stable rib fractures  Workstation performed: [id number]     Xr Chest Portable Icu    Result Date: 12/19/2018  Impression: Right basilar opacity partially obscuring the hemidiaphragm may represent atelectasis or infiltrate  Possible small basilar effusion  Workstation performed: [id number]     Cta Chest Pe Study    Result Date: 12/22/2018  Impression: No evidence of pulmonary embolism Moderate right effusion small left effusion There is development of diffuse lung opacities with reticulation, groundglass density with a few areas of basilar consolidation  This may be due to pulmonary edema  However infiltrates are difficult to exclude  The ET tube is low-lying with tip lying in the radha with likely projection into the right main bronchus  Small mediastinal lymph nodes larger from the previous study probably reactive  I personally discussed this study with Debbi Perez on 12/22/2018 at 11:46 AM  Workstation performed: [id number]       EKG, Pathology, and Other Studies: I have personally reviewed pertinent reports  VTE Pharmacologic Prophylaxis: Heparin    VTE Mechanical Prophylaxis: sequential compression device    Please note that the M-Modal voice recognition software may have been used for documentation of parts of this note

## 2019-01-02 NOTE — PROGRESS NOTES
Progress Note - General Surgery   Claudio Newby 59 y o  female MRN: [id number]  Unit/Bed#:  -01 Encounter: [id number]    Assessment/Plan:  59 y o  female with ventilatory dependent respiratory failure, protein-calorie malnutrition following fall and TBI     Protein-calorie malnutrition ([address] )   Assessment & Plan    Malnutrition Findings:           BMI Findings: Body mass index is 22 57 kg/m²  Was tolerating TF at goal  TF currently held  To OR for PEG placement today  Consent obtained from sister, Erin     Acute respiratory failure with hypoxia [facility])   Assessment & Plan    S/p tracheostomy 12/24/2018  Continue to wean vent as tolerated         Subjective/Objective     Subjective: Patient febrile overnight  Was tolerating TF until held for surgery  Objective:     Vitals: Blood pressure 118/55, pulse 80, temperature 100 4 °F (38 °C), resp  rate 16, height 5' 3" (1 6 m), weight 57 8 kg (127 lb 6 8 oz), SpO2 100 %  ,Body mass index is 22 57 kg/m²      I/O       12/31 0701 - 01/01 0700 01/01 0701 - 01/02 0700    I V  (mL/kg) 89 9 (1 6) 30 (0 5)    NG/GT 1120 950    IV Piggyback 250 100    Feedings 989 1044    Total Intake(mL/kg) 2448 9 (42 4) 2124 (36 7)    Urine (mL/kg/hr) 1045 (0 8) 581 (0 4)    Total Output 1045 581    Net +1403 9 +1543          Unmeasured Urine Occurrence 1 x     Unmeasured Stool Occurrence  1 x          Physical Exam:  GEN: critically ill  HEENT: MMM  CV: RRR  Lung: Normal effort  Ab: Soft, NT/ND  Extrem: No CCE  Neuro: GCS 3    Lab, Imaging and other studies: CBC with diff: No results found for: WBC, HGB, HCT, MCV, PLT, ADJUSTEDWBC, MCH, MCHC, RDW, MPV, NRBC, BMP/CMP: No results found for: SODIUM, K, CL, CO2, ANIONGAP, BUN, CREATININE, GLUCOSE, CALCIUM, AST, ALT, ALKPHOS, PROT, BILITOT, EGFR, Magnesium: No components found for: MAG  VTE Pharmacologic Prophylaxis: Heparin  VTE Mechanical Prophylaxis: sequential compression device

## 2019-01-02 NOTE — SOCIAL WORK
Plan for PEG today  [facility] [facility] will take tomorrow  CM left a voicemail for pt's sister to discuss

## 2019-01-02 NOTE — OP NOTE
OPERATIVE REPORT  PATIENT NAME: Jayson Estrada    :  1955  MRN: [id number]  Pt Location: [facility] OR ROOM 03    SURGERY DATE: 2019    Surgeon(s) and Role:     * Jose Ortiz MD - Primary     * Kelly Corcoran MD - Assisting    Preop Diagnosis:  Protein-calorie malnutrition, unspecified severity ([address] ) [E46]    Post-Op Diagnosis Codes:     * Protein-calorie malnutrition, unspecified severity ([address] ) [E46]    Procedure(s) (LRB):  INSERTION PEG TUBE (N/A)    Specimen(s):  * No specimens in log *    Estimated Blood Loss:   Minimal    Drains:  NG/OG/Enteral Tube Nasogastric Right nares (Active)   Placement Reverification Auscultation 2019  9:00 AM   Site Assessment Intact 2019  9:00 AM   Enteral feeding tube interventions Flushed 2019 12:00 AM   Status Clamped 2019  9:00 AM   Drainage Appearance Bile 2018  8:00 AM   Intake (mL) 50 mL 2019  9:14 AM   Output (mL) 0 mL 2018  3:58 AM   Number of days: 0       Gastrostomy/Enterostomy Percutaneous endoscopic gastrostomy (PEG) 20 Fr  LUQ (Active)   Number of days: 0       External Urinary Catheter (Active)   Collection Container Canister and suction tubing (For Female) 2019 11:01 AM   Suction Pressure (mmHg) 60 mmHg 2019 11:01 AM   Interventions Pericare performed;Device changed 2019 11:01 AM   Number of days: 0       Anesthesia Type:   General    Operative Indications:  Protein-calorie malnutrition, unspecified severity ([address] ) [E46]      Operative Findings:  Normal esophagus  Normal stomach  Normal duodenum  Retroflexed view of the GE junction normal  PEG tube placed 3cm to skin and to gravity bag  Tube feeds to start tomorrow, 1/3    Complications:   None    Procedure and Technique:  The patient was taken to the operating room on the ventilator with a previously placed tracheostomy  Anesthesia was induced  A time-out was performed  The EGD scope was passed through a bite block into the oropharynx and into the stomach  The esophagus was normal, and the stomach was normal   The NG tube was then removed, and the EGD scope was passed into the duodenum  The duodenum was normal   The scope was pulled back into the stomach and a retroflexed view of the GE junction of was obtained which was normal   A 1-1 finger invagination test was good when the skin was depressed on the abdomen to the left of a midline laparotomy scar  Transillumination was also obtained easily  The PEG tube kit was opened and the abdomen was prepped  A snare was passed, and local anesthesia was introduced the skin above the previously invaginated skin  A safe tract was obtained and confirmed by passing a syringe into the stomach which did not aspirate fluid were bubbles until the needle was visualized on the EGD scope screen  A dilator was placed through the skin into the stomach, and a wire was advanced through it  The snare was used to grab the wire  Wire was then pulled out through the mouth by removing the EGD scope  The PEG tube was placed through the wire, and by pulling the wire from the abdomen, the PEG tube was pulled from the mouth into the stomach and out through the skin  The EGD scope was then passed into the stomach again, the PEG tube was confirmed to be in a good position  At 3 cm and is to the skin, the PEG tube was flush against the stomach wall but was able to spin in all directions  The stomach was then suctioned, the EGD scope was removed  The external bumper was placed to the skin level with a single piece of gauze between the skin and the bumper  Bandage was placed above the bumper to keep it in position  The clamp was placed on the PEG tube, the PEG tube was placed to a gravity bag  The patient was then taken back to the intensive care unit on the ventilator  Tube feeds may begin tomorrow, 1/3  Dr Mariela Sanchez was present for the entire case      Patient Disposition:  Critical Care Unit    SIGNATURE: Marti Gonzales MD  DATE: January 2, 2019  TIME: 2:45 PM

## 2019-01-02 NOTE — PLAN OF CARE
Problem: OCCUPATIONAL THERAPY ADULT  Goal: Performs self-care activities at highest level of function for planned discharge setting  See evaluation for individualized goals  Treatment Interventions: ADL retraining, Functional transfer training, UE strengthening/ROM, Endurance training, Cognitive reorientation, Patient/family training, Equipment evaluation/education, Compensatory technique education, Continued evaluation, Energy conservation, Activityengagement          See flowsheet documentation for full assessment, interventions and recommendations     Limitation: Decreased ADL status, Decreased UE ROM, Decreased UE strength, Decreased Safe judgement during ADL, Decreased cognition, Decreased endurance, Decreased self-care trans, Decreased high-level ADLs  Prognosis: [address]        OT Discharge Recommendation: Short Term Rehab  OT - OK to Discharge:  (to rehab when medically stable)      Comments: MIGUEL Patel, OTR/L

## 2019-01-02 NOTE — SOCIAL WORK
Pt accepted to [facility]  Pt will d/c there tomorrow @1200 via [facility]  Pt's sister [place] was made aware  Medical team will need to call Dr Vincent Og at [phone] for signout

## 2019-01-02 NOTE — OCCUPATIONAL THERAPY NOTE
[address]  Evaluation     Patient Name: Anayeli Macdonald  HLJGI'L Date: 1/2/2019  Problem List  Patient Active Problem List   Diagnosis    Multiple fractures of ribs, bilateral, initial encounter for closed fracture    Traumatic pneumothorax    Glenoid fracture of shoulder, right, closed, initial encounter    Intraparenchymal hemorrhage of brain ([address] )    Traumatic brain injury ([address] )    Flail chest, initial encounter for closed fracture    Fracture of thoracic transverse process, closed, initial encounter ([address] )    Subdural hematoma ([address] )    Subarachnoid hemorrhage ([address] )    Diabetes mellitus, type II ([address] )    Closed nondisplaced left toe fracture    Hypokalemia    Acute encephalopathy    Status post craniectomy    Acute respiratory failure with hypoxia ([address] )    Acute blood loss anemia    Protein-calorie malnutrition ([address] )     Past Medical History  Past Medical History:   Diagnosis Date    Diabetes mellitus, type II ([address] )      Past Surgical History  Past Surgical History:   Procedure Laterality Date    CRANIECTOMY Left 12/20/2018    Procedure: Left hemicraniectomy with evacuation of clot; Surgeon: Kimber Pleitez MD;  Location: [facility];  Service: Neurosurgery    PEG W/TRACHEOSTOMY PLACEMENT N/A 12/24/2018    Procedure: TRACHEOSTOMY;  Surgeon: Autumn Knox MD;  Location: [facility];  Service: General         01/02/19 1155   Note Type   Note type Eval only   Restrictions/Precautions   Weight Bearing Precautions Per Order Yes   RUE Weight Bearing Per Order NWB   Other Precautions Cognitive; Restraints;Telemetry;Multiple lines; Fall Risk;Pain;WBS   Pain Assessment   Pain Assessment FLACC   Pain Rating: FLACC (Rest) - Face 1   Pain Rating: FLACC (Rest) - Legs 1   Pain Rating: FLACC (Rest) - Activity 1   Pain Rating: FLACC (Rest) - Cry 0   Pain Rating: FLACC (Rest) - Consolability 1   Score: FLACC (Rest) 4   Home Living   Additional Comments Per chart, pt lives alone in an apartment with an elevator  Her sister lives in another apartment in the same building  Prior Function   Level of [place] Independent with ADLs and functional mobility   Lives With Alone   Receives Help From Family   ADL Assistance Independent   IADLs Independent   Falls in the last 6 months (at least 1 fall (reason for admission))   Lifestyle   Autonomy Per chart, pt was I with ADLs, IADLs and functional mobility PTA  Reciprocal Relationships Siblings nearby   Service to Others Unable to state secondary to cog   Intrinsic Gratification Unable to state secondary to cog   Psychosocial   Psychosocial (WDL) X   Patient Behaviors/Mood Uncooperative  (agitated)   ADL   Eating Assistance 1  Total Assistance   Grooming Assistance 1  Total Assistance   UB Bathing Assistance 1  Total Assistance   LB Bathing Assistance 1  Total [address] 1  Total Assistance   LB Dressing Assistance 1  Total [address]  1  Total Assistance   Bed Mobility   Supine to Sit 2  Maximal assistance   Additional items Assist x 2; Increased time required;Verbal cues   Sit to Supine 3  Moderate assistance   Additional items Assist x 2   Transfers   Additional Comments OOB transfers unable to be assessed upon eval   Balance   Static Sitting Zero   Activity Tolerance   Activity Tolerance Treatment limited secondary to medical complications (Comment); Treatment limited secondary to agitation;Patient limited by fatigue   Medical Staff Made Aware  [address] Per RN, pt okay to see for OT   RUE Assessment   RUE Assessment (no active movement noted)   LUE Assessment   LUE Assessment WFL   Cognition   Overall Cognitive Status Impaired   Arousal/Participation Persistent stimuli required;Poorly responsive; Uncooperative   Attention Difficulty attending to directions   Orientation Level Unable to assess   Memory Unable to assess   Following Commands Unable to follow one step commands   Assessment Limitation Decreased ADL status; Decreased UE ROM; Decreased UE strength;Decreased Safe judgement during ADL;Decreased cognition;Decreased endurance;Decreased self-care trans;Decreased high-level ADLs   Prognosis Fair   Goals   Patient Goals unable to state secondary to trach and cog   Plan   Treatment Interventions ADL retraining;Functional transfer training;UE strengthening/ROM; Endurance training;Cognitive reorientation;Patient/family training;Equipment evaluation/education; Compensatory technique education;Continued evaluation; Energy conservation; Activityengagement   Goal Expiration Date 01/12/19   OT Frequency 3-5x/wk   Recommendation   OT Discharge Recommendation Short Term Rehab   OT - OK to Discharge (to rehab when medically stable)   Barthel Index   Feeding 0   Bathing 0   Grooming Score 0   Dressing Score 0   Bladder Score 0   Bowels Score 0   Toilet Use Score 0   Transfers (Bed/Chair) Score 5   Mobility (Level Surface) Score 0   Stairs Score 0   Barthel Index Score 5     Goals:    Pt will improve activity tolerance to G for min 15 min txment sessions for increase engagement in functional tasks  Pt will complete bed mobility with modAx1  Pt will participate in ongoing cognitive assessment with fair participation for safe discharge/planning  Assess OOB functional transfers as appropriate  Pt will follow simple one step commands 75% of the time with min cues      Marisela Peon, MOT, OTR/L

## 2019-01-02 NOTE — RESPIRATORY THERAPY NOTE
RT Ventilator Management Note  Mark Caden 59 y o  female MRN: [id number]  Unit/Bed#:  -01 Encounter: [id number]      Daily Screen       1/1/2019 0902 1/2/2019 0745          Patient safety screen outcome[de-identified] - Passed      Not Ready for Weaning due to[de-identified] Underline problem not resolved;Poor inspiratory effort Underline problem not resolved      Spont breathing trial % for 30 min: Yes -      Spont breathing trial outcome[de-identified] Failed -      Spont breathing trial reason failed: RR > 35 bpm;Tidal volume < 4ml/Kg of ideal body weight or VE <5 or  >15 LPM;RSBI > 100 -      Previous settings resumed: Yes -      Name of Medical Team Notified[de-identified] CCS -              Physical Exam:   Assessment Type: Assess only  General Appearance: Awake  Respiratory Pattern: Assisted  Chest Assessment: Chest expansion symmetrical  Bilateral Breath Sounds: Clear, Diminished      Resp Comments: pt tolerating current vent settings at this time AC/VC 14/480/40%/8, no changes at this time, will continue to monitor

## 2019-01-02 NOTE — PROGRESS NOTES
Progress Note - Critical Care   Porsche Pace 59 y o  female MRN: [id number]  Unit/Bed#: [address] [facility] -01 Encounter: [id number]    Assessment:   Principal Problem:    Traumatic brain injury [facility])  Active Problems:    Multiple fractures of ribs, bilateral, initial encounter for closed fracture    Traumatic pneumothorax    Glenoid fracture of shoulder, right, closed, initial encounter    Intraparenchymal hemorrhage of brain ([facility] )    Flail chest, initial encounter for closed fracture    Fracture of thoracic transverse process, closed, initial encounter ([facility] )    Subdural hematoma ([facility] )    Subarachnoid hemorrhage ([facility] )    Diabetes mellitus, type II ([facility] )    Closed nondisplaced left toe fracture    Hypokalemia    Acute encephalopathy    Status post craniectomy    Acute respiratory failure with hypoxia ([facility] )    Acute blood loss anemia    Protein-calorie malnutrition (HCC)    Plan:   Neuro:   · L SDH, b/l SAH s/p L craniectomy POD #12: L craniectomy precautions, helmet when out of bed  Serial neuro exams  Keppra 500mg BID  · Analgesia/sedation: remains off continuous infusions  Oxycodone 5/10mg for mild/moderate pain (0 doses/24 hours) and dilaudid for breakthrough (2 doses/24 hours)  · Daily CAM-ICU, delirium precautions    CV:   · Hypertension: lisinopril/HCTZ daily, prn hydralazine    Lung:   · Acute hypoxic respiratory failure s/p trach: continue daily PSV trials as able  · B/l rib fractures, flail chest: pain control, pulmonary toilet  · Pulmonary edema: Lasix 40mg daily  · VAP: completed antibiotic course    GI:   · GERD: omeprazole daily  · Dysphagia: plan for PEG today, NPO for procedure    FEN:   · Hypernatremia: start D5 1/2NS while NPO in setting of hypernatremia and low UOP   Will continue FWF when able to use PEG tube   · Hypokalemia: replete  · NPO for PEG placement    :   · Monitor I/Os, no kenny    ID:   · H  Flu/strep pneumoniae VAP: completed antibiotic course, monitor fever curve/white count    Heme: · SQH/SCDs    Endo:   · DM2: SSI algorithm 1    Msk/Skin:   · L toe fx: podiatry following, WBAT  · T1-T4 TP fx: pain control  · R glenoid fx: pain control, NWB RUE  · Frequent turns and repositioning, offloading  Out of bed to chair    Disposition:  · ICU    ______________________________________________________________________  Chief Complaint: Unable to provide    HPI/24hr events: Low UOP overnight  No other acute events    ______________________________________________________________________  Temperature:   Temp (24hrs), Av 9 °F (38 3 °C), Min:100 °F (37 8 °C), Max:101 5 °F (38 6 °C)    Current Temperature: (!) 101 1 °F (38 4 °C)    Vitals:    19 0331 19 0400 19 0500 19 0600   BP:  118/55 123/61 (!) 114/45   Pulse:  80 84 80   Resp:  16 (!) 33 15   Temp:  100 4 °F (38 °C) (!) 100 8 °F (38 2 °C) (!) 101 1 °F (38 4 °C)   TempSrc:       SpO2: 100% 100% 99% 100%   Weight:       Height:         Arterial Line BP: 126/80  Arterial Line MAP (mmHg): 102 mmHg     Weights:   IBW: 52 4 kg    Body mass index is 22 57 kg/m²  Weight (last 2 days)     None        Height: 5' 3" (160 cm)    Ventilator Settings:   Vent Mode: AC/VC  Resp Rate (BPM): 14 BPM  Vt (mL): 480 mL  FIO2 (%): 40 %  PEEP (cmH2O): 8 cmH2O  SpO2: 100 %    No results found for: PHART, SCV7YLY, PO2ART, CNN8RSR, P6KTWPVO, BEART, SOURCE  SpO2: SpO2: 100 %    ______________________________________________________________________  Physical Exam:  Kessler Agitation Sedation Scale (RASS): Alert and calm  Physical Exam   Constitutional: Vital signs are normal  She is sleeping  No distress  L craniectomy, surgical site C/D/I  Sutures intact  LUE in restraint   HENT:   NGT in place   Eyes: Pupils are equal, round, and reactive to light  Cardiovascular: Normal rate, regular rhythm and normal heart sounds  Pulses:       Radial pulses are 2+ on the right side, and 2+ on the left side          Dorsalis pedis pulses are 2+ on the right side, and 2+ on the left side  Pulmonary/Chest: No respiratory distress  She has no decreased breath sounds  Abdominal: Soft  She exhibits no distension  There is no tenderness  Musculoskeletal:   No peripheral edema  Neurological: GCS eye subscore is 3  GCS verbal subscore is 1  GCS motor subscore is 5  Moves LUE, b/l lower extremities spontaneously  No movement of RUE on exam   Skin: Skin is warm and dry  She is not diaphoretic  Many chronic abrasions/bruising on all extremities     ______________________________________________________________________  Intake and Outputs:  I/O       12/31 0701 - 01/01 0700 01/01 0701 - 01/02 0700    I V  (mL/kg) 89 9 (1 6) 30 (0 5)    NG/GT 1120 950    IV Piggyback 250 100    Feedings 989 1044    Total Intake(mL/kg) 2448 9 (42 4) 2124 (36 7)    Urine (mL/kg/hr) 1045 (0 8) 581 (0 4)    Total Output 1045 581    Net +1403 9 +1543          Unmeasured Urine Occurrence 1 x     Unmeasured Stool Occurrence  1 x        Nutrition:        Diet Orders            Start     Ordered    01/02/19 0001  Diet NPO  Diet effective midnight     Question Answer Comment   Diet Type NPO    RD to adjust diet per protocol?  Yes        01/01/19 0843        Labs:     Results from last 7 days  Lab Units 01/02/19 0535 01/01/19  0535 12/31/18  0422   WBC Thousand/uL 14 29* 13 95* 13 98*   HEMOGLOBIN g/dL 8 6* 8 8* 8 7*   HEMATOCRIT % 28 3* 29 9* 29 1*   PLATELETS Thousands/uL 647* 601* 532*   NEUTROS PCT % 72 73  --    MONOS PCT % 6 6  --    MONO PCT %  --   --  3*      Results from last 7 days  Lab Units 01/02/19  0535 01/01/19  0536 12/31/18  0422  12/29/18  0550   POTASSIUM mmol/L 3 1* 4 1 3 3*  < > 3 2*   CHLORIDE mmol/L 110* 109* 111*  < > 105   CO2 mmol/L 32 30 36*  < > 34*   BUN mg/dL 28* 34* 31*  < > 30*   CREATININE mg/dL 0 44* 0 42* 0 42*  < > 0 51*   CALCIUM mg/dL 9 1 8 7 8 6  < > 9 1   ALK PHOS U/L  --   --   --   --  126*   ALT U/L  --   --   --   --  20   AST U/L  --   --   -- --  25   < > = values in this interval not displayed  Results from last 7 days  Lab Units 01/02/19  0535 01/01/19  0536 12/31/18  0422   MAGNESIUM mg/dL 2 0 2 4 2 2       Results from last 7 days  Lab Units 01/02/19  0535 01/01/19  0536 12/31/18  0422   PHOSPHORUS mg/dL 3 2 3 2 3 5                0  Lab Value Date/Time   TROPONINI <0 02 12/20/2018 1204   TROPONINI <0 02 12/20/2018 0054       Micro:  Blood Culture:   Lab Results   Component Value Date    BLOODCX No Growth After 5 Days  12/25/2018    BLOODCX No Growth After 5 Days  12/25/2018     Urine Culture:   Lab Results   Component Value Date    URINECX >100,000 cfu/ml Gram Negative [place] Enteric Like (A) 12/25/2018    URINECX >100,000 cfu/ml Escherichia coli (A) 12/25/2018     Sputum Culture: No components found for: SPUTUMCX  Wound Culure: No results found for: WOUNDCULT    Lab Results   Component Value Date    BLOODCX No Growth After 5 Days  12/25/2018    BLOODCX No Growth After 5 Days   12/25/2018    URINECX >100,000 cfu/ml Gram Negative [place] Enteric Like (A) 12/25/2018    URINECX >100,000 cfu/ml Escherichia coli (A) 12/25/2018    SPUTUMCULTUR 4+ Growth of Haemophilus influenzae (AA) 12/25/2018    SPUTUMCULTUR 1+ Growth of Streptococcus pneumoniae (AA) 12/25/2018    SPUTUMCULTUR 1+ Growth of  12/25/2018     Allergies: No Known Allergies  Medications:   Scheduled Meds:    Current Facility-Administered Medications:  acetaminophen 650 mg Oral Q6H Lorrie Barragan PA-C    albuterol 2 5 mg Nebulization Q4H PRN Jenae Santiago DO    bisacodyl 10 mg Rectal Daily PRN Stacy Rachel PA-C    cefazolin 1,000 mg Intravenous On Call To OR Carmie Flatness Atkins    chlorhexidine 15 mL Swish & Spit Q12H [facility] Blanca Lara MD    furosemide 40 mg Intravenous Daily Kati Austin PA-C    heparin (porcine) 5,000 Units Subcutaneous Q8H [address], MD    hydrALAZINE 5 mg Intravenous Q6H PRN Kate Coombs MD    HYDROmorphone 1 mg Intravenous Q2H PRN Mine Irizarry, PA-C    influenza vaccine 0 5 mL Intramuscular Prior to discharge Jenae Santiago, DO    insulin lispro 1-5 Units Subcutaneous HS Juan Miguel Hdz MD    insulin lispro 5-25 Units Subcutaneous Q6H [facility] Kati AustinHARSHAD    ipratropium 0 5 mg Nebulization Q6H Jenae Santiago,     lamoTRIgine 25 mg Oral Daily Stacy Degree, PA-RICH    levalbuterol 1 25 mg Nebulization Q6H [facility], DO    levETIRAcetam 500 mg Oral Q12H [facility] Julio Cesar Gonzalez MD    levothyroxine 88 mcg Oral Early Morning Dorthula Duck, HARSHAD    lidocaine 2 patch Topical Daily Dorthula Duck, HARSHAD    lisinopril-hydrochlorothiazide (PRINZIDE 10/12  5) combo dose  Oral Daily OUMAR, HARSHAD    multi-electrolyte 500 mL Intravenous Once Stacy Degree, [place]    nicotine 14 mg Transdermal Daily Lorrie Barragan, PA-RICH    nystatin 500,000 Units Swish & Swallow 4x Daily Stacy Degree, PA-RICH    omeprazole (PRILOSEC) suspension 2 mg/mL 20 mg Oral Daily Stacy Degree, PA-C    oxyCODONE 5 mg Oral Q4H PRN Stacy Degree, PA-C    Or        oxyCODONE 10 mg Oral Q4H PRN Stacy Degree, PA-C    polyethylene glycol 17 g Oral Daily PRN Stacy Degree, PA-C    potassium chloride 40 mEq Intravenous Once Stacy Degree, PA-C Last Rate: 40 mEq (01/02/19 0659)   pramipexole 0 25 mg Oral BID TRISTAN Og    senna-docusate sodium 1 tablet Oral HS Stacy Degree, PA-C      Continuous Infusions:   PRN Meds:    albuterol 2 5 mg Q4H PRN   bisacodyl 10 mg Daily PRN   hydrALAZINE 5 mg Q6H PRN   HYDROmorphone 1 mg Q2H PRN   influenza vaccine 0 5 mL Prior to discharge   oxyCODONE 5 mg Q4H PRN   Or     oxyCODONE 10 mg Q4H PRN   polyethylene glycol 17 g Daily PRN     VTE Pharmacologic Prophylaxis:   Pharmacologic: Heparin  Mechanical VTE Prophylaxis in Place: Yes    Invasive lines and devices:   Invasive Devices     Peripherally Inserted Central Catheter Line            PICC Line 14/19/22 Left Basilic 10 days          Drain NG/OG/Enteral Tube Nasogastric Right nares 8 days          Airway            Surgical Airway Shiley Cuffed 8 days                   Counseling / Coordination of Care  Total Critical Care time spent 0 minutes excluding procedures, teaching and family updates        Code Status: Level 1 - Full Code      Emily Wynne PA-C

## 2019-01-03 ENCOUNTER — APPOINTMENT (INPATIENT)
Dept: RADIOLOGY | Facility: HOSPITAL | Age: 64
DRG: 003 | End: 2019-01-03
Payer: MEDICARE

## 2019-01-03 VITALS
DIASTOLIC BLOOD PRESSURE: 68 MMHG | SYSTOLIC BLOOD PRESSURE: 137 MMHG | TEMPERATURE: 99.3 F | WEIGHT: 127.43 LBS | RESPIRATION RATE: 19 BRPM | BODY MASS INDEX: 22.58 KG/M2 | HEART RATE: 72 BPM | HEIGHT: 63 IN | OXYGEN SATURATION: 100 %

## 2019-01-03 PROBLEM — R34 LOW URINE OUTPUT: Status: ACTIVE | Noted: 2019-01-03

## 2019-01-03 PROBLEM — D62 ACUTE BLOOD LOSS ANEMIA: Status: RESOLVED | Noted: 2018-12-24 | Resolved: 2019-01-03

## 2019-01-03 LAB
GLUCOSE SERPL-MCNC: 159 MG/DL (ref 65–140)
GLUCOSE SERPL-MCNC: 183 MG/DL (ref 65–140)
GLUCOSE SERPL-MCNC: 188 MG/DL (ref 65–140)

## 2019-01-03 PROCEDURE — 94760 N-INVAS EAR/PLS OXIMETRY 1: CPT

## 2019-01-03 PROCEDURE — 99024 POSTOP FOLLOW-UP VISIT: CPT | Performed by: PHYSICIAN ASSISTANT

## 2019-01-03 PROCEDURE — 94640 AIRWAY INHALATION TREATMENT: CPT

## 2019-01-03 PROCEDURE — 99238 HOSP IP/OBS DSCHRG MGMT 30/<: CPT | Performed by: EMERGENCY MEDICINE

## 2019-01-03 PROCEDURE — 94003 VENT MGMT INPAT SUBQ DAY: CPT

## 2019-01-03 PROCEDURE — 70496 CT ANGIOGRAPHY HEAD: CPT

## 2019-01-03 PROCEDURE — 82948 REAGENT STRIP/BLOOD GLUCOSE: CPT

## 2019-01-03 RX ADMIN — OXYCODONE HYDROCHLORIDE 10 MG: 10 TABLET ORAL at 06:08

## 2019-01-03 RX ADMIN — PRAMIPEXOLE DIHYDROCHLORIDE 0.25 MG: 0.25 TABLET ORAL at 00:23

## 2019-01-03 RX ADMIN — NICOTINE 14 MG: 14 PATCH TRANSDERMAL at 08:11

## 2019-01-03 RX ADMIN — IPRATROPIUM BROMIDE 0.5 MG: 0.5 SOLUTION RESPIRATORY (INHALATION) at 07:46

## 2019-01-03 RX ADMIN — ACETAMINOPHEN 650 MG: 160 SUSPENSION ORAL at 00:23

## 2019-01-03 RX ADMIN — HYDROMORPHONE HYDROCHLORIDE 1 MG: 1 INJECTION, SOLUTION INTRAMUSCULAR; INTRAVENOUS; SUBCUTANEOUS at 02:47

## 2019-01-03 RX ADMIN — Medication 20 MG: at 06:09

## 2019-01-03 RX ADMIN — NYSTATIN 500000 UNITS: 100000 SUSPENSION ORAL at 08:10

## 2019-01-03 RX ADMIN — LEVETIRACETAM 500 MG: 100 SOLUTION ORAL at 08:06

## 2019-01-03 RX ADMIN — LEVOTHYROXINE SODIUM 88 MCG: 88 TABLET ORAL at 06:08

## 2019-01-03 RX ADMIN — INSULIN LISPRO 5 UNITS: 100 INJECTION, SOLUTION INTRAVENOUS; SUBCUTANEOUS at 00:25

## 2019-01-03 RX ADMIN — LIDOCAINE 2 PATCH: 50 PATCH CUTANEOUS at 08:10

## 2019-01-03 RX ADMIN — ACETAMINOPHEN 650 MG: 160 SUSPENSION ORAL at 06:08

## 2019-01-03 RX ADMIN — HEPARIN SODIUM 5000 UNITS: 5000 INJECTION INTRAVENOUS; SUBCUTANEOUS at 06:09

## 2019-01-03 RX ADMIN — LEVALBUTEROL 1.25 MG: 1.25 SOLUTION, CONCENTRATE RESPIRATORY (INHALATION) at 00:38

## 2019-01-03 RX ADMIN — IPRATROPIUM BROMIDE 0.5 MG: 0.5 SOLUTION RESPIRATORY (INHALATION) at 00:38

## 2019-01-03 RX ADMIN — LEVALBUTEROL 1.25 MG: 1.25 SOLUTION, CONCENTRATE RESPIRATORY (INHALATION) at 07:46

## 2019-01-03 RX ADMIN — IOHEXOL 85 ML: 350 INJECTION, SOLUTION INTRAVENOUS at 10:19

## 2019-01-03 RX ADMIN — CHLORHEXIDINE GLUCONATE 0.12% ORAL RINSE 15 ML: 1.2 LIQUID ORAL at 08:33

## 2019-01-03 RX ADMIN — LAMOTRIGINE 25 MG: 25 TABLET ORAL at 08:05

## 2019-01-03 NOTE — DISCHARGE SUMMARY
Discharge Summary - Lala Dorman 59 y o  female MRN: [id number]    Unit/Bed#: 3150 [facility] -01 Encounter: [id number]    Admission Date:   Admission Orders     Ordered        12/18/18 2040  Inpatient Admission  Once               Admitting Diagnosis: Acute respiratory failure ([address] ) [J96 00]  Trauma [T14 90XA]    HPI:     Lala Dorman is a 59 y o  female w PMH DMII, psychiatric disease who presented to \[facility]\"" after she was found down by her sister  On arrival she had a GCS of 11 and was not moving the R side of her body  Her initial sodium level was 122 for which she received a hypertonic saline bolus  She was noted to have a large parenchymal hemorrhage with left-sided subdural hemorrhage in bilateral subarachnoid hemorrhage on her initial CT scan  She had a small apical pneumothorax and right sided 2nd to 11th rib fractures, flail chest   She had an incidental 4 mm pulmonary nodule  She had a T1-T4 transverse process fracture  Her CT C-spine film was negative, CTA head and neck showed a displacement of her left MCA branches by the left temporal parietal hematoma but no acute vascular injury  Her repeat CT head on 12/19, 24 hours after admission was stable  There is a slight increase in edema on a 3rd CT scan performed on 12/20 and has slight increase in left to right midline shift, increased from 6 mm from 4 mm  On 12/20 she went for a left jesi craniectomy and evacuation of left temporal intraparenchymal hematoma, expansitile duraplasty and subgaleal PETAR drain placement  On 12/21 her postoperative CT head scan was stable with postoperative changes  Her tertiary survey was significant for a nondisplaced proximal 3rd toe fracture  On 12/22 patient underwent a CTA PE study which was negative for PE but did show bilateral effusions, right greater than left and diffuse lung opacities concerning for basilar consolidation and ventilator associated pneumonia    She was started on antibiotics and has now completed a course of antibiotics for ventilator associated pneumonia  She has been stable off antibiotics at this time  She underwent a tracheostomy on 12/24  She had an additional CT head on 12/24 which was stable  Vasogenic edema had decreased and pneumocephalus had resolved  No midline shift at this time  No known hydrocephalus  She did have a new subgaleal collection that was concerning for seroma versus abscess  This was reimaged with an MRI on 12/30 and was not concerning for abscess  She underwent a PEG placement on 01/02  Procedures Performed:   Orders Placed This Encounter   Procedures    Intubation       Summary of Hospital Course: per above HPI    At this time patient has a GCS of 11 at best   She intermittently follows some commands, squeezes hands or opens or closes her eyes  She also ranges her extremities spontaneously at times  She tracks and looks that anyone who enters the room, eyes open spontaneously  She does not talk but does have some appropriate facial expressions, sometimes smiles in response to jokes or conversation or she grimaces in response to pain  She waves good-bye when you go to leave the room  She went for a CTA of her head and neck today for surveillance by Neurosurgery and this result is currently pending  Patient takes Lamictal at home a and this was held for several days, recently restarted and will need to be up titrated slowly  She normally takes 200mg BID at home  She was started on 25mg QD on 12/31  She will remain on 25mg QD through 1/13  Then can be increased to 50mg QD from 1/14 to 1/27  On 1/28 can increase to 50mg BID  Then she can get an additional 25mg per dose (50mg per day total) WEEKLY starting 1/29 until she reaches her therapeutic home dose of 200mg BID  Her Prozac can be resumed without slow titration  These medication changes were discussed with Critical Care pharmacist     Her PEG was placed on 01/02    We will resume trickle tube feeds today and see how there tolerated, ideally will up titrate quickly back to her goal   She has been on cyclic tube feeds here, held at night  For pain the patient take schedule Tylenol and as needed oxycodone  She has had daily pressure support trials here but mostly has been maintained on AC mode of ventilation throughout the day  Her current vent settings are 12/460/ 40%/ 8  Patient currently has normal renal function  She has had a slight decrease in her urination over the past 24 hours although her tube feeds have been held over this time for her PEG placement and she has only been on low maintenance fluids  She takes 40 mg of Lasix daily  This is not listed as a home medicine here but she has been responsive to this throughout most of her stay in the hospital     She has had a persistent leukocytosis since developing ventilator associated pneumonia  Her cultures grew out H flu and strep pneumonia a for which she completed a full course of antibiotics  Her antibiotics were narrowed to ceftriaxone when able after culture growth return  She has been on subcutaneous heparin for DVT prophylaxis here but can be switched to Lovenox for easier dosing as an outpatient  She has had some hypernatremia for which she can receive free water flushes once tube feeds are resumed  She is on her home HCTZ/lisinopril medication and has not required any p r n  Medicines for hypertension  She has been normotensive here  Podiatry has evaluated her regarding her toe fracture and she is weight-bearing as tolerated  She is nonweightbearing to her right upper extremity given her right glenoid fracture  Significant Findings, Care, Treatment and Services Provided:     Bilateral subarachnoid hemorrhage, left subdural hemorrhage: s/p left jesi craniectomy, evacuation of left temporal intraparenchymal hematoma, expands the tile duraplasty and subgaleal PETAR drain placement on 12/20      Multiple right-sided rib fractures    Tiny right apical pneumothorax    T1 and T4 transverse process fractures    Right glenoid fracture    Left 3rd toe fracture    Complications: VAP, E coli UTI    Discharge Diagnosis:     b/l SAH, L SDH  Acute metabolic encephalopathy  Acute hypoxic respiratory insufficiency  Multiple right-sided rib fractures  T1 through T or transverse process fracture  Left 3rd toe fracture  Right glenoid fracture  Hypernatremia  Thrombocytosis  Leukocytosis  Right pneumothorax, resolved  E coli UTI, resolved  VAP, resolved  Acute blood loss anemia, resolved    Resolved Problems  Date Reviewed: 1/3/2019          Resolved    Hyponatremia 12/22/2018     Resolved by  Alonzo Landis PA-C    Hypophosphatemia 12/27/2018     Resolved by  Alonzo Landis PA-C    Mucus plugging of bronchi 1/2/2019     Resolved by  Mary Granados PA-C    Acute respiratory failure ([facility] [address] ) 12/27/2018     Resolved by  Alonzo Landis PA-C    Overview Signed 12/22/2018 11:16 AM by Priscilla Young MD     Added automatically from request for surgery [id number]         Acute blood loss anemia 1/3/2019     Resolved by  Shimon Jameson PA-C    Ileus ([facility] [address] ) 1/2/2019     Resolved by  Mary Granados PA-C          Condition at Discharge: good         Discharge instructions/Information to patient and family:   See after visit summary for information provided to patient and family  Provisions for Follow-Up Care:  See after visit summary for information related to follow-up care and any pertinent home health orders  PCP: Lb Hansen MD    Disposition: Extended care facility  [facility]    Planned Readmission: No    Discharge Statement   I spent 40 minutes discharging the patient  This time was spent on the day of discharge  I had direct contact with the patient on the day of discharge  Additional documentation is required if more than 30 minutes were spent on discharge       Discharge Medications:  See after visit summary for reconciled discharge medications provided to patient and family

## 2019-01-03 NOTE — RESPIRATORY THERAPY NOTE
RT Ventilator Management Note  Mark Caden 59 y o  female MRN: [id number]  Unit/Bed#:  -01 Encounter: [id number]      Daily Screen       1/1/2019 0902 1/2/2019 0745          Patient safety screen outcome[de-identified] - Passed      Not Ready for Weaning due to[de-identified] Underline problem not resolved;Poor inspiratory effort Underline problem not resolved      Spont breathing trial % for 30 min: Yes -      Spont breathing trial outcome[de-identified] Failed -      Spont breathing trial reason failed: RR > 35 bpm;Tidal volume < 4ml/Kg of ideal body weight or VE <5 or  >15 LPM;RSBI > 100 -      Previous settings resumed: Yes -      Name of Medical Team Notified[de-identified] CCS -              Physical Exam:   Assessment Type: Assess only  General Appearance: Awake  Respiratory Pattern: Assisted  Chest Assessment: Chest expansion symmetrical  Bilateral Breath Sounds: Diminished  Suction: Trach  O2 Device: vent      Resp Comments: no vent changes made at this time, pt continues on ac/vc settings, will continue to monitor pt

## 2019-01-03 NOTE — PROGRESS NOTES
Progress Note - Neurosurgery   Ivory Or 59 y o  female MRN: [id number]  Unit/Bed#:  -01 Encounter: [id number]    Assessment:  1  POD 14 left hemicraniectomy for evacuation of left temporal intraparenchymal hematoma, expansatile duraplasty  2  Status post PEG and trach  3  Multicompartmental SAH, SAH, IVH  4  Status post unwitnessed fall  5  Diabetes type 2    Plan:  · Exam reveals GCS10, E4, V1, M5  Lower spontaneous eye opening  Tracks appropriately in room  Spontaneous movement in the left greater than right arm  Mild movement LLE when agitated  Flap mildly sunken with dependent edema  Incision healing well  · Continue monitor neurological exam had frequent neuro checks  · Staples and drain suture removed without complication  · Imaging reviewed personally and by attending  Final results as below  · CTA head w/wo 1/3/18: Final report pending  No obvious vascular abnormalities  Improving hemorrhage  · Repeat CT head with any neurological decline  · Pain control as needed per primary team   · The continue physical and occupational therapy  · DVT PPX:  SCDs and heparin  · Ongoing medical management per primary team   · Patient clear discharge today as scheduled to LTAC  Two week follow-up visit completed today  Patient has scheduled six week postoperative visit with repeat CT head ordered  Subjective/Objective   Chief Complaint:  Postoperative follow-up    Subjective:  No significant events overnight  Status post PEG placement yesterday  Objective:  Lying in bed with elevated head bed  NAD      I/O       01/01 0701 - 01/02 0700 01/02 0701 - 01/03 0700 01/03 0701 - 01/04 0700    I V  (mL/kg) 30 (0 5) 2237 5 (38 7)     NG/GT 1150 100     IV Piggyback 100 100     Feedings 1044      Total Intake(mL/kg) 2324 (40 2) 2437 5 (42 2)     Urine (mL/kg/hr) 781 (0 6) 319 (0 2)     Emesis/NG output  50     Total Output 781 369      Net +1543 +2068 5             Unmeasured Urine Occurrence 1 x 2 x Unmeasured Stool Occurrence 1 x 2 x           Invasive Devices     Peripherally Inserted Central Catheter Line            PICC Line 09/32/79 Left Basilic 12 days          Drain            External Urinary Catheter 1 day    Gastrostomy/Enterostomy Percutaneous endoscopic gastrostomy (PEG) 20 Fr  LUQ less than 1 day          Airway            Surgical Airway Dayanaraley Cuffed 9 days                Physical Exam:  Vitals: Blood pressure (!) 100/43, pulse 70, temperature 99 3 °F (37 4 °C), resp  rate 17, height 5' 3" (1 6 m), weight 57 8 kg (127 lb 6 8 oz), SpO2 100 %  ,Body mass index is 22 57 kg/m²  Hemodynamic Monitoring: MAP: Arterial Line MAP (mmHg): 102 mmHg    General appearance: alert, appears stated age, and no distress  Head:  Flap with dependent edema and mildly sunken  Soft  Incision healing well  Skin edges well approximated  Expected scabbing  No erythema, edema or drainage  Eyes:  Tracks appropriately room, PERRL, conjugate gaze  Neck: supple, symmetrical, trachea midline   Lungs:  Trach in place  AC settings  Heart: regular heart rate  Neurologic:   Mental status: GCS 10, E4, V1, M5  Cranial nerves:  Limited secondary to mental status    Right central facial weakness  Sensory:  Appears intact to painful stimuli x4  Motor:  Spontaneous movement with good strength left upper extremity, mild spontaneous movement right upper extremity 2-3/5  withdrawl BLE with intermittent spontaneous movement LLE  Reflexes: 2+ and symmetric  Toes - right down, left equivocal    Lab Results:    Results from last 7 days  Lab Units 01/02/19  0535 01/01/19  0535 12/31/18  0422   WBC Thousand/uL 14 29* 13 95* 13 98*   HEMOGLOBIN g/dL 8 6* 8 8* 8 7*   HEMATOCRIT % 28 3* 29 9* 29 1*   PLATELETS Thousands/uL 647* 601* 532*   NEUTROS PCT % 72 73  --    MONOS PCT % 6 6  --    MONO PCT %  --   --  3*       Results from last 7 days  Lab Units 01/02/19  0535 01/01/19  0536 12/31/18  0422  12/29/18  0550   POTASSIUM mmol/L 3 1* 4 1 3 3*  < > 3 2*   CHLORIDE mmol/L 110* 109* 111*  < > 105   CO2 mmol/L 32 30 36*  < > 34*   BUN mg/dL 28* 34* 31*  < > 30*   CREATININE mg/dL 0 44* 0 42* 0 42*  < > 0 51*   CALCIUM mg/dL 9 1 8 7 8 6  < > 9 1   ALK PHOS U/L  --   --   --   --  126*   ALT U/L  --   --   --   --  20   AST U/L  --   --   --   --  25   < > = values in this interval not displayed  Results from last 7 days  Lab Units 01/02/19  0535 01/01/19  0536 12/31/18  0422   MAGNESIUM mg/dL 2 0 2 4 2 2       Results from last 7 days  Lab Units 01/02/19  0535 01/01/19  0536 12/31/18  0422   PHOSPHORUS mg/dL 3 2 3 2 3 5         No results found for: TROPONINT  ABG:  Lab Results   Component Value Date    PHART 7 467 (H) 12/26/2018    YLG1SXE 43 7 12/26/2018    PO2ART 76 7 12/26/2018    SAO2EDU 30 9 (H) 12/26/2018    BEART 6 5 12/26/2018    SOURCE Brachial, Right 12/26/2018       Imaging Studies: I have personally reviewed pertinent reports  and I have personally reviewed pertinent films in PACS    Cta Head And Neck W Wo Contrast    Result Date: 12/18/2018  Impression: 1  Atherosclerotic plaque at the right carotid bifurcation resulting in moderate (50-69%) stenosis  No evidence of carotid or vertebral artery dissection or pseudoaneurysm to suggest acute vascular injury  2   Displacement of left MCA branches by left temporal parietal hematoma  No evidence of aneurysm or vascular malformation  No significant stenosis in the major vessels of the [place]  3   Patent dural venous sinuses  Workstation performed: [id number]     Xr Chest Portable    Result Date: 12/23/2018  Impression: 1  Low position of endotracheal tube which should be withdrawn approximately 2 to 3 cm  2   Left lower lobe consolidation  3   Diffuse patchy opacities in both lungs, either pneumonia or, more likely, pulmonary edema  Workstation performed: [id number]     Xr Chest Portable    Result Date: 12/23/2018  Impression: 1    Tip of endotracheal tube 9 mm above the radha  I recommend that the tube be pulled back approximately 3 cm  2   Opacification in both lungs, most likely pulmonary edema or, possibly, bilateral pneumonia  Workstation performed: [id number]     Xr Chest Portable    Result Date: 12/19/2018  Impression: Satisfactory intubation  Bibasilar subsegmental atelectasis  No pneumothorax  Workstation performed: [id number]     Xr Shoulder 2+ Vw Right    Result Date: 12/19/2018  Impression: Right-sided rib fractures again noted  Possible acromion fracture  The small glenoid fracture seen on CT scan of the chest is not clearly appreciated on this exam  Workstation performed: [id number]     Xr Foot 3+ Vw Left    Result Date: 12/21/2018  Impression: Nondisplaced transverse fracture involves the proximal aspect of the proximal to the 3rd digit  Workstation performed: [id number]     Xr Abdomen 1 View Kub    Result Date: 12/24/2018  Impression: Increased bowel gas predominantly in the colon suggesting colonic ileus  Maximal transverse diameter, located in the right transverse colon is approximately 9 8 cm Workstation performed: [id number]     Ct Head Wo Contrast    Result Date: 12/21/2018  Impression: 1  Postsurgical changes of left craniectomy and evacuation of left temporal intraparenchymal hemorrhage  2   A small amount of intraparenchymal/extra-axial hemorrhage remains within the anterior left temporal lobe and stable thin subdural hemorrhage along the left frontal region  3   Increased blood products along the cerebral falx when compared with prior exam  4   Persistent edema predominantly within the left temporal lobe and effacement of the left-sided cerebral sulci however there is interval resolution of midline shift and decreased effacement of the left lateral ventricle  5   Continued washout and interval evolution of intraventricular and subarachnoid blood products   Workstation performed: [id number]     Ct Head Wo Contrast    Result Date: 12/20/2018  Impression: 1  Left temporal lobe intraparenchymal hemorrhage with slight increase in surrounding edema as manifest by increased effacement of the left occipital horn when compared with prior examination  Slight increase in left to right midline shift now measuring up to 6 mm, previously 4 mm  2   Stable subdural, subarachnoid and intraventricular blood products  No large delayed intracranial hemorrhage  The study was marked in [facility] for immediate notification  Workstation performed: [id number]     Ct Head Wo Contrast    Result Date: 12/19/2018  Impression: 1  Stable size of large intraparenchymal hemorrhage centered in the left temporal lobe with surrounding mass effect and edema and adjacent left temporal to frontal subdural hemorrhage  Stable midline shift from left to right  2   Increased conspicuity of bilateral posterior subarachnoid and intraventricular blood products are likely due to redistribution  Workstation performed: [id number]     Trauma - Ct Head Wo Contrast    Result Date: 12/18/2018  Impression: 1  Large parenchymal hemorrhage on the left with associated left-sided subdural hematoma and bilateral subarachnoid hemorrhage including intraventricular hemorrhage  2   Mass effect and midline shift to the right measuring 4 mm  I personally discussed this study with Crow Goodwin on 12/18/2018 at 8:45 PM  Workstation performed: [id number]     Trauma - Ct Spine Cervical Wo Contrast    Result Date: 12/18/2018  Impression: 1  No cervical spine fracture or traumatic malalignment  2   Small right apical pneumothorax  3   Nondisplaced fractures of the right T1 and T2 transverse processes  Fracture of the posterior right second rib   I personally discussed this study with Crow Goodwin on 12/18/2018 at 8:45 PM   Workstation performed: [id number]     Ct Shoulder Right Wo Contrast    Result Date: 12/20/2018  Impression: Acute fracture anterior glenoid rim with 1 to 2 mm anteromedial displacement  Acute comminuted fracture posterior acromion with 6 mm mediolateral distraction and 1 to 2 mm inferior cortical offset  No dislocation  Interval progression of right lower lobe and right upper lobe dependent airspace disease  This may represent subsegmental atelectasis, aspiration, and/or pulmonary contusion  Interval enlargement of small right pleural effusion  No other significant interval change  The examination demonstrates a significant  finding and was documented as such in [facility] for liaison and referring practitioner notification  Workstation performed: [id number]     Xr Chest 1 View    Result Date: 12/20/2018  Impression: Multiple right-sided rib fractures  Consolidation at the base may represent atelectasis or pneumonia  Workstation performed: [id number]     Trauma - Ct Chest Abdomen Pelvis W Contrast    Result Date: 12/18/2018  Impression: 1  Multiple right-sided rib fractures with tiny right apical pneumothorax  Fractures extend from the second through eleventh ribs at the costovertebral junctions and fourth through eighth ribs laterally  This is consistent with flail chest  A right anterior second rib fracture is severely displaced/angulated  2   Fracture of the anterior right glenoid, acromium, and first through fourth thoracic transverse processes on the right  3   Focal irregular opacity in the right upper lobe with adjacent 4 mm nodule  While contusion is possible given overlying trauma, the appearance of the lesion is most suspicious for a primary lung cancer and follow-up PET/CT is recommended  4   Borderline upper abdominal lymph nodes with density/stranding at the base of the small bowel mesentery  While mesenteric contusion can have this appearance in the setting of trauma  There are calcifications which suggest chronicity  Lymphoproliferative disorder cannot be excluded and follow-up and 3-6 months is recommended  5   Chronic interstitial lung disease of uncertain etiology  Given esophageal dilatation, this could represent NSIP in the setting of scleroderma  I personally discussed this study with Ursula Myrick on 12/18/2018 at 8:45 PM  Workstation performed: [id number]     Xr Trauma Multiple    Result Date: 12/19/2018  Impression: Multiple right-sided rib fractures  Consolidation at the base may represent atelectasis or pneumonia  Workstation performed: [id number]     Xr Chest Portable Icu    Result Date: 12/24/2018  Impression: Endotracheal tube tip 2 9 cm above the radha No interval change in appearance of diffuse bilateral pulmonary airspace disease Workstation performed: [id number]     Xr Chest Portable Icu    Result Date: 12/20/2018  Impression: Right subclavian catheter extending superiorly into the neck requiring repositioning  Workstation performed: [id number]     Xr Chest Portable Icu    Result Date: 12/20/2018  Impression: Mild central vascular congestion  Basilar opacities and effusions  Stable rib fractures  Workstation performed: [id number]     Xr Chest Portable Icu    Result Date: 12/19/2018  Impression: Right basilar opacity partially obscuring the hemidiaphragm may represent atelectasis or infiltrate  Possible small basilar effusion  Workstation performed: [id number]     Cta Chest Pe Study    Result Date: 12/22/2018  Impression: No evidence of pulmonary embolism Moderate right effusion small left effusion There is development of diffuse lung opacities with reticulation, groundglass density with a few areas of basilar consolidation  This may be due to pulmonary edema  However infiltrates are difficult to exclude  The ET tube is low-lying with tip lying in the radha with likely projection into the right main bronchus   Small mediastinal lymph nodes larger from the previous study probably reactive  I personally discussed this study with Bronson Salas on 12/22/2018 at 11:46 AM  Workstation performed: [id number]     EKG, Pathology, and Other Studies: I have personally reviewed pertinent reports        VTE Pharmacologic Prophylaxis: Enoxaparin (Lovenox)    VTE Mechanical Prophylaxis: sequential compression device

## 2019-01-03 NOTE — PROGRESS NOTES
Patient picked up at 1130 to go to [facility] has ventilator, peg tube clamped, left picc NSL  Blood suger 183 prior to transfer, upper dentures sent with patient, along with stuffed animals, and xtra #8 shiley sent with patient as well  Report called to Muriel Wu at [phone]  Abdominal binder also on patient

## 2019-01-03 NOTE — PROGRESS NOTES
Progress Note - Critical Care   Srinivas Rivas 59 y o  female MRN: [id number]  Unit/Bed#: 3150 [facility] -01 Encounter: [id number]    Assessment:     Principal Problem:    Traumatic brain injury [facility])  Active Problems:    Multiple fractures of ribs, bilateral, initial encounter for closed fracture    Traumatic pneumothorax    Glenoid fracture of shoulder, right, closed, initial encounter    Intraparenchymal hemorrhage of brain ([facility] )    Flail chest, initial encounter for closed fracture    Fracture of thoracic transverse process, closed, initial encounter ([facility] )    Subdural hematoma (HCC)    Subarachnoid hemorrhage ([facility] )    Diabetes mellitus, type II ([facility] )    Closed nondisplaced left toe fracture    Hypokalemia    Acute encephalopathy    Status post craniectomy    Acute respiratory failure with hypoxia ([facility] )    Protein-calorie malnutrition ([facility] )    Low urine output      Plan:          Neuro:    L SDH, b/l SAH s/p L craniectomy POD 13 - craniectomy precautions, neurochecks, keppra 500mg BID - keep indefinitely given hemorrhage / edema of L temporal lobe   Analgesia - sched tylenol, oxycodone 5mg Q4 PRN (1/24) oxycodone 10mg Q4 PRN (2/24), dilaudid 0 5mg Q3 PRN (4/24)   Delirium precautions, sleep hygiene, daily CAM-ICU                 CV:    HTN - lisinopril/HCTZ QD, hydralazine PRN (0/24)                 Lung:    Acute hypoxic respiratory insufficiency s/p trach - wean vent as able, QD or BID PSV trial as tolerated   B/l rib fx w flail component - analgesia, pulmonary toilet    Pulmonary edema - lasix 40mg BID    VAP - completed abx course, monitor off abx                  GI:    GERD - QD omeprazole    Dysphagia - s/p PEG, continue to gravity x24h prior to trickle trial                 FEN:    Electrolytes - Monitor/trend - replete based on deficiencies    Nutrition - FWF once able to use PEG for hypernatremia   Fluid - D5 1/2 NS 75/h given low UO / NPO w fresh PEG                 :    Low urine output - lasix 40mg BID, consider increased dose v zaroxolyn                  ID:    H flu / strep VAP - completed course of abx, monitor off abx                  Heme:    thrombocytosis - likely reactive, monitor    Anemia - monitor / trend, no active source bleeding    Leukocytosis - persistent, likely 2/2 resolving VAP, monitor    DVT ppx - SQH                 Endo:    DM2 w hyperglycemia - Alg 6 SSI currently held while NPO s/p PEG, may need to de-escalate algorithm once TF resume as her infection and sequela is resolving    Hypothyroid - levothyroxine                            Msk/Skin:    Repetitive repositioning and off-loading to prevent skin break down and ulcerative formation   L toe fx - podiatry following, WBAT   T1-T4 TP fx - analgesia    R glenoid fx - analgesia, NWB RUE    OOB to chair / PT / OT                 Disposition:    Discharge to [facility], [facility] today     Chief Complaint: Unable to provide 2/2 acuity of condition     HPI/24hr events: AC overnight, went for PEG    Physical Exam: Physical Exam   Constitutional: She is oriented to person, place, and time  She appears well-developed and well-nourished  No distress  HENT:   Head: Normocephalic and atraumatic  Eyes: Pupils are equal, round, and reactive to light  Neck: Normal range of motion  Neck supple  No tracheal deviation present  Cardiovascular: Normal rate, regular rhythm, normal heart sounds and intact distal pulses  Exam reveals no gallop and no friction rub  No murmur heard  Pulmonary/Chest: Effort normal and breath sounds normal  No respiratory distress  She has no wheezes  She has no rales  Trach site c/d/i  40% and 5 PEEP on AC   Abdominal: Soft  Bowel sounds are normal  She exhibits no distension and no mass  There is no tenderness  There is no guarding  abd binder on over PEG    Genitourinary:   Genitourinary Comments: No kenny   purewick   Musculoskeletal: She exhibits no edema or deformity     Neurological: She is alert and oriented to person, place, and time  Best GCS this morning of 12 E4 V1 M6  No attempts to talk around trach but she does have appropriate facial expressions - seems to smile when you joke w her, seems to grimace to pain and she tracks / looks at everyone who walks in or talks to her  She followed commands this morning - opened and closed eyes and squeezed my hand on the R side   spont movement of her extremities per nurse    Skin: Skin is warm and dry  She is not diaphoretic  Psychiatric: She has a normal mood and affect  Her behavior is normal    Nursing note and vitals reviewed  Vitals:    19 0600 19 0700 19 0746 19 0800   BP: 135/80 (!) 104/49  (!) 100/43   Pulse: 72 66  70   Resp: (!) 36 16  17   Temp: 99 3 °F (37 4 °C) 99 3 °F (37 4 °C)  99 3 °F (37 4 °C)   TempSrc:       SpO2: 99% 100% 99% 100%   Weight:       Height:         Arterial Line BP: 126/80  Arterial Line MAP (mmHg): 102 mmHg    Temperature:   Temp (24hrs), Av 7 °F (37 6 °C), Min:99 °F (37 2 °C), Max:101 5 °F (38 6 °C)    Current: Temperature: 99 3 °F (37 4 °C)    Weights:   IBW: 52 4 kg    Body mass index is 22 57 kg/m²    Weight (last 2 days)     None          Hemodynamic Monitoring:  N/A     Non-Invasive/Invasive Ventilation Settings:  Respiratory    Lab Data (Last 4 hours)    None         O2/Vent Data (Last 4 hours)    None              No results found for: PHART, CKG4GAO, PO2ART, IPS4PBH, U7CIJXUD, BEART, SOURCE  SpO2: SpO2: 100 %    Intake and Outputs:  I/O       701 -  0700 701 -  0700    I V  (mL/kg) 30 (0 5) 2237 5 (38 7)    NG/GT 1150 100    IV Piggyback 100 100    Feedings 1044     Total Intake(mL/kg) 2324 (40 2) 2437 5 (42 2)    Urine (mL/kg/hr) 781 (0 6) 319 (0 2)    Emesis/NG output  50    Total Output 781 369    Net +1543 +2068 5          Unmeasured Urine Occurrence 1 x 2 x    Unmeasured Stool Occurrence 1 x 2 x          Nutrition:        Diet Orders            Start     Ordered    19 0001  Diet NPO  Diet effective midnight     Question Answer Comment   Diet Type NPO    RD to adjust diet per protocol? Yes        01/01/19 0843          Labs:     Results from last 7 days  Lab Units 01/02/19  0535 01/01/19  0535 12/31/18  0422   WBC Thousand/uL 14 29* 13 95* 13 98*   HEMOGLOBIN g/dL 8 6* 8 8* 8 7*   HEMATOCRIT % 28 3* 29 9* 29 1*   PLATELETS Thousands/uL 647* 601* 532*   NEUTROS PCT % 72 73  --    MONOS PCT % 6 6  --    MONO PCT %  --   --  3*      Results from last 7 days  Lab Units 01/02/19  0535 01/01/19  0536 12/31/18  0422  12/29/18  0550   SODIUM mmol/L 149* 146* 153*  < > 147*   POTASSIUM mmol/L 3 1* 4 1 3 3*  < > 3 2*   CHLORIDE mmol/L 110* 109* 111*  < > 105   CO2 mmol/L 32 30 36*  < > 34*   BUN mg/dL 28* 34* 31*  < > 30*   CREATININE mg/dL 0 44* 0 42* 0 42*  < > 0 51*   CALCIUM mg/dL 9 1 8 7 8 6  < > 9 1   ALK PHOS U/L  --   --   --   --  126*   ALT U/L  --   --   --   --  20   AST U/L  --   --   --   --  25   < > = values in this interval not displayed  Results from last 7 days  Lab Units 01/02/19  0535 01/01/19  0536 12/31/18  0422   MAGNESIUM mg/dL 2 0 2 4 2 2       Results from last 7 days  Lab Units 01/02/19  0535 01/01/19  0536 12/31/18  0422   PHOSPHORUS mg/dL 3 2 3 2 3 5                0  Lab Value Date/Time   TROPONINI <0 02 12/20/2018 1204   TROPONINI <0 02 12/20/2018 0054       Imaging:   XR abdomen 1 view kub   Final Result      The tip of the endogastric tube is most likely within the duodenum as mentioned above  Workstation performed: [id number]         XR chest portable   Final Result      Stable diffuse airspace disease, unchanged from prior examination  Left PICC at the cavoatrial junction  Workstation performed: [id number]         MRI brain w wo contrast   Final Result      Status post extensive left hemicraniectomy for subacute left hemispheric hemorrhage decompression  The subacute hemorrhage is primarily within the temporal lobe  Partial extension of the left temporal parietal lobe through the craniectomy site  Subdural, extradural and extracranial fluid collection identified within the anterior left hemisphere extending into the left temporalis region  There is acute to early subacute ischemia identified within the left anterior frontal white matter on series 3 image 23  Scattered subarachnoid hemorrhage  Small amount of intraventricular hemorrhage layering in the occipital horns  This is unchanged from recent CT scan  The study was marked in [facility] for immediate notification  Workstation performed: [id number]         XR chest portable ICU   Final Result      Stable diffuse airspace disease  Left PICC at the cavoatrial junction  Workstation performed: [id number]         CT head wo contrast   Final Result      Decreasing left temporal parenchymal hemorrhages with persistent mild to moderate vasogenic edema and mild regional mass effect  Decreasing bilateral subarachnoid hemorrhage  Small amount of layering intraventricular hemorrhage, noted previously in retrospect  No hydrocephalus  Overall similar or minimally decreased size of left extradural/subgaleal fluid collection described on the previous study  However, the collection does appears to communicate with the extra-axial space anteriorly in the left frontal region (series 2    images 25 through 30)  This could represent collection of CSF fluid as opposed to a seroma  No new hemorrhage or acute intracranial abnormality seen  Workstation performed: [id number]         XR chest portable   Final Result      1  Persistent bilateral groundglass opacities without significant change from prior exam    2   Distal tip of the right-sided PICC line is in the region the SVC with tip coiled and directed cephalad        Workstation performed: [id number]         XR chest portable   Final Result   Slight improvement in bilateral infiltrates  Workstation performed: [id number]         XR chest portable   Final Result      No significant interval change in the diffuse airspace opacities bilaterally, likely reflecting diffuse alveolar edema  Superimposed pneumonia is not excluded  Small right pleural effusion  Workstation performed: [id number]         XR abdomen 1 view kub   Final Result   Improvement  The distention of the small bowel loops which was seen on the previous study has resolved               Workstation performed: [id number]         XR chest portable   Final Result      No change in diffuse bilateral airspace disease concerning for the alveolar phase of pulmonary edema  Superimposed infiltrates not excluded  Workstation performed: [id number]         CT head wo contrast   Final Result         1  Stable intracranial hemorrhage  Resolution of pneumocephalus and decreased vasogenic edema  2   No midline shift or hydrocephalus  3   New extradural/subgaleal collection measuring 6 x 2 x 8 cm  Possible seroma  Abscess less likely though not excluded  Workstation performed: [id number]         XR chest portable   Final Result      Pulmonary edema, unchanged since a portable chest radiograph from earlier in the day  Workstation performed: [id number]         VAS lower limb venous duplex study, complete bilateral   Final Result      XR chest portable   Final Result      Stable diffuse pulmonary edema  Workstation performed: [id number]         XR chest portable   Final Result      Appropriately positioned endotracheal tube      Persistent bilateral pulmonary airspace disease without significant change            Workstation performed: [id number]         XR abdomen 1 view kub   Final Result      Increased bowel gas predominantly in the colon suggesting colonic ileus    Maximal transverse diameter, located in the right transverse colon is approximately 9 8 cm               Workstation performed: [id number]         XR chest portable ICU   Final Result      Endotracheal tube tip 2 9 cm above the radha      No interval change in appearance of diffuse bilateral pulmonary airspace disease            Workstation performed: [id number]         XR chest portable   Final Result      1  Low position of endotracheal tube which should be withdrawn approximately 2 to 3 cm  2   Left lower lobe consolidation  3   Diffuse patchy opacities in both lungs, either pneumonia or, more likely, pulmonary edema  Workstation performed: [id number]         XR chest portable   Final Result      1  Tip of endotracheal tube 9 mm above the radha  I recommend that the tube be pulled back approximately 3 cm  2   Opacification in both lungs, most likely pulmonary edema or, possibly, bilateral pneumonia  Workstation performed: [id number]         CTA chest pe study   Final Result   No evidence of pulmonary embolism      Moderate right effusion small left effusion      There is development of diffuse lung opacities with reticulation, groundglass density with a few areas of basilar consolidation  This may be due to pulmonary edema  However infiltrates are difficult to exclude  The ET tube is low-lying with tip lying in the radha with likely projection into the right main bronchus  Small mediastinal lymph nodes larger from the previous study probably reactive          I personally discussed this study with Kylah Leung on 12/22/2018 at 11:46 AM                            Workstation performed: [id number]         XR foot 3+ vw left   Final Result      Nondisplaced transverse fracture involves the proximal aspect of the proximal to the 3rd digit  Workstation performed: [id number]         CT head wo contrast   Final Result      1  Postsurgical changes of left craniectomy and evacuation of left temporal intraparenchymal hemorrhage     2   A small amount of intraparenchymal/extra-axial hemorrhage remains within the anterior left temporal lobe and stable thin subdural hemorrhage along the left frontal region  3   Increased blood products along the cerebral falx when compared with prior exam    4   Persistent edema predominantly within the left temporal lobe and effacement of the left-sided cerebral sulci however there is interval resolution of midline shift and decreased effacement of the left lateral ventricle  5   Continued washout and interval evolution of intraventricular and subarachnoid blood products  Workstation performed: [id number]         XR chest portable ICU   Final Result      Right subclavian catheter extending superiorly into the neck requiring repositioning  Workstation performed: [id number]         XR chest 1 view   Final Result      Multiple right-sided rib fractures  Consolidation at the base may represent atelectasis or pneumonia  Workstation performed: [id number]         XR chest portable ICU   Final Result      Mild central vascular congestion  Basilar opacities and effusions  Stable rib fractures  Workstation performed: [id number]         CT shoulder right wo contrast   Final Result      Acute fracture anterior glenoid rim with 1 to 2 mm anteromedial displacement  Acute comminuted fracture posterior acromion with 6 mm mediolateral distraction and 1 to 2 mm inferior cortical offset  No dislocation  Interval progression of right lower lobe and right upper lobe dependent airspace disease  This may represent subsegmental atelectasis, aspiration, and/or pulmonary contusion  Interval enlargement of small right pleural effusion  No other significant interval change  The examination demonstrates a significant  finding and was documented as such in [facility] for liaison and referring practitioner notification            Workstation performed: [id number]         CT head wo contrast   Final Result      1  Left temporal lobe intraparenchymal hemorrhage with slight increase in surrounding edema as manifest by increased effacement of the left occipital horn when compared with prior examination  Slight increase in left to right midline shift now    measuring up to 6 mm, previously 4 mm  2   Stable subdural, subarachnoid and intraventricular blood products  No large delayed intracranial hemorrhage  The study was marked in [facility] for immediate notification  Workstation performed: [id number]         XR chest portable ICU   Final Result      Right basilar opacity partially obscuring the hemidiaphragm may represent atelectasis or infiltrate  Possible small basilar effusion  Workstation performed: [id number]         XR shoulder 2+ vw right   Final Result      Right-sided rib fractures again noted  Possible acromion fracture  The small glenoid fracture seen on CT scan of the chest is not clearly appreciated on this exam             Workstation performed: [id number]         XR trauma multiple   Final Result      Multiple right-sided rib fractures  Consolidation at the base may represent atelectasis or pneumonia  Workstation performed: [id number]         XR chest portable   Final Result      Satisfactory intubation  Bibasilar subsegmental atelectasis  No pneumothorax  Workstation performed: [id number]         CT head wo contrast   Final Result      1  Stable size of large intraparenchymal hemorrhage centered in the left temporal lobe with surrounding mass effect and edema and adjacent left temporal to frontal subdural hemorrhage  Stable midline shift from left to right  2   Increased conspicuity of bilateral posterior subarachnoid and intraventricular blood products are likely due to redistribution                    Workstation performed: [id number]         TRAUMA - CT chest abdomen pelvis w contrast   Final Result      1  Multiple right-sided rib fractures with tiny right apical pneumothorax  Fractures extend from the second through eleventh ribs at the costovertebral junctions and fourth through eighth ribs laterally  This is consistent with flail chest  A right    anterior second rib fracture is severely displaced/angulated  2   Fracture of the anterior right glenoid, acromium, and first through fourth thoracic transverse processes on the right  3   Focal irregular opacity in the right upper lobe with adjacent 4 mm nodule  While contusion is possible given overlying trauma, the appearance of the lesion is most suspicious for a primary lung cancer and follow-up PET/CT is recommended  4   Borderline upper abdominal lymph nodes with density/stranding at the base of the small bowel mesentery  While mesenteric contusion can have this appearance in the setting of trauma  There are calcifications which suggest chronicity  Lymphoproliferative disorder cannot be excluded and follow-up and 3-6 months is recommended  5   Chronic interstitial lung disease of uncertain etiology  Given esophageal dilatation, this could represent NSIP in the setting of scleroderma  I personally discussed this study with Mayur Blum on 12/18/2018 at 8:45 PM                   Workstation performed: [id number]         CTA head and neck w wo contrast   Final Result         1  Atherosclerotic plaque at the right carotid bifurcation resulting in moderate (50-69%) stenosis  No evidence of carotid or vertebral artery dissection or pseudoaneurysm to suggest acute vascular injury  2   Displacement of left MCA branches by left temporal parietal hematoma  No evidence of aneurysm or vascular malformation  No significant stenosis in the major vessels of the [place]  3   Patent dural venous sinuses                    Workstation performed: [id number]         TRAUMA - CT spine cervical wo contrast   Final Result      1  No cervical spine fracture or traumatic malalignment  2   Small right apical pneumothorax  3   Nondisplaced fractures of the right T1 and T2 transverse processes  Fracture of the posterior right second rib  I personally discussed this study with Sofia Flood on 12/18/2018 at 8:45 PM                          Workstation performed: [id number]         TRAUMA - CT head wo contrast   Final Result      1  Large parenchymal hemorrhage on the left with associated left-sided subdural hematoma and bilateral subarachnoid hemorrhage including intraventricular hemorrhage  2   Mass effect and midline shift to the right measuring 4 mm  I personally discussed this study with Sofia Flood on 12/18/2018 at 8:45 PM                      Workstation performed: [id number]         CT head wo contrast    (Results Pending)   CTA head w wo contrast    (Results Pending)   CTA head w wo contrast    (Results Pending)      I have personally reviewed pertinent reports  EKG: as per tele nsr no ectopy     Micro:  Lab Results   Component Value Date    BLOODCX No Growth After 5 Days  12/25/2018    BLOODCX No Growth After 5 Days   12/25/2018    URINECX >100,000 cfu/ml Escherichia coli (A) 12/25/2018    SPUTUMCULTUR 4+ Growth of Haemophilus influenzae (AA) 12/25/2018    SPUTUMCULTUR 1+ Growth of Streptococcus pneumoniae (AA) 12/25/2018    SPUTUMCULTUR 1+ Growth of  12/25/2018       Allergies: No Known Allergies    Medications:   Scheduled Meds:    Current Facility-Administered Medications:  acetaminophen 650 mg Oral Q6H Manjit Sharif PA-C    albuterol 2 5 mg Nebulization Q4H PRN Macho Ching, DO    bisacodyl 10 mg Rectal Daily PRN Anthony Segal MD    chlorhexidine 15 mL Swish & Spit Q12H [address] Za Hall MD    dextrose 5 % and sodium chloride 0 45 % 75 mL/hr Intravenous Continuous Venu Rosado DO Last Rate: 75 mL/hr (01/02/19 2026)   furosemide 40 mg Intravenous Daily Kati Austin, HARSHAD    heparin (porcine) 5,000 Units Subcutaneous [facility] Kate Coombs MD    hydrALAZINE 5 mg Intravenous Q6H PRN Kate Coombs MD    influenza vaccine 0 5 mL Intramuscular Prior to discharge Jenae Santiago, DO    insulin lispro 1-5 Units Subcutaneous HS Juan Miguel Hdz MD    insulin lispro 5-25 Units Subcutaneous Q6H [facility] Chrissy Plasencia PA-C    ipratropium 0 5 mg Nebulization Q6H Jenae Santiago,     lamoTRIgine 25 mg Oral Daily Stacy Degree, HARSHAD    levalbuterol 1 25 mg Nebulization Q6H [facility], DO    levETIRAcetam 500 mg Oral Q12H [facility] Julio Cesar Gonzalez MD    levothyroxine 88 mcg Oral Early Morning Dorthcolin Irizarry, HARSHAD    lidocaine 2 patch Topical Daily Dorthula Juan C, HARSHAD    lisinopril-hydrochlorothiazide (PRINZIDE 10/12  5) combo dose  Oral Daily [place]    nicotine 14 mg Transdermal Daily Lorrie Barragan, HARSHAD    nystatin 500,000 Units Swish & Swallow 4x Daily Stacy Degree, HARSHAD    omeprazole (PRILOSEC) suspension 2 mg/mL 20 mg Oral Daily Stacy Degree, PA-RICH    oxyCODONE 5 mg Oral Q4H PRN Stacy Degree, PA-RICH    Or        oxyCODONE 10 mg Oral Q4H PRN Stacy Degree, PA-C    polyethylene glycol 17 g Oral Daily PRN Stacy Degree, PA-C    pramipexole 0 25 mg Oral BID TRISTAN Og      Continuous Infusions:    dextrose 5 % and sodium chloride 0 45 % 75 mL/hr Last Rate: 75 mL/hr (01/02/19 2026)     PRN Meds:    albuterol 2 5 mg Q4H PRN   bisacodyl 10 mg Daily PRN   hydrALAZINE 5 mg Q6H PRN   influenza vaccine 0 5 mL Prior to discharge   oxyCODONE 5 mg Q4H PRN   Or     oxyCODONE 10 mg Q4H PRN   polyethylene glycol 17 g Daily PRN       VTE Pharmacologic Prophylaxis: Sequential compression device (Venodyne)  and Heparin  VTE Mechanical Prophylaxis: sequential compression device    Invasive lines and devices:   Invasive Devices     Peripherally Inserted Central Catheter Line            PICC Line 54/25/67 Left Basilic 12 days Drain            External Urinary Catheter less than 1 day    Gastrostomy/Enterostomy Percutaneous endoscopic gastrostomy (PEG) 20 Fr  LUQ less than 1 day          Airway            Surgical Airway Shiley Cuffed 9 days                   Counseling / Coordination of Care  Total Critical Care time spent 46 minutes excluding procedures, teaching and family updates  Code Status: Level 1 - Full Code     Portions of the record may have been created with voice recognition software  Occasional wrong word or "sound a like" substitutions may have occurred due to the inherent limitations of voice recognition software  Read the chart carefully and recognize, using context, where substitutions have occurred       Rhea Hagen PA-C

## 2019-01-04 NOTE — TELEPHONE ENCOUNTER
Called José Miguel Mcnulty to see how she is doing after surgery and recent d/c from the hospital to [facility]  Called nursing staff at [facility] and spoke with Luna Agrawal, She reports that she is doing very well overall and denies any incisional issues or fevers  Patient denies any bleeding, dizziness, fever, redness, significant pain and swelling  Verified date/time/location of her upcoming POV on 2/6/2019 and advised her to call the office with any further questions or concerns, or if any incisional issues or fevers would arise  Reminded them to please complete head CT/CTA prior to follow up visit  Went over incision care with nurse and she has no further questions at this time

## 2019-01-28 ENCOUNTER — TELEPHONE (OUTPATIENT)
Dept: NEUROSURGERY | Facility: CLINIC | Age: 64
End: 2019-01-28

## 2019-01-28 NOTE — TELEPHONE ENCOUNTER
Sister Aniya Levy wishes to speak with Dr Ashli Davis re her sisters" brain and what is going on"  Discussed that since pt was d/c to [facility] 1/3 she has not been seen by Dr Ashli Davis    Pt has f/u with study on 2/6 and suggested a conversation after that would be more informative  She reports her sister came back to ICU 2 days ago  Unable to see that pt was admitted  Suggested she contact [facility]  She is in agreement to wait until seen 2/6 for further neurosurgical update  She was encouraged to call back with questions or concerns

## 2019-02-11 ENCOUNTER — TELEPHONE (OUTPATIENT)
Dept: GASTROENTEROLOGY | Facility: AMBULARY SURGERY CENTER | Age: 64
End: 2019-02-11

## 2019-02-20 ENCOUNTER — TELEPHONE (OUTPATIENT)
Dept: OBGYN CLINIC | Facility: CLINIC | Age: 64
End: 2019-02-20

## 2019-02-20 NOTE — TELEPHONE ENCOUNTER
Dr Micky Trotter a PA @ [place] [address] is looking for a weightbearing status for Porsche Pace following a right glenoid acromion fx  Linda Astudillo did a consult on 12/19/18  Please call Rosemary Rucker @ [phone]    Thank you

## 2019-03-06 ENCOUNTER — TELEPHONE (OUTPATIENT)
Dept: NEUROSURGERY | Facility: CLINIC | Age: 64
End: 2019-03-06

## 2019-03-06 DIAGNOSIS — S06.5X9A SUBDURAL HEMATOMA (HCC): Primary | ICD-10-CM

## 2019-03-06 DIAGNOSIS — I60.9 SUBARACHNOID HEMORRHAGE (HCC): ICD-10-CM

## 2019-03-06 NOTE — TELEPHONE ENCOUNTER
Sister called because she and pt's brothers questioned when her skull flap will be replaced  I appears pt has been at [facility] and cancelled her two week  Sister reports they cancelled her 6 week f/u because she was trached and they would not transport  It is now another month later  Sister reports trach remains but is plugged because pt is speaking  Discussed with Dr Yue Olivarez who wants the CT head, and CTA head w w/o that was needed for previously scheduled appt

## 2019-03-06 NOTE — TELEPHONE ENCOUNTER
Spoke with Rakan at [facility] who is caring for pt today  Explained the situation with pt now being 1 month past her 6 week f/u, and familiies concern about Skull flap replacement  She is currently being worked up for infection  Blood cultures and sputum are not resulted  He reports she remains trached but uses passe kota valve during the day  Informed him that Ct and CTA orders as well as labs are placed and will be faxed to number provided [phone]  Requested that someone call once the pt is stable for f/u appt     He stated an understanding

## 2019-03-20 ENCOUNTER — OFFICE VISIT (OUTPATIENT)
Dept: NEUROSURGERY | Facility: CLINIC | Age: 64
End: 2019-03-20
Payer: MEDICARE

## 2019-03-20 VITALS
DIASTOLIC BLOOD PRESSURE: 69 MMHG | HEART RATE: 76 BPM | TEMPERATURE: 98.3 F | SYSTOLIC BLOOD PRESSURE: 137 MMHG | RESPIRATION RATE: 18 BRPM

## 2019-03-20 DIAGNOSIS — S06.9X0D TRAUMATIC BRAIN INJURY, WITHOUT LOSS OF CONSCIOUSNESS, SUBSEQUENT ENCOUNTER: ICD-10-CM

## 2019-03-20 DIAGNOSIS — Z98.890 STATUS POST CRANIECTOMY: Primary | ICD-10-CM

## 2019-03-20 DIAGNOSIS — I61.9 INTRAPARENCHYMAL HEMORRHAGE OF BRAIN (HCC): ICD-10-CM

## 2019-03-20 PROCEDURE — 99024 POSTOP FOLLOW-UP VISIT: CPT | Performed by: NEUROLOGICAL SURGERY

## 2019-03-20 RX ORDER — POTASSIUM CHLORIDE 1.5 G/1.77G
20 POWDER, FOR SOLUTION ORAL 2 TIMES DAILY
COMMUNITY

## 2019-03-20 RX ORDER — ALBUTEROL SULFATE 2.5 MG/3ML
2.5 SOLUTION RESPIRATORY (INHALATION) EVERY 6 HOURS PRN
COMMUNITY
End: 2020-11-05

## 2019-03-20 RX ORDER — CHLORHEXIDINE GLUCONATE 0.12 MG/ML
15 RINSE ORAL ONCE
Status: CANCELLED | OUTPATIENT
Start: 2019-03-20 | End: 2019-03-20

## 2019-03-20 RX ORDER — ACETAMINOPHEN 325 MG/1
650 TABLET ORAL EVERY 4 HOURS PRN
COMMUNITY

## 2019-03-20 NOTE — PROGRESS NOTES
Patient Id: Markie Moreno is a 59 y o  female        Handedness: Right     Assessment/Plan:    Diagnoses and all orders for this visit:    Status post craniectomy  -     Case request operating room: Left Autologous Cranioplasty; Standing  -     Case request operating room: Left Autologous Cranioplasty    Traumatic brain injury, without loss of consciousness, subsequent encounter    Intraparenchymal hemorrhage of brain ([address] )    Other orders  -     albuterol (2 5 mg/3 mL) 0 083 % nebulizer solution; Take 2 5 mg by nebulization every 6 (six) hours as needed for wheezing or shortness of breath  -     acetaminophen (TYLENOL) 325 mg tablet; Take 650 mg by mouth every 6 (six) hours as needed for mild pain  -     potassium chloride (KLOR-CON) 20 mEq packet; Take 20 mEq by mouth 2 (two) times a day  -     LANSOPRAZOLE PO; Take 10 mg by mouth daily  -     enoxaparin (LOVENOX) 40 mg/0 4 mL; Inject under the skin  -     Diet NPO; Sips with meds; Standing  -     Nursing Communication [address] [place] [address] Interventions Implemented; Standing  -     Nursing Communication Use 2 CHG cloths, have the patient wash his/her body from the neck down or have staff wash entire body (from neck down) if patient is unable; Standing  -     Nursing Communication Swab both nares with Povidone-Iodine solution, EXCLUDE if patient has shellfish/Iodine allergy; Standing  -     chlorhexidine (PERIDEX) 0 12 % oral rinse 15 mL  -     Void on call to OR; Standing  -     Insert peripheral IV; Standing  -     MRSA culture; Standing        Discussion Summary:   1  Status post left hemicraniectomy 12/20/2019 for trauma and left temporal hematoma  Patient has had a poor recovery  She remains of the tracheostomy and gastrostomy tube  However she at this time is an appropriate candidate for left autologous cranioplasty  I briefly spoke to her sister, Osbaldo Gregorio, on phone and explained this to her  She will be here on Friday and I will obtain surgical consent person    She will require medical clearance prior to the procedure  Chief Complaint: Follow-up (post op visit)    HPI:   This is an unfortunate very pleasant 61-year-old female who likely suffered a blunt head trauma and significant left temporal contusion  She unfortunately had significant swelling requiring craniectomy and hematoma evacuation  Postprocedure early she has made port recovery and is currently in [facility]  She is largely nonverbal   The aid states that she will intermittently follow commands  She is still wearing her helmet and participating in therapies  Her family is relatively discouraged by her lack of progress  Review of systems obtained by the MA reviewed and updated below  Review of Systems   Unable to perform ROS: Patient nonverbal   Neurological: Positive for headaches (head pain when she coughs)  Physical Exam  Vitals:    03/20/19 1421   BP: 137/69   Pulse: 76   Resp: 18   Temp: 98 3 °F (36 8 °C)    She is awake  Her eyes are open  She looks spontaneously around the room  She is largely nonverbal she intermittently will say 1 word that is nonsensical for example, seat  She does not follow commands  She moves her left side spontaneously  Her right side is relatively contracted and does not move spontaneously  Her incision is clean dry and intact  Her craniectomy is soft and sunken  She has a trach in place and a PEG tube  Heart rate is regular    Breath sounds are clear    The following portions of the patient's history were reviewed and updated as appropriate: allergies, current medications, past family history, past medical history, past social history, past surgical history and problem list     Active Ambulatory Problems     Diagnosis Date Noted    Closed nondisplaced fracture of fourth metatarsal bone of right foot 07/12/2018    Acquired deformity of left foot 07/12/2018    Arthralgia of left foot 07/12/2018    Pain in both feet 07/12/2018    Plantar warts 07/26/2018    Right foot pain 07/26/2018    Restless leg syndrome 09/26/2018    Multiple fractures of ribs, bilateral, initial encounter for closed fracture 12/19/2018    Traumatic pneumothorax 12/19/2018    Glenoid fracture of shoulder, right, closed, initial encounter 12/19/2018    Intraparenchymal hemorrhage of brain ([address] ) 12/19/2018    Traumatic brain injury ([address] ) 12/19/2018    Flail chest, initial encounter for closed fracture 12/19/2018    Fracture of thoracic transverse process, closed, initial encounter ([address] ) 12/19/2018    Subdural hematoma ([address] ) 12/19/2018    Subarachnoid hemorrhage ([address] ) 12/19/2018    Diabetes mellitus, type II ([address] ) 12/19/2018    Closed nondisplaced left toe fracture 12/21/2018    Hypokalemia 12/22/2018    Acute encephalopathy 12/22/2018    Status post craniectomy 12/22/2018    Acute respiratory failure with hypoxia ([address] ) 12/22/2018    Protein-calorie malnutrition ([address] ) 12/18/2018    Low urine output 01/03/2019     Resolved Ambulatory Problems     Diagnosis Date Noted    Tremor 06/13/2018    Tremor, essential 06/13/2018    Hyponatremia 12/19/2018    Hypophosphatemia 12/22/2018    Mucus plugging of bronchi 12/22/2018    Acute respiratory failure ([address] ) 12/18/2018    Acute blood loss anemia 12/24/2018    Ileus ([address] ) 12/24/2018     Past Medical History:   Diagnosis Date    Arthritis     Bipolar disorder ([address] )     Chronic kidney disease     COPD (chronic obstructive pulmonary disease) ([address] )     Depression     Diabetes mellitus ([address] )     Diabetes mellitus, type II ([address] )     Disease of thyroid gland     DVT (deep vein thrombosis) in pregnancy ([address] ) 1992    Elbow disorder     Enlarged heart     Essential tremor     GERD (gastroesophageal reflux disease)     Hyperlipidemia     Parkinson disease ([address] )     Parkinsons disease ([address] )     Pneumonia     Psychiatric disorder     RLS (restless legs syndrome)     Wears partial dentures        Past Surgical History:   Procedure Laterality Date    ABDOMINAL SURGERY      adhesions x3 surgeries    APPENDECTOMY      BACK SURGERY      fusion L5-S1 with hardware    BLADDER SUSPENSION      BREAST SURGERY Bilateral     reduction    CHOLECYSTECTOMY      lap    COLONOSCOPY N/A 2/8/2017    Procedure: COLONOSCOPY;  Surgeon: Tiny Angelo MD;  Location: [place] [address] GI LAB; Service:     CRANIECTOMY Left 12/20/2018    Procedure: Left hemicraniectomy with evacuation of clot; Surgeon: Cesar Loya MD;  [facility];  Service: Neurosurgery    ELBOW ARTHROSCOPY Right     tendon repair    ESOPHAGOGASTRODUODENOSCOPY N/A 2/8/2017    Procedure: ESOPHAGOGASTRODUODENOSCOPY (EGD); Surgeon: Tiny Angelo MD;  Location: [facility] GI LAB; Service:     FASCIECTOMY Right 2/13/2017    Procedure:  MIDDLE FINGER SCAR REVISION AND Z-PLASTY;  Surgeon: Delphine Gaucher, MD;  Location: [facility] MAIN OR;  Service:     FOOT SURGERY Right 1980    metatarsal repair    FOOT SURGERY Left 1985    metatarsal repair, bunionectomy    FRACTURE SURGERY Left     post trauma, fusion, hardware    GASTROSTOMY TUBE PLACEMENT N/A 1/2/2019    Procedure: INSERTION PEG TUBE;  Surgeon: Jose Ortiz MD;  [facility];  Service: General    HAND SURGERY Right     ganglion    HAND SURGERY Right     trigger finger    HAND SURGERY Right     tendon release/dupuytrens    KNEE ARTHROSCOPY W/ DEBRIDEMENT Left     meniscus repair    NASAL SEPTUM SURGERY      NASAL SEPTUM SURGERY      PEG W/TRACHEOSTOMY PLACEMENT N/A 12/24/2018    Procedure: TRACHEOSTOMY;  Surgeon: Stanislav Dorantes MD;  [facility];  Service: General    WI REMOVAL OF HEAD OF RADIUS Right 11/16/2017    Procedure: RADIAL HEAD EXCISION, ELBOW;  Surgeon: Addie Wood MD;  Location: [place] [address] MAIN OR;  Service: Orthopedics    SHOULDER ARTHROSCOPY Right     SPINAL FUSION           Current Outpatient Medications:     acetaminophen (TYLENOL) 325 mg tablet, Take 650 mg by mouth every 6 (six) hours as needed for mild pain, Disp: , Rfl:     albuterol (2 5 mg/3 mL) 0 083 % nebulizer solution, Take 2 5 mg by nebulization every 6 (six) hours as needed for wheezing or shortness of breath, Disp: , Rfl:     enoxaparin (LOVENOX) 40 mg/0 4 mL, Inject under the skin, Disp: , Rfl:     FLUoxetine (PROzac) 40 MG capsule, Take 40 mg by mouth daily , Disp: , Rfl:     lamoTRIgine (LaMICtal) 200 MG tablet, Take 200 mg by mouth daily , Disp: , Rfl:     LANSOPRAZOLE PO, Take 10 mg by mouth daily, Disp: , Rfl:     levothyroxine 100 mcg tablet, Take 88 mcg by mouth daily, Disp: , Rfl:     lisinopril (ZESTRIL) 2 5 mg tablet, Lisinopril 10MG Oral Tablet TAKE 1 TABLET DAILY  Refills: 0   Started 20-Feb-2014 Active, Disp: , Rfl:     metFORMIN (GLUCOPHAGE) 500 mg tablet, MetFORMIN HCl - 500 MG Oral Tablet  Refills: 0  Active, Disp: , Rfl:     potassium chloride (KLOR-CON) 20 mEq packet, Take 20 mEq by mouth 2 (two) times a day, Disp: , Rfl:     pramipexole (MIRAPEX) 0 25 mg tablet, Take 1 tablet (0 25 mg total) by mouth 2 (two) times a day One tablet at 6:00 p m  and 11:00 p m , Disp: 60 tablet, Rfl: 3    pramipexole (MIRAPEX) 0 25 mg tablet, Take 0 25 mg by mouth 2 (two) times a day, Disp: , Rfl:     primidone (MYSOLINE) 50 mg tablet, Two tablets at bedtime, Disp: 60 tablet, Rfl: 3    QUEtiapine (SEROquel) 25 mg tablet, Take 25 mg by mouth daily at bedtime, Disp: , Rfl:     Cholecalciferol (VITAMIN D) 2000 units CAPS, Take 1,000 Units by mouth every morning, Disp: , Rfl:     FLUoxetine (PROzac) 40 MG capsule, Take 80 mg by mouth every morning Takes two tabs = 80mg each a m dose  , Disp: , Rfl:     lamoTRIgine (LAMICTAL) 200 MG tablet, LaMICtal 200 MG Oral Tablet TAKE 2 TABLETS DAILY  Refills: 0  Active, Disp: , Rfl:     levothyroxine 88 mcg tablet, Levothyroxine Sodium 75 MCG Oral Tablet Take 1 tablet daily  Quantity: 0;  Refills: 0    Marycruz Lozano ;  Active, Disp: , Rfl:    lisinopril-hydrochlorothiazide (PRINZIDE,ZESTORETIC) 10-12 5 MG per tablet, Take 1 tablet by mouth daily, Disp: , Rfl:     metFORMIN (FORTAMET) 1000 MG (OSM) 24 hr tablet, Take 1,000 mg by mouth 2 (two) times daily after meals  , Disp: , Rfl:     metFORMIN (GLUCOPHAGE) 1000 MG tablet, MetFORMIN HCl 1000 MG (MOD) TB24  Refills: 0  Active   takes 500mg a m  and 1000 mg with dinner, Disp: , Rfl:     omeprazole (PriLOSEC) 20 mg delayed release capsule, Take 1 capsule by mouth 2 (two) times a day  , Disp: , Rfl:     omeprazole (PriLOSEC) 20 mg delayed release capsule, Take 20 mg by mouth 2 (two) times a day, Disp: , Rfl:     pravastatin (PRAVACHOL) 80 mg tablet, 80 mg every evening  , Disp: , Rfl:     primidone (MYSOLINE) 50 mg tablet, Take by mouth every 12 (twelve) hours, Disp: , Rfl:     QUEtiapine (SEROquel) 50 mg tablet, Take 50 mg by mouth daily at bedtime, Disp: , Rfl:     Results/Data:  CT and CTA reviewed in detail

## 2019-04-09 RX ORDER — UREA 10 %
1 LOTION (ML) TOPICAL
COMMUNITY

## 2019-04-10 ENCOUNTER — TELEPHONE (OUTPATIENT)
Dept: PREADMISSION TESTING | Facility: HOSPITAL | Age: 64
End: 2019-04-10

## 2019-04-10 ENCOUNTER — DOCUMENTATION (OUTPATIENT)
Dept: NEUROSURGERY | Facility: CLINIC | Age: 64
End: 2019-04-10

## 2019-04-10 ENCOUNTER — ANESTHESIA EVENT (OUTPATIENT)
Dept: PERIOP | Facility: HOSPITAL | Age: 64
DRG: 981 | End: 2019-04-10
Payer: MEDICARE

## 2019-04-11 ENCOUNTER — ANESTHESIA (OUTPATIENT)
Dept: PERIOP | Facility: HOSPITAL | Age: 64
DRG: 981 | End: 2019-04-11
Payer: MEDICARE

## 2019-04-11 ENCOUNTER — HOSPITAL ENCOUNTER (INPATIENT)
Facility: HOSPITAL | Age: 64
LOS: 8 days | Discharge: NURSING, REHAB OR LONG-TERM CARE | DRG: 981 | End: 2019-04-19
Attending: NEUROLOGICAL SURGERY | Admitting: NEUROLOGICAL SURGERY
Payer: MEDICARE

## 2019-04-11 DIAGNOSIS — S06.9X0D TRAUMATIC BRAIN INJURY, WITHOUT LOSS OF CONSCIOUSNESS, SUBSEQUENT ENCOUNTER: ICD-10-CM

## 2019-04-11 DIAGNOSIS — Z78.1: ICD-10-CM

## 2019-04-11 DIAGNOSIS — Z98.890 POST-OPERATIVE STATE: ICD-10-CM

## 2019-04-11 DIAGNOSIS — Z98.890 STATUS POST CRANIECTOMY: ICD-10-CM

## 2019-04-11 DIAGNOSIS — N39.0 URINARY TRACT INFECTION WITHOUT HEMATURIA, SITE UNSPECIFIED: ICD-10-CM

## 2019-04-11 DIAGNOSIS — N17.9 AKI (ACUTE KIDNEY INJURY) (HCC): Primary | ICD-10-CM

## 2019-04-11 PROBLEM — I10 ESSENTIAL HYPERTENSION: Status: ACTIVE | Noted: 2019-04-11

## 2019-04-11 LAB
ABO GROUP BLD: NORMAL
BACTERIA UR QL AUTO: ABNORMAL /HPF
BILIRUB UR QL STRIP: NEGATIVE
BLD GP AB SCN SERPL QL: NEGATIVE
CLARITY UR: ABNORMAL
COLOR UR: YELLOW
EST. AVERAGE GLUCOSE BLD GHB EST-MCNC: 117 MG/DL
GLUCOSE SERPL-MCNC: 109 MG/DL (ref 65–140)
GLUCOSE SERPL-MCNC: 123 MG/DL (ref 65–140)
GLUCOSE SERPL-MCNC: 124 MG/DL (ref 65–140)
GLUCOSE UR STRIP-MCNC: NEGATIVE MG/DL
HBA1C MFR BLD: 5.7 % (ref 4.2–6.3)
HGB UR QL STRIP.AUTO: ABNORMAL
KETONES UR STRIP-MCNC: NEGATIVE MG/DL
LEUKOCYTE ESTERASE UR QL STRIP: ABNORMAL
NITRITE UR QL STRIP: POSITIVE
NON-SQ EPI CELLS URNS QL MICRO: ABNORMAL /HPF
PH UR STRIP.AUTO: 7.5 [PH]
PROT UR STRIP-MCNC: NEGATIVE MG/DL
RBC #/AREA URNS AUTO: ABNORMAL /HPF
RH BLD: NEGATIVE
SP GR UR STRIP.AUTO: 1.01 (ref 1–1.03)
SPECIMEN EXPIRATION DATE: NORMAL
UROBILINOGEN UR QL STRIP.AUTO: 0.2 E.U./DL
WBC #/AREA URNS AUTO: ABNORMAL /HPF

## 2019-04-11 PROCEDURE — 86900 BLOOD TYPING SEROLOGIC ABO: CPT | Performed by: NEUROLOGICAL SURGERY

## 2019-04-11 PROCEDURE — 87077 CULTURE AEROBIC IDENTIFY: CPT | Performed by: NEUROLOGICAL SURGERY

## 2019-04-11 PROCEDURE — 82948 REAGENT STRIP/BLOOD GLUCOSE: CPT

## 2019-04-11 PROCEDURE — 81001 URINALYSIS AUTO W/SCOPE: CPT | Performed by: NEUROLOGICAL SURGERY

## 2019-04-11 PROCEDURE — 83036 HEMOGLOBIN GLYCOSYLATED A1C: CPT | Performed by: PHYSICIAN ASSISTANT

## 2019-04-11 PROCEDURE — 86901 BLOOD TYPING SEROLOGIC RH(D): CPT | Performed by: NEUROLOGICAL SURGERY

## 2019-04-11 PROCEDURE — C1713 ANCHOR/SCREW BN/BN,TIS/BN: HCPCS | Performed by: NEUROLOGICAL SURGERY

## 2019-04-11 PROCEDURE — 62143 RPL B1 FLP/PROSTC PLATE SKL: CPT | Performed by: NEUROLOGICAL SURGERY

## 2019-04-11 PROCEDURE — 0NR00KZ REPLACEMENT OF SKULL WITH NONAUTOLOGOUS TISSUE SUBSTITUTE, OPEN APPROACH: ICD-10-PCS | Performed by: NEUROLOGICAL SURGERY

## 2019-04-11 PROCEDURE — 87081 CULTURE SCREEN ONLY: CPT | Performed by: NEUROLOGICAL SURGERY

## 2019-04-11 PROCEDURE — 87186 SC STD MICRODIL/AGAR DIL: CPT | Performed by: NEUROLOGICAL SURGERY

## 2019-04-11 PROCEDURE — 0T9B70Z DRAINAGE OF BLADDER WITH DRAINAGE DEVICE, VIA NATURAL OR ARTIFICIAL OPENING: ICD-10-PCS | Performed by: NEUROLOGICAL SURGERY

## 2019-04-11 PROCEDURE — 99222 1ST HOSP IP/OBS MODERATE 55: CPT | Performed by: INTERNAL MEDICINE

## 2019-04-11 PROCEDURE — 86850 RBC ANTIBODY SCREEN: CPT | Performed by: NEUROLOGICAL SURGERY

## 2019-04-11 PROCEDURE — 94760 N-INVAS EAR/PLS OXIMETRY 1: CPT

## 2019-04-11 PROCEDURE — 87086 URINE CULTURE/COLONY COUNT: CPT | Performed by: NEUROLOGICAL SURGERY

## 2019-04-11 DEVICE — IMPLANTABLE DEVICE
Type: IMPLANTABLE DEVICE | Site: CRANIAL | Status: FUNCTIONAL
Brand: 1.5MM SYSTEM

## 2019-04-11 DEVICE — BONE FLAP: Type: IMPLANTABLE DEVICE | Site: CRANIAL | Status: FUNCTIONAL

## 2019-04-11 DEVICE — IMPLANTABLE DEVICE
Type: IMPLANTABLE DEVICE | Site: CRANIAL | Status: FUNCTIONAL
Brand: THINFLAP SYSTEM

## 2019-04-11 DEVICE — IMPLANTABLE DEVICE
Type: IMPLANTABLE DEVICE | Site: CRANIAL | Status: FUNCTIONAL
Brand: 1.5 SYSTEM

## 2019-04-11 RX ORDER — LAMOTRIGINE 100 MG/1
200 TABLET ORAL DAILY
Status: DISCONTINUED | OUTPATIENT
Start: 2019-04-12 | End: 2019-04-14

## 2019-04-11 RX ORDER — EPHEDRINE SULFATE 50 MG/ML
INJECTION INTRAVENOUS AS NEEDED
Status: DISCONTINUED | OUTPATIENT
Start: 2019-04-11 | End: 2019-04-11 | Stop reason: SURG

## 2019-04-11 RX ORDER — ONDANSETRON 2 MG/ML
4 INJECTION INTRAMUSCULAR; INTRAVENOUS ONCE AS NEEDED
Status: DISCONTINUED | OUTPATIENT
Start: 2019-04-11 | End: 2019-04-11 | Stop reason: HOSPADM

## 2019-04-11 RX ORDER — PROPOFOL 10 MG/ML
INJECTION, EMULSION INTRAVENOUS AS NEEDED
Status: DISCONTINUED | OUTPATIENT
Start: 2019-04-11 | End: 2019-04-11 | Stop reason: SURG

## 2019-04-11 RX ORDER — QUETIAPINE FUMARATE 25 MG/1
25 TABLET, FILM COATED ORAL
Status: DISCONTINUED | OUTPATIENT
Start: 2019-04-11 | End: 2019-04-14

## 2019-04-11 RX ORDER — SODIUM CHLORIDE, SODIUM LACTATE, POTASSIUM CHLORIDE, CALCIUM CHLORIDE 600; 310; 30; 20 MG/100ML; MG/100ML; MG/100ML; MG/100ML
75 INJECTION, SOLUTION INTRAVENOUS CONTINUOUS
Status: DISCONTINUED | OUTPATIENT
Start: 2019-04-11 | End: 2019-04-12

## 2019-04-11 RX ORDER — ALBUTEROL SULFATE 2.5 MG/3ML
2.5 SOLUTION RESPIRATORY (INHALATION) EVERY 6 HOURS PRN
Status: DISCONTINUED | OUTPATIENT
Start: 2019-04-11 | End: 2019-04-19 | Stop reason: HOSPADM

## 2019-04-11 RX ORDER — CEFAZOLIN SODIUM 1 G/3ML
INJECTION, POWDER, FOR SOLUTION INTRAMUSCULAR; INTRAVENOUS AS NEEDED
Status: DISCONTINUED | OUTPATIENT
Start: 2019-04-11 | End: 2019-04-11 | Stop reason: SURG

## 2019-04-11 RX ORDER — NEOSTIGMINE METHYLSULFATE 1 MG/ML
INJECTION INTRAVENOUS AS NEEDED
Status: DISCONTINUED | OUTPATIENT
Start: 2019-04-11 | End: 2019-04-11 | Stop reason: SURG

## 2019-04-11 RX ORDER — POTASSIUM CHLORIDE 20MEQ/15ML
20 LIQUID (ML) ORAL 2 TIMES DAILY
Status: DISCONTINUED | OUTPATIENT
Start: 2019-04-12 | End: 2019-04-14

## 2019-04-11 RX ORDER — SENNOSIDES 8.6 MG
1 TABLET ORAL DAILY
Status: DISCONTINUED | OUTPATIENT
Start: 2019-04-12 | End: 2019-04-14

## 2019-04-11 RX ORDER — FENTANYL CITRATE 50 UG/ML
INJECTION, SOLUTION INTRAMUSCULAR; INTRAVENOUS AS NEEDED
Status: DISCONTINUED | OUTPATIENT
Start: 2019-04-11 | End: 2019-04-11 | Stop reason: SURG

## 2019-04-11 RX ORDER — OXYCODONE HYDROCHLORIDE 10 MG/1
10 TABLET ORAL EVERY 4 HOURS PRN
Status: DISCONTINUED | OUTPATIENT
Start: 2019-04-11 | End: 2019-04-12

## 2019-04-11 RX ORDER — PRAMIPEXOLE DIHYDROCHLORIDE 0.25 MG/1
0.25 TABLET ORAL 2 TIMES DAILY
Status: DISCONTINUED | OUTPATIENT
Start: 2019-04-11 | End: 2019-04-14

## 2019-04-11 RX ORDER — CEFAZOLIN SODIUM 1 G/50ML
1000 SOLUTION INTRAVENOUS EVERY 8 HOURS
Status: COMPLETED | OUTPATIENT
Start: 2019-04-11 | End: 2019-04-12

## 2019-04-11 RX ORDER — LEVETIRACETAM 500 MG/5ML
INJECTION, SOLUTION, CONCENTRATE INTRAVENOUS AS NEEDED
Status: DISCONTINUED | OUTPATIENT
Start: 2019-04-11 | End: 2019-04-11 | Stop reason: SURG

## 2019-04-11 RX ORDER — GLYCOPYRROLATE 0.2 MG/ML
INJECTION INTRAMUSCULAR; INTRAVENOUS AS NEEDED
Status: DISCONTINUED | OUTPATIENT
Start: 2019-04-11 | End: 2019-04-11 | Stop reason: SURG

## 2019-04-11 RX ORDER — PRIMIDONE 50 MG/1
50 TABLET ORAL EVERY 12 HOURS SCHEDULED
Status: DISCONTINUED | OUTPATIENT
Start: 2019-04-11 | End: 2019-04-14

## 2019-04-11 RX ORDER — DOCUSATE SODIUM 100 MG/1
100 CAPSULE, LIQUID FILLED ORAL 2 TIMES DAILY
Status: DISCONTINUED | OUTPATIENT
Start: 2019-04-11 | End: 2019-04-17

## 2019-04-11 RX ORDER — ONDANSETRON 2 MG/ML
INJECTION INTRAMUSCULAR; INTRAVENOUS AS NEEDED
Status: DISCONTINUED | OUTPATIENT
Start: 2019-04-11 | End: 2019-04-11 | Stop reason: SURG

## 2019-04-11 RX ORDER — OMEPRAZOLE 20 MG/1
20 CAPSULE, DELAYED RELEASE ORAL DAILY
Status: DISCONTINUED | OUTPATIENT
Start: 2019-04-12 | End: 2019-04-11

## 2019-04-11 RX ORDER — FLUOXETINE HYDROCHLORIDE 20 MG/1
40 CAPSULE ORAL DAILY
Status: DISCONTINUED | OUTPATIENT
Start: 2019-04-12 | End: 2019-04-14

## 2019-04-11 RX ORDER — SODIUM CHLORIDE 9 MG/ML
125 INJECTION, SOLUTION INTRAVENOUS CONTINUOUS
Status: DISCONTINUED | OUTPATIENT
Start: 2019-04-11 | End: 2019-04-14

## 2019-04-11 RX ORDER — LEVOTHYROXINE SODIUM 88 UG/1
88 TABLET ORAL
Status: DISCONTINUED | OUTPATIENT
Start: 2019-04-12 | End: 2019-04-19 | Stop reason: HOSPADM

## 2019-04-11 RX ORDER — CHLORHEXIDINE GLUCONATE 0.12 MG/ML
15 RINSE ORAL ONCE
Status: DISCONTINUED | OUTPATIENT
Start: 2019-04-11 | End: 2019-04-11 | Stop reason: HOSPADM

## 2019-04-11 RX ORDER — POTASSIUM CHLORIDE 1.5 G/1.77G
20 POWDER, FOR SOLUTION ORAL 2 TIMES DAILY
Status: DISCONTINUED | OUTPATIENT
Start: 2019-04-11 | End: 2019-04-11

## 2019-04-11 RX ORDER — HYDROMORPHONE HCL/PF 1 MG/ML
0.5 SYRINGE (ML) INJECTION
Status: DISCONTINUED | OUTPATIENT
Start: 2019-04-11 | End: 2019-04-12

## 2019-04-11 RX ORDER — FENTANYL CITRATE/PF 50 MCG/ML
25 SYRINGE (ML) INJECTION
Status: COMPLETED | OUTPATIENT
Start: 2019-04-11 | End: 2019-04-11

## 2019-04-11 RX ORDER — LANOLIN ALCOHOL/MO/W.PET/CERES
3 CREAM (GRAM) TOPICAL
Status: DISCONTINUED | OUTPATIENT
Start: 2019-04-11 | End: 2019-04-14

## 2019-04-11 RX ORDER — LISINOPRIL 10 MG/1
10 TABLET ORAL DAILY
Status: DISCONTINUED | OUTPATIENT
Start: 2019-04-12 | End: 2019-04-14

## 2019-04-11 RX ORDER — SODIUM CHLORIDE, SODIUM LACTATE, POTASSIUM CHLORIDE, CALCIUM CHLORIDE 600; 310; 30; 20 MG/100ML; MG/100ML; MG/100ML; MG/100ML
100 INJECTION, SOLUTION INTRAVENOUS CONTINUOUS
Status: DISCONTINUED | OUTPATIENT
Start: 2019-04-11 | End: 2019-04-11

## 2019-04-11 RX ORDER — ONDANSETRON 2 MG/ML
4 INJECTION INTRAMUSCULAR; INTRAVENOUS EVERY 6 HOURS PRN
Status: DISCONTINUED | OUTPATIENT
Start: 2019-04-11 | End: 2019-04-19 | Stop reason: HOSPADM

## 2019-04-11 RX ORDER — ROCURONIUM BROMIDE 10 MG/ML
INJECTION, SOLUTION INTRAVENOUS AS NEEDED
Status: DISCONTINUED | OUTPATIENT
Start: 2019-04-11 | End: 2019-04-11 | Stop reason: SURG

## 2019-04-11 RX ORDER — NITROFURANTOIN 25; 75 MG/1; MG/1
100 CAPSULE ORAL 2 TIMES DAILY WITH MEALS
Status: DISCONTINUED | OUTPATIENT
Start: 2019-04-12 | End: 2019-04-17

## 2019-04-11 RX ORDER — VANCOMYCIN HYDROCHLORIDE 1 G/200ML
1000 INJECTION, SOLUTION INTRAVENOUS ONCE
Status: COMPLETED | OUTPATIENT
Start: 2019-04-11 | End: 2019-04-11

## 2019-04-11 RX ORDER — OXYCODONE HYDROCHLORIDE 5 MG/1
5 TABLET ORAL EVERY 4 HOURS PRN
Status: DISCONTINUED | OUTPATIENT
Start: 2019-04-11 | End: 2019-04-12

## 2019-04-11 RX ORDER — ACETAMINOPHEN 325 MG/1
975 TABLET ORAL EVERY 8 HOURS SCHEDULED
Status: DISCONTINUED | OUTPATIENT
Start: 2019-04-11 | End: 2019-04-14

## 2019-04-11 RX ADMIN — NEOSTIGMINE METHYLSULFATE 3 MG: 1 INJECTION, SOLUTION INTRAVENOUS at 10:19

## 2019-04-11 RX ADMIN — VANCOMYCIN HYDROCHLORIDE 1000 MG: 1 INJECTION, SOLUTION INTRAVENOUS at 07:38

## 2019-04-11 RX ADMIN — LEVETIRACETAM 1000 MG: 100 INJECTION, SOLUTION INTRAVENOUS at 09:54

## 2019-04-11 RX ADMIN — FENTANYL CITRATE 25 MCG: 50 INJECTION, SOLUTION INTRAMUSCULAR; INTRAVENOUS at 11:30

## 2019-04-11 RX ADMIN — PRAMIPEXOLE DIHYDROCHLORIDE 0.25 MG: 0.25 TABLET ORAL at 18:45

## 2019-04-11 RX ADMIN — PHENYLEPHRINE HYDROCHLORIDE 50 MCG/MIN: 10 INJECTION INTRAVENOUS at 09:26

## 2019-04-11 RX ADMIN — FENTANYL CITRATE 25 MCG: 50 INJECTION, SOLUTION INTRAMUSCULAR; INTRAVENOUS at 16:32

## 2019-04-11 RX ADMIN — FENTANYL CITRATE 25 MCG: 50 INJECTION, SOLUTION INTRAMUSCULAR; INTRAVENOUS at 08:09

## 2019-04-11 RX ADMIN — FENTANYL CITRATE 25 MCG: 50 INJECTION, SOLUTION INTRAMUSCULAR; INTRAVENOUS at 09:00

## 2019-04-11 RX ADMIN — FENTANYL CITRATE 25 MCG: 50 INJECTION, SOLUTION INTRAMUSCULAR; INTRAVENOUS at 12:43

## 2019-04-11 RX ADMIN — EPHEDRINE SULFATE 10 MG: 50 INJECTION, SOLUTION INTRAVENOUS at 08:32

## 2019-04-11 RX ADMIN — ROCURONIUM BROMIDE 30 MG: 10 INJECTION, SOLUTION INTRAVENOUS at 08:25

## 2019-04-11 RX ADMIN — MELATONIN 3 MG: at 21:28

## 2019-04-11 RX ADMIN — QUETIAPINE FUMARATE 25 MG: 25 TABLET ORAL at 21:28

## 2019-04-11 RX ADMIN — FENTANYL CITRATE 50 MCG: 50 INJECTION, SOLUTION INTRAMUSCULAR; INTRAVENOUS at 09:05

## 2019-04-11 RX ADMIN — PHENYLEPHRINE HYDROCHLORIDE 100 MCG: 10 INJECTION INTRAVENOUS at 09:17

## 2019-04-11 RX ADMIN — PHENYLEPHRINE HYDROCHLORIDE 100 MCG: 10 INJECTION INTRAVENOUS at 10:52

## 2019-04-11 RX ADMIN — SODIUM CHLORIDE, SODIUM LACTATE, POTASSIUM CHLORIDE, AND CALCIUM CHLORIDE: .6; .31; .03; .02 INJECTION, SOLUTION INTRAVENOUS at 10:07

## 2019-04-11 RX ADMIN — PHENYLEPHRINE HYDROCHLORIDE 200 MCG: 10 INJECTION INTRAVENOUS at 08:43

## 2019-04-11 RX ADMIN — CEFAZOLIN SODIUM 1000 MG: 1 SOLUTION INTRAVENOUS at 16:50

## 2019-04-11 RX ADMIN — SODIUM CHLORIDE 125 ML/HR: 0.9 INJECTION, SOLUTION INTRAVENOUS at 16:52

## 2019-04-11 RX ADMIN — FENTANYL CITRATE 25 MCG: 50 INJECTION, SOLUTION INTRAMUSCULAR; INTRAVENOUS at 12:05

## 2019-04-11 RX ADMIN — FENTANYL CITRATE 25 MCG: 50 INJECTION, SOLUTION INTRAMUSCULAR; INTRAVENOUS at 13:58

## 2019-04-11 RX ADMIN — ACETAMINOPHEN 975 MG: 325 TABLET ORAL at 18:45

## 2019-04-11 RX ADMIN — GLYCOPYRROLATE 0.6 MG: 0.2 INJECTION, SOLUTION INTRAMUSCULAR; INTRAVENOUS at 10:19

## 2019-04-11 RX ADMIN — PRIMIDONE 50 MG: 50 TABLET ORAL at 21:27

## 2019-04-11 RX ADMIN — SODIUM CHLORIDE, SODIUM LACTATE, POTASSIUM CHLORIDE, AND CALCIUM CHLORIDE 100 ML/HR: .6; .31; .03; .02 INJECTION, SOLUTION INTRAVENOUS at 07:40

## 2019-04-11 RX ADMIN — EPHEDRINE SULFATE 5 MG: 50 INJECTION, SOLUTION INTRAVENOUS at 08:28

## 2019-04-11 RX ADMIN — DOCUSATE SODIUM 100 MG: 100 CAPSULE, LIQUID FILLED ORAL at 18:45

## 2019-04-11 RX ADMIN — OXYCODONE HYDROCHLORIDE 10 MG: 10 TABLET ORAL at 18:46

## 2019-04-11 RX ADMIN — ROCURONIUM BROMIDE 40 MG: 10 INJECTION, SOLUTION INTRAVENOUS at 08:56

## 2019-04-11 RX ADMIN — PHENYLEPHRINE HYDROCHLORIDE 100 MCG: 10 INJECTION INTRAVENOUS at 10:11

## 2019-04-11 RX ADMIN — LEVETIRACETAM 500 MG: 100 INJECTION, SOLUTION INTRAVENOUS at 21:23

## 2019-04-11 RX ADMIN — PROPOFOL 50 MG: 10 INJECTION, EMULSION INTRAVENOUS at 08:14

## 2019-04-11 RX ADMIN — EPHEDRINE SULFATE 10 MG: 50 INJECTION, SOLUTION INTRAVENOUS at 09:25

## 2019-04-11 RX ADMIN — FENTANYL CITRATE 25 MCG: 50 INJECTION, SOLUTION INTRAMUSCULAR; INTRAVENOUS at 12:21

## 2019-04-11 RX ADMIN — FENTANYL CITRATE 25 MCG: 50 INJECTION, SOLUTION INTRAMUSCULAR; INTRAVENOUS at 11:50

## 2019-04-11 RX ADMIN — ONDANSETRON 4 MG: 2 INJECTION INTRAMUSCULAR; INTRAVENOUS at 09:52

## 2019-04-11 RX ADMIN — ROCURONIUM BROMIDE 10 MG: 10 INJECTION, SOLUTION INTRAVENOUS at 08:44

## 2019-04-11 RX ADMIN — PHENYLEPHRINE HYDROCHLORIDE 200 MCG: 10 INJECTION INTRAVENOUS at 10:37

## 2019-04-11 RX ADMIN — PHENYLEPHRINE HYDROCHLORIDE 100 MCG: 10 INJECTION INTRAVENOUS at 10:34

## 2019-04-11 RX ADMIN — CEFAZOLIN 1000 MG: 1 INJECTION, POWDER, FOR SOLUTION INTRAVENOUS at 08:33

## 2019-04-11 RX ADMIN — PHENYLEPHRINE HYDROCHLORIDE 100 MCG: 10 INJECTION INTRAVENOUS at 10:00

## 2019-04-11 RX ADMIN — PHENYLEPHRINE HYDROCHLORIDE 200 MCG: 10 INJECTION INTRAVENOUS at 09:28

## 2019-04-12 ENCOUNTER — APPOINTMENT (INPATIENT)
Dept: RADIOLOGY | Facility: HOSPITAL | Age: 64
DRG: 981 | End: 2019-04-12
Payer: MEDICARE

## 2019-04-12 LAB
ANION GAP SERPL CALCULATED.3IONS-SCNC: 5 MMOL/L (ref 4–13)
APTT PPP: 19 SECONDS (ref 26–38)
BUN SERPL-MCNC: 8 MG/DL (ref 5–25)
CALCIUM SERPL-MCNC: 9.8 MG/DL (ref 8.3–10.1)
CHLORIDE SERPL-SCNC: 102 MMOL/L (ref 100–108)
CO2 SERPL-SCNC: 30 MMOL/L (ref 21–32)
CREAT SERPL-MCNC: 0.5 MG/DL (ref 0.6–1.3)
ERYTHROCYTE [DISTWIDTH] IN BLOOD BY AUTOMATED COUNT: 15.7 % (ref 11.6–15.1)
GFR SERPL CREATININE-BSD FRML MDRD: 104 ML/MIN/1.73SQ M
GLUCOSE SERPL-MCNC: 104 MG/DL (ref 65–140)
GLUCOSE SERPL-MCNC: 105 MG/DL (ref 65–140)
GLUCOSE SERPL-MCNC: 135 MG/DL (ref 65–140)
GLUCOSE SERPL-MCNC: 80 MG/DL (ref 65–140)
GLUCOSE SERPL-MCNC: 87 MG/DL (ref 65–140)
GLUCOSE SERPL-MCNC: 96 MG/DL (ref 65–140)
HCT VFR BLD AUTO: 35.3 % (ref 34.8–46.1)
HGB BLD-MCNC: 10.4 G/DL (ref 11.5–15.4)
INR PPP: 0.97 (ref 0.86–1.17)
MCH RBC QN AUTO: 25.1 PG (ref 26.8–34.3)
MCHC RBC AUTO-ENTMCNC: 29.5 G/DL (ref 31.4–37.4)
MCV RBC AUTO: 85 FL (ref 82–98)
MRSA NOSE QL CULT: NORMAL
PLATELET # BLD AUTO: 195 THOUSANDS/UL (ref 149–390)
PMV BLD AUTO: 11.5 FL (ref 8.9–12.7)
POTASSIUM SERPL-SCNC: 4.1 MMOL/L (ref 3.5–5.3)
PROTHROMBIN TIME: 13 SECONDS (ref 11.8–14.2)
RBC # BLD AUTO: 4.14 MILLION/UL (ref 3.81–5.12)
SODIUM SERPL-SCNC: 137 MMOL/L (ref 136–145)
WBC # BLD AUTO: 6.72 THOUSAND/UL (ref 4.31–10.16)

## 2019-04-12 PROCEDURE — 85027 COMPLETE CBC AUTOMATED: CPT | Performed by: PHYSICIAN ASSISTANT

## 2019-04-12 PROCEDURE — 85610 PROTHROMBIN TIME: CPT | Performed by: PHYSICIAN ASSISTANT

## 2019-04-12 PROCEDURE — 85730 THROMBOPLASTIN TIME PARTIAL: CPT | Performed by: PHYSICIAN ASSISTANT

## 2019-04-12 PROCEDURE — G8988 SELF CARE GOAL STATUS: HCPCS

## 2019-04-12 PROCEDURE — 92610 EVALUATE SWALLOWING FUNCTION: CPT

## 2019-04-12 PROCEDURE — 94760 N-INVAS EAR/PLS OXIMETRY 1: CPT

## 2019-04-12 PROCEDURE — 70450 CT HEAD/BRAIN W/O DYE: CPT

## 2019-04-12 PROCEDURE — 80048 BASIC METABOLIC PNL TOTAL CA: CPT | Performed by: PHYSICIAN ASSISTANT

## 2019-04-12 PROCEDURE — G8987 SELF CARE CURRENT STATUS: HCPCS

## 2019-04-12 PROCEDURE — G8981 BODY POS CURRENT STATUS: HCPCS

## 2019-04-12 PROCEDURE — G8982 BODY POS GOAL STATUS: HCPCS

## 2019-04-12 PROCEDURE — 97167 OT EVAL HIGH COMPLEX 60 MIN: CPT

## 2019-04-12 PROCEDURE — 99024 POSTOP FOLLOW-UP VISIT: CPT | Performed by: NEUROLOGICAL SURGERY

## 2019-04-12 PROCEDURE — 97163 PT EVAL HIGH COMPLEX 45 MIN: CPT

## 2019-04-12 PROCEDURE — 82948 REAGENT STRIP/BLOOD GLUCOSE: CPT

## 2019-04-12 RX ORDER — OXYCODONE HYDROCHLORIDE 5 MG/1
2.5 TABLET ORAL EVERY 4 HOURS PRN
Status: DISCONTINUED | OUTPATIENT
Start: 2019-04-12 | End: 2019-04-14

## 2019-04-12 RX ORDER — OXYCODONE HYDROCHLORIDE 5 MG/1
5 TABLET ORAL EVERY 4 HOURS PRN
Status: DISCONTINUED | OUTPATIENT
Start: 2019-04-12 | End: 2019-04-14

## 2019-04-12 RX ADMIN — MELATONIN 3 MG: at 21:17

## 2019-04-12 RX ADMIN — POTASSIUM CHLORIDE 20 MEQ: 20 SOLUTION ORAL at 17:59

## 2019-04-12 RX ADMIN — LEVETIRACETAM 500 MG: 100 INJECTION, SOLUTION INTRAVENOUS at 09:39

## 2019-04-12 RX ADMIN — NITROFURANTOIN (MONOHYDRATE/MACROCRYSTALS) 100 MG: 75; 25 CAPSULE ORAL at 17:59

## 2019-04-12 RX ADMIN — PRAMIPEXOLE DIHYDROCHLORIDE 0.25 MG: 0.25 TABLET ORAL at 09:40

## 2019-04-12 RX ADMIN — SODIUM CHLORIDE 125 ML/HR: 0.9 INJECTION, SOLUTION INTRAVENOUS at 18:36

## 2019-04-12 RX ADMIN — PRIMIDONE 50 MG: 50 TABLET ORAL at 09:39

## 2019-04-12 RX ADMIN — DOCUSATE SODIUM 100 MG: 100 CAPSULE, LIQUID FILLED ORAL at 09:40

## 2019-04-12 RX ADMIN — NITROFURANTOIN (MONOHYDRATE/MACROCRYSTALS) 100 MG: 75; 25 CAPSULE ORAL at 09:39

## 2019-04-12 RX ADMIN — OXYCODONE HYDROCHLORIDE 10 MG: 10 TABLET ORAL at 02:40

## 2019-04-12 RX ADMIN — OXYCODONE HYDROCHLORIDE 5 MG: 5 TABLET ORAL at 10:50

## 2019-04-12 RX ADMIN — SODIUM CHLORIDE 125 ML/HR: 0.9 INJECTION, SOLUTION INTRAVENOUS at 10:25

## 2019-04-12 RX ADMIN — PRAMIPEXOLE DIHYDROCHLORIDE 0.25 MG: 0.25 TABLET ORAL at 17:59

## 2019-04-12 RX ADMIN — LEVOTHYROXINE SODIUM 88 MCG: 88 TABLET ORAL at 05:49

## 2019-04-12 RX ADMIN — METFORMIN HYDROCHLORIDE 500 MG: 500 TABLET ORAL at 17:59

## 2019-04-12 RX ADMIN — LAMOTRIGINE 200 MG: 100 TABLET ORAL at 09:39

## 2019-04-12 RX ADMIN — LEVETIRACETAM 500 MG: 100 INJECTION, SOLUTION INTRAVENOUS at 21:26

## 2019-04-12 RX ADMIN — OXYCODONE HYDROCHLORIDE 5 MG: 5 TABLET ORAL at 19:56

## 2019-04-12 RX ADMIN — ACETAMINOPHEN 975 MG: 325 TABLET ORAL at 05:49

## 2019-04-12 RX ADMIN — Medication 20 MG: at 09:38

## 2019-04-12 RX ADMIN — ACETAMINOPHEN 975 MG: 325 TABLET ORAL at 14:59

## 2019-04-12 RX ADMIN — POTASSIUM CHLORIDE 20 MEQ: 20 SOLUTION ORAL at 09:40

## 2019-04-12 RX ADMIN — SODIUM CHLORIDE 125 ML/HR: 0.9 INJECTION, SOLUTION INTRAVENOUS at 01:08

## 2019-04-12 RX ADMIN — SENNOSIDES 8.6 MG: 8.6 TABLET, FILM COATED ORAL at 09:40

## 2019-04-12 RX ADMIN — CEFAZOLIN SODIUM 1000 MG: 1 SOLUTION INTRAVENOUS at 01:08

## 2019-04-12 RX ADMIN — ACETAMINOPHEN 975 MG: 325 TABLET ORAL at 21:17

## 2019-04-12 RX ADMIN — PRIMIDONE 50 MG: 50 TABLET ORAL at 21:22

## 2019-04-12 RX ADMIN — FLUOXETINE HYDROCHLORIDE 40 MG: 20 CAPSULE ORAL at 09:40

## 2019-04-12 RX ADMIN — QUETIAPINE FUMARATE 25 MG: 25 TABLET ORAL at 21:17

## 2019-04-12 RX ADMIN — HYDROMORPHONE HYDROCHLORIDE 0.5 MG: 1 INJECTION, SOLUTION INTRAMUSCULAR; INTRAVENOUS; SUBCUTANEOUS at 05:49

## 2019-04-12 RX ADMIN — METFORMIN HYDROCHLORIDE 500 MG: 500 TABLET ORAL at 09:39

## 2019-04-13 LAB
GLUCOSE SERPL-MCNC: 124 MG/DL (ref 65–140)
GLUCOSE SERPL-MCNC: 124 MG/DL (ref 65–140)
GLUCOSE SERPL-MCNC: 86 MG/DL (ref 65–140)
GLUCOSE SERPL-MCNC: 93 MG/DL (ref 65–140)

## 2019-04-13 PROCEDURE — 94760 N-INVAS EAR/PLS OXIMETRY 1: CPT

## 2019-04-13 PROCEDURE — 82948 REAGENT STRIP/BLOOD GLUCOSE: CPT

## 2019-04-13 PROCEDURE — 99024 POSTOP FOLLOW-UP VISIT: CPT | Performed by: PHYSICIAN ASSISTANT

## 2019-04-13 RX ADMIN — DOCUSATE SODIUM 100 MG: 100 CAPSULE, LIQUID FILLED ORAL at 08:58

## 2019-04-13 RX ADMIN — LAMOTRIGINE 200 MG: 100 TABLET ORAL at 08:58

## 2019-04-13 RX ADMIN — ACETAMINOPHEN 975 MG: 325 TABLET ORAL at 05:11

## 2019-04-13 RX ADMIN — ACETAMINOPHEN 975 MG: 325 TABLET ORAL at 23:00

## 2019-04-13 RX ADMIN — POTASSIUM CHLORIDE 20 MEQ: 20 SOLUTION ORAL at 08:58

## 2019-04-13 RX ADMIN — PRIMIDONE 50 MG: 50 TABLET ORAL at 08:59

## 2019-04-13 RX ADMIN — SENNOSIDES 8.6 MG: 8.6 TABLET, FILM COATED ORAL at 08:58

## 2019-04-13 RX ADMIN — MELATONIN 3 MG: at 23:00

## 2019-04-13 RX ADMIN — SODIUM CHLORIDE 125 ML/HR: 0.9 INJECTION, SOLUTION INTRAVENOUS at 06:13

## 2019-04-13 RX ADMIN — LEVOTHYROXINE SODIUM 88 MCG: 88 TABLET ORAL at 05:11

## 2019-04-13 RX ADMIN — METFORMIN HYDROCHLORIDE 500 MG: 500 TABLET ORAL at 17:35

## 2019-04-13 RX ADMIN — LEVETIRACETAM 500 MG: 100 INJECTION, SOLUTION INTRAVENOUS at 09:06

## 2019-04-13 RX ADMIN — QUETIAPINE FUMARATE 25 MG: 25 TABLET ORAL at 23:00

## 2019-04-13 RX ADMIN — OXYCODONE HYDROCHLORIDE 5 MG: 5 TABLET ORAL at 09:11

## 2019-04-13 RX ADMIN — ACETAMINOPHEN 975 MG: 325 TABLET ORAL at 14:24

## 2019-04-13 RX ADMIN — LEVETIRACETAM 500 MG: 100 INJECTION, SOLUTION INTRAVENOUS at 22:00

## 2019-04-13 RX ADMIN — METFORMIN HYDROCHLORIDE 500 MG: 500 TABLET ORAL at 08:58

## 2019-04-13 RX ADMIN — SODIUM CHLORIDE 125 ML/HR: 0.9 INJECTION, SOLUTION INTRAVENOUS at 10:31

## 2019-04-13 RX ADMIN — FLUOXETINE HYDROCHLORIDE 40 MG: 20 CAPSULE ORAL at 08:58

## 2019-04-13 RX ADMIN — Medication 20 MG: at 09:08

## 2019-04-13 RX ADMIN — SODIUM CHLORIDE 125 ML/HR: 0.9 INJECTION, SOLUTION INTRAVENOUS at 18:49

## 2019-04-13 RX ADMIN — NITROFURANTOIN (MONOHYDRATE/MACROCRYSTALS) 100 MG: 75; 25 CAPSULE ORAL at 17:35

## 2019-04-13 RX ADMIN — DOCUSATE SODIUM 100 MG: 100 CAPSULE, LIQUID FILLED ORAL at 17:35

## 2019-04-13 RX ADMIN — NITROFURANTOIN (MONOHYDRATE/MACROCRYSTALS) 100 MG: 75; 25 CAPSULE ORAL at 08:58

## 2019-04-13 RX ADMIN — POTASSIUM CHLORIDE 20 MEQ: 20 SOLUTION ORAL at 17:35

## 2019-04-13 RX ADMIN — PRAMIPEXOLE DIHYDROCHLORIDE 0.25 MG: 0.25 TABLET ORAL at 17:35

## 2019-04-13 RX ADMIN — PRAMIPEXOLE DIHYDROCHLORIDE 0.25 MG: 0.25 TABLET ORAL at 08:58

## 2019-04-13 RX ADMIN — LISINOPRIL 10 MG: 10 TABLET ORAL at 08:58

## 2019-04-13 RX ADMIN — PRIMIDONE 50 MG: 50 TABLET ORAL at 23:00

## 2019-04-14 LAB
BACTERIA UR CULT: ABNORMAL
GLUCOSE SERPL-MCNC: 100 MG/DL (ref 65–140)
GLUCOSE SERPL-MCNC: 133 MG/DL (ref 65–140)
GLUCOSE SERPL-MCNC: 174 MG/DL (ref 65–140)
GLUCOSE SERPL-MCNC: 94 MG/DL (ref 65–140)

## 2019-04-14 PROCEDURE — 94760 N-INVAS EAR/PLS OXIMETRY 1: CPT

## 2019-04-14 PROCEDURE — 82948 REAGENT STRIP/BLOOD GLUCOSE: CPT

## 2019-04-14 PROCEDURE — 99024 POSTOP FOLLOW-UP VISIT: CPT | Performed by: PHYSICIAN ASSISTANT

## 2019-04-14 RX ORDER — PRAMIPEXOLE DIHYDROCHLORIDE 0.25 MG/1
0.25 TABLET ORAL 2 TIMES DAILY
Status: DISCONTINUED | OUTPATIENT
Start: 2019-04-14 | End: 2019-04-19 | Stop reason: HOSPADM

## 2019-04-14 RX ORDER — PRIMIDONE 50 MG/1
50 TABLET ORAL EVERY 12 HOURS SCHEDULED
Status: DISCONTINUED | OUTPATIENT
Start: 2019-04-14 | End: 2019-04-19 | Stop reason: HOSPADM

## 2019-04-14 RX ORDER — QUETIAPINE FUMARATE 25 MG/1
25 TABLET, FILM COATED ORAL
Status: DISCONTINUED | OUTPATIENT
Start: 2019-04-14 | End: 2019-04-19 | Stop reason: HOSPADM

## 2019-04-14 RX ORDER — LEVETIRACETAM 500 MG/1
500 TABLET ORAL EVERY 12 HOURS SCHEDULED
Status: COMPLETED | OUTPATIENT
Start: 2019-04-14 | End: 2019-04-18

## 2019-04-14 RX ORDER — SENNOSIDES 8.6 MG
1 TABLET ORAL DAILY
Status: DISCONTINUED | OUTPATIENT
Start: 2019-04-15 | End: 2019-04-19 | Stop reason: HOSPADM

## 2019-04-14 RX ORDER — OXYCODONE HYDROCHLORIDE 5 MG/1
2.5 TABLET ORAL EVERY 4 HOURS PRN
Status: DISCONTINUED | OUTPATIENT
Start: 2019-04-14 | End: 2019-04-19 | Stop reason: HOSPADM

## 2019-04-14 RX ORDER — OXYCODONE HYDROCHLORIDE 5 MG/1
5 TABLET ORAL EVERY 4 HOURS PRN
Status: DISCONTINUED | OUTPATIENT
Start: 2019-04-14 | End: 2019-04-17

## 2019-04-14 RX ORDER — FLUOXETINE HYDROCHLORIDE 20 MG/1
40 CAPSULE ORAL DAILY
Status: DISCONTINUED | OUTPATIENT
Start: 2019-04-15 | End: 2019-04-19 | Stop reason: HOSPADM

## 2019-04-14 RX ORDER — LAMOTRIGINE 100 MG/1
200 TABLET ORAL DAILY
Status: DISCONTINUED | OUTPATIENT
Start: 2019-04-15 | End: 2019-04-19 | Stop reason: HOSPADM

## 2019-04-14 RX ORDER — POTASSIUM CHLORIDE 20MEQ/15ML
20 LIQUID (ML) ORAL 2 TIMES DAILY
Status: DISCONTINUED | OUTPATIENT
Start: 2019-04-14 | End: 2019-04-19 | Stop reason: HOSPADM

## 2019-04-14 RX ORDER — LISINOPRIL 10 MG/1
10 TABLET ORAL DAILY
Status: DISCONTINUED | OUTPATIENT
Start: 2019-04-15 | End: 2019-04-19 | Stop reason: HOSPADM

## 2019-04-14 RX ORDER — LANOLIN ALCOHOL/MO/W.PET/CERES
3 CREAM (GRAM) TOPICAL
Status: DISCONTINUED | OUTPATIENT
Start: 2019-04-14 | End: 2019-04-19 | Stop reason: HOSPADM

## 2019-04-14 RX ORDER — ACETAMINOPHEN 325 MG/1
975 TABLET ORAL EVERY 8 HOURS SCHEDULED
Status: DISCONTINUED | OUTPATIENT
Start: 2019-04-14 | End: 2019-04-19 | Stop reason: HOSPADM

## 2019-04-14 RX ADMIN — ACETAMINOPHEN 975 MG: 325 TABLET ORAL at 06:21

## 2019-04-14 RX ADMIN — POTASSIUM CHLORIDE 20 MEQ: 20 SOLUTION ORAL at 17:45

## 2019-04-14 RX ADMIN — ACETAMINOPHEN 975 MG: 325 TABLET, FILM COATED ORAL at 14:17

## 2019-04-14 RX ADMIN — QUETIAPINE FUMARATE 25 MG: 25 TABLET ORAL at 21:07

## 2019-04-14 RX ADMIN — PRAMIPEXOLE DIHYDROCHLORIDE 0.25 MG: 0.25 TABLET ORAL at 17:45

## 2019-04-14 RX ADMIN — PRIMIDONE 50 MG: 50 TABLET ORAL at 22:58

## 2019-04-14 RX ADMIN — POTASSIUM CHLORIDE 20 MEQ: 20 SOLUTION ORAL at 08:20

## 2019-04-14 RX ADMIN — PRAMIPEXOLE DIHYDROCHLORIDE 0.25 MG: 0.25 TABLET ORAL at 08:20

## 2019-04-14 RX ADMIN — INSULIN LISPRO 1 UNITS: 100 INJECTION, SOLUTION INTRAVENOUS; SUBCUTANEOUS at 07:54

## 2019-04-14 RX ADMIN — LEVOTHYROXINE SODIUM 88 MCG: 88 TABLET ORAL at 06:21

## 2019-04-14 RX ADMIN — PRIMIDONE 50 MG: 50 TABLET ORAL at 08:22

## 2019-04-14 RX ADMIN — FLUOXETINE HYDROCHLORIDE 40 MG: 20 CAPSULE ORAL at 08:20

## 2019-04-14 RX ADMIN — MELATONIN 3 MG: at 21:07

## 2019-04-14 RX ADMIN — LEVETIRACETAM 500 MG: 500 TABLET, FILM COATED ORAL at 21:07

## 2019-04-14 RX ADMIN — LEVETIRACETAM 500 MG: 100 INJECTION, SOLUTION INTRAVENOUS at 08:32

## 2019-04-14 RX ADMIN — NITROFURANTOIN (MONOHYDRATE/MACROCRYSTALS) 100 MG: 75; 25 CAPSULE ORAL at 07:54

## 2019-04-14 RX ADMIN — Medication 20 MG: at 07:57

## 2019-04-14 RX ADMIN — METFORMIN HYDROCHLORIDE 500 MG: 500 TABLET ORAL at 17:45

## 2019-04-14 RX ADMIN — SODIUM CHLORIDE 125 ML/HR: 0.9 INJECTION, SOLUTION INTRAVENOUS at 09:50

## 2019-04-14 RX ADMIN — NITROFURANTOIN (MONOHYDRATE/MACROCRYSTALS) 100 MG: 75; 25 CAPSULE ORAL at 17:45

## 2019-04-14 RX ADMIN — LAMOTRIGINE 200 MG: 100 TABLET ORAL at 08:19

## 2019-04-14 RX ADMIN — DOCUSATE SODIUM 100 MG: 100 CAPSULE, LIQUID FILLED ORAL at 08:20

## 2019-04-14 RX ADMIN — SENNOSIDES 8.6 MG: 8.6 TABLET, FILM COATED ORAL at 08:19

## 2019-04-14 RX ADMIN — ACETAMINOPHEN 975 MG: 325 TABLET, FILM COATED ORAL at 21:07

## 2019-04-14 RX ADMIN — LISINOPRIL 10 MG: 10 TABLET ORAL at 08:20

## 2019-04-14 RX ADMIN — METFORMIN HYDROCHLORIDE 500 MG: 500 TABLET ORAL at 07:53

## 2019-04-15 ENCOUNTER — APPOINTMENT (INPATIENT)
Dept: RADIOLOGY | Facility: HOSPITAL | Age: 64
DRG: 981 | End: 2019-04-15
Payer: MEDICARE

## 2019-04-15 LAB
GLUCOSE SERPL-MCNC: 102 MG/DL (ref 65–140)
GLUCOSE SERPL-MCNC: 104 MG/DL (ref 65–140)
GLUCOSE SERPL-MCNC: 111 MG/DL (ref 65–140)
GLUCOSE SERPL-MCNC: 156 MG/DL (ref 65–140)
PLATELET # BLD AUTO: 277 THOUSANDS/UL (ref 149–390)
PMV BLD AUTO: 10.4 FL (ref 8.9–12.7)

## 2019-04-15 PROCEDURE — 82948 REAGENT STRIP/BLOOD GLUCOSE: CPT

## 2019-04-15 PROCEDURE — 70450 CT HEAD/BRAIN W/O DYE: CPT

## 2019-04-15 PROCEDURE — 99024 POSTOP FOLLOW-UP VISIT: CPT | Performed by: NEUROLOGICAL SURGERY

## 2019-04-15 PROCEDURE — 85049 AUTOMATED PLATELET COUNT: CPT | Performed by: PHYSICIAN ASSISTANT

## 2019-04-15 PROCEDURE — 97535 SELF CARE MNGMENT TRAINING: CPT

## 2019-04-15 PROCEDURE — 92526 ORAL FUNCTION THERAPY: CPT

## 2019-04-15 PROCEDURE — 97530 THERAPEUTIC ACTIVITIES: CPT

## 2019-04-15 PROCEDURE — 94760 N-INVAS EAR/PLS OXIMETRY 1: CPT

## 2019-04-15 RX ADMIN — PRAMIPEXOLE DIHYDROCHLORIDE 0.25 MG: 0.25 TABLET ORAL at 09:06

## 2019-04-15 RX ADMIN — ACETAMINOPHEN 975 MG: 325 TABLET, FILM COATED ORAL at 21:38

## 2019-04-15 RX ADMIN — POTASSIUM CHLORIDE 20 MEQ: 20 SOLUTION ORAL at 09:06

## 2019-04-15 RX ADMIN — Medication 20 MG: at 09:00

## 2019-04-15 RX ADMIN — FLUOXETINE 40 MG: 20 CAPSULE ORAL at 09:06

## 2019-04-15 RX ADMIN — ACETAMINOPHEN 975 MG: 325 TABLET, FILM COATED ORAL at 13:54

## 2019-04-15 RX ADMIN — LEVOTHYROXINE SODIUM 88 MCG: 88 TABLET ORAL at 05:42

## 2019-04-15 RX ADMIN — PRIMIDONE 50 MG: 50 TABLET ORAL at 22:07

## 2019-04-15 RX ADMIN — ENOXAPARIN SODIUM 40 MG: 40 INJECTION SUBCUTANEOUS at 13:55

## 2019-04-15 RX ADMIN — LAMOTRIGINE 200 MG: 100 TABLET ORAL at 09:07

## 2019-04-15 RX ADMIN — DOCUSATE SODIUM 100 MG: 100 CAPSULE, LIQUID FILLED ORAL at 09:07

## 2019-04-15 RX ADMIN — METFORMIN HYDROCHLORIDE 500 MG: 500 TABLET ORAL at 17:55

## 2019-04-15 RX ADMIN — ACETAMINOPHEN 975 MG: 325 TABLET, FILM COATED ORAL at 05:42

## 2019-04-15 RX ADMIN — QUETIAPINE FUMARATE 25 MG: 25 TABLET ORAL at 21:38

## 2019-04-15 RX ADMIN — NITROFURANTOIN (MONOHYDRATE/MACROCRYSTALS) 100 MG: 75; 25 CAPSULE ORAL at 17:55

## 2019-04-15 RX ADMIN — NITROFURANTOIN (MONOHYDRATE/MACROCRYSTALS) 100 MG: 75; 25 CAPSULE ORAL at 09:06

## 2019-04-15 RX ADMIN — PRAMIPEXOLE DIHYDROCHLORIDE 0.25 MG: 0.25 TABLET ORAL at 17:55

## 2019-04-15 RX ADMIN — LISINOPRIL 10 MG: 10 TABLET ORAL at 09:06

## 2019-04-15 RX ADMIN — LEVETIRACETAM 500 MG: 500 TABLET, FILM COATED ORAL at 09:06

## 2019-04-15 RX ADMIN — DOCUSATE SODIUM 100 MG: 100 CAPSULE, LIQUID FILLED ORAL at 17:55

## 2019-04-15 RX ADMIN — LEVETIRACETAM 500 MG: 500 TABLET, FILM COATED ORAL at 21:38

## 2019-04-15 RX ADMIN — METFORMIN HYDROCHLORIDE 500 MG: 500 TABLET ORAL at 09:07

## 2019-04-15 RX ADMIN — POTASSIUM CHLORIDE 20 MEQ: 20 SOLUTION ORAL at 17:55

## 2019-04-15 RX ADMIN — STANDARDIZED SENNA CONCENTRATE 8.6 MG: 8.6 TABLET ORAL at 09:06

## 2019-04-15 RX ADMIN — MELATONIN 3 MG: at 21:38

## 2019-04-15 RX ADMIN — INSULIN LISPRO 1 UNITS: 100 INJECTION, SOLUTION INTRAVENOUS; SUBCUTANEOUS at 09:12

## 2019-04-15 RX ADMIN — PRIMIDONE 50 MG: 50 TABLET ORAL at 13:00

## 2019-04-16 LAB
GLUCOSE SERPL-MCNC: 106 MG/DL (ref 65–140)
GLUCOSE SERPL-MCNC: 108 MG/DL (ref 65–140)
GLUCOSE SERPL-MCNC: 144 MG/DL (ref 65–140)
GLUCOSE SERPL-MCNC: 94 MG/DL (ref 65–140)

## 2019-04-16 PROCEDURE — 97535 SELF CARE MNGMENT TRAINING: CPT

## 2019-04-16 PROCEDURE — 92526 ORAL FUNCTION THERAPY: CPT

## 2019-04-16 PROCEDURE — 82948 REAGENT STRIP/BLOOD GLUCOSE: CPT

## 2019-04-16 PROCEDURE — 97112 NEUROMUSCULAR REEDUCATION: CPT

## 2019-04-16 PROCEDURE — 99221 1ST HOSP IP/OBS SF/LOW 40: CPT | Performed by: OTOLARYNGOLOGY

## 2019-04-16 PROCEDURE — 97530 THERAPEUTIC ACTIVITIES: CPT

## 2019-04-16 PROCEDURE — 99024 POSTOP FOLLOW-UP VISIT: CPT | Performed by: PHYSICIAN ASSISTANT

## 2019-04-16 PROCEDURE — 94760 N-INVAS EAR/PLS OXIMETRY 1: CPT

## 2019-04-16 PROCEDURE — 92523 SPEECH SOUND LANG COMPREHEN: CPT

## 2019-04-16 RX ADMIN — NITROFURANTOIN (MONOHYDRATE/MACROCRYSTALS) 100 MG: 75; 25 CAPSULE ORAL at 17:20

## 2019-04-16 RX ADMIN — POTASSIUM CHLORIDE 20 MEQ: 20 SOLUTION ORAL at 17:20

## 2019-04-16 RX ADMIN — LISINOPRIL 10 MG: 10 TABLET ORAL at 08:11

## 2019-04-16 RX ADMIN — QUETIAPINE FUMARATE 25 MG: 25 TABLET ORAL at 21:05

## 2019-04-16 RX ADMIN — PRIMIDONE 50 MG: 50 TABLET ORAL at 21:05

## 2019-04-16 RX ADMIN — MELATONIN 3 MG: at 21:05

## 2019-04-16 RX ADMIN — METFORMIN HYDROCHLORIDE 500 MG: 500 TABLET ORAL at 17:20

## 2019-04-16 RX ADMIN — LAMOTRIGINE 200 MG: 100 TABLET ORAL at 08:12

## 2019-04-16 RX ADMIN — ACETAMINOPHEN 975 MG: 325 TABLET, FILM COATED ORAL at 05:52

## 2019-04-16 RX ADMIN — LEVETIRACETAM 500 MG: 500 TABLET, FILM COATED ORAL at 08:11

## 2019-04-16 RX ADMIN — METFORMIN HYDROCHLORIDE 500 MG: 500 TABLET ORAL at 08:12

## 2019-04-16 RX ADMIN — LEVETIRACETAM 500 MG: 500 TABLET, FILM COATED ORAL at 21:05

## 2019-04-16 RX ADMIN — Medication 20 MG: at 08:19

## 2019-04-16 RX ADMIN — FLUOXETINE 40 MG: 20 CAPSULE ORAL at 08:12

## 2019-04-16 RX ADMIN — PRAMIPEXOLE DIHYDROCHLORIDE 0.25 MG: 0.25 TABLET ORAL at 08:12

## 2019-04-16 RX ADMIN — POTASSIUM CHLORIDE 20 MEQ: 20 SOLUTION ORAL at 08:11

## 2019-04-16 RX ADMIN — ACETAMINOPHEN 975 MG: 325 TABLET, FILM COATED ORAL at 13:25

## 2019-04-16 RX ADMIN — ENOXAPARIN SODIUM 40 MG: 40 INJECTION SUBCUTANEOUS at 08:11

## 2019-04-16 RX ADMIN — LEVOTHYROXINE SODIUM 88 MCG: 88 TABLET ORAL at 05:52

## 2019-04-16 RX ADMIN — ACETAMINOPHEN 975 MG: 325 TABLET, FILM COATED ORAL at 21:05

## 2019-04-16 RX ADMIN — PRAMIPEXOLE DIHYDROCHLORIDE 0.25 MG: 0.25 TABLET ORAL at 17:20

## 2019-04-16 RX ADMIN — PRIMIDONE 50 MG: 50 TABLET ORAL at 08:12

## 2019-04-16 RX ADMIN — NITROFURANTOIN (MONOHYDRATE/MACROCRYSTALS) 100 MG: 75; 25 CAPSULE ORAL at 08:12

## 2019-04-17 ENCOUNTER — TELEPHONE (OUTPATIENT)
Dept: NEUROSURGERY | Facility: CLINIC | Age: 64
End: 2019-04-17

## 2019-04-17 LAB
GLUCOSE SERPL-MCNC: 107 MG/DL (ref 65–140)
GLUCOSE SERPL-MCNC: 113 MG/DL (ref 65–140)
GLUCOSE SERPL-MCNC: 119 MG/DL (ref 65–140)
GLUCOSE SERPL-MCNC: 122 MG/DL (ref 65–140)

## 2019-04-17 PROCEDURE — 97530 THERAPEUTIC ACTIVITIES: CPT

## 2019-04-17 PROCEDURE — 94760 N-INVAS EAR/PLS OXIMETRY 1: CPT

## 2019-04-17 PROCEDURE — 92526 ORAL FUNCTION THERAPY: CPT

## 2019-04-17 PROCEDURE — 82948 REAGENT STRIP/BLOOD GLUCOSE: CPT

## 2019-04-17 PROCEDURE — 97535 SELF CARE MNGMENT TRAINING: CPT

## 2019-04-17 PROCEDURE — 99024 POSTOP FOLLOW-UP VISIT: CPT | Performed by: NEUROLOGICAL SURGERY

## 2019-04-17 RX ORDER — CEPHALEXIN 250 MG/5ML
500 POWDER, FOR SUSPENSION ORAL EVERY 6 HOURS SCHEDULED
Status: DISCONTINUED | OUTPATIENT
Start: 2019-04-17 | End: 2019-04-17

## 2019-04-17 RX ORDER — CEPHALEXIN 250 MG/5ML
500 POWDER, FOR SUSPENSION ORAL EVERY 6 HOURS SCHEDULED
Status: DISCONTINUED | OUTPATIENT
Start: 2019-04-17 | End: 2019-04-19 | Stop reason: HOSPADM

## 2019-04-17 RX ADMIN — ACETAMINOPHEN 975 MG: 325 TABLET, FILM COATED ORAL at 05:48

## 2019-04-17 RX ADMIN — POTASSIUM CHLORIDE 20 MEQ: 20 SOLUTION ORAL at 08:19

## 2019-04-17 RX ADMIN — MELATONIN 3 MG: at 21:39

## 2019-04-17 RX ADMIN — ENOXAPARIN SODIUM 40 MG: 40 INJECTION SUBCUTANEOUS at 08:33

## 2019-04-17 RX ADMIN — LEVETIRACETAM 500 MG: 500 TABLET, FILM COATED ORAL at 08:17

## 2019-04-17 RX ADMIN — Medication 20 MG: at 05:49

## 2019-04-17 RX ADMIN — LAMOTRIGINE 200 MG: 100 TABLET ORAL at 08:18

## 2019-04-17 RX ADMIN — METFORMIN HYDROCHLORIDE 500 MG: 500 TABLET ORAL at 08:17

## 2019-04-17 RX ADMIN — PRAMIPEXOLE DIHYDROCHLORIDE 0.25 MG: 0.25 TABLET ORAL at 18:19

## 2019-04-17 RX ADMIN — PRAMIPEXOLE DIHYDROCHLORIDE 0.25 MG: 0.25 TABLET ORAL at 08:18

## 2019-04-17 RX ADMIN — PRIMIDONE 50 MG: 50 TABLET ORAL at 21:40

## 2019-04-17 RX ADMIN — PRIMIDONE 50 MG: 50 TABLET ORAL at 08:21

## 2019-04-17 RX ADMIN — CEPHALEXIN 500 MG: 250 FOR SUSPENSION ORAL at 13:09

## 2019-04-17 RX ADMIN — STANDARDIZED SENNA CONCENTRATE 8.6 MG: 8.6 TABLET ORAL at 08:18

## 2019-04-17 RX ADMIN — LEVETIRACETAM 500 MG: 500 TABLET, FILM COATED ORAL at 21:39

## 2019-04-17 RX ADMIN — METFORMIN HYDROCHLORIDE 500 MG: 500 TABLET ORAL at 18:19

## 2019-04-17 RX ADMIN — CEPHALEXIN 500 MG: 250 FOR SUSPENSION ORAL at 23:34

## 2019-04-17 RX ADMIN — LEVOTHYROXINE SODIUM 88 MCG: 88 TABLET ORAL at 05:49

## 2019-04-17 RX ADMIN — FLUOXETINE 40 MG: 20 CAPSULE ORAL at 08:17

## 2019-04-17 RX ADMIN — ACETAMINOPHEN 975 MG: 325 TABLET, FILM COATED ORAL at 13:09

## 2019-04-17 RX ADMIN — ACETAMINOPHEN 975 MG: 325 TABLET, FILM COATED ORAL at 21:39

## 2019-04-17 RX ADMIN — CEPHALEXIN 500 MG: 250 FOR SUSPENSION ORAL at 18:19

## 2019-04-17 RX ADMIN — POTASSIUM CHLORIDE 20 MEQ: 20 SOLUTION ORAL at 18:19

## 2019-04-17 RX ADMIN — QUETIAPINE FUMARATE 25 MG: 25 TABLET ORAL at 21:39

## 2019-04-18 LAB
GLUCOSE SERPL-MCNC: 114 MG/DL (ref 65–140)
GLUCOSE SERPL-MCNC: 160 MG/DL (ref 65–140)
GLUCOSE SERPL-MCNC: 93 MG/DL (ref 65–140)

## 2019-04-18 PROCEDURE — 97112 NEUROMUSCULAR REEDUCATION: CPT

## 2019-04-18 PROCEDURE — 82948 REAGENT STRIP/BLOOD GLUCOSE: CPT

## 2019-04-18 PROCEDURE — 97535 SELF CARE MNGMENT TRAINING: CPT

## 2019-04-18 PROCEDURE — 99024 POSTOP FOLLOW-UP VISIT: CPT | Performed by: NEUROLOGICAL SURGERY

## 2019-04-18 PROCEDURE — 97530 THERAPEUTIC ACTIVITIES: CPT

## 2019-04-18 PROCEDURE — 99232 SBSQ HOSP IP/OBS MODERATE 35: CPT | Performed by: PHYSICIAN ASSISTANT

## 2019-04-18 PROCEDURE — 94760 N-INVAS EAR/PLS OXIMETRY 1: CPT

## 2019-04-18 PROCEDURE — 0B21XFZ CHANGE TRACHEOSTOMY DEVICE IN TRACHEA, EXTERNAL APPROACH: ICD-10-PCS | Performed by: EMERGENCY MEDICINE

## 2019-04-18 RX ADMIN — ACETAMINOPHEN 975 MG: 325 TABLET, FILM COATED ORAL at 21:48

## 2019-04-18 RX ADMIN — LAMOTRIGINE 200 MG: 100 TABLET ORAL at 10:07

## 2019-04-18 RX ADMIN — METFORMIN HYDROCHLORIDE 500 MG: 500 TABLET ORAL at 10:06

## 2019-04-18 RX ADMIN — PRAMIPEXOLE DIHYDROCHLORIDE 0.25 MG: 0.25 TABLET ORAL at 10:07

## 2019-04-18 RX ADMIN — ACETAMINOPHEN 975 MG: 325 TABLET, FILM COATED ORAL at 05:51

## 2019-04-18 RX ADMIN — MELATONIN 3 MG: at 21:48

## 2019-04-18 RX ADMIN — ENOXAPARIN SODIUM 40 MG: 40 INJECTION SUBCUTANEOUS at 10:06

## 2019-04-18 RX ADMIN — CEPHALEXIN 500 MG: 250 FOR SUSPENSION ORAL at 17:20

## 2019-04-18 RX ADMIN — PRIMIDONE 50 MG: 50 TABLET ORAL at 10:09

## 2019-04-18 RX ADMIN — LEVOTHYROXINE SODIUM 88 MCG: 88 TABLET ORAL at 05:51

## 2019-04-18 RX ADMIN — CEPHALEXIN 500 MG: 250 FOR SUSPENSION ORAL at 12:33

## 2019-04-18 RX ADMIN — FLUOXETINE 40 MG: 20 CAPSULE ORAL at 10:07

## 2019-04-18 RX ADMIN — LEVETIRACETAM 500 MG: 500 TABLET, FILM COATED ORAL at 10:07

## 2019-04-18 RX ADMIN — STANDARDIZED SENNA CONCENTRATE 8.6 MG: 8.6 TABLET ORAL at 10:06

## 2019-04-18 RX ADMIN — POTASSIUM CHLORIDE 20 MEQ: 20 SOLUTION ORAL at 17:20

## 2019-04-18 RX ADMIN — POTASSIUM CHLORIDE 20 MEQ: 20 SOLUTION ORAL at 10:07

## 2019-04-18 RX ADMIN — METFORMIN HYDROCHLORIDE 500 MG: 500 TABLET ORAL at 17:20

## 2019-04-18 RX ADMIN — QUETIAPINE FUMARATE 25 MG: 25 TABLET ORAL at 21:48

## 2019-04-18 RX ADMIN — PRIMIDONE 50 MG: 50 TABLET ORAL at 21:48

## 2019-04-18 RX ADMIN — INSULIN LISPRO 1 UNITS: 100 INJECTION, SOLUTION INTRAVENOUS; SUBCUTANEOUS at 10:10

## 2019-04-18 RX ADMIN — CEPHALEXIN 500 MG: 250 FOR SUSPENSION ORAL at 05:51

## 2019-04-18 RX ADMIN — LISINOPRIL 10 MG: 10 TABLET ORAL at 10:06

## 2019-04-18 RX ADMIN — ACETAMINOPHEN 975 MG: 325 TABLET, FILM COATED ORAL at 13:04

## 2019-04-18 RX ADMIN — PRAMIPEXOLE DIHYDROCHLORIDE 0.25 MG: 0.25 TABLET ORAL at 17:20

## 2019-04-18 RX ADMIN — Medication 20 MG: at 06:02

## 2019-04-19 VITALS
HEIGHT: 63 IN | DIASTOLIC BLOOD PRESSURE: 63 MMHG | HEART RATE: 61 BPM | WEIGHT: 127 LBS | SYSTOLIC BLOOD PRESSURE: 108 MMHG | BODY MASS INDEX: 22.5 KG/M2 | TEMPERATURE: 97.5 F | RESPIRATION RATE: 16 BRPM | OXYGEN SATURATION: 98 %

## 2019-04-19 LAB
GLUCOSE SERPL-MCNC: 221 MG/DL (ref 65–140)
GLUCOSE SERPL-MCNC: 91 MG/DL (ref 65–140)

## 2019-04-19 PROCEDURE — 94760 N-INVAS EAR/PLS OXIMETRY 1: CPT

## 2019-04-19 PROCEDURE — 99024 POSTOP FOLLOW-UP VISIT: CPT | Performed by: PHYSICIAN ASSISTANT

## 2019-04-19 PROCEDURE — 99024 POSTOP FOLLOW-UP VISIT: CPT | Performed by: NEUROLOGICAL SURGERY

## 2019-04-19 PROCEDURE — 82948 REAGENT STRIP/BLOOD GLUCOSE: CPT

## 2019-04-19 RX ORDER — CEPHALEXIN 250 MG/5ML
500 POWDER, FOR SUSPENSION ORAL EVERY 6 HOURS SCHEDULED
Qty: 100 ML | Refills: 0
Start: 2019-04-19 | End: 2019-04-24

## 2019-04-19 RX ADMIN — CEPHALEXIN 500 MG: 250 FOR SUSPENSION ORAL at 00:25

## 2019-04-19 RX ADMIN — PRIMIDONE 50 MG: 50 TABLET ORAL at 08:46

## 2019-04-19 RX ADMIN — LEVOTHYROXINE SODIUM 88 MCG: 88 TABLET ORAL at 06:33

## 2019-04-19 RX ADMIN — POTASSIUM CHLORIDE 20 MEQ: 20 SOLUTION ORAL at 08:45

## 2019-04-19 RX ADMIN — ACETAMINOPHEN 975 MG: 325 TABLET, FILM COATED ORAL at 06:33

## 2019-04-19 RX ADMIN — LAMOTRIGINE 200 MG: 100 TABLET ORAL at 08:46

## 2019-04-19 RX ADMIN — ENOXAPARIN SODIUM 40 MG: 40 INJECTION SUBCUTANEOUS at 08:45

## 2019-04-19 RX ADMIN — Medication 20 MG: at 06:33

## 2019-04-19 RX ADMIN — CEPHALEXIN 500 MG: 250 FOR SUSPENSION ORAL at 06:33

## 2019-04-19 RX ADMIN — STANDARDIZED SENNA CONCENTRATE 8.6 MG: 8.6 TABLET ORAL at 08:46

## 2019-04-19 RX ADMIN — FLUOXETINE 40 MG: 20 CAPSULE ORAL at 08:46

## 2019-04-19 RX ADMIN — PRAMIPEXOLE DIHYDROCHLORIDE 0.25 MG: 0.25 TABLET ORAL at 08:46

## 2019-04-19 RX ADMIN — METFORMIN HYDROCHLORIDE 500 MG: 500 TABLET ORAL at 08:46

## 2019-04-22 ENCOUNTER — TELEPHONE (OUTPATIENT)
Dept: NEUROSURGERY | Facility: CLINIC | Age: 64
End: 2019-04-22

## 2019-05-06 ENCOUNTER — TELEPHONE (OUTPATIENT)
Dept: NEUROSURGERY | Facility: CLINIC | Age: 64
End: 2019-05-06

## 2019-05-21 ENCOUNTER — TELEPHONE (OUTPATIENT)
Dept: NEUROSURGERY | Facility: CLINIC | Age: 64
End: 2019-05-21

## 2019-06-26 ENCOUNTER — TELEPHONE (OUTPATIENT)
Dept: NEUROSURGERY | Facility: CLINIC | Age: 64
End: 2019-06-26

## 2019-06-26 DIAGNOSIS — Z01.812 ENCOUNTER FOR PREPROCEDURAL LABORATORY EXAMINATION: Primary | ICD-10-CM

## 2019-07-02 ENCOUNTER — HOSPITAL ENCOUNTER (OUTPATIENT)
Dept: RADIOLOGY | Facility: HOSPITAL | Age: 64
Discharge: HOME | End: 2019-07-02
Attending: NEUROLOGICAL SURGERY
Payer: MEDICARE

## 2019-07-02 DIAGNOSIS — S06.5X9A SUBDURAL HEMATOMA (HCC): ICD-10-CM

## 2019-07-02 DIAGNOSIS — I60.9 SUBARACHNOID HEMORRHAGE (HCC): ICD-10-CM

## 2019-07-02 PROCEDURE — 70496 CT ANGIOGRAPHY HEAD: CPT

## 2019-07-02 RX ADMIN — IOHEXOL 85 ML: 350 INJECTION, SOLUTION INTRAVENOUS at 11:20

## 2019-07-15 ENCOUNTER — OFFICE VISIT (OUTPATIENT)
Dept: NEUROSURGERY | Facility: CLINIC | Age: 64
End: 2019-07-15
Payer: MEDICARE

## 2019-07-15 VITALS
DIASTOLIC BLOOD PRESSURE: 48 MMHG | SYSTOLIC BLOOD PRESSURE: 93 MMHG | TEMPERATURE: 98.1 F | RESPIRATION RATE: 16 BRPM | HEART RATE: 61 BPM

## 2019-07-15 DIAGNOSIS — I60.9 SUBARACHNOID HEMORRHAGE (HCC): ICD-10-CM

## 2019-07-15 DIAGNOSIS — S06.5X9A SUBDURAL HEMATOMA (HCC): ICD-10-CM

## 2019-07-15 DIAGNOSIS — S06.9X0D TRAUMATIC BRAIN INJURY, WITHOUT LOSS OF CONSCIOUSNESS, SUBSEQUENT ENCOUNTER: ICD-10-CM

## 2019-07-15 DIAGNOSIS — I61.9 INTRAPARENCHYMAL HEMORRHAGE OF BRAIN (HCC): Primary | ICD-10-CM

## 2019-07-15 PROCEDURE — 99214 OFFICE O/P EST MOD 30 MIN: CPT | Performed by: NEUROLOGICAL SURGERY

## 2019-07-15 RX ORDER — IPRATROPIUM BROMIDE AND ALBUTEROL SULFATE 2.5; .5 MG/3ML; MG/3ML
3 SOLUTION RESPIRATORY (INHALATION) EVERY 6 HOURS PRN
COMMUNITY
End: 2020-11-05

## 2019-07-15 RX ORDER — FUROSEMIDE 20 MG/1
20 TABLET ORAL DAILY
COMMUNITY
End: 2020-11-05

## 2019-07-15 NOTE — PROGRESS NOTES
Patient Id: Lenise Dakin is a 59 y o  female        Handedness: Right      Assessment/Plan:    Diagnoses and all orders for this visit:    Intraparenchymal hemorrhage of brain ([address] )    Subarachnoid hemorrhage ([address] )    Subdural hematoma ([address] )    Traumatic brain injury, without loss of consciousness, subsequent encounter        Discussion Summary:   1  Status post left hemicraniectomy 12/20/2019 for trauma and left temporal hematoma with cranioplasty 4/11/19  Patient has had a relatively poor recovery  She has had some improvement with her speech and according to the aide she does intermittently mobilized  I see no evidence vascular etiology for her hematoma and believe that this was likely secondary to her trauma  At this juncture I would recommend continuation of therapies to aid in further improvement however I explained that I do not expect her to have a full recovery given her residual symptoms at this juncture  I will see her back as needed  Chief Complaint: Post-op (follow up with CTA)        HPI:   This is an unfortunate and very pleasant 72-year-old female who likely suffered a blunt head trauma and significant left temporal contusion  She unfortunately had significant swelling requiring craniectomy and hematoma evacuation  Postprocedure early she has made poor recovery and was in [facility]  She is currently in assisted living facility  She is accompanied by her aide  She states that she will intermittently verbalize and mobilize  She has significant contractures of the right hand side  No family is present  Review of systems unobtainable  Review of Systems   Unable to perform ROS: Patient nonverbal   Skin: Negative  Physical Exam  Vitals:    07/15/19 1404   BP: (!) 93/48   Pulse: 61   Resp: 16   Temp: 98 1 °F (36 7 °C)    She is awake  She is alert  She is nonverbal for me  She is able to follow some simple commands including high 5, but not thumbs up    She moves her left side spontaneously  She has contracted in her right upper and lower extremity  Her incision is clean dry and intact      The following portions of the patient's history were reviewed and updated as appropriate: allergies, current medications, past family history, past medical history, past social history, past surgical history and problem list     Active Ambulatory Problems     Diagnosis Date Noted    Closed nondisplaced fracture of fourth metatarsal bone of right foot 07/12/2018    Acquired deformity of left foot 07/12/2018    Arthralgia of left foot 07/12/2018    Pain in both feet 07/12/2018    Plantar warts 07/26/2018    Right foot pain 07/26/2018    Restless leg syndrome 09/26/2018    Multiple fractures of ribs, bilateral, initial encounter for closed fracture 12/19/2018    Traumatic pneumothorax 12/19/2018    Glenoid fracture of shoulder, right, closed, initial encounter 12/19/2018    Intraparenchymal hemorrhage of brain ([address] ) 12/19/2018    Traumatic brain injury ([address] ) 12/19/2018    Flail chest, initial encounter for closed fracture 12/19/2018    Fracture of thoracic transverse process, closed, initial encounter ([address] ) 12/19/2018    Subdural hematoma ([address] ) 12/19/2018    Subarachnoid hemorrhage ([address] ) 12/19/2018    Diabetes mellitus, type II ([address] ) 12/19/2018    Closed nondisplaced left toe fracture 12/21/2018    Hypokalemia 12/22/2018    Acute encephalopathy 12/22/2018    Status post craniectomy 12/22/2018    Acute respiratory failure with hypoxia ([address] ) 12/22/2018    Protein-calorie malnutrition ([address] ) 12/18/2018    Low urine output 01/03/2019    Essential hypertension 04/11/2019     Resolved Ambulatory Problems     Diagnosis Date Noted    Tremor 06/13/2018    Tremor, essential 06/13/2018    Hyponatremia 12/19/2018    Hypophosphatemia 12/22/2018    Mucus plugging of bronchi 12/22/2018    Acute respiratory failure ([address] ) 12/18/2018    Acute blood loss anemia 12/24/2018    Ileus ([address] ) 12/24/2018     Past Medical History:   Diagnosis Date    Arthritis     [facility] disorder [facility]     Chronic kidney disease     COPD [facility] disease) [facility]     Depression     Diabetes mellitus ([facility] )     Disease of thyroid gland     DVT (deep vein thrombosis) in pregnancy ([facility] ) 1992    Elbow disorder     Enlarged heart     Essential tremor     GERD (gastroesophageal reflux disease)     Hyperlipidemia     Parkinson disease ([facility] )     Parkinsons disease ([facility] )     Pneumonia     Psychiatric disorder     RLS (restless legs syndrome)     Wears partial dentures        Past Surgical History:   Procedure Laterality Date    ABDOMINAL SURGERY      adhesions x3 surgeries    APPENDECTOMY      BACK SURGERY      fusion L5-S1 with hardware    BLADDER SUSPENSION      BREAST SURGERY Bilateral     reduction    CHOLECYSTECTOMY      lap    COLONOSCOPY N/A 2/8/2017    Procedure: COLONOSCOPY;  Surgeon: Sloane Lindsey MD;  Location: [place] [address] GI LAB; Service:     CRANIECTOMY Left 12/20/2018    Procedure: Left hemicraniectomy with evacuation of clot; Surgeon: Denis Leon MD;  [facility];  Service: Neurosurgery    CRANIOPLASTY Left 4/11/2019    Procedure: Left Autologous Cranioplasty;  Surgeon: Denis Leon MD;  [facility];  Service: Neurosurgery    ELBOW ARTHROSCOPY Right     tendon repair    ESOPHAGOGASTRODUODENOSCOPY N/A 2/8/2017    Procedure: ESOPHAGOGASTRODUODENOSCOPY (EGD); Surgeon: Sloane Lindsey MD;  Location: [facility] GI LAB; Service:     FASCIECTOMY Right 2/13/2017    Procedure:  MIDDLE FINGER SCAR REVISION AND Z-PLASTY;  Surgeon: Claudeen Ewing, MD;  Location: [facility] MAIN OR;  Service:     FOOT SURGERY Right 1980    metatarsal repair    FOOT SURGERY Left 1985    metatarsal repair, bunionectomy    FRACTURE SURGERY Left     post trauma, fusion, hardware    GASTROSTOMY TUBE PLACEMENT N/A 1/2/2019    Procedure: INSERTION PEG TUBE;  Surgeon: Ben Carlson Errol Juarez MD;  [facility];  Service: General    HAND SURGERY Right     ganglion    HAND SURGERY Right     trigger finger    HAND SURGERY Right     tendon release/dupuytrens    KNEE ARTHROSCOPY W/ DEBRIDEMENT Left     meniscus repair    NASAL SEPTUM SURGERY      NASAL SEPTUM SURGERY      PEG W/TRACHEOSTOMY PLACEMENT N/A 12/24/2018    Procedure: TRACHEOSTOMY;  Surgeon: Sergio Cheng MD;  [facility];  Service: General    CO REMOVAL OF HEAD OF RADIUS Right 11/16/2017    Procedure: RADIAL HEAD EXCISION, ELBOW;  Surgeon: Aubrey Lanza MD;  Location: [facility] MAIN OR;  Service: Orthopedics    SHOULDER ARTHROSCOPY Right     SPINAL FUSION           Current Outpatient Medications:     acetaminophen (TYLENOL) 325 mg tablet, Take 650 mg by mouth every 6 (six) hours as needed for mild pain, Disp: , Rfl:     FLUoxetine (PROzac) 40 MG capsule, Take 40 mg by mouth daily , Disp: , Rfl:     glucagon (GLUCAGON EMERGENCY) 1 MG injection, Inject 1 mg under the skin once as needed for low blood sugar, Disp: , Rfl:     ipratropium-albuterol (DUO-NEB) 0 5-2 5 mg/3 mL nebulizer solution, Take 3 mL by nebulization every 6 (six) hours as needed for wheezing or shortness of breath, Disp: , Rfl:     lamoTRIgine (LaMICtal) 200 MG tablet, Take 200 mg by mouth daily , Disp: , Rfl:     albuterol (2 5 mg/3 mL) 0 083 % nebulizer solution, Take 2 5 mg by nebulization every 6 (six) hours as needed for wheezing or shortness of breath, Disp: , Rfl:     enoxaparin (LOVENOX) 40 mg/0 4 mL, Inject under the skin daily , Disp: , Rfl:     LANSOPRAZOLE PO, 30 mg by Per PEG Tube route daily , Disp: , Rfl:     levothyroxine 100 mcg tablet, 88 mcg by Per G Tube route daily , Disp: , Rfl:     LISINOPRIL PO, Take 10 mg by mouth daily, Disp: , Rfl:     melatonin 1 mg, Take 1 mg by mouth daily at bedtime, Disp: , Rfl:     metFORMIN (GLUCOPHAGE) 500 mg tablet, bid, Disp: , Rfl:     omeprazole (PriLOSEC) 20 mg delayed release capsule, Take 20 mg by mouth daily , Disp: , Rfl:     potassium chloride (KLOR-CON) 20 mEq packet, Take 20 mEq by mouth 2 (two) times a day, Disp: , Rfl:     pramipexole (MIRAPEX) 0 25 mg tablet, Take 0 25 mg by mouth 2 (two) times a day, Disp: , Rfl:     primidone (MYSOLINE) 50 mg tablet, Take by mouth every 12 (twelve) hours, Disp: , Rfl:     psyllium (METAMUCIL) 58 6 % packet, Take 1 packet by mouth daily, Disp: , Rfl:     QUEtiapine (SEROquel) 25 mg tablet, Take 25 mg by mouth daily at bedtime, Disp: , Rfl:     ZINC OXIDE EX, Apply topically daily, Disp: , Rfl:     Results/Data:  I discussed the results of her CT and CTA with her aide  No evidence of underlying vascular pathology  Cranioplasty in place    Expected left hemispheric changes

## 2019-07-24 ENCOUNTER — TELEPHONE (OUTPATIENT)
Dept: NEUROSURGERY | Facility: CLINIC | Age: 64
End: 2019-07-24

## 2019-07-24 NOTE — TELEPHONE ENCOUNTER
Reviewed office note with sister Sky Lala, who stated an understanding and was grateful for the review

## 2019-07-24 NOTE — TELEPHONE ENCOUNTER
Pt's sister Oval Myron called to discuss progress of her sister seen 7/15  Please advise if office note may be reviewed with her over the phone, or if you prefer to call and speak with her    Thank You

## 2019-08-07 ENCOUNTER — TRANSCRIBE ORDERS (OUTPATIENT)
Dept: ADMINISTRATIVE | Facility: HOSPITAL | Age: 64
End: 2019-08-07

## 2019-08-07 DIAGNOSIS — R13.13 PHARYNGEAL DYSPHAGIA: Primary | ICD-10-CM

## 2019-08-20 ENCOUNTER — HOSPITAL ENCOUNTER (OUTPATIENT)
Dept: RADIOLOGY | Facility: HOSPITAL | Age: 64
Discharge: HOME | End: 2019-08-20
Attending: INTERNAL MEDICINE
Payer: MEDICARE

## 2019-08-20 DIAGNOSIS — R13.13 PHARYNGEAL DYSPHAGIA: ICD-10-CM

## 2019-08-20 PROCEDURE — 74230 X-RAY XM SWLNG FUNCJ C+: CPT

## 2019-08-20 PROCEDURE — G8996 SWALLOW CURRENT STATUS: HCPCS

## 2019-08-20 PROCEDURE — 92611 MOTION FLUOROSCOPY/SWALLOW: CPT

## 2019-08-20 PROCEDURE — G8998 SWALLOW D/C STATUS: HCPCS

## 2019-08-20 PROCEDURE — G8997 SWALLOW GOAL STATUS: HCPCS

## 2019-08-20 NOTE — PROCEDURES
Video Swallow Study      Patient Name: Maryellen LEONEO'S Date: 8/20/2019        Past Medical History  Past Medical History:   Diagnosis Date    Arthritis     osteo    [facility]     Chronic kidney disease     COPD [facility]chronic obstructive pulmonary disease) [facility]     Depression     Diabetes mellitus ([facility]     Diabetes mellitus, type II ([facility]     Disease of thyroid gland     DVT (deep vein thrombosis) in pregnancy ([facility] ) 1992    right leg    Elbow disorder     fx right elbow    Enlarged heart     Essential tremor     GERD (gastroesophageal reflux disease)     Hyperlipidemia     Parkinson disease ([facility]     Parkinsons disease ([facility]     Pneumonia     x3    Psychiatric disorder     RLS (restless legs syndrome)     Wears partial dentures     upper        Past Surgical History  Past Surgical History:   Procedure Laterality Date    ABDOMINAL SURGERY      adhesions x3 surgeries    APPENDECTOMY      BACK SURGERY      fusion L5-S1 with hardware    BLADDER SUSPENSION      BREAST SURGERY Bilateral     reduction    CHOLECYSTECTOMY      lap    COLONOSCOPY N/A 2/8/2017    Procedure: COLONOSCOPY;  Surgeon: Joesph Boyle MD;  Location: [facility]; Service:     CRANIECTOMY Left 12/20/2018    Procedure: Left hemicraniectomy with evacuation of clot; Surgeon: Yaima Roman MD;  [facility];  Service: Neurosurgery    CRANIOPLASTY Left 4/11/2019    Procedure: Left Autologous Cranioplasty;  Surgeon: Yaima Roman MD;  [facility];  Service: Neurosurgery    ELBOW ARTHROSCOPY Right     tendon repair    ESOPHAGOGASTRODUODENOSCOPY N/A 2/8/2017    Procedure: ESOPHAGOGASTRODUODENOSCOPY (EGD); Surgeon: Joesph Boyle MD;  Location: [facility] GI LAB; Service:     FASCIECTOMY Right 2/13/2017    Procedure:  MIDDLE FINGER SCAR REVISION AND Z-PLASTY;  Surgeon: Agnieszka Miramontes MD;  Location: [facility] MAIN OR;  Service:    [address] [place] [address] metatarsal repair    FOOT SURGERY Left 1985    metatarsal repair, bunionectomy    FRACTURE SURGERY Left     post trauma, fusion, hardware    GASTROSTOMY TUBE PLACEMENT N/A 1/2/2019    Procedure: INSERTION PEG TUBE;  Surgeon: Jewell Valenzuela MD;  [facility];  Service: General    HAND SURGERY Right     ganglion    HAND SURGERY Right     trigger finger    HAND SURGERY Right     tendon release/dupuytrens    KNEE ARTHROSCOPY W/ DEBRIDEMENT Left     meniscus repair    NASAL SEPTUM SURGERY      NASAL SEPTUM SURGERY      PEG W/TRACHEOSTOMY PLACEMENT N/A 12/24/2018    Procedure: Harper Olivera;  Surgeon: Jim Pedro MD;  [facility];  Service: General    SD REMOVAL OF HEAD OF RADIUS Right 11/16/2017    Procedure: RADIAL HEAD Courtney Main;  Surgeon: Jc Palacios MD;  Location: [facility] MAIN OR;  Service: Orthopedics    SHOULDER ARTHROSCOPY Right     SPINAL FUSION         General Information:  Ordering Physician: Dr Drew Gayle  Radiologist: Dr Myra Dennis  Date of Order: 08/13/19  Date of Evaluation: 08/20/19  Type of Study: Initial MBS  Diet Prior to this Study: NPO with G tube feedings  Additional History: Patient is NPO with G tube feedings since TBI December 2018  Patient has received treatment for dysphagia  Exam completed to determine safety of PO intake for pleasure feeding  Limitations: Posture not optimal, no imitation of oral motor movements  Positioning: Exam completed in videofluoroscopy chair in lateral view  Difficulty placing head in midline, neck flexion during exam   Materials Adminstered: Thin liquid, puree, mechanical soft solid  Oral Stage:  Incoordination and delay in bolus formation of thin liquid, puree, soft solid  Significant oral residual following the swallow for mechanical soft solid consistency   Delay in oral preparation and transport may have been influenced by patient's dislike of taste of material   Patient required verbal encouragement to reduce timing of oral preparation and transport of liquid and puree  Pharyngeal Stage:  Within Normal Limits  No delay in the initiation of the swallow, good laryngeal elevation, no aspiration or laryngeal penetration during or following the swallow, no pharyngeal residual following the swallow on any consistency  Aspiration Response: No aspiration or laryngeal penetration during or following the swallow on any consistency  Swallow Mechanics  Swallowing Initiation: Mildly delayed  Hyolaryngeal Excursion: Adequate  Epiglottic Inversion: Present  Tongue Drive: Mildly weak  Pharyngeal Constriction: Adequate  Cricopharyngeal Opening/Relaxation: Adequate  Cricopharyngeal Prominence/Bar: Not observed  Cervical Osteophytes: Not present  Zenkers Diverticulum: Not present  Esophageal Stage:  Within functional limits  Assessment Summary: Moderate-severe oral stage dysphagia with delay and incoordination of bolus formation on all consistencies, severe oral residual following the swallow on soft solids  Diagnosis/Prognosis:  Dysphagia Diagnosis: Mild to moderate oral stage dysphagia  Dysphagia Assessment: Swallow skills are safe and WNL for a puree consistency diet with thin liquid  Prognosis for Safe Diet Advancement: Good  Barriers to Reach Goals: Severely impaired oral motor skills for soft solid consistencies  Results/Reviewed with Recommendations: Patient, MD  CNA from [facility], SLP from [facility]  Recommendations:  Primary Recommendations: Oral feeding  Diet Texture: Dysphagia 1 Pureed  Liquid Consistency: Thin  Liquid Administration: Teaspoon  Medication Administration: Medication crushed and in puree  Suggested Postioning: Upright/ 90 degrees, head midline with support required  Suggested Precautions: Aspiration precautions, feed when alert and in fully upright position  Strategies: Transfer liquid and puree promptly, patient to be fed    Dysphagia Treatment Recommendations: Yes  Further Evaluation by: Nutritional services  Consider Follow-up VBS: Not required  Thank you for the referral   Please contact the [facility] Department at [phone] with any questions          PORFIRIO Loja , [address] Pathologist  [phone]

## 2020-11-05 ENCOUNTER — APPOINTMENT (EMERGENCY)
Dept: RADIOLOGY | Facility: HOSPITAL | Age: 65
End: 2020-11-05
Payer: MEDICARE

## 2020-11-05 ENCOUNTER — HOSPITAL ENCOUNTER (EMERGENCY)
Facility: HOSPITAL | Age: 65
Discharge: NURSING, REHAB OR LONG-TERM CARE | End: 2020-11-06
Attending: EMERGENCY MEDICINE | Admitting: EMERGENCY MEDICINE
Payer: MEDICARE

## 2020-11-05 VITALS
OXYGEN SATURATION: 95 % | RESPIRATION RATE: 20 BRPM | HEART RATE: 68 BPM | TEMPERATURE: 98.9 F | SYSTOLIC BLOOD PRESSURE: 164 MMHG | DIASTOLIC BLOOD PRESSURE: 65 MMHG

## 2020-11-05 DIAGNOSIS — G40.919 BREAKTHROUGH SEIZURE (HCC): ICD-10-CM

## 2020-11-05 DIAGNOSIS — R56.9 SEIZURE (HCC): Primary | ICD-10-CM

## 2020-11-05 LAB
ALBUMIN SERPL BCP-MCNC: 3.4 G/DL (ref 3.5–5)
ALP SERPL-CCNC: 139 U/L (ref 46–116)
ALT SERPL W P-5'-P-CCNC: 20 U/L (ref 12–78)
ANION GAP SERPL CALCULATED.3IONS-SCNC: 7 MMOL/L (ref 4–13)
AST SERPL W P-5'-P-CCNC: 15 U/L (ref 5–45)
BASOPHILS # BLD AUTO: 0.06 THOUSANDS/ΜL (ref 0–0.1)
BASOPHILS NFR BLD AUTO: 1 % (ref 0–1)
BILIRUB SERPL-MCNC: 0.2 MG/DL (ref 0.2–1)
BUN SERPL-MCNC: 18 MG/DL (ref 5–25)
CALCIUM ALBUM COR SERPL-MCNC: 9.8 MG/DL (ref 8.3–10.1)
CALCIUM SERPL-MCNC: 9.3 MG/DL (ref 8.3–10.1)
CHLORIDE SERPL-SCNC: 97 MMOL/L (ref 100–108)
CO2 SERPL-SCNC: 28 MMOL/L (ref 21–32)
CREAT SERPL-MCNC: 0.92 MG/DL (ref 0.6–1.3)
EOSINOPHIL # BLD AUTO: 0.14 THOUSAND/ΜL (ref 0–0.61)
EOSINOPHIL NFR BLD AUTO: 2 % (ref 0–6)
ERYTHROCYTE [DISTWIDTH] IN BLOOD BY AUTOMATED COUNT: 14.2 % (ref 11.6–15.1)
GFR SERPL CREATININE-BSD FRML MDRD: 66 ML/MIN/1.73SQ M
GLUCOSE SERPL-MCNC: 142 MG/DL (ref 65–140)
GLUCOSE SERPL-MCNC: 145 MG/DL (ref 65–140)
HCT VFR BLD AUTO: 42.3 % (ref 34.8–46.1)
HGB BLD-MCNC: 13.1 G/DL (ref 11.5–15.4)
IMM GRANULOCYTES # BLD AUTO: 0.05 THOUSAND/UL (ref 0–0.2)
IMM GRANULOCYTES NFR BLD AUTO: 1 % (ref 0–2)
LYMPHOCYTES # BLD AUTO: 2.01 THOUSANDS/ΜL (ref 0.6–4.47)
LYMPHOCYTES NFR BLD AUTO: 25 % (ref 14–44)
MCH RBC QN AUTO: 26 PG (ref 26.8–34.3)
MCHC RBC AUTO-ENTMCNC: 31 G/DL (ref 31.4–37.4)
MCV RBC AUTO: 84 FL (ref 82–98)
MONOCYTES # BLD AUTO: 0.46 THOUSAND/ΜL (ref 0.17–1.22)
MONOCYTES NFR BLD AUTO: 6 % (ref 4–12)
NEUTROPHILS # BLD AUTO: 5.38 THOUSANDS/ΜL (ref 1.85–7.62)
NEUTS SEG NFR BLD AUTO: 65 % (ref 43–75)
NRBC BLD AUTO-RTO: 0 /100 WBCS
PLATELET # BLD AUTO: 323 THOUSANDS/UL (ref 149–390)
PMV BLD AUTO: 10.1 FL (ref 8.9–12.7)
POTASSIUM SERPL-SCNC: 3.6 MMOL/L (ref 3.5–5.3)
PROT SERPL-MCNC: 7.4 G/DL (ref 6.4–8.2)
RBC # BLD AUTO: 5.03 MILLION/UL (ref 3.81–5.12)
SODIUM SERPL-SCNC: 132 MMOL/L (ref 136–145)
WBC # BLD AUTO: 8.1 THOUSAND/UL (ref 4.31–10.16)

## 2020-11-05 PROCEDURE — 80053 COMPREHEN METABOLIC PANEL: CPT | Performed by: EMERGENCY MEDICINE

## 2020-11-05 PROCEDURE — 96360 HYDRATION IV INFUSION INIT: CPT

## 2020-11-05 PROCEDURE — 99285 EMERGENCY DEPT VISIT HI MDM: CPT

## 2020-11-05 PROCEDURE — 80175 DRUG SCREEN QUAN LAMOTRIGINE: CPT | Performed by: EMERGENCY MEDICINE

## 2020-11-05 PROCEDURE — 36415 COLL VENOUS BLD VENIPUNCTURE: CPT | Performed by: EMERGENCY MEDICINE

## 2020-11-05 PROCEDURE — 82948 REAGENT STRIP/BLOOD GLUCOSE: CPT

## 2020-11-05 PROCEDURE — 93005 ELECTROCARDIOGRAM TRACING: CPT

## 2020-11-05 PROCEDURE — 99285 EMERGENCY DEPT VISIT HI MDM: CPT | Performed by: EMERGENCY MEDICINE

## 2020-11-05 PROCEDURE — 85025 COMPLETE CBC W/AUTO DIFF WBC: CPT | Performed by: EMERGENCY MEDICINE

## 2020-11-05 PROCEDURE — G1004 CDSM NDSC: HCPCS

## 2020-11-05 PROCEDURE — 70450 CT HEAD/BRAIN W/O DYE: CPT

## 2020-11-05 RX ORDER — LAMOTRIGINE 100 MG/1
200 TABLET ORAL ONCE
Status: COMPLETED | OUTPATIENT
Start: 2020-11-05 | End: 2020-11-05

## 2020-11-05 RX ORDER — LANOLIN ALCOHOL/MO/W.PET/CERES
1.5 CREAM (GRAM) TOPICAL
Status: DISCONTINUED | OUTPATIENT
Start: 2020-11-05 | End: 2020-11-06 | Stop reason: HOSPADM

## 2020-11-05 RX ORDER — POTASSIUM CHLORIDE 750 MG/1
10 TABLET, EXTENDED RELEASE ORAL ONCE
Status: COMPLETED | OUTPATIENT
Start: 2020-11-05 | End: 2020-11-05

## 2020-11-05 RX ORDER — LORAZEPAM 0.5 MG/1
0.5 TABLET ORAL EVERY 6 HOURS PRN
COMMUNITY

## 2020-11-05 RX ORDER — QUETIAPINE FUMARATE 25 MG/1
25 TABLET, FILM COATED ORAL
Status: DISCONTINUED | OUTPATIENT
Start: 2020-11-05 | End: 2020-11-06 | Stop reason: HOSPADM

## 2020-11-05 RX ORDER — PRAMIPEXOLE DIHYDROCHLORIDE 0.25 MG/1
0.25 TABLET ORAL
Status: DISCONTINUED | OUTPATIENT
Start: 2020-11-05 | End: 2020-11-06 | Stop reason: HOSPADM

## 2020-11-05 RX ORDER — LORAZEPAM 0.5 MG/1
0.5 TABLET ORAL
Status: DISCONTINUED | OUTPATIENT
Start: 2020-11-05 | End: 2020-11-06 | Stop reason: HOSPADM

## 2020-11-05 RX ORDER — PRIMIDONE 50 MG/1
50 TABLET ORAL
Status: DISCONTINUED | OUTPATIENT
Start: 2020-11-05 | End: 2020-11-06 | Stop reason: HOSPADM

## 2020-11-05 RX ORDER — FLUOXETINE HYDROCHLORIDE 20 MG/1
20 CAPSULE ORAL DAILY
COMMUNITY

## 2020-11-05 RX ADMIN — PRAMIPEXOLE DIHYDROCHLORIDE 0.25 MG: 0.25 TABLET ORAL at 21:32

## 2020-11-05 RX ADMIN — SODIUM CHLORIDE 500 ML: 0.9 INJECTION, SOLUTION INTRAVENOUS at 18:08

## 2020-11-05 RX ADMIN — LORAZEPAM 0.5 MG: 0.5 TABLET ORAL at 21:32

## 2020-11-05 RX ADMIN — POTASSIUM CHLORIDE 10 MEQ: 750 TABLET, EXTENDED RELEASE ORAL at 21:32

## 2020-11-05 RX ADMIN — LAMOTRIGINE 200 MG: 100 TABLET ORAL at 18:21

## 2020-11-05 RX ADMIN — QUETIAPINE FUMARATE 25 MG: 25 TABLET ORAL at 21:32

## 2020-11-05 RX ADMIN — PRIMIDONE 50 MG: 50 TABLET ORAL at 21:32

## 2020-11-05 RX ADMIN — MELATONIN 1.5 MG: at 21:32

## 2020-11-06 LAB
ATRIAL RATE: 68 BPM
P AXIS: 37 DEGREES
PR INTERVAL: 92 MS
QRS AXIS: 1 DEGREES
QRSD INTERVAL: 84 MS
QT INTERVAL: 422 MS
QTC INTERVAL: 448 MS
T WAVE AXIS: 52 DEGREES
VENTRICULAR RATE: 68 BPM

## 2020-11-06 PROCEDURE — 93010 ELECTROCARDIOGRAM REPORT: CPT | Performed by: INTERNAL MEDICINE

## 2020-11-09 LAB — LAMOTRIGINE SERPL-MCNC: 2.2 UG/ML (ref 2–20)

## 2020-11-14 ENCOUNTER — APPOINTMENT (EMERGENCY)
Dept: RADIOLOGY | Facility: HOSPITAL | Age: 65
End: 2020-11-14
Attending: EMERGENCY MEDICINE
Payer: MEDICARE

## 2020-11-14 ENCOUNTER — APPOINTMENT (EMERGENCY)
Dept: RADIOLOGY | Facility: HOSPITAL | Age: 65
End: 2020-11-14
Payer: MEDICARE

## 2020-11-14 ENCOUNTER — HOSPITAL ENCOUNTER (EMERGENCY)
Facility: HOSPITAL | Age: 65
Discharge: HOME | End: 2020-11-14
Attending: EMERGENCY MEDICINE | Admitting: EMERGENCY MEDICINE
Payer: MEDICARE

## 2020-11-14 VITALS
SYSTOLIC BLOOD PRESSURE: 154 MMHG | OXYGEN SATURATION: 97 % | TEMPERATURE: 96.9 F | RESPIRATION RATE: 18 BRPM | HEART RATE: 58 BPM | DIASTOLIC BLOOD PRESSURE: 60 MMHG

## 2020-11-14 DIAGNOSIS — R91.8 MASS OF RIGHT LUNG: Primary | ICD-10-CM

## 2020-11-14 DIAGNOSIS — N39.0 UTI (URINARY TRACT INFECTION): ICD-10-CM

## 2020-11-14 LAB
ANION GAP SERPL CALCULATED.3IONS-SCNC: 9 MMOL/L (ref 4–13)
BACTERIA UR QL AUTO: ABNORMAL /HPF
BASOPHILS # BLD AUTO: 0.06 THOUSANDS/ΜL (ref 0–0.1)
BASOPHILS NFR BLD AUTO: 1 % (ref 0–1)
BILIRUB UR QL STRIP: NEGATIVE
BUN SERPL-MCNC: 13 MG/DL (ref 5–25)
CALCIUM SERPL-MCNC: 9.7 MG/DL (ref 8.3–10.1)
CHLORIDE SERPL-SCNC: 98 MMOL/L (ref 100–108)
CLARITY UR: ABNORMAL
CO2 SERPL-SCNC: 30 MMOL/L (ref 21–32)
COLOR UR: YELLOW
CREAT SERPL-MCNC: 0.92 MG/DL (ref 0.6–1.3)
EOSINOPHIL # BLD AUTO: 0.23 THOUSAND/ΜL (ref 0–0.61)
EOSINOPHIL NFR BLD AUTO: 3 % (ref 0–6)
ERYTHROCYTE [DISTWIDTH] IN BLOOD BY AUTOMATED COUNT: 14.1 % (ref 11.6–15.1)
GFR SERPL CREATININE-BSD FRML MDRD: 66 ML/MIN/1.73SQ M
GLUCOSE SERPL-MCNC: 110 MG/DL (ref 65–140)
GLUCOSE UR STRIP-MCNC: NEGATIVE MG/DL
HCT VFR BLD AUTO: 46.2 % (ref 34.8–46.1)
HGB BLD-MCNC: 13.9 G/DL (ref 11.5–15.4)
HGB UR QL STRIP.AUTO: ABNORMAL
IMM GRANULOCYTES # BLD AUTO: 0.06 THOUSAND/UL (ref 0–0.2)
IMM GRANULOCYTES NFR BLD AUTO: 1 % (ref 0–2)
KETONES UR STRIP-MCNC: NEGATIVE MG/DL
LEUKOCYTE ESTERASE UR QL STRIP: ABNORMAL
LYMPHOCYTES # BLD AUTO: 3.12 THOUSANDS/ΜL (ref 0.6–4.47)
LYMPHOCYTES NFR BLD AUTO: 38 % (ref 14–44)
MCH RBC QN AUTO: 25.3 PG (ref 26.8–34.3)
MCHC RBC AUTO-ENTMCNC: 30.1 G/DL (ref 31.4–37.4)
MCV RBC AUTO: 84 FL (ref 82–98)
MONOCYTES # BLD AUTO: 0.5 THOUSAND/ΜL (ref 0.17–1.22)
MONOCYTES NFR BLD AUTO: 6 % (ref 4–12)
NEUTROPHILS # BLD AUTO: 4.15 THOUSANDS/ΜL (ref 1.85–7.62)
NEUTS SEG NFR BLD AUTO: 51 % (ref 43–75)
NITRITE UR QL STRIP: POSITIVE
NON-SQ EPI CELLS URNS QL MICRO: ABNORMAL /HPF
NRBC BLD AUTO-RTO: 0 /100 WBCS
PH UR STRIP.AUTO: 6 [PH]
PLATELET # BLD AUTO: 292 THOUSANDS/UL (ref 149–390)
PMV BLD AUTO: 9.7 FL (ref 8.9–12.7)
POTASSIUM SERPL-SCNC: 3.7 MMOL/L (ref 3.5–5.3)
PROT UR STRIP-MCNC: NEGATIVE MG/DL
RBC # BLD AUTO: 5.5 MILLION/UL (ref 3.81–5.12)
RBC #/AREA URNS AUTO: ABNORMAL /HPF
SODIUM SERPL-SCNC: 137 MMOL/L (ref 136–145)
SP GR UR STRIP.AUTO: 1.01 (ref 1–1.03)
UROBILINOGEN UR QL STRIP.AUTO: 0.2 E.U./DL
WBC # BLD AUTO: 8.12 THOUSAND/UL (ref 4.31–10.16)
WBC #/AREA URNS AUTO: ABNORMAL /HPF

## 2020-11-14 PROCEDURE — G1004 CDSM NDSC: HCPCS

## 2020-11-14 PROCEDURE — 71045 X-RAY EXAM CHEST 1 VIEW: CPT

## 2020-11-14 PROCEDURE — 72125 CT NECK SPINE W/O DYE: CPT

## 2020-11-14 PROCEDURE — 85025 COMPLETE CBC W/AUTO DIFF WBC: CPT | Performed by: EMERGENCY MEDICINE

## 2020-11-14 PROCEDURE — 70450 CT HEAD/BRAIN W/O DYE: CPT

## 2020-11-14 PROCEDURE — 99284 EMERGENCY DEPT VISIT MOD MDM: CPT | Performed by: EMERGENCY MEDICINE

## 2020-11-14 PROCEDURE — 99285 EMERGENCY DEPT VISIT HI MDM: CPT

## 2020-11-14 PROCEDURE — 96374 THER/PROPH/DIAG INJ IV PUSH: CPT

## 2020-11-14 PROCEDURE — 80175 DRUG SCREEN QUAN LAMOTRIGINE: CPT | Performed by: EMERGENCY MEDICINE

## 2020-11-14 PROCEDURE — 81001 URINALYSIS AUTO W/SCOPE: CPT | Performed by: EMERGENCY MEDICINE

## 2020-11-14 PROCEDURE — 80048 BASIC METABOLIC PNL TOTAL CA: CPT | Performed by: EMERGENCY MEDICINE

## 2020-11-14 PROCEDURE — 36415 COLL VENOUS BLD VENIPUNCTURE: CPT | Performed by: EMERGENCY MEDICINE

## 2020-11-14 RX ORDER — NITROFURANTOIN 25; 75 MG/1; MG/1
100 CAPSULE ORAL 2 TIMES DAILY
Qty: 10 CAPSULE | Refills: 0 | Status: SHIPPED | OUTPATIENT
Start: 2020-11-14

## 2020-11-14 RX ORDER — NITROFURANTOIN 25; 75 MG/1; MG/1
100 CAPSULE ORAL ONCE
Status: COMPLETED | OUTPATIENT
Start: 2020-11-14 | End: 2020-11-14

## 2020-11-14 RX ORDER — LORAZEPAM 2 MG/ML
0.5 INJECTION INTRAMUSCULAR ONCE
Status: COMPLETED | OUTPATIENT
Start: 2020-11-14 | End: 2020-11-14

## 2020-11-14 RX ORDER — IPRATROPIUM BROMIDE AND ALBUTEROL SULFATE 2.5; .5 MG/3ML; MG/3ML
3 SOLUTION RESPIRATORY (INHALATION) ONCE
Status: COMPLETED | OUTPATIENT
Start: 2020-11-14 | End: 2020-11-14

## 2020-11-14 RX ADMIN — NITROFURANTOIN (MONOHYDRATE/MACROCRYSTALS) 100 MG: 75; 25 CAPSULE ORAL at 04:23

## 2020-11-14 RX ADMIN — LORAZEPAM 0.5 MG: 2 INJECTION INTRAMUSCULAR; INTRAVENOUS at 06:12

## 2020-11-14 RX ADMIN — IPRATROPIUM BROMIDE AND ALBUTEROL SULFATE 3 ML: 2.5; .5 SOLUTION RESPIRATORY (INHALATION) at 05:52

## 2020-11-16 LAB — LAMOTRIGINE SERPL-MCNC: 3.7 UG/ML (ref 2–20)

## 2021-03-09 NOTE — RESPIRATORY THERAPY NOTE
RT Ventilator Management Note  Harris Salinas 59 y o  female MRN: [id number]  Unit/Bed#:  -01 Encounter: [id number]      Daily Screen       1/1/2019 0902 1/2/2019 0745          Patient safety screen outcome[de-identified] - Passed      Not Ready for Weaning due to[de-identified] Underline problem not resolved;Poor inspiratory effort Underline problem not resolved      Spont breathing trial % for 30 min: Yes -      Spont breathing trial outcome[de-identified] Failed -      Spont breathing trial reason failed: RR > 35 bpm;Tidal volume < 4ml/Kg of ideal body weight or VE <5 or  >15 LPM;RSBI > 100 -      Previous settings resumed: Yes -      Name of Medical Team Notified[de-identified] [facility] -              Physical Exam:   Assessment Type: Assess only      Resp Comments: (P) pt appears comfortable on current settings, will continue to monitor, no weaning attempted, pt going to OR this AM [Nutrition/ Exercise/ Weight Management] : nutrition, exercise, weight management [Body Image] : body image [Vitamins/Supplements] : vitamins/supplements [Sunscreen] : sunscreen [Smoking Cessation] : smoking cessation [Drugs/Alcohol] : drugs, alcohol [Breast Self Exam] : breast self exam [Bladder Hygiene] : bladder hygiene

## 2022-05-15 NOTE — DISCHARGE INSTRUCTIONS
"Last Written Prescription Date:  913/20  Last Fill Quantity: 135,  # refills: 3   Last office visit provider:  4/11/22     Requested Prescriptions   Pending Prescriptions Disp Refills     famotidine (PEPCID) 20 MG tablet [Pharmacy Med Name: Famotidine Oral Tablet 20 MG] 180 tablet 0     Sig: Take 1 tablet (20 mg total) by mouth 2 (two) times a day as needed for heartburn       H2 Blockers Protocol Passed - 5/13/2022 12:16 PM        Passed - Patient is age 12 or older        Passed - Recent (12 mo) or future (30 days) visit within the authorizing provider's specialty     Patient has had an office visit with the authorizing provider or a provider within the authorizing providers department within the previous 12 mos or has a future within next 30 days. See \"Patient Info\" tab in inbasket, or \"Choose Columns\" in Meds & Orders section of the refill encounter.              Passed - Medication is active on med Mary Anderson RN 05/15/22 6:01 PM  " Finger Laceration   WHAT YOU NEED TO KNOW:   A finger laceration is a deep cut in your skin  It is often caused by a sharp object, such as a knife, or blunt force to your finger  Your blood vessels, bones, joints, tendons, or nerves may also be injured  DISCHARGE INSTRUCTIONS:   Return to the emergency department if:   · Your wound comes apart  · Blood soaks through your bandage  · You have severe pain in your finger or hand  · Your finger is pale and cold  · You have sudden trouble moving your finger  · Your swelling suddenly gets worse  · You have red streaks on your skin coming from your wound  Contact your healthcare provider or hand specialist if:   · You have new numbness or tingling  · Your finger feels warm, looks swollen or red, and is draining pus  · You have a fever  · You have questions or concerns about your condition or care  Medicines: You may  need any of the following:  · Antibiotics  help prevent a bacterial infection  · Acetaminophen  decreases pain and fever  It is available without a doctor's order  Ask how much to take and how often to take it  Follow directions  Read the labels of all other medicines you are using to see if they also contain acetaminophen, or ask your doctor or pharmacist  Acetaminophen can cause liver damage if not taken correctly  Do not use more than 4 grams (4,000 milligrams) total of acetaminophen in one day  · Prescription pain medicine  may be given  Ask your healthcare provider how to take this medicine safely  Some prescription pain medicines contain acetaminophen  Do not take other medicines that contain acetaminophen without talking to your healthcare provider  Too much acetaminophen may cause liver damage  Prescription pain medicine may cause constipation  Ask your healthcare provider how to prevent or treat constipation  · Take your medicine as directed    Contact your healthcare provider if you think your medicine is not helping or if you have side effects  Tell him or her if you are allergic to any medicine  Keep a list of the medicines, vitamins, and herbs you take  Include the amounts, and when and why you take them  Bring the list or the pill bottles to follow-up visits  Carry your medicine list with you in case of an emergency  Self-care:   · Apply ice  on your finger for 15 to 20 minutes every hour or as directed  Use an ice pack, or put crushed ice in a plastic bag  Cover it with a towel before you apply it to your skin  Ice helps prevent tissue damage and decreases swelling and pain  · Elevate  your hand above the level of your heart as often as you can  This will help decrease swelling and pain  Prop your hand on pillows or blankets to keep it elevated comfortably  · Wear your splint as directed  A splint will decrease movement and stress on your wound  The splint may help your wound heal faster  Ask your healthcare provider how to apply and remove a splint  · Apply ointments to decrease scarring  Do not apply ointments until your healthcare provider says it is okay  You may need to wait until your wound is healed  Ask which ointment to buy and how often to use it  Wound care:   · Do not get your wound wet until your healthcare provider says it is okay  Do not soak your hand in water  Do not go swimming until your healthcare provider says it is okay  When your healthcare provider says it is okay, carefully wash around the wound with soap and water  Let soap and water run over your wound  Gently pat the area dry or allow it to air dry  · Change your bandages when they get wet, dirty, or after washing  Apply new, clean bandages as directed  Do not apply elastic bandages or tape too tightly  Do not put powders or lotions on your wound  · Apply antibiotic ointment as directed  Your healthcare provider may give you antibiotic ointment to put over your wound if you have stitches   If you have Strips-Strips over your wound, let them dry up and fall off on their own  If they do not fall off within 14 days, gently remove them  If you have glue over your wound, do not remove or pick at it  If your glue comes off, do not replace it with glue that you have at home  · Check your wound every day for signs of infection  Signs of infection include swelling, redness, or pus  Follow up with your healthcare provider or hand specialist in 2 days:  Write down your questions so you remember to ask them during your visits  © 2017 [employer]  Information is for End User's use only and may not be sold, redistributed or otherwise used for commercial purposes  All illustrations and images included in CareNotes® are the copyrighted property of [employer] , [employer]  or [employer] Astudillo  The above information is an  only  It is not intended as medical advice for individual conditions or treatments  Talk to your doctor, nurse or pharmacist before following any medical regimen to see if it is safe and effective for you

## 2022-12-06 ENCOUNTER — HOSPITAL ENCOUNTER (INPATIENT)
Facility: HOSPITAL | Age: 67
LOS: 2 days | Discharge: NURSING, REHAB OR LONG-TERM CARE | End: 2022-12-08
Attending: EMERGENCY MEDICINE | Admitting: INTERNAL MEDICINE

## 2022-12-06 ENCOUNTER — APPOINTMENT (EMERGENCY)
Dept: RADIOLOGY | Facility: HOSPITAL | Age: 67
End: 2022-12-06

## 2022-12-06 DIAGNOSIS — I10 ESSENTIAL HYPERTENSION: ICD-10-CM

## 2022-12-06 DIAGNOSIS — R91.8 LUNG MASS: ICD-10-CM

## 2022-12-06 DIAGNOSIS — R04.2 HEMOPTYSIS: Primary | ICD-10-CM

## 2022-12-06 DIAGNOSIS — J95.01 TRACHEOSTOMY HEMORRHAGE (HCC): ICD-10-CM

## 2022-12-06 DIAGNOSIS — Z71.89 ENCOUNTER FOR HOSPICE CARE DISCUSSION: ICD-10-CM

## 2022-12-06 DIAGNOSIS — E46 PROTEIN-CALORIE MALNUTRITION (HCC): ICD-10-CM

## 2022-12-06 PROBLEM — R47.01 NONVERBAL: Status: ACTIVE | Noted: 2022-12-06

## 2022-12-06 PROBLEM — N30.90 CYSTITIS: Status: RESOLVED | Noted: 2022-12-06 | Resolved: 2022-12-06

## 2022-12-06 PROBLEM — N30.90 CYSTITIS: Status: ACTIVE | Noted: 2022-12-06

## 2022-12-06 LAB
ANION GAP SERPL CALCULATED.3IONS-SCNC: 7 MMOL/L (ref 4–13)
BASOPHILS # BLD AUTO: 0.08 THOUSANDS/ÂΜL (ref 0–0.1)
BASOPHILS NFR BLD AUTO: 1 % (ref 0–1)
BUN SERPL-MCNC: 19 MG/DL (ref 5–25)
CALCIUM SERPL-MCNC: 9.7 MG/DL (ref 8.3–10.1)
CHLORIDE SERPL-SCNC: 99 MMOL/L (ref 96–108)
CO2 SERPL-SCNC: 30 MMOL/L (ref 21–32)
CREAT SERPL-MCNC: 0.82 MG/DL (ref 0.6–1.3)
EOSINOPHIL # BLD AUTO: 0.16 THOUSAND/ÂΜL (ref 0–0.61)
EOSINOPHIL NFR BLD AUTO: 2 % (ref 0–6)
ERYTHROCYTE [DISTWIDTH] IN BLOOD BY AUTOMATED COUNT: 15.5 % (ref 11.6–15.1)
GFR SERPL CREATININE-BSD FRML MDRD: 74 ML/MIN/1.73SQ M
GLUCOSE SERPL-MCNC: 187 MG/DL (ref 65–140)
GLUCOSE SERPL-MCNC: 224 MG/DL (ref 65–140)
HCT VFR BLD AUTO: 43.6 % (ref 34.8–46.1)
HGB BLD-MCNC: 13.3 G/DL (ref 11.5–15.4)
IMM GRANULOCYTES # BLD AUTO: 0.05 THOUSAND/UL (ref 0–0.2)
IMM GRANULOCYTES NFR BLD AUTO: 1 % (ref 0–2)
LYMPHOCYTES # BLD AUTO: 2.22 THOUSANDS/ÂΜL (ref 0.6–4.47)
LYMPHOCYTES NFR BLD AUTO: 28 % (ref 14–44)
MCH RBC QN AUTO: 24.6 PG (ref 26.8–34.3)
MCHC RBC AUTO-ENTMCNC: 30.5 G/DL (ref 31.4–37.4)
MCV RBC AUTO: 81 FL (ref 82–98)
MONOCYTES # BLD AUTO: 0.53 THOUSAND/ÂΜL (ref 0.17–1.22)
MONOCYTES NFR BLD AUTO: 7 % (ref 4–12)
NEUTROPHILS # BLD AUTO: 5 THOUSANDS/ÂΜL (ref 1.85–7.62)
NEUTS SEG NFR BLD AUTO: 61 % (ref 43–75)
NRBC BLD AUTO-RTO: 0 /100 WBCS
PLATELET # BLD AUTO: 311 THOUSANDS/UL (ref 149–390)
PMV BLD AUTO: 9.8 FL (ref 8.9–12.7)
POTASSIUM SERPL-SCNC: 4.2 MMOL/L (ref 3.5–5.3)
RBC # BLD AUTO: 5.4 MILLION/UL (ref 3.81–5.12)
SODIUM SERPL-SCNC: 136 MMOL/L (ref 135–147)
WBC # BLD AUTO: 8.04 THOUSAND/UL (ref 4.31–10.16)

## 2022-12-06 RX ORDER — QUETIAPINE FUMARATE 25 MG/1
50 TABLET, FILM COATED ORAL
Status: DISCONTINUED | OUTPATIENT
Start: 2022-12-06 | End: 2022-12-08 | Stop reason: HOSPADM

## 2022-12-06 RX ORDER — INSULIN LISPRO 100 [IU]/ML
1-5 INJECTION, SOLUTION INTRAVENOUS; SUBCUTANEOUS
Status: DISCONTINUED | OUTPATIENT
Start: 2022-12-06 | End: 2022-12-08 | Stop reason: HOSPADM

## 2022-12-06 RX ORDER — PANTOPRAZOLE SODIUM 40 MG/10ML
40 INJECTION, POWDER, LYOPHILIZED, FOR SOLUTION INTRAVENOUS
Status: DISCONTINUED | OUTPATIENT
Start: 2022-12-07 | End: 2022-12-08 | Stop reason: HOSPADM

## 2022-12-06 RX ORDER — LANOLIN ALCOHOL/MO/W.PET/CERES
3 CREAM (GRAM) TOPICAL
Status: DISCONTINUED | OUTPATIENT
Start: 2022-12-06 | End: 2022-12-08 | Stop reason: HOSPADM

## 2022-12-06 RX ORDER — DOCUSATE SODIUM 100 MG/1
200 CAPSULE, LIQUID FILLED ORAL
COMMUNITY

## 2022-12-06 RX ORDER — PANTOPRAZOLE SODIUM 20 MG/1
20 TABLET, DELAYED RELEASE ORAL
Status: CANCELLED | OUTPATIENT
Start: 2022-12-07

## 2022-12-06 RX ORDER — POLYETHYLENE GLYCOL 3350 17 G/17G
17 POWDER, FOR SOLUTION ORAL DAILY
COMMUNITY

## 2022-12-06 RX ORDER — LEVOTHYROXINE SODIUM 88 UG/1
88 TABLET ORAL
Status: DISCONTINUED | OUTPATIENT
Start: 2022-12-07 | End: 2022-12-08 | Stop reason: HOSPADM

## 2022-12-06 RX ORDER — ONDANSETRON 2 MG/ML
4 INJECTION INTRAMUSCULAR; INTRAVENOUS EVERY 6 HOURS PRN
Status: DISCONTINUED | OUTPATIENT
Start: 2022-12-06 | End: 2022-12-08 | Stop reason: HOSPADM

## 2022-12-06 RX ORDER — POLYETHYLENE GLYCOL 3350 17 G/17G
17 POWDER, FOR SOLUTION ORAL DAILY PRN
Status: DISCONTINUED | OUTPATIENT
Start: 2022-12-06 | End: 2022-12-08 | Stop reason: HOSPADM

## 2022-12-06 RX ORDER — LORAZEPAM 0.5 MG/1
0.5 TABLET ORAL EVERY 6 HOURS PRN
Status: DISCONTINUED | OUTPATIENT
Start: 2022-12-06 | End: 2022-12-08 | Stop reason: HOSPADM

## 2022-12-06 RX ORDER — BENZTROPINE MESYLATE 0.5 MG/1
0.5 TABLET ORAL 2 TIMES DAILY
COMMUNITY

## 2022-12-06 RX ORDER — SODIUM CHLORIDE 9 MG/ML
75 INJECTION, SOLUTION INTRAVENOUS CONTINUOUS
Status: DISCONTINUED | OUTPATIENT
Start: 2022-12-06 | End: 2022-12-07

## 2022-12-06 RX ORDER — FLUOXETINE HYDROCHLORIDE 20 MG/1
40 CAPSULE ORAL DAILY
Status: DISCONTINUED | OUTPATIENT
Start: 2022-12-07 | End: 2022-12-08 | Stop reason: HOSPADM

## 2022-12-06 RX ORDER — PRAMIPEXOLE DIHYDROCHLORIDE 0.25 MG/1
0.25 TABLET ORAL 3 TIMES DAILY
Status: DISCONTINUED | OUTPATIENT
Start: 2022-12-06 | End: 2022-12-08 | Stop reason: HOSPADM

## 2022-12-06 RX ADMIN — SODIUM CHLORIDE 75 ML/HR: 0.9 INJECTION, SOLUTION INTRAVENOUS at 21:15

## 2022-12-06 RX ADMIN — QUETIAPINE FUMARATE 50 MG: 25 TABLET ORAL at 22:46

## 2022-12-06 RX ADMIN — Medication 3 MG: at 22:45

## 2022-12-06 RX ADMIN — PRAMIPEXOLE DIHYDROCHLORIDE 0.25 MG: 0.25 TABLET ORAL at 22:45

## 2022-12-06 RX ADMIN — LORAZEPAM 0.5 MG: 0.5 TABLET ORAL at 22:46

## 2022-12-06 RX ADMIN — IOHEXOL 85 ML: 350 INJECTION, SOLUTION INTRAVENOUS at 14:14

## 2022-12-06 NOTE — ASSESSMENT & PLAN NOTE
Lab Results   Component Value Date    HGBA1C 5 7 04/11/2019       No results for input(s): POCGLU in the last 72 hours      Blood Sugar Average: Last 72 hrs:  Improved, last A1c 3 years ago, repeat pending  SSI, hold home meds, consider basal and prandial after restarting food

## 2022-12-06 NOTE — H&P
[address]  H&P- Markie Moreno 1955, 78 y o  female MRN: [id number]  Unit/Bed#: 2 [place] [address] Encounter: [id number]  Primary Care Provider: Renae Garcia MD   Date and time admitted to hospital: 12/6/2022  1:02 PM    * Tracheostomy hemorrhage [facility])  Assessment & Plan  Noted at [facility], suction and congruent    · Trend H&H  · Pulmonology consulted, who will scope in morning, n p o  at midnight  · No O2 support  · Will monitor  · Prn suction    Encounter for hospice care discussion  Assessment & Plan  Per POA brother Ed's request: DNR/I    Lung mass  Assessment & Plan  CTA: No pulmonary embolus  3 5 x 1 8 cm right upper lobe mass extending to the right suprahilar region with an enlarged right suprahilar node  This is compatible with malignancy  Pulmonology consulted: Hemoptysis likely 2/2 to RUL mass suspicious for cancer  Discussed with the patient's family -- the do not wish to pursue diagnostic workup or treatment if the suspicion is cancer, and they wish to have patient be DNR/DNI        Plan:  - Keep NPO tonight for possible bronchoscopy tomorrow  - patients family will discuss whether to pursue with an intervention vs  hospice enrollment tonight and follow-up in the AM  - her POA is Kevin Johnstondoch (brother), who confirms patient is DNR/DNI  ·      Depression  Assessment & Plan  Continue home Prozac and mood stabilizers Seroquel and prn ativan    Parkinson disease ([facility] [address] )  Assessment & Plan  Continue home meds    Nonverbal  Assessment & Plan  Patient's baseline, will reach out to  NH and family for additional details    Essential hypertension  Assessment & Plan  Chronic,  · Continue home medications    Protein-calorie malnutrition ([facility] [address] )  Assessment & Plan  Malnutrition Findings:                   Nutrition consulted              BMI Findings: Body mass index is 22 5 kg/m²         Diabetes mellitus, type II [facility])  Assessment & Plan  Lab Results   Component Value Date    HGBA1C 5 7 04/11/2019       No results for input(s): POCGLU in the last 72 hours  Blood Sugar Average: Last 72 hrs:  Improved, last A1c 3 years ago, repeat pending  SSI, hold home meds, consider basal and prandial after restarting food    VTE Pharmacologic Prophylaxis: VTE Score: 3 Moderate Risk (Score 3-4) - Pharmacological DVT Prophylaxis Contraindicated  Sequential Compression Devices Ordered  Code Status: Level 3 - DNAR and DNI   Discussion with family: Attempted to update  (sister and brother) via phone  Unable to contact  Anticipated Length of Stay: Patient will be admitted on an inpatient basis with an anticipated length of stay of greater than 2 midnights secondary to Clinical course  Total Time for Visit, including Counseling / Coordination of Care: 60 minutes Greater than 50% of this total time spent on direct patient counseling and coordination of care  Chief Complaint: Bleeding from trach    History of Present Illness:  Grant Kerns is a 78 y o  female with a PMH of  DM, who presents with   Pts brother stated that he spoke with pulmonologist regarding sisters findings does not want her to be scoped or have lung mass biopsied, he reported he does not want sister to suffer any longer  Brother stated that he has POA and informed needs to bring it to hospital  Pt brother Kasey Ferguson stated that he spoke with family and they want to make her a hospice candidate  Pts brother stated pts minimal verbql responses of "no" could mean "yes or no" that it can not be taken as a accurate response to pts current condition  Pts Brother stated that pt is DNR/i and pt reported no resuscitative efforts  HPI limited  Review of Systems:  Review of Systems   Unable to perform ROS: Patient nonverbal   Gastrointestinal: Negative for abdominal pain  Neurological: Negative for headaches         Past Medical and Surgical History:   Past Medical History:   Diagnosis Date   • Arthritis     osteo   • Bipolar disorder ([facility] [address] ) • Chronic kidney disease    • COPD (chronic obstructive pulmonary disease) ([facility])    • Depression    • Diabetes mellitus ([facility] [address] )    • Diabetes mellitus, type II ([facility] [address] )    • Disease of thyroid gland    • DVT (deep vein thrombosis) in pregnancy 1992    right leg   • Elbow disorder     fx right elbow   • Enlarged heart    • Essential tremor    • GERD (gastroesophageal reflux disease)    • Hyperlipidemia    • Parkinson disease ([facility])    • Parkinsons disease ([facility])    • Pneumonia     x3   • Psychiatric disorder    • RLS (restless legs syndrome)    • Wears partial dentures     upper       Past Surgical History:   Procedure Laterality Date   • ABDOMINAL SURGERY      adhesions x3 surgeries   • APPENDECTOMY     • BACK SURGERY      fusion L5-S1 with hardware   • BLADDER SUSPENSION     • BREAST SURGERY Bilateral     reduction   • CHOLECYSTECTOMY      lap   • COLONOSCOPY N/A 2/8/2017    Procedure: COLONOSCOPY;  Surgeon: Lisbet Wheat MD;  Location: [place] [address] GI LAB; Service:    • CRANIECTOMY Left 12/20/2018    Procedure: Left hemicraniectomy with evacuation of clot; Surgeon: Ju Gates MD;  Location:  MAIN OR;  Service: Neurosurgery   • CRANIOPLASTY Left 4/11/2019    Procedure: Left Autologous Cranioplasty;  Surgeon: Ju Gates MD;  [facility] MAIN [facility];  Service: Neurosurgery   • ELBOW ARTHROSCOPY Right     tendon repair   • ESOPHAGOGASTRODUODENOSCOPY N/A 2/8/2017    Procedure: ESOPHAGOGASTRODUODENOSCOPY (EGD); Surgeon: Lisbet Wheat MD;  Location: [facility] GI LAB; Service:    • FASCIECTOMY Right 2/13/2017    Procedure:  MIDDLE FINGER SCAR REVISION AND Z-PLASTY;  Surgeon: Lidia Joseph MD;  Location: [facility] MAIN OR;  Service:    • FOOT SURGERY Right 1980    metatarsal repair   • FOOT SURGERY Left 1985    metatarsal repair, bunionectomy   • FRACTURE SURGERY Left     post trauma, fusion, hardware   • GASTROSTOMY TUBE PLACEMENT N/A 1/2/2019    Procedure: INSERTION PEG TUBE;  Surgeon: Rafael Moncada MD;  Location:  MAIN OR;  Service: General   • HAND SURGERY Right     ganglion   • HAND SURGERY Right     trigger finger   • HAND SURGERY Right     tendon release/dupuytrens   • KNEE ARTHROSCOPY W/ DEBRIDEMENT Left     meniscus repair   • NASAL SEPTUM SURGERY     • NASAL SEPTUM SURGERY     • PEG W/TRACHEOSTOMY PLACEMENT N/A 12/24/2018    Procedure: TRACHEOSTOMY;  Surgeon: Ashli Valles MD;  Location:  MAIN OR;  Service: General   • MT REMOVAL OF HEAD OF RADIUS Right 11/16/2017    Procedure: RADIAL HEAD EXCISION, ELBOW;  Surgeon: Christopher Geiger MD;  Location: [facility] MAIN OR;  Service: Orthopedics   • SHOULDER ARTHROSCOPY Right    • SPINAL FUSION         Meds/Allergies:  Prior to Admission medications    Medication Sig Start Date End Date Taking?  Authorizing Provider   acetaminophen (TYLENOL) 325 mg tablet Take 650 mg by mouth every 4 (four) hours as needed for mild pain     Historical Provider, MD   FLUoxetine (PROzac) 20 mg capsule Take 20 mg by mouth daily On wednesdays    Historical Provider, MD   FLUoxetine (PROzac) 40 MG capsule Take 40 mg by mouth daily Every day except wednesday    Historical Provider, MD   glucagon (GLUCAGON EMERGENCY) 1 MG injection Inject 1 mg under the skin once as needed for low blood sugar    Historical Provider, MD   lamoTRIgine (LaMICtal) 200 MG tablet Take 200 mg by mouth daily     Historical Provider, MD   levothyroxine 100 mcg tablet 88 mcg by Per G Tube route daily     Historical Provider, MD   LORazepam (ATIVAN) 0 5 mg tablet Take 0 5 mg by mouth every 6 (six) hours as needed     Historical Provider, MD   melatonin 1 mg Take 1 mg by mouth daily at bedtime    Historical Provider, MD   nitrofurantoin (MACROBID) 100 mg capsule Take 1 capsule (100 mg total) by mouth 2 (two) times a day 11/14/20   Kelle Ríos MD   omeprazole (PriLOSEC) 20 mg delayed release capsule Take 20 mg by mouth daily     Historical Provider, MD   potassium chloride (KLOR-CON) 20 mEq packet Take 20 mEq by mouth 2 (two) times a day    Historical Provider, MD   pramipexole (MIRAPEX) 0 25 mg tablet Take 0 25 mg by mouth 3 (three) times a day     Historical Provider, MD   primidone (MYSOLINE) 50 mg tablet Take by mouth every 12 (twelve) hours    Historical Provider, MD   QUEtiapine (SEROquel) 25 mg tablet Take 50 mg by mouth daily at bedtime     Historical Provider, MD   [facility] topically daily    Historical Provider, MD PIZANO have reviewed home medications using recent Epic encounter  Allergies:    Allergies   Allergen Reactions   • Aristocort [Triamcinolone] Hives   • Bactrim [Sulfamethoxazole-Trimethoprim] Itching   • Bupropion Itching   • Ciprofloxacin Hives   • Codeine GI Intolerance   • Latex      Sensitive / rash   • Methylprednisolone Hives   • Penicillins Hives   • Percocet [Oxycodone-Acetaminophen] GI Intolerance   • Vioxx [Rofecoxib] Itching   • Carbamazepine Rash   • Ezetimibe Rash   • Lipitor [Atorvastatin] Rash   • Zocor [Simvastatin] Rash       Social History:  Marital Status: Single   Occupation: n/a  Patient Pre-hospital Living Situation: [facility]  Patient Pre-hospital Level of Mobility: non-ambulatory/bed bound  Patient Pre-hospital Diet Restrictions:   Substance Use History:   Social History     Substance and Sexual Activity   Alcohol Use Not Currently     Social History     Tobacco Use   Smoking Status Former   • Packs/day: 0 00   • Years: 30 00   • Pack years: 0 00   • Types: Cigarettes   Smokeless Tobacco Never     Social History     Substance and Sexual Activity   Drug Use No       Family History:  Family History   Problem Relation Age of Onset   • Cerebral aneurysm Sister    • Cancer Mother         non hodgkins lymphoma   • Parkinsonism Mother    • Heart disease Father    • Cancer Brother         colon   • Cancer Maternal Grandmother         breast   • Cancer Maternal Aunt         breast   • Aneurysm Sister        Physical Exam:     Vitals:   Blood Pressure: 119/81 (12/06/22 1814)  Pulse: 69 (12/06/22 1814)  Temperature: 98 2 °F (36 8 °C) (12/06/22 1814)  Temp Source: Oral (12/06/22 1814)  Respirations: 22 (12/06/22 1814)  Height: 5' 3" (160 cm) (12/06/22 1307)  Weight - Scale: 57 6 kg (127 lb) (12/06/22 1307)  SpO2: 92 % (12/06/22 1814)    Physical Exam  Vitals and nursing note reviewed  Constitutional:       General: She is not in acute distress  Appearance: She is well-developed  She is not ill-appearing  HENT:      Head: Normocephalic and atraumatic  Eyes:      Conjunctiva/sclera: Conjunctivae normal    Neck:      Comments: Trach with dried blood  Cardiovascular:      Rate and Rhythm: Normal rate and regular rhythm  Heart sounds: No murmur heard  Pulmonary:      Effort: Pulmonary effort is normal  No respiratory distress  Breath sounds: Normal breath sounds  Transmitted upper airway sounds present  Abdominal:      Palpations: Abdomen is soft  Tenderness: There is no abdominal tenderness  Musculoskeletal:         General: No swelling  Cervical back: Neck supple  Comments: Contractures distal extremites     Skin:     General: Skin is warm and dry  Capillary Refill: Capillary refill takes less than 2 seconds  Neurological:      Mental Status: She is alert  Mental status is at baseline  Psychiatric:         Behavior: Behavior is agitated            Additional Data:     Lab Results:  Results from last 7 days   Lab Units 12/06/22  1331   WBC Thousand/uL 8 04   HEMOGLOBIN g/dL 13 3   HEMATOCRIT % 43 6   PLATELETS Thousands/uL 311   NEUTROS PCT % 61   LYMPHS PCT % 28   MONOS PCT % 7   EOS PCT % 2     Results from last 7 days   Lab Units 12/06/22  1331   SODIUM mmol/L 136   POTASSIUM mmol/L 4 2   CHLORIDE mmol/L 99   CO2 mmol/L 30   BUN mg/dL 19   CREATININE mg/dL 0 82   ANION GAP mmol/L 7   CALCIUM mg/dL 9 7   GLUCOSE RANDOM mg/dL 224*         Results from last 7 days   Lab Units 12/06/22  1832   POC GLUCOSE mg/dl 187* Lines/Drains:  Invasive Devices     Peripheral Intravenous Line  Duration           Peripheral IV 12/06/22 Left Antecubital <1 day          Drain  Duration           Gastrostomy/Enterostomy Percutaneous endoscopic gastrostomy (PEG) 20 Fr  LUQ 1434 days          Airway  Duration           Surgical Airway Shiley 1443 days                    Imaging: Reviewed radiology reports from this admission including: CTA  CTA ED chest PE study   Final Result by Ahmet Escalante MD (12/06 1457)      No pulmonary embolus  3 5 x 1 8 cm right upper lobe mass extending to the right suprahilar region with an enlarged right suprahilar node  This is compatible with malignancy  I personally discussed this study with Dr Bernadette Mcfarlane on 12/6/2022 at 2:55 PM                       Workstation performed: [id number]             EKG and Other Studies Reviewed on Admission:   · EKG: No EKG obtained  ** Please Note: This note has been constructed using a voice recognition system   **

## 2022-12-06 NOTE — ED NOTES
Marlee Palacio, pt's brother, called for an update  He was updated on pt and requested that the admitting doctor provide an update when available  His number is [phone]       Kash John RN  12/06/22 0068

## 2022-12-06 NOTE — ASSESSMENT & PLAN NOTE
Malnutrition Findings:                   Nutrition consulted              BMI Findings: Body mass index is 22 5 kg/m²

## 2022-12-06 NOTE — ED NOTES
[address] [facility] was called to update them on pt's status  Spoke to Jordan who was given an update       Melisa Ochoa RN  12/06/22 7665

## 2022-12-06 NOTE — CONSULTS
Pulmonary Consult Note     Assessment:   Hemoptysis likely 2/2 to RUL mass suspicious for cancer  Discussed with the patient's family -- the do not wish to pursue diagnostic workup or treatment if the suspicion is cancer, and they wish to have patient be DNR/DNI  Plan:  - Keep NPO tonight for possible bronchoscopy tomorrow  - patients family will discuss whether to pursue with an intervention vs  hospice enrollment tonight and follow-up in the AM  - her POA is Maximino Benitez (brother), who confirms patient is DNR/DNI  [address] [phone]  Nilandry Becerril) [phone]      Referring Provider: [place]    Chief Complaint: Hemoptysis     HPI:   77 yo F w COPD, hx of trauma and former smoker presented from [facility] with bleeding from tracheostomy  She has a hx of hemicraniectomy in 2019 following a trauma and had a L temporal hematomy with cranioplasty  She is at a SNF due to residual deficits from this trauma and is functionally dependent  She had a tracheostomy following this trauma, and had been decannulated for appx 2 years  Her tracheostomy never completely healed  It is unclear how many episodes of hemoptysis she had but it was noted for appx 2 days as per ED documentation  No associated fevers  Workup included CT imaging which showed a RUL mass  Review of CT imaging of the cervical spine showed similar RUL mass in 2020  The patient's family reports that this was never worked up       Social History:   Social History     Tobacco Use   • Smoking status: Former     Packs/day: 0 00     Years: 30 00     Pack years: 0 00     Types: Cigarettes   • Smokeless tobacco: Never   Substance Use Topics   • Alcohol use: Not Currently   • Drug use: No       Pmhx:   Past Medical History:   Diagnosis Date   • Arthritis     osteo   • B[facility] disorder ([facility] )    • Chronic kidney disease    • COPD (chronic obstructive pulmonary disease) ([facility] )    • Depression    • Diabetes mellitus ([facility] )    • [facility] mellitus, [facility] II [facility]    • Disease of thyroid gland    • DVT (deep vein thrombosis) in pregnancy 1992    right leg   • Elbow disorder     fx right elbow   • Enlarged heart    • Essential tremor    • GERD (gastroesophageal reflux disease)    • Hyperlipidemia    • Parkinson disease ([facility])    • Parkinsons disease ([facility])    • Pneumonia     x3   • Psychiatric disorder    • RLS (restless legs syndrome)    • Wears partial dentures     upper        Surgical history:   Past Surgical History:   Procedure Laterality Date   • ABDOMINAL SURGERY      adhesions x3 surgeries   • APPENDECTOMY     • BACK SURGERY      fusion L5-S1 with hardware   • BLADDER SUSPENSION     • BREAST SURGERY Bilateral     reduction   • CHOLECYSTECTOMY      lap   • COLONOSCOPY N/A 2/8/2017    Procedure: COLONOSCOPY;  Surgeon: Sherrell Lee MD;  Location: [facility] GI LAB; Service:    • CRANIECTOMY Left 12/20/2018    Procedure: Left hemicraniectomy with evacuation of clot; Surgeon: Mei Salinas MD;  [facility] MAIN [facility];  Service: Neurosurgery   • CRANIOPLASTY Left 4/11/2019    Procedure: Left Autologous Cranioplasty;  Surgeon: Mei Salinas MD;  [facility];  Service: Neurosurgery   • ELBOW ARTHROSCOPY Right     tendon repair   • ESOPHAGOGASTRODUODENOSCOPY N/A 2/8/2017    Procedure: ESOPHAGOGASTRODUODENOSCOPY (EGD); Surgeon: Sherrell Lee MD;  Location: [facility] GI LAB; Service:    • FASCIECTOMY Right 2/13/2017    Procedure:  MIDDLE FINGER SCAR REVISION AND Z-PLASTY;  Surgeon: Nora Khan MD;  Location: [facility] MAIN OR;  Service:    • FOOT SURGERY Right 1980    metatarsal repair   • FOOT SURGERY Left 1985    metatarsal repair, bunionectomy   • FRACTURE SURGERY Left     post trauma, fusion, hardware   • GASTROSTOMY TUBE PLACEMENT N/A 1/2/2019    Procedure: INSERTION PEG TUBE;  Surgeon: Regino De León MD;  [facility]: [facility] MAIN OR;  Service: General   • HAND SURGERY Right     ganglion   • HAND SURGERY Right     trigger finger   • HAND SURGERY Right tendon release/dupuytrens   • KNEE ARTHROSCOPY W/ DEBRIDEMENT Left     meniscus repair   • NASAL SEPTUM SURGERY     • NASAL SEPTUM SURGERY     • PEG W/TRACHEOSTOMY PLACEMENT N/A 12/24/2018    Procedure: TRACHEOSTOMY;  Surgeon: Ciara Perez MD;  Location:  MAIN OR;  Service: General   • OH REMOVAL OF HEAD OF RADIUS Right 11/16/2017    Procedure: RADIAL HEAD EXCISION, ELBOW;  Surgeon: Vikki Combs MD;  Location: [facility] MAIN OR;  Service: Orthopedics   • SHOULDER ARTHROSCOPY Right    • SPINAL FUSION          Family History:   Family History   Problem Relation Age of Onset   • Cerebral aneurysm Sister    • Cancer Mother         non hodgkins lymphoma   • Parkinsonism Mother    • Heart disease Father    • Cancer Brother         colon   • Cancer Maternal Grandmother         breast   • Cancer Maternal Aunt         breast   • Aneurysm Sister         Allergies: Allergies   Allergen Reactions   • Aristocort [Triamcinolone] Hives   • Bactrim [Sulfamethoxazole-Trimethoprim] Itching   • Bupropion Itching   • Ciprofloxacin Hives   • Codeine GI Intolerance   • Latex      Sensitive / rash   • Methylprednisolone Hives   • Penicillins Hives   • Percocet [Oxycodone-Acetaminophen] GI Intolerance   • Vioxx [Rofecoxib] Itching   • Carbamazepine Rash   • Ezetimibe Rash   • Lipitor [Atorvastatin] Rash   • Zocor [Simvastatin] Rash       Current Meds:   No current facility-administered medications on file prior to encounter       Current Outpatient Medications on File Prior to Encounter   Medication Sig Dispense Refill   • acetaminophen (TYLENOL) 325 mg tablet Take 650 mg by mouth every 4 (four) hours as needed for mild pain      • FLUoxetine (PROzac) 20 mg capsule Take 20 mg by mouth daily On wednesdays     • FLUoxetine (PROzac) 40 MG capsule Take 40 mg by mouth daily Every day except wednesday     • glucagon (GLUCAGON EMERGENCY) 1 MG injection Inject 1 mg under the skin once as needed for low blood sugar     • lamoTRIgine (LaMICtal) 200 MG tablet Take 200 mg by mouth daily      • levothyroxine 100 mcg tablet 88 mcg by Per G Tube route daily      • LORazepam (ATIVAN) 0 5 mg tablet Take 0 5 mg by mouth every 6 (six) hours as needed      • melatonin 1 mg Take 1 mg by mouth daily at bedtime     • nitrofurantoin (MACROBID) 100 mg capsule Take 1 capsule (100 mg total) by mouth 2 (two) times a day 10 capsule 0   • omeprazole (PriLOSEC) 20 mg delayed release capsule Take 20 mg by mouth daily      • potassium chloride (KLOR-CON) 20 mEq packet Take 20 mEq by mouth 2 (two) times a day     • pramipexole (MIRAPEX) 0 25 mg tablet Take 0 25 mg by mouth 3 (three) times a day      • primidone (MYSOLINE) 50 mg tablet Take by mouth every 12 (twelve) hours     • QUEtiapine (SEROquel) 25 mg tablet Take 50 mg by mouth daily at bedtime      • ZINC OXIDE EX Apply topically daily         Review of Systems: all review of system negative except as stated in HPI     Vital Signs:   /53 (BP Location: Left arm)   Pulse 68   Temp 97 5 °F (36 4 °C) (Tympanic)   Resp 20   Ht 5' 3" (1 6 m)   Wt 57 6 kg (127 lb)   SpO2 93%   BMI 22 50 kg/m²     Physical Exam (limited due to lack of cooperation):   General: NAD, sitting upright   Eyes: anicteric   Cardiac: s1s2 rrr, no m/r/g   Lungs: cta b/l, no wheezing  Neck: bright red blood at tracheostomy site    MSK: no peripheral edema    Neuro: restless, answers some questions      Pertinent labs and imaging reviewed as per HPI

## 2022-12-06 NOTE — ASSESSMENT & PLAN NOTE
CTA: No pulmonary embolus  3 5 x 1 8 cm right upper lobe mass extending to the right suprahilar region with an enlarged right suprahilar node  This is compatible with malignancy  Pulmonology consulted: Hemoptysis likely 2/2 to RUL mass suspicious for cancer  Discussed with the patient's family -- the do not wish to pursue diagnostic workup or treatment if the suspicion is cancer, and they wish to have patient be DNR/DNI        Plan:  - Keep NPO tonight for possible bronchoscopy tomorrow  - patients family will discuss whether to pursue with an intervention vs  hospice enrollment tonight and follow-up in the AM  - her POA is Graciela Beard (brother), who confirms patient is DNR/DNI     ·

## 2022-12-06 NOTE — ED PROVIDER NOTES
History  Chief Complaint   Patient presents with   • Medical Problem     Pt sent here from [facility] for bleeding in the trach for 2 days, not actively bleeding     70-year-old female, presented from skilled nursing facility with reported bleeding noted from tracheostomy site for the past 2 days  EMS reports on arrival they noted dried blood outside of tracheostomy, no active bleeding  Patient is awake, nonverbal at baseline and unable to provide any history  History provided by:  EMS personnel and nursing home  History limited by:  Patient nonverbal   used: No    Medical Problem      Prior to Admission Medications   Prescriptions Last Dose Informant Patient Reported? Taking?    FLUoxetine (PROzac) 20 mg capsule   Yes No   Sig: Take 20 mg by mouth daily On    FLUoxetine (PROzac) 40 MG capsule   Yes No   Sig: Take 40 mg by mouth daily Every day except wednesday   LORazepam (ATIVAN) 0 5 mg tablet   Yes No   Sig: Take 0 5 mg by mouth every 6 (six) hours as needed    QUEtiapine (SEROquel) 25 mg tablet   Yes No   Sig: Take 50 mg by mouth daily at bedtime    ZINC OXIDE EX   Yes No   Sig: Apply topically daily   acetaminophen (TYLENOL) 325 mg tablet   Yes No   Sig: Take 650 mg by mouth every 4 (four) hours as needed for mild pain    glucagon (GLUCAGON EMERGENCY) 1 MG injection   Yes No   Sig: Inject 1 mg under the skin once as needed for low blood sugar   lamoTRIgine (LaMICtal) 200 MG tablet   Yes No   Sig: Take 200 mg by mouth daily    levothyroxine 100 mcg tablet   Yes No   Si mcg by Per G Tube route daily    melatonin 1 mg   Yes No   Sig: Take 1 mg by mouth daily at bedtime   nitrofurantoin (MACROBID) 100 mg capsule   No No   Sig: Take 1 capsule (100 mg total) by mouth 2 (two) times a day   omeprazole (PriLOSEC) 20 mg delayed release capsule   Yes No   Sig: Take 20 mg by mouth daily    potassium chloride (KLOR-CON) 20 mEq packet   Yes No   Sig: Take 20 mEq by mouth 2 (two) times a day   pramipexole (MIRAPEX) 0 25 mg tablet   Yes No   Sig: Take 0 25 mg by mouth 3 (three) times a day    primidone (MYSOLINE) 50 mg tablet   Yes No   Sig: Take by mouth every 12 (twelve) hours      Facility-Administered Medications: None       Past Medical History:   Diagnosis Date   • Arthritis     osteo   • Bipolar disorder ([facility] )    • Chronic kidney disease    • COPD (chronic obstructive pulmonary disease) ([place] [address] )    • Depression    • Diabetes mellitus ([place] [address] )    • Diabetes mellitus, type II ([place] [address] )    • Disease of thyroid gland    • DVT (deep vein thrombosis) in pregnancy 1992    right leg   • Elbow disorder     fx right elbow   • Enlarged heart    • Essential tremor    • GERD (gastroesophageal reflux disease)    • Hyperlipidemia    • Parkinson disease (HCC)    • Parkinsons disease (HCC)    • Pneumonia     x3   • Psychiatric disorder    • RLS (restless legs syndrome)    • Wears partial dentures     upper       Past Surgical History:   Procedure Laterality Date   • ABDOMINAL SURGERY      adhesions x3 surgeries   • APPENDECTOMY     • BACK SURGERY      fusion L5-S1 with hardware   • BLADDER SUSPENSION     • BREAST SURGERY Bilateral     reduction   • CHOLECYSTECTOMY      lap   • COLONOSCOPY N/A 2/8/2017    Procedure: COLONOSCOPY;  Surgeon: Angel Samson MD;  Location: [facility]; Service:    • CRANIECTOMY Left 12/20/2018    Procedure: Left hemicraniectomy with evacuation of clot; Surgeon: Pasquale Weathers MD;  [facility]: [facility];  Service: Neurosurgery   • CRANIOPLASTY Left 4/11/2019    Procedure: Left Autologous Cranioplasty;  Surgeon: Pasquale Weathers MD;  [facility]: [facility];  Service: Neurosurgery   • ELBOW ARTHROSCOPY Right     tendon repair   • ESOPHAGOGASTRODUODENOSCOPY N/A 2/8/2017    Procedure: ESOPHAGOGASTRODUODENOSCOPY (EGD); Surgeon: Angel Samson MD;  Location: [facility] GI LAB; Service:    • FASCIECTOMY Right 2/13/2017    Procedure:  MIDDLE FINGER SCAR REVISION AND Z-PLASTY;  Surgeon: Tina Rodriguez MD; Location: [facility] MAIN OR;  Service:    • FOOT SURGERY Right 1980    metatarsal repair   • FOOT SURGERY Left 1985    metatarsal repair, bunionectomy   • FRACTURE SURGERY Left     post trauma, fusion, hardware   • GASTROSTOMY TUBE PLACEMENT N/A 1/2/2019    Procedure: INSERTION PEG TUBE;  Surgeon: Janice Olguin MD;  [facility]: [facility];  Service: General   • HAND SURGERY Right     ganglion   • HAND SURGERY Right     trigger finger   • HAND SURGERY Right     tendon release/dupuytrens   • KNEE ARTHROSCOPY W/ DEBRIDEMENT Left     meniscus repair   • NASAL SEPTUM SURGERY     • NASAL SEPTUM SURGERY     • PEG W/TRACHEOSTOMY PLACEMENT N/A 12/24/2018    Procedure: TRACHEOSTOMY;  Surgeon: Carla Donnelly MD;  Location: [facility];  Service: General   • GA REMOVAL OF HEAD OF RADIUS Right 11/16/2017    Procedure: RADIAL HEAD EXCISION, ELBOW;  Surgeon: Samantha Romero MD;  Location: [facility] MAIN OR;  Service: Orthopedics   • SHOULDER ARTHROSCOPY Right    • SPINAL FUSION         Family History   Problem Relation Age of Onset   • Cerebral aneurysm Sister    • Cancer Mother         non hodgkins lymphoma   • Parkinsonism Mother    • Heart disease Father    • Cancer Brother         colon   • Cancer Maternal Grandmother         breast   • Cancer Maternal Aunt         breast   • Aneurysm Sister      I have reviewed and agree with the history as documented  E-Cigarette/Vaping     E-Cigarette/Vaping Substances   • Nicotine No    • THC No    • CBD No    • Flavoring No    • Other No    • Unknown No      Social History     Tobacco Use   • Smoking status: Former     Packs/day: 0 00     Years: 30 00     Pack years: 0 00     Types: Cigarettes   • Smokeless tobacco: Never   Substance Use Topics   • Alcohol use: Not Currently   • Drug use: No       Review of Systems   Unable to perform ROS: Patient nonverbal       Physical Exam  Physical Exam  Vitals and nursing note reviewed     Constitutional:       General: She is not in acute distress  HENT:      Head: Normocephalic and atraumatic  Mouth/Throat:      Mouth: Mucous membranes are moist       Pharynx: Oropharynx is clear  Eyes:      Extraocular Movements: Extraocular movements intact  Pupils: Pupils are equal, round, and reactive to light  Neck:      Comments: Tracheostomy site open with no tube in place, dried red blood noted to anterior neck surrounding tracheostomy with no active bleeding  Cardiovascular:      Rate and Rhythm: Normal rate and regular rhythm  Pulmonary:      Effort: Pulmonary effort is normal       Comments: Normal respiratory effort  Mild expiratory wheezing  Abdominal:      Palpations: Abdomen is soft  Tenderness: There is no abdominal tenderness  Musculoskeletal:         General: Normal range of motion  Cervical back: Normal range of motion  Skin:     General: Skin is warm and dry  Neurological:      Mental Status: She is alert  Mental status is at baseline           Vital Signs  ED Triage Vitals [12/06/22 1307]   Temperature Pulse Respirations Blood Pressure SpO2   97 5 °F (36 4 °C) 71 20 110/56 96 %      Temp Source Heart Rate Source Patient Position - Orthostatic VS BP Location FiO2 (%)   Tympanic Monitor Lying Left arm --      Pain Score       --           Vitals:    12/06/22 1307 12/06/22 1430   BP: 110/56 114/53   Pulse: 71 68   Patient Position - Orthostatic VS: Lying Lying         Visual Acuity      ED Medications  Medications   iohexol (OMNIPAQUE) 350 MG/ML injection (SINGLE-DOSE) 85 mL (85 mL Intravenous Given 12/6/22 1414)       Diagnostic Studies  Results Reviewed     Procedure Component Value Units Date/Time    Basic metabolic panel [[id number]]  (Abnormal) Collected: 12/06/22 1331    Lab Status: Final result Specimen: Blood from Arm, Left Updated: 12/06/22 1351     Sodium 136 mmol/L      Potassium 4 2 mmol/L      Chloride 99 mmol/L      CO2 30 mmol/L      ANION GAP 7 mmol/L      BUN 19 mg/dL      Creatinine 0 82 mg/dL Glucose 224 mg/dL      Calcium 9 7 mg/dL      eGFR 74 ml/min/1 73sq m     Narrative:      National Kidney Disease Foundation guidelines for Chronic Kidney Disease (CKD):   •  Stage 1 with normal or high GFR (GFR > 90 mL/min/1 73 square meters)  •  Stage 2 Mild CKD (GFR = 60-89 mL/min/1 73 square meters)  •  Stage 3A Moderate CKD (GFR = 45-59 mL/min/1 73 square meters)  •  Stage 3B Moderate CKD (GFR = 30-44 mL/min/1 73 square meters)  •  Stage 4 Severe CKD (GFR = 15-29 mL/min/1 73 square meters)  •  Stage 5 End Stage CKD (GFR <15 mL/min/1 73 square meters)  Note: GFR calculation is accurate only with a steady state creatinine    CBC and differential [[id number]]  (Abnormal) Collected: 12/06/22 1331    Lab Status: Final result Specimen: Blood from Arm, Left Updated: 12/06/22 1337     WBC 8 04 Thousand/uL      RBC 5 40 Million/uL      Hemoglobin 13 3 g/dL      Hematocrit 43 6 %      MCV 81 fL      MCH 24 6 pg      MCHC 30 5 g/dL      RDW 15 5 %      MPV 9 8 fL      Platelets 166 Thousands/uL      nRBC 0 /100 WBCs      Neutrophils Relative 61 %      Immat GRANS % 1 %      Lymphocytes Relative 28 %      Monocytes Relative 7 %      Eosinophils Relative 2 %      Basophils Relative 1 %      Neutrophils Absolute 5 00 Thousands/µL      Immature Grans Absolute 0 05 Thousand/uL      Lymphocytes Absolute 2 22 Thousands/µL      Monocytes Absolute 0 53 Thousand/µL      Eosinophils Absolute 0 16 Thousand/µL      Basophils Absolute 0 08 Thousands/µL                  CTA ED chest PE study   Final Result by Prerna Lemon MD (12/06 7857)      No pulmonary embolus  3 5 x 1 8 cm right upper lobe mass extending to the right suprahilar region with an enlarged right suprahilar node  This is compatible with malignancy        I personally discussed this study with Dr Oleg Strange on 12/6/2022 at 2:55 PM                       Workstation performed: [id number]                    Procedures  Procedures         ED Course  ED Course as of 12/06/22 1557   Tue Dec 06, 2022   1352 Suctioning through tracheostomy performed by myself using 14 Mong[place]ian soft suctioning catheter  Red blood noted  1500 Patient CT scan results discussed with radiology, pulmonary consulted  1557 Discussed with pulmonary and hospitalist, will admit for further evaluation and monitoring  SBIRT 22yo+    Flowsheet Row Most Recent Value   SBIRT (23 yo +)    In order to provide better care to our patients, we are screening all of our patients for alcohol and drug use  Would it be okay to ask you these screening questions? No Filed at: 12/06/2022 1334          Wells' Criteria for PE    Flowsheet Row Most Recent Value   Wells' Criteria for PE    Clinical signs and symptoms of DVT 0 Filed at: 12/06/2022 1325   PE is primary diagnosis or equally likely 0 Filed at: 12/06/2022 1325   HR >100 0 Filed at: 12/06/2022 1325   Immobilization at least 3 days or Surgery in the previous 4 weeks 1 5 Filed at: 12/06/2022 1325   Previous, objectively diagnosed PE or DVT 0 Filed at: 12/06/2022 1325   Hemoptysis 1 Filed at: 12/06/2022 1325   Malignancy with treatment within 6 months or palliative 0 Filed at: 12/06/2022 1325   Wells' Criteria Total 2 5 Filed at: 12/06/2022 1325                MDM  Number of Diagnoses or Management Options  Lung mass  Tracheostomy hemorrhage (HCC)  Diagnosis management comments: 60-year-old female, presenting with bleeding noted from tracheostomy over the past 2 days from skilled nursing facility  Differential diagnosis includes tracheostomy bleeding, hemoptysis, pulmonary embolism among other diagnoses  Imaging and labs ordered, will continue to monitor in ED and reevaluate         Amount and/or Complexity of Data Reviewed  Clinical lab tests: ordered and reviewed  Tests in the radiology section of CPT®: ordered and reviewed  Obtain history from someone other than the patient: yes  Review and summarize past medical records: yes  Discuss the patient with other providers: yes        Disposition  Final diagnoses:   Lung mass   Tracheostomy hemorrhage ([address] )     Time reflects when diagnosis was documented in both MDM as applicable and the Disposition within this note     Time User Action Codes Description Comment    12/6/2022  3:55 PM Melisa Lofton Add [R91 8] Lung mass     12/6/2022  3:55 PM Melisa Lofton Add [J95 01] Tracheostomy hemorrhage [facility])       ED Disposition     ED Disposition   Admit    Condition   Stable    Date/Time   Tue Dec 6, 2022  3:55 PM    Comment   Case was discussed with Dr Sangeeta Roa and the patient's admission status was agreed to be Admission Status: inpatient status to the service of Dr Sangeeta Roa   Follow-up Information    None         Patient's Medications   Discharge Prescriptions    No medications on file       No discharge procedures on file      PDMP Review     None          ED Provider  Electronically Signed by           Sue Mcgrath MD  12/06/22 7716

## 2022-12-06 NOTE — ASSESSMENT & PLAN NOTE
Noted at NH, suction and congruent    · Trend H&H  · Pulmonology consulted, who will scope in morning, n p o  at midnight  · No O2 support  · Will monitor  · Prn suction

## 2022-12-06 NOTE — ED NOTES
Respiratory called to see if they can come assess the pt and possibly suction her  Rehan Coffman stated she'd arrive when she was done with a pt       Jasper Zuniga RN  12/06/22 0078

## 2022-12-07 LAB
ALBUMIN SERPL BCP-MCNC: 3.4 G/DL (ref 3.5–5)
ALP SERPL-CCNC: 135 U/L (ref 46–116)
ALT SERPL W P-5'-P-CCNC: 21 U/L (ref 12–78)
ANION GAP SERPL CALCULATED.3IONS-SCNC: 5 MMOL/L (ref 4–13)
AST SERPL W P-5'-P-CCNC: 14 U/L (ref 5–45)
BASOPHILS # BLD AUTO: 0.06 THOUSANDS/ÂΜL (ref 0–0.1)
BASOPHILS NFR BLD AUTO: 1 % (ref 0–1)
BILIRUB SERPL-MCNC: 0.21 MG/DL (ref 0.2–1)
BUN SERPL-MCNC: 16 MG/DL (ref 5–25)
CALCIUM ALBUM COR SERPL-MCNC: 9.8 MG/DL (ref 8.3–10.1)
CALCIUM SERPL-MCNC: 9.3 MG/DL (ref 8.3–10.1)
CHLORIDE SERPL-SCNC: 102 MMOL/L (ref 96–108)
CO2 SERPL-SCNC: 32 MMOL/L (ref 21–32)
CREAT SERPL-MCNC: 0.76 MG/DL (ref 0.6–1.3)
EOSINOPHIL # BLD AUTO: 0.13 THOUSAND/ÂΜL (ref 0–0.61)
EOSINOPHIL NFR BLD AUTO: 2 % (ref 0–6)
ERYTHROCYTE [DISTWIDTH] IN BLOOD BY AUTOMATED COUNT: 15.5 % (ref 11.6–15.1)
EST. AVERAGE GLUCOSE BLD GHB EST-MCNC: 212 MG/DL
GFR SERPL CREATININE-BSD FRML MDRD: 82 ML/MIN/1.73SQ M
GLUCOSE SERPL-MCNC: 127 MG/DL (ref 65–140)
GLUCOSE SERPL-MCNC: 137 MG/DL (ref 65–140)
GLUCOSE SERPL-MCNC: 139 MG/DL (ref 65–140)
GLUCOSE SERPL-MCNC: 140 MG/DL (ref 65–140)
GLUCOSE SERPL-MCNC: 145 MG/DL (ref 65–140)
GLUCOSE SERPL-MCNC: 148 MG/DL (ref 65–140)
GLUCOSE SERPL-MCNC: 156 MG/DL (ref 65–140)
HBA1C MFR BLD: 9 %
HCT VFR BLD AUTO: 40.9 % (ref 34.8–46.1)
HGB BLD-MCNC: 12.5 G/DL (ref 11.5–15.4)
IMM GRANULOCYTES # BLD AUTO: 0.03 THOUSAND/UL (ref 0–0.2)
IMM GRANULOCYTES NFR BLD AUTO: 0 % (ref 0–2)
LYMPHOCYTES # BLD AUTO: 3.05 THOUSANDS/ÂΜL (ref 0.6–4.47)
LYMPHOCYTES NFR BLD AUTO: 35 % (ref 14–44)
MCH RBC QN AUTO: 24.5 PG (ref 26.8–34.3)
MCHC RBC AUTO-ENTMCNC: 30.6 G/DL (ref 31.4–37.4)
MCV RBC AUTO: 80 FL (ref 82–98)
MONOCYTES # BLD AUTO: 0.61 THOUSAND/ÂΜL (ref 0.17–1.22)
MONOCYTES NFR BLD AUTO: 7 % (ref 4–12)
NEUTROPHILS # BLD AUTO: 4.74 THOUSANDS/ÂΜL (ref 1.85–7.62)
NEUTS SEG NFR BLD AUTO: 55 % (ref 43–75)
NRBC BLD AUTO-RTO: 0 /100 WBCS
PLATELET # BLD AUTO: 297 THOUSANDS/UL (ref 149–390)
PMV BLD AUTO: 9.9 FL (ref 8.9–12.7)
POTASSIUM SERPL-SCNC: 3.6 MMOL/L (ref 3.5–5.3)
PROT SERPL-MCNC: 7.6 G/DL (ref 6.4–8.4)
RBC # BLD AUTO: 5.11 MILLION/UL (ref 3.81–5.12)
SODIUM SERPL-SCNC: 139 MMOL/L (ref 135–147)
WBC # BLD AUTO: 8.62 THOUSAND/UL (ref 4.31–10.16)

## 2022-12-07 RX ORDER — CHLORTHALIDONE 25 MG/1
25 TABLET ORAL DAILY
Status: DISCONTINUED | OUTPATIENT
Start: 2022-12-07 | End: 2022-12-08 | Stop reason: HOSPADM

## 2022-12-07 RX ORDER — BENZTROPINE MESYLATE 1 MG/1
0.5 TABLET ORAL 2 TIMES DAILY
Status: DISCONTINUED | OUTPATIENT
Start: 2022-12-07 | End: 2022-12-08 | Stop reason: HOSPADM

## 2022-12-07 RX ORDER — GUAIFENESIN/DEXTROMETHORPHAN 100-10MG/5
10 SYRUP ORAL EVERY 6 HOURS PRN
Status: DISCONTINUED | OUTPATIENT
Start: 2022-12-07 | End: 2022-12-08 | Stop reason: HOSPADM

## 2022-12-07 RX ADMIN — LEVOTHYROXINE SODIUM 88 MCG: 88 TABLET ORAL at 06:37

## 2022-12-07 RX ADMIN — CHLORTHALIDONE 25 MG: 25 TABLET ORAL at 18:33

## 2022-12-07 RX ADMIN — LORAZEPAM 0.5 MG: 0.5 TABLET ORAL at 22:24

## 2022-12-07 RX ADMIN — PANTOPRAZOLE SODIUM 40 MG: 40 INJECTION, POWDER, FOR SOLUTION INTRAVENOUS at 08:59

## 2022-12-07 RX ADMIN — Medication 3 MG: at 22:24

## 2022-12-07 RX ADMIN — SODIUM CHLORIDE 75 ML/HR: 0.9 INJECTION, SOLUTION INTRAVENOUS at 12:59

## 2022-12-07 RX ADMIN — QUETIAPINE FUMARATE 50 MG: 25 TABLET ORAL at 22:24

## 2022-12-07 RX ADMIN — PRAMIPEXOLE DIHYDROCHLORIDE 0.25 MG: 0.25 TABLET ORAL at 22:24

## 2022-12-07 RX ADMIN — BENZTROPINE MESYLATE 0.5 MG: 1 TABLET ORAL at 18:33

## 2022-12-07 RX ADMIN — PRAMIPEXOLE DIHYDROCHLORIDE 0.25 MG: 0.25 TABLET ORAL at 08:59

## 2022-12-07 RX ADMIN — PRAMIPEXOLE DIHYDROCHLORIDE 0.25 MG: 0.25 TABLET ORAL at 16:02

## 2022-12-07 RX ADMIN — LORAZEPAM 0.5 MG: 0.5 TABLET ORAL at 16:02

## 2022-12-07 RX ADMIN — FLUOXETINE HYDROCHLORIDE 40 MG: 20 CAPSULE ORAL at 08:59

## 2022-12-07 NOTE — PROGRESS NOTES
Progress Note - Pulmonary   Scot Evelyn 78 y o  female MRN: [id number]  Unit/Bed#: 2 [place] [address] Encounter: [id number]    Assessment:  Hemoptysis in the setting of RUL mass suspicious for cancer  Discussed with patient's POA (brother - Ed) to defer invasive workup and procedures at this time  Also discussed with IR, and the risks outweigh the benefit in this setting as well given the low volume and intermittent nature of the hemoptysis  The patient did not have any significant hemoptysis documented overnight  Plan:  - recommend supportive care with PRN cough suppression  The family is interested in potentially enrolling the patient in hospice given the possible underlying diagnosis of lung cancer  Time spent 35 min >50% time discussing with consultants and providing counseling with patient/family  Chief Complaint:   Hemoptysis     Subjective:   Feels well today  Objective:     Vitals: Blood pressure 148/73, pulse 57, temperature 97 8 °F (36 6 °C), temperature source Oral, resp  rate 20, height 5' 3" (1 6 m), weight 57 6 kg (127 lb), SpO2 90 %, not currently breastfeeding  ,Body mass index is 22 5 kg/m²        Intake/Output Summary (Last 24 hours) at 12/7/2022 1042  Last data filed at 12/6/2022 2115  Gross per 24 hour   Intake 900 ml   Output --   Net 900 ml       Invasive Devices     Peripheral Intravenous Line  Duration           Peripheral IV 12/06/22 Left Antecubital <1 day          Drain  Duration           Gastrostomy/Enterostomy Percutaneous endoscopic gastrostomy (PEG) 20 Fr  LUQ 1434 days          Airway  Duration           Surgical Airway Shiley 1443 days                Physical Exam:   General - sitting upright, not in distress  Eyes - anicteric   Respiratory - unlabored breathing   Neck - dry blood from previous hemoptysis around tracheostomy site   Mental status - awake and alert     Labs:   CBC:   Lab Results   Component Value Date    WBC 8 62 12/07/2022    HGB 12 5 12/07/2022    HCT 40 9 12/07/2022    MCV 80 (L) 12/07/2022     12/07/2022    MCH 24 5 (L) 12/07/2022    MCHC 30 6 (L) 12/07/2022    RDW 15 5 (H) 12/07/2022    MPV 9 9 12/07/2022    NRBC 0 12/07/2022   , CMP:   Lab Results   Component Value Date    SODIUM 139 12/07/2022    K 3 6 12/07/2022     12/07/2022    CO2 32 12/07/2022    BUN 16 12/07/2022    CREATININE 0 76 12/07/2022    CALCIUM 9 3 12/07/2022    AST 14 12/07/2022    ALT 21 12/07/2022    ALKPHOS 135 (H) 12/07/2022    EGFR 82 12/07/2022     Imaging and other studies: I have personally reviewed pertinent reports

## 2022-12-07 NOTE — OCCUPATIONAL THERAPY NOTE
Occupational Therapy Screen       12/07/22 1441   Note Type   Note type Screen   Additional Comments OT orders received  Chart reviewed  Per review, patient is a LTC resident at a nursing home, dependent at baseline, and current discharge plan is to return to LTC on comfort care/hospice  NO skilled inpatient OT services indicated at this time  OT orders to be discharged     Licensure   [place] License Number  [place], [place] [id number]

## 2022-12-07 NOTE — ASSESSMENT & PLAN NOTE
CTA: No pulmonary embolus  3 5 x 1 8 cm right upper lobe mass extending to the right suprahilar region with an enlarged right suprahilar node  This is compatible with malignancy  Pulmonology consulted: Hemoptysis likely 2/2 to RUL mass suspicious for cancer  Discussed with the patient's family -- the do not wish to pursue diagnostic workup or treatment if the suspicion is cancer, and they wish to have patient be DNR/DNI        Plan:  - recommend supportive care with PRN cough suppression  The family is interested in potentially enrolling the patient in hospice giv the possible underlying diagnosis of lung cancer  - her POA is Ct Fatima (brother), who confirms patient is DNR/DNI  IR consulted, recs: Patient with a central right lung mass and some hemoptysis  IR consulted for possible bronchial artery embolization  I would not offer this procedure for mild intermittent hemoptysis  I would offer it if and when there is more moderate hemoptysis that is not controlled  The family does not want to proceed with any invasive procedures  If they wish to pursue IR bronchial artery embolization, I would first suggest bronchoscopy but my understanding is that the patient refused bronchoscopy due to the need for sedation  She will also require sedation with bronchial artery embolization

## 2022-12-07 NOTE — ASSESSMENT & PLAN NOTE
CTA: No pulmonary embolus  3 5 x 1 8 cm right upper lobe mass extending to the right suprahilar region with an enlarged right suprahilar node  This is compatible with malignancy  Pulmonology consulted: Hemoptysis likely 2/2 to RUL mass suspicious for cancer  Discussed with the patient's family -- the do not wish to pursue diagnostic workup or treatment if the suspicion is cancer, and they wish to have patient be DNR/DNI        Plan:  - recommend supportive care with PRN cough suppression  The family is interested in potentially enrolling the patient in hospice giv the possible underlying diagnosis of lung cancer  - her POA is Ольга Broderick (brother), who confirms patient is DNR/DNI  IR consulted,recs: Patient with a central right lung mass and some hemoptysis  IR consulted for possible bronchial artery embolization  I would not offer this procedure for mild intermittent hemoptysis  I would offer it if and when there is more moderate hemoptysis that is not controlled  The family does not want to proceed with any invasive procedures  If they wish to pursue IR bronchial artery embolization, I would first suggest bronchoscopy but my understanding is that the patient refused bronchoscopy due to the need for sedation  She will also require sedation with bronchial artery embolization

## 2022-12-07 NOTE — ASSESSMENT & PLAN NOTE
Per MARGARET lopez Ed's request: DNR/I and comfort care today    · Patient will be evaluated at [facility] by hospice team

## 2022-12-07 NOTE — PLAN OF CARE
Problem: Potential for Falls  Goal: Patient will remain free of falls  Description: INTERVENTIONS:  - Educate patient/family on patient safety including physical limitations  - Instruct patient to call for assistance with activity   - Consult OT/PT to assist with strengthening/mobility   - Keep Call bell within reach  - Keep bed low and locked with side rails adjusted as appropriate  - Keep care items and personal belongings within reach  - Initiate and maintain comfort rounds  - Make Fall Risk Sign visible to staff  - Offer Toileting every 2 Hours, in advance of need  - Initiate/Maintain bed alarm  - Apply yellow socks and bracelet for high fall risk patients  - Consider moving patient to room near nurses station  Outcome: Progressing     Problem: MOBILITY - ADULT  Goal: Maintain or return to baseline ADL function  Description: INTERVENTIONS:  -  Assess patient's ability to carry out ADLs; assess patient's baseline for ADL function and identify physical deficits which impact ability to perform ADLs (bathing, care of mouth/teeth, toileting, grooming, dressing, etc )  - Assess/evaluate cause of self-care deficits   - Assess range of motion  - Assess patient's mobility; develop plan if impaired  - Assess patient's need for assistive devices and provide as appropriate  - Encourage maximum independence but intervene and supervise when necessary  - Involve family in performance of ADLs  - Assess for home care needs following discharge   - Consider OT consult to assist with ADL evaluation and planning for discharge  - Provide patient education as appropriate  Outcome: Progressing  Goal: Maintains/Returns to pre admission functional level  Description: INTERVENTIONS:  - Perform BMAT or MOVE assessment daily    - Set and communicate daily mobility goal to care team and patient/family/caregiver  - Collaborate with rehabilitation services on mobility goals if consulted  - Perform Range of Motion 3 times a day    - Reposition patient every 2 hours  - Record patient progress and toleration of activity level   Outcome: Progressing     Problem: Nutrition/Hydration-ADULT  Goal: Nutrient/Hydration intake appropriate for improving, restoring or maintaining nutritional needs  Description: Monitor and assess patient's nutrition/hydration status for malnutrition  Collaborate with interdisciplinary team and initiate plan and interventions as ordered  Monitor patient's weight and dietary intake as ordered or per policy  Utilize nutrition screening tool and intervene as necessary  Determine patient's food preferences and provide high-protein, high-caloric foods as appropriate       INTERVENTIONS:  - Monitor oral intake, urinary output, labs, and treatment plans  - Assess nutrition and hydration status and recommend course of action  - Evaluate amount of meals eaten  - Assist patient with eating if necessary   - Allow adequate time for meals  - Recommend/ encourage appropriate diets, oral nutritional supplements, and vitamin/mineral supplements  - Order, calculate, and assess calorie counts as needed  - Recommend, monitor, and adjust tube feedings and TPN/PPN based on assessed needs  - Assess need for intravenous fluids  - Provide specific nutrition/hydration education as appropriate  - Include patient/family/caregiver in decisions related to nutrition  Outcome: Progressing     Problem: Prexisting or High Potential for Compromised Skin Integrity  Goal: Skin integrity is maintained or improved  Description: INTERVENTIONS:  - Identify patients at risk for skin breakdown  - Assess and monitor skin integrity  - Assess and monitor nutrition and hydration status  - Monitor labs   - Assess for incontinence   - Turn and reposition patient  - Assist with mobility/ambulation  - Relieve pressure over bony prominences  - Avoid friction and shearing  - Provide appropriate hygiene as needed including keeping skin clean and dry  - Evaluate need for skin moisturizer/barrier cream  - Collaborate with interdisciplinary team   - Patient/family teaching  - Consider wound care consult   Outcome: Progressing

## 2022-12-07 NOTE — ASSESSMENT & PLAN NOTE
Malnutrition Findings:   · Nutrition consulted 12/6/22, pending recs  · Last note on MAR regarding external nutrition from [facility]  Tube feeds  Adult  Vital 1 2  Continuous at 20ml/hr  Goal of 60ml/hr  Therapeutic substitution: yes  RD diet modification: yes  TF: Vital 1 2 @ 60 ml/hr  Please hold TF 1 hr prior to and after synthroid administration  · Will initiate prior Recs at lower rate while pending nutrition consult  · Family aware agree to no additional intervention and comfort care  · Continue aspiration cautions      BMI Findings: Body mass index is 22 5 kg/m²

## 2022-12-07 NOTE — DISCHARGE SUMMARY
Isreal 46  Discharge- Ian Listen 1955, 78 y o  female MRN: [id number]  Unit/Bed#: [address] [address] Encounter: [id number]  Primary Care Provider: Tika Hurt MD   Date and time admitted to hospital: 12/6/2022  1:02 PM    Encounter for hospice care discussion  Assessment & Plan  Per POA brother Ed's request: DNR/I and comfort care today    · Patient will be evaluated at [facility], [place], operated by [facility] by hospice team    Lung mass  Assessment & Plan  CTA: No pulmonary embolus  3 5 x 1 8 cm right upper lobe mass extending to the right suprahilar region with an enlarged right suprahilar node  This is compatible with malignancy  Pulmonology consulted: Hemoptysis likely 2/2 to RUL mass suspicious for cancer  Discussed with the patient's family -- the do not wish to pursue diagnostic workup or treatment if the suspicion is cancer, and they wish to have patient be DNR/DNI        Plan:  - recommend supportive care with PRN cough suppression  The family is interested in potentially enrolling the patient in hospice giv the possible underlying diagnosis of lung cancer  - her POA is Addie Clemens (brother), who confirms patient is DNR/DNI  IR consulted,recs: Patient with a central right lung mass and some hemoptysis  IR consulted for possible bronchial artery embolization  I would not offer this procedure for mild intermittent hemoptysis  I would offer it if and when there is more moderate hemoptysis that is not controlled  The family does not want to proceed with any invasive procedures  If they wish to pursue IR bronchial artery embolization, I would first suggest bronchoscopy but my understanding is that the patient refused bronchoscopy due to the need for sedation  She will also require sedation with bronchial artery embolization      * Tracheostomy hemorrhage (HCC)  Assessment & Plan  Noted at NH, suction and stable    · Stable H&H  · Pulmonology consulted, see lung mass recs above  · No O2 support  · Will monitor  · Prn suction    Protein-calorie malnutrition ([facility] [address] )  Assessment & Plan  Malnutrition Findings:   · Nutrition consulted 12/6/22, pending recs  · Last note on MAR regarding external nutrition from [facility]  Tube feeds  Adult  Vital 1 2  Continuous at 20ml/hr  Goal of 60ml/hr  Therapeutic substitution: yes  RD diet modification: yes  TF: Vital 1 2 @ 60 ml/hr  Please hold TF 1 hr prior to and after synthroid administration  · Will initiate prior Recs at lower rate while pending nutrition consult  · Family aware agree to no additional intervention and comfort care  · Continue aspiration cautions      BMI Findings: Body mass index is 22 5 kg/m²  Depression  Assessment & Plan  Continue home Prozac and mood stabilizers Seroquel and prn ativan    Parkinson disease ([facility] [address] )  Assessment & Plan  Continue home meds as needed for comfort care and/or agitation    Nonverbal  Assessment & Plan  Patient's baseline, will reach out to  NH and family for additional details    Essential hypertension  Assessment & Plan  Chronic,    · Continue home medications  · Chlorthalidone 25mg, As needed hydralazine with parameters    Diabetes mellitus, type II [facility])  Assessment & Plan  Lab Results   Component Value Date    HGBA1C 9 0 (H) 12/06/2022       Recent Labs     12/07/22  0641 12/07/22  0706 12/07/22  1109 12/07/22  1552   POCGLU 127 137 145* 139       Blood Sugar Average: Last 72 hrs:  (P) 148 5Improved, last A1c 3 years ago, repeat 9 0%  SSI, reconsider hold home meds on comfort care    Medical Problems     Resolved Problems  Date Reviewed: 12/7/2022          Resolved    Cystitis 12/6/2022     Resolved by  Jaqueline Hooks MD        Discharging Physician / Practitioner:  Jaqueline Hooks MD  PCP: Benigno Rios MD  Admission Date:   Admission Orders (From admission, onward)     Ordered        12/06/22 [address]  Once                      Discharge Date: 12/08/22    Consultations During Hospital Stay:  · IR, pulmonology    Procedures Performed:   · None    Significant Findings / Test Results:   · Lung mass    Incidental Findings:   · Yes  · I reviewed the above mentioned incidental findings with the patient and/or family and they expressed understanding  Test Results Pending at Discharge (will require follow up): · None     Outpatient Tests Requested:  · None    Complications: None    Reason for Admission: Bleeding from trach site    Hospital Course:   Jayson Estrada is a 78 y o  female patient who originally presented to the hospital on 12/6/2022 due to bleeding from trach site  Patient was evaluated and imaging confirmed a lung mass  Pulmonology and IR consulted for further evaluation  Patient's brother who is POA made decision not to intervene and to transition patient to DNR/I and placed on comfort care  Patient's family agreed to have patient assessed by hospice was returned to the nursing home  The patient, initially admitted to the hospital as inpatient, was discharged earlier than expected given the following: Clinical course of care  Please see above list of diagnoses and related plan for additional information  Condition at Discharge: serous    Discharge Day Visit / Exam:   Subjective: Nonverbal at baseline, patient appeared to be better than prior physical exam   Patient was alert  Subjective Limited due to nonverbal   She is brother Ed made aware yesterday evening that she would be transition back to nursing home today  Vitals: Blood Pressure: 103/65 (12/08/22 0657)  Pulse: 55 (12/08/22 0657)  Temperature: 98 4 °F (36 9 °C) (12/08/22 0657)  Temp Source: Oral (12/07/22 0800)  Respirations: 18 (12/07/22 2321)  Height: 5' 3" (160 cm) (12/06/22 1307)  Weight - Scale: 57 6 kg (127 lb) (12/06/22 1307)  SpO2: 92 % (12/08/22 0730)     Exam:   Physical Exam  Vitals and nursing note reviewed  Constitutional:       General: She is not in acute distress  Appearance: She is well-developed  HENT:      Head: Normocephalic and atraumatic  Eyes:      Conjunctiva/sclera: Conjunctivae normal    Neck:      Comments: Dried blood around trach site  Cardiovascular:      Rate and Rhythm: Normal rate and regular rhythm  Heart sounds: No murmur heard  No gallop  Pulmonary:      Effort: Pulmonary effort is normal  No respiratory distress  Breath sounds: Normal breath sounds  Transmitted upper airway sounds present  No wheezing or rales  Abdominal:      Palpations: Abdomen is soft  Tenderness: There is no abdominal tenderness  There is no guarding or rebound  Musculoskeletal:         General: No swelling or tenderness  Cervical back: Neck supple  Comments: Diffuse contractures extremities   Skin:     General: Skin is warm and dry  Capillary Refill: Capillary refill takes less than 2 seconds  Neurological:      Mental Status: She is alert  Mental status is at baseline  Psychiatric:         Mood and Affect: Mood normal           Discussion with Family: Updated  (brother) via phone  Discharge instructions/Information to patient and family:   See after visit summary for information provided to patient and family  Provisions for Follow-Up Care:  See after visit summary for information related to follow-up care and any pertinent home health orders  Disposition:   Assisted Living Facility at [facility]    Planned Readmission: No     Discharge Statement:  I spent 45 minutes discharging the patient  This time was spent on the day of discharge  I had direct contact with the patient on the day of discharge  Greater than 50% of the total time was spent examining patient, answering all patient questions, arranging and discussing plan of care with patient as well as directly providing post-discharge instructions  Additional time then spent on discharge activities      Discharge Medications:  See after visit summary for reconciled discharge medications provided to patient and/or family        **Please Note: This note may have been constructed using a voice recognition system**

## 2022-12-07 NOTE — ASSESSMENT & PLAN NOTE
Lab Results   Component Value Date    HGBA1C 9 0 (H) 12/06/2022       Recent Labs     12/07/22  0641 12/07/22  0706 12/07/22  1109 12/07/22  1552   POCGLU 127 137 145* 139       Blood Sugar Average: Last 72 hrs:  (P) 148 5Improved, last A1c 3 years ago, repeat 9 0%  SSI, reconsider hold home meds on comfort care

## 2022-12-07 NOTE — ASSESSMENT & PLAN NOTE
Lab Results   Component Value Date    HGBA1C 9 0 (H) 12/06/2022       Recent Labs     12/07/22  0641 12/07/22  0706 12/07/22  1109 12/07/22  1552   POCGLU 127 137 145* 139       Blood Sugar Average: Last 72 hrs:  (P) 148 5Improved, last A1c 3 years ago, repeat 9 0%  SSI, hold home meds, consider basal and prandial after restarting food

## 2022-12-07 NOTE — ASSESSMENT & PLAN NOTE
Chronic,    · Continue home medications  · Chlorthalidone 25mg, As needed hydralazine with parameters

## 2022-12-07 NOTE — ASSESSMENT & PLAN NOTE
Per MARGARET lopez Ed's request: DNR/I and comfort care today    · Patient will be [facility] at [facility] by hospice team

## 2022-12-07 NOTE — ASSESSMENT & PLAN NOTE
Malnutrition Findings:       · Nutrition consulted 12/6/22, pending recs  · Last note on MAR regarding external nutrition from [facility]  Tube feeds  Adult  Vital 1 2  Continuous at 20ml/hr  Goal of 60ml/hr  Therapeutic substitution: yes  RD diet modification: yes  TF: Vital 1 2 @ 60 ml/hr  Please hold TF 1 hr prior to and after synthroid administration  · Will initiate prior Recs at lower rate while pending nutrition consult  · Family aware agree to no additional intervention and comfort care  · Continue aspiration cautions      BMI Findings: Body mass index is 22 5 kg/m²

## 2022-12-07 NOTE — CONSULTS
e-Consult (IPC)  - Interventional Radiology  Maura Mejia 78 y o  female MRN: [id number]  Unit/Bed#: [address] [place] [address] Encounter: [id number]    Interventional Radiology has been consulted to evaluate Maura Mejia    We were consulted by Dr Melina Arreola concerning this patient with hemoptysis  IP Consult to IR  Consult performed by: Lucrecia Luevano MD  Consult ordered by: Beata Soto DO        12/07/22    Assessment/Recommendation:   Patient with a central right lung mass and some hemoptysis  IR consulted for possible bronchial artery embolization  I would not offer this procedure for mild intermittent hemoptysis  I would offer it if and when there is more moderate hemoptysis that is not controlled  The family does not want to proceed with any invasive procedures  If they wish to pursue IR bronchial artery embolization, I would first suggest bronchoscopy but my understanding is that the patient refused bronchoscopy due to the need for sedation  She will also require sedation with bronchial artery embolization  31 + minutes, >50% of the total time devoted to medical consultative verbal/EMR discussion between providers  Written report will be generated in the EMR  Thank you for allowing Interventional Radiology to participate in the care of Maura Mejia  Please don't hesitate to call or TigerText us with any questions       Lucrecia Luevano MD

## 2022-12-07 NOTE — ASSESSMENT & PLAN NOTE
Noted at NH, suction and stable    · Stable H&H  · Pulmonology consulted, see lung mass recs above  · No O2 support  · Will monitor  · Prn suction

## 2022-12-07 NOTE — PROGRESS NOTES
Danii U  67   Progress Note - Anayeli Macdonald 1955, 78 y o  female MRN: [id number]  Unit/Bed#: [address] [place] [address] Encounter: [id number]  Primary Care Provider: Le Willis MD   Date and time admitted to hospital: 12/6/2022  1:02 PM    Lung mass  Assessment & Plan  CTA: No pulmonary embolus  3 5 x 1 8 cm right upper lobe mass extending to the right suprahilar region with an enlarged right suprahilar node  This is compatible with malignancy  Pulmonology consulted: Hemoptysis likely 2/2 to RUL mass suspicious for cancer  Discussed with the patient's family -- the do not wish to pursue diagnostic workup or treatment if the suspicion is cancer, and they wish to have patient be DNR/DNI        Plan:  - recommend supportive care with PRN cough suppression  The family is interested in potentially enrolling the patient in hospice giv the possible underlying diagnosis of lung cancer  - her POA is Loria Gilford (brother), who confirms patient is DNR/DNI  IR consulted, recs: Patient with a central right lung mass and some hemoptysis  IR consulted for possible bronchial artery embolization  I would not offer this procedure for mild intermittent hemoptysis  I would offer it if and when there is more moderate hemoptysis that is not controlled  The family does not want to proceed with any invasive procedures  If they wish to pursue IR bronchial artery embolization, I would first suggest bronchoscopy but my understanding is that the patient refused bronchoscopy due to the need for sedation  She will also require sedation with bronchial artery embolization      Encounter for hospice care discussion  Assessment & Plan  Per POA brother Ed's request: DNR/I and comfort care today    · Patient will be evaluated at NH by hospice team    Protein-calorie malnutrition [facility])  Assessment & Plan  Malnutrition Findings:       · Nutrition consulted 12/6/22, pending recs  · Last note on [facility] regarding external nutrition from [facility]  Tube feeds  Adult  Vital 1 2  Continuous at 20ml/hr  Goal of 60ml/hr  Therapeutic substitution: yes  RD diet modification: yes  TF: Vital 1 2 @ 60 ml/hr  Please hold TF 1 hr prior to and after synthroid administration  · Will initiate prior Recs at lower rate while pending nutrition consult  · Family aware agree to no additional intervention and comfort care  · Continue aspiration cautions      BMI Findings: Body mass index is 22 5 kg/m²  Depression  Assessment & Plan  Continue home Prozac and mood stabilizers Seroquel and prn ativan    Parkinson disease ([facility] [address] )  Assessment & Plan  Continue home meds    Nonverbal  Assessment & Plan  Patient's baseline, will reach out to  NH and family for additional details    Essential hypertension  Assessment & Plan  Chronic,    · Continue home medications  · As needed hydralazine with parameters    Diabetes mellitus, type II [facility])  Assessment & Plan  Lab Results   Component Value Date    HGBA1C 9 0 (H) 12/06/2022       Recent Labs     12/07/22  0641 12/07/22  0706 12/07/22  1109 12/07/22  1552   POCGLU 127 137 145* 139       Blood Sugar Average: Last 72 hrs:  (P) 148 5Improved, last A1c 3 years ago, repeat 9 0%  SSI, hold home meds, consider basal and prandial after restarting food      VTE Pharmacologic Prophylaxis: VTE Score: 3 Moderate Risk (Score 3-4) - Pharmacological DVT Prophylaxis Contraindicated  Sequential Compression Devices Ordered  Patient Centered Rounds: I performed bedside rounds with nursing staff today  Discussions with Specialists or Other Care Team Provider: IR/pulmonology    Education and Discussions with Family / Patient: Updated  (brother) via phone  Ed    Time Spent for Care: 45 minutes  More than 50% of total time spent on counseling and coordination of care as described above      Current Length of Stay: 1 day(s)  Current Patient Status: Inpatient   Certification Statement: The patient will continue to require additional inpatient hospital stay due to clinical course  Discharge Plan: Anticipate discharge tomorrow to prior assisted or independent living facility  Code Status: Level 4 - Comfort Care    Subjective:   Patient seen and examined at bed bedside, patient nonverbal at baseline, was not in acute distress  Spoke with patient's brother Ed who is POA, want patient to continue to be DNR/DNI and comfort care  Explained course of care with returning back to NH for hospice consult  Objective:     Vitals:   Temp (24hrs), Av 4 °F (36 9 °C), Min:97 6 °F (36 4 °C), Max:99 2 °F (37 3 °C)    Temp:  [97 6 °F (36 4 °C)-99 2 °F (37 3 °C)] 99 2 °F (37 3 °C)  HR:  [57-69] 64  Resp:  [14-22] 20  BP: ()/(68-81) 178/80  SpO2:  [90 %-96 %] 93 %  Body mass index is 22 5 kg/m²  Input and Output Summary (last 24 hours): Intake/Output Summary (Last 24 hours) at 2022 1641  Last data filed at 2022 2115  Gross per 24 hour   Intake 900 ml   Output --   Net 900 ml       Physical Exam:   Physical Exam  Vitals and nursing note reviewed  Constitutional:       General: She is not in acute distress  Appearance: She is well-developed  HENT:      Head: Normocephalic and atraumatic  Eyes:      Conjunctiva/sclera: Conjunctivae normal    Neck:      Trachea: Tracheal tenderness and tracheostomy present  No abnormal tracheal secretions  Comments: Peritracheal dried blood  Cardiovascular:      Rate and Rhythm: Normal rate and regular rhythm  Heart sounds: No murmur heard  Pulmonary:      Effort: Pulmonary effort is normal  No respiratory distress  Breath sounds: Normal breath sounds  Transmitted upper airway sounds present  No stridor  No rhonchi  Chest:      Chest wall: No tenderness  Abdominal:      General: There is no distension  Palpations: Abdomen is soft  Tenderness: There is no abdominal tenderness  There is no guarding  Musculoskeletal:         General: No swelling        Cervical back: Neck supple  Skin:     General: Skin is warm and dry  Capillary Refill: Capillary refill takes less than 2 seconds  Neurological:      Mental Status: She is alert  Mental status is at baseline     Psychiatric:      Comments: Unable to assess due to patient mentation and nonverbal communication          Additional Data:     Labs:  Results from last 7 days   Lab Units 12/07/22  0453   WBC Thousand/uL 8 62   HEMOGLOBIN g/dL 12 5   HEMATOCRIT % 40 9   PLATELETS Thousands/uL 297   NEUTROS PCT % 55   LYMPHS PCT % 35   MONOS PCT % 7   EOS PCT % 2     Results from last 7 days   Lab Units 12/07/22  0453   SODIUM mmol/L 139   POTASSIUM mmol/L 3 6   CHLORIDE mmol/L 102   CO2 mmol/L 32   BUN mg/dL 16   CREATININE mg/dL 0 76   ANION GAP mmol/L 5   CALCIUM mg/dL 9 3   ALBUMIN g/dL 3 4*   TOTAL BILIRUBIN mg/dL 0 21   ALK PHOS U/L 135*   ALT U/L 21   AST U/L 14   GLUCOSE RANDOM mg/dL 140         Results from last 7 days   Lab Units 12/07/22  1552 12/07/22  1109 12/07/22  0706 12/07/22  0641 12/07/22  0157 12/06/22  1832   POC GLUCOSE mg/dl 139 145* 137 127 156* 187*     Results from last 7 days   Lab Units 12/06/22  1331   HEMOGLOBIN A1C % 9 0*           Lines/Drains:  Invasive Devices     Peripheral Intravenous Line  Duration           Peripheral IV 12/06/22 Left Antecubital 1 day          Drain  Duration           Gastrostomy/Enterostomy Percutaneous endoscopic gastrostomy (PEG) 20 Fr  LUQ 1435 days          Airway  Duration           Surgical Airway Shiley 1444 days                      Imaging: Reviewed radiology reports from this admission including: cta    Recent Cultures (last 7 days):         Last 24 Hours Medication List:   Current Facility-Administered Medications   Medication Dose Route Frequency Provider Last Rate   • dextromethorphan-guaiFENesin  10 mL Oral Q6H PRN Benito Casey MD     • FLUoxetine  40 mg Oral Daily Benito Casey MD     • insulin lispro  1-5 Units Subcutaneous TID AC Eulas Manual, MD     • levothyroxine  88 mcg Per G Tube Early Morning Mindy Laird MD     • LORazepam  0 5 mg Oral Q6H PRN Mindy Laird MD     • melatonin  3 mg Oral HS Mindy Laird MD     • ondansetron  4 mg Intravenous Q6H PRN Mindy Laird MD     • pantoprazole  40 mg Intravenous Q24H [address] Mindy Laird MD     • polyethylene glycol  17 g Oral Daily PRN Mindy Laird MD     • pramipexole  0 25 mg Oral TID Mindy Laird MD     • QUEtiapine  50 mg Oral HS Mindy Laird MD     • sodium chloride  75 mL/hr Intravenous Continuous Mindy Laird MD 75 mL/hr (12/07/22 1259)        Today, Patient Was Seen By: Mindy Laird MD    **Please Note: This note may have been constructed using a voice recognition system  **

## 2022-12-08 LAB
GLUCOSE SERPL-MCNC: 144 MG/DL (ref 65–140)
GLUCOSE SERPL-MCNC: 168 MG/DL (ref 65–140)

## 2022-12-08 RX ORDER — GUAIFENESIN/DEXTROMETHORPHAN 100-10MG/5
10 SYRUP ORAL EVERY 6 HOURS PRN
Qty: 118 ML | Refills: 0 | Status: SHIPPED | OUTPATIENT
Start: 2022-12-08

## 2022-12-08 RX ORDER — CHLORTHALIDONE 25 MG/1
25 TABLET ORAL DAILY
Qty: 30 TABLET | Refills: 0 | Status: SHIPPED | OUTPATIENT
Start: 2022-12-09

## 2022-12-08 RX ADMIN — LEVOTHYROXINE SODIUM 88 MCG: 88 TABLET ORAL at 05:59

## 2022-12-08 RX ADMIN — FLUOXETINE HYDROCHLORIDE 40 MG: 20 CAPSULE ORAL at 08:42

## 2022-12-08 RX ADMIN — INSULIN LISPRO 1 UNITS: 100 INJECTION, SOLUTION INTRAVENOUS; SUBCUTANEOUS at 11:25

## 2022-12-08 RX ADMIN — BENZTROPINE MESYLATE 0.5 MG: 1 TABLET ORAL at 08:42

## 2022-12-08 RX ADMIN — PRAMIPEXOLE DIHYDROCHLORIDE 0.25 MG: 0.25 TABLET ORAL at 08:42

## 2022-12-08 NOTE — PLAN OF CARE
Problem: Potential for Falls  Goal: Patient will remain free of falls  Description: INTERVENTIONS:  - Educate patient/family on patient safety including physical limitations  - Instruct patient to call for assistance with activity   - Consult OT/PT to assist with strengthening/mobility   - Keep Call bell within reach  - Keep bed low and locked with side rails adjusted as appropriate  - Keep care items and personal belongings within reach  - Initiate and maintain comfort rounds  - Make Fall Risk Sign visible to staff  - Offer Toileting every 2 Hours, in advance of need  - Initiate/Maintain alarm  - Obtain necessary fall risk management equipment  - Apply yellow socks and bracelet for high fall risk patients  - Consider moving patient to room near nurses station  Outcome: Progressing     Problem: MOBILITY - ADULT  Goal: Maintain or return to baseline ADL function  Description: INTERVENTIONS:  -  Assess patient's ability to carry out ADLs; assess patient's baseline for ADL function and identify physical deficits which impact ability to perform ADLs (bathing, care of mouth/teeth, toileting, grooming, dressing, etc )  - Assess/evaluate cause of self-care deficits   - Assess range of motion  - Assess patient's mobility; develop plan if impaired  - Assess patient's need for assistive devices and provide as appropriate  - Encourage maximum independence but intervene and supervise when necessary  - Involve family in performance of ADLs  - Assess for home care needs following discharge   - Consider OT consult to assist with ADL evaluation and planning for discharge  - Provide patient education as appropriate  Outcome: Progressing  Goal: Maintains/Returns to pre admission functional level  Description: INTERVENTIONS:  - Perform BMAT or MOVE assessment daily    - Set and communicate daily mobility goal to care team and patient/family/caregiver     - Collaborate with rehabilitation services on mobility goals if consulted  - Perform Range of Motion 3 times a day  - Reposition patient every 2 hours  - Dangle patient 3 times a day  - Stand patient 3 times a day  - Ambulate patient 3 times a day  - Out of bed to chair 3 times a day   - Out of bed for meals 3 times a day  - Out of bed for toileting  - Record patient progress and toleration of activity level   Outcome: Progressing     Problem: Nutrition/Hydration-ADULT  Goal: Nutrient/Hydration intake appropriate for improving, restoring or maintaining nutritional needs  Description: Monitor and assess patient's nutrition/hydration status for malnutrition  Collaborate with interdisciplinary team and initiate plan and interventions as ordered  Monitor patient's weight and dietary intake as ordered or per policy  Utilize nutrition screening tool and intervene as necessary  Determine patient's food preferences and provide high-protein, high-caloric foods as appropriate       INTERVENTIONS:  - Monitor intake, urinary output, labs, and treatment plans  - Assess nutrition and hydration status and recommend course of action  - Recommend/ encourage appropriate nutritional supplements, and vitamin/mineral supplements  - Recommend, monitor, and adjust tube feedings based on assessed needs  - Assess need for intravenous fluids  - Provide specific nutrition/hydration education as appropriate  - Include patient/family/caregiver in decisions related to nutrition  Outcome: Progressing     Problem: Prexisting or High Potential for Compromised Skin Integrity  Goal: Skin integrity is maintained or improved  Description: INTERVENTIONS:  - Identify patients at risk for skin breakdown  - Assess and monitor skin integrity  - Assess and monitor nutrition and hydration status  - Monitor labs   - Assess for incontinence   - Turn and reposition patient  - Assist with mobility/ambulation  - Relieve pressure over bony prominences  - Avoid friction and shearing  - Provide appropriate hygiene as needed including keeping skin clean and dry  - Evaluate need for skin moisturizer/barrier cream  - Collaborate with interdisciplinary team   - Patient/family teaching  - Consider wound care consult   Outcome: Progressing

## 2022-12-08 NOTE — CONSULTS
Recommend goal rate of TF at Vital 1 2 at 60 mL/hr for a total volume of 1440 mL  This will provide 1728 kcals, 108 grams protein (1 9 grams/kg) and 1168 mL free water  Due to current findings of lung mass, additional protein is appropriate  Recommend flushes of 75 mL q 4 hours for a total fluid intake of 1618 mL

## 2022-12-08 NOTE — [place] UNIVERSAL TRANSFER FORM
[place] UNIV[place] FORM  (ALL ITEMS MUST BE COMPLETED)    1  TRANSFER FROM: [facility]      TRANSFER TO: [facility]     2  DATE OF TRANSFER: 12/8/2022                        TIME OF TRANSFER: 1300      3  PATIENT NAME: Maury Sapp,        YOB: 1955                             GENDER: female    4  LANGUAGE:   English    5  PHYSICIAN NAME:  David Vaughan MD                   PHONE: [phone]  CODE STATUS: Level 4 - [facility] DNR Attached: Yes    7  :                                      :  Extended Emergency Contact Information  Primary Emergency Contact: Calli W [facility]  Mobile Phone: [phone]  Relation: Brother  Secondary Emergency Contact: Camron Sandoval [facility]  Mobile Phone: [phone]  Relation: [id number] LUIS  Representative/Proxy:  Yes           Legal Guardian:  Yes             NAME OF:           HEALTH CARE REPRESENTATIVE/PROXY:                                         OR           LEGAL GUARDIAN, IF NOT :                                               PHONE:  (Day)           (Night)                        (Cell)    8  REASON FOR TRANSFER: (Must include brief medical history and recent changes in physical function or cognition ) HOSPICE            V/S: /65   Pulse 55   Temp 98 4 °F (36 9 °C)   Resp 18   Ht 5' 3" (1 6 m)   Wt 57 6 kg (127 lb)   SpO2 92%   BMI 22 50 kg/m²           PAIN: None    9  PRIMARY DIAGNOSIS: Tracheostomy hemorrhage (HCC)      Secondary Diagnosis:         Pacemaker: No    Internal Defib: No          Mental Health Diagnosis (if Applicable):    10  RESTRAINTS: No     11  RESPIRATORY NEEDS: None    12  ISOLATION/PRECAUTION: None    13   ALLERGY: Aristocort [triamcinolone], Bactrim [sulfamethoxazole-trimethoprim], Bupropion, Ciprofloxacin, Codeine, Latex, Methylprednisolone, Penicillins, Percocet [oxycodone-acetaminophen], Vioxx [rofecoxib], Carbamazepine, Ezetimibe, Lipitor [atorvastatin], and Zocor [simvastatin]    14  SENSORY:       Speech Aphasia    15  SKIN CONDITION: No Wounds    16  DIET: Tube Feed    17  IV ACCESS: None    18  PERSONAL ITEMS SENT WITH PATIENT: None    19  ATTACHED DOCUMENTS: MUST ATTACH CURRENT MEDICATION INFORMATION Face Sheet, MAR, Advanced Directive, Discharge Summary and HX/PE    20  AT RISK ALERTS:Pressure Ulcer and Aspiration        HARM TO: N/A    21  WEIGHT BEARING STATUS:         Left Leg: Full        Right Leg: Full    22  MENTAL STATUS:Alert, Disoriented and Other NONVERBAL    23  FUNCTION:        Walk: Not Able        Transfer: Not Able        Toilet: With Help        Feed: Not Able    24  IMMUNIZATIONS/SCREENING:     Immunization History   Administered Date(s) Administered   • Tdap 07/26/2018       25  BOWEL: Incontinent  and Date Last BM 12/07/2022    26  BLADDER: Incontinent    27   SENDING FACILITY CONTACT: Kiya Culver                  Title: RN        Unit: 2S        Phone: [phone] [address] (if known):        Title:        Unit:         Phone:         FORM PREFILLED BY (if applicable)       Title:       Unit:        Phone:         FORM COMPLETED BY Enoch Soares RN      Title: RN      Phone: [phone]

## 2022-12-08 NOTE — INCIDENTAL FINDINGS
The following findings require follow up:  Radiographic finding   Finding: CTA ED chest PE study: No pulmonary embolus  , 3 5 x 1 8 cm right upper lobe mass extending to the right suprahilar region with an enlarged right suprahilar node  This is compatible with malignancy  , I     Follow up required: No, patient made DNR/I by family       Please notify the following clinician to assist with the follow up:

## 2022-12-08 NOTE — PHYSICAL THERAPY NOTE
PT Screen        12/08/22 0823   PT Last Visit   PT Visit Date 12/08/22   Note Type   Note type Screen   Additional Comments PT orders received and reviewed  Per chart review, patient is LTC resident at nursing home  Dependent for all mobility at baseline  Per review current D/C plan is to return to facility with hospice/comfort care  No skilled PT indicated at this time - D/C PT orders  Please reorder if needs arise     Licensure   [place] License Number  Evelin Milligan [id number]

## 2022-12-08 NOTE — CASE MANAGEMENT
Case Management Assessment & Discharge Planning Note    Patient name Eh Munson  Location [address] [address] MRN [id number]  : 1955 Date 2022       Current Admission Date: 2022  Current Admission Diagnosis:Tracheostomy hemorrhage [facility])   Patient Active Problem List    Diagnosis Date Noted   • Tracheostomy hemorrhage ([facility] ) 2022   • Nonverbal 2022   • Lung mass 2022   • Encounter for hospice care discussion 2022   • Parkinson disease [facility])    • Depression    • Essential hypertension 2019   • Low urine output 2019   • Hypokalemia 2018   • Acute encephalopathy 2018   • Status post craniectomy 2018   • Acute respiratory failure with hypoxia ([facility] ) 2018   • Closed nondisplaced left toe fracture 2018   • Multiple fractures of ribs, bilateral, initial encounter for closed fracture 2018   • Traumatic pneumothorax 2018   • Glenoid fracture of shoulder, right, closed, initial encounter 2018   • Intraparenchymal hemorrhage of brain ([facility] ) 2018   • Traumatic brain injury 2018   • Flail chest, initial encounter for closed fracture 2018   • Fracture of thoracic transverse process, closed, initial encounter ([facility] ) 2018   • Subdural hematoma 2018   • Subarachnoid hemorrhage ([facility] ) 2018   • Diabetes mellitus, type II ([facility] ) 2018   • Protein-calorie malnutrition ([facility] ) 2018   • Restless leg syndrome 2018   • Plantar warts 2018   • Right foot pain 2018   • Closed nondisplaced fracture of fourth metatarsal bone of right foot 2018   • Acquired deformity of left foot 2018   • Arthralgia of left foot 2018   • Pain in both feet 2018      LOS (days): 2  Geometric Mean LOS (GMLOS) (days): 3 20  Days to GMLOS:1 4     OBJECTIVE:    Risk of Unplanned Readmission Score: 10 93      Current admission status: Inpatient  Referral Reason: Hospice    Preferred Pharmacy:   [address] [facility], [address] [facility]  [address] [facility] [address]  Phone: [phone] Fax: [phone]    [address] [place] [address], [facility]  [address]  [place] [address]  Phone: [phone] Fax: [phone]    Primary Care Provider: Rohan Mathur MD    Primary Insurance: MEDICARE  Secondary Insurance: Rahul Hope MA [facility]    ASSESSMENT:  Rubén Pino Proxies    There are no active Health Care Proxies on file  Readmission Root Cause  30 Day Readmission: No    Patient Information  Admitted from[de-identified] Facility [facility])  Mental Status: Alert, Confused (Hx of TBI/craniectomy)  During Assessment patient was accompanied by: Not accompanied during assessment  Assessment information provided by[de-identified] Brother (Ed)  Primary Caregiver:  Other (Comment) (facility staff)  Support Systems: Family members, Home care staff  [place] of Residence: [address] [place] [address] do you live in?: [place], [place]  Type of Current Residence: Facility [facility])  In the last 12 months, was there a time when you were not able to pay the mortgage or rent on time?: No  In the last 12 months, how many places have you lived?: 1  In the last 12 months, was there a time when you did not have a steady place to sleep or slept in a shelter (including now)?: No  Homeless/housing insecurity resource given?: N/A  Living Arrangements: Other (Comment)    Activities of Daily Living Prior to Admission  Functional Status: Assistance  Completes ADLs independently?: No  Level of ADL dependence: Assistance  Ambulates independently?: No  Level of ambulatory dependence: Assistance     Patient Information Continued  Income Source: SSI/SSD  Does patient have prescription coverage?: Yes  Within the past 12 months, you worried that your food would run out before you got the money to buy more : Never true  Within the past 12 months, the food you bought just didn't last and you didn't have money to get more : Never true  Food insecurity resource given?: N/A  Does patient receive dialysis treatments?: No     Means of Transportation  Means of Transport to Appts[de-identified] Eliseo Avalos  In the past 12 months, has lack of transportation kept you from medical appointments or from getting medications?: No  In the past 12 months, has lack of transportation kept you from meetings, work, or from getting things needed for daily living?: No  Was application for public transport provided?: N/A    DISCHARGE DETAILS:    Discharge planning discussed with[de-identified] Brother Ed  Freedom of Choice: Yes  Comments - Freedom of Choice: Choice is for patient to return to LTC residence at [facility] on comfort care with later transition to hospice services at facility  CM contacted family/caregiver?: Yes  Were Treatment Team discharge recommendations reviewed with patient/caregiver?: Yes  Did patient/caregiver verbalize understanding of patient care needs?: N/A- going to facility  Were patient/caregiver advised of the risks associated with not following Treatment Team discharge recommendations?: Yes    Contacts  Patient Contacts: Hina Torito (brother)  Relationship to Patient[de-identified] Family  Contact Method: Phone  Phone Number: [phone]  Reason/Outcome: Emergency Contact, Discharge Planning, Continuity of [address] [facility] [address]         Is the patient interested in [facility] at discharge?: No    DME Referral Provided  Referral made for DME?: No    Other Referral/Resources/Interventions Provided:  Interventions: SNF, Hospice  Referral Comments: Aidin referral sent to [facility]  Confirmed with liaison Franklin  they can accept back on comfort care and will transition to hospice upon her return      Would you like to participate in our [address] service program?  : No - Declined    Treatment Team Recommendation: SNF  Discharge Destination Plan[de-identified] SNF  Transport at Discharge : [facility]     Number/Name of Dispatcher: Cody Melo and Unit #): Katherine  ETA of Transport (Date): 12/08/22  ETA of Transport (Time): [address] [facility] Name, [address] : [facility]  Receiving Facility/Agency Phone Number: [phone]

## 2022-12-09 VITALS
RESPIRATION RATE: 18 BRPM | DIASTOLIC BLOOD PRESSURE: 85 MMHG | HEART RATE: 88 BPM | BODY MASS INDEX: 22.5 KG/M2 | SYSTOLIC BLOOD PRESSURE: 131 MMHG | TEMPERATURE: 98.1 F | WEIGHT: 127 LBS | HEIGHT: 63 IN | OXYGEN SATURATION: 94 %

## (undated) DEVICE — SUT PROLENE 2-0 CT-2 30 IN 8411H

## (undated) DEVICE — TUBING BUBBLE CLEAR 5MM X 100 FT NS

## (undated) DEVICE — MEDI-VAC YANKAUER SUCTION HANDLE: Brand: CARDINAL HEALTH

## (undated) DEVICE — BASIC SINGLE BASIN 2-LF: Brand: MEDLINE INDUSTRIES, INC.

## (undated) DEVICE — GLOVE INDICATOR PI UNDERGLOVE SZ 8 BLUE

## (undated) DEVICE — SUCTION CATH 18 FR

## (undated) DEVICE — TRAVELKIT CONTAINS FIRST STEP KIT (200ML EP-4 KIT) AND SOILED SCOPE BAG - 1 KIT: Brand: TRAVELKIT CONTAINS FIRST STEP KIT AND SOILED SCOPE BAG

## (undated) DEVICE — RANEY SCALP CLIP STERILE: Brand: AESCULAP

## (undated) DEVICE — 3000CC GUARDIAN II: Brand: GUARDIAN

## (undated) DEVICE — ACE WRAP 4 IN UNSTERILE

## (undated) DEVICE — GLOVE SRG BIOGEL 7.5

## (undated) DEVICE — TOOL F2/8TA23 LEGEND 8CM 2.3MM TAPER: Brand: MIDAS REX™

## (undated) DEVICE — TELFA ADHESIVE ISLAND DRESSING: Brand: TELFA

## (undated) DEVICE — GLOVE EXAM NON-STRL NTRL PLUS LRG PF

## (undated) DEVICE — NEURO PATTIES 1/2 X 1 1/2

## (undated) DEVICE — TRAP POLY

## (undated) DEVICE — SNARE BARBED 230CM

## (undated) DEVICE — UTILITY MARKER,BLACK WITH LABELS: Brand: DEVON

## (undated) DEVICE — SPONGE SCRUB 4 PCT CHLORHEXIDINE

## (undated) DEVICE — OCCLUSIVE GAUZE STRIP,3% BISMUTH TRIBROMOPHENATE IN PETROLATUM BLEND: Brand: XEROFORM

## (undated) DEVICE — PAD GROUNDING ADULT

## (undated) DEVICE — 1200CC GUARDIAN II: Brand: GUARDIAN

## (undated) DEVICE — DRAIN JACKSON PRATT 10FR 1/8END: Brand: CARDINAL HEALTH

## (undated) DEVICE — DISPOSABLE EQUIPMENT COVER: Brand: SMALL TOWEL DRAPE

## (undated) DEVICE — SINGLE-USE BIOPSY FORCEPS: Brand: RADIAL JAW 4

## (undated) DEVICE — CHLORAPREP HI-LITE 26ML ORANGE

## (undated) DEVICE — SUT ETHILON 3-0 FSLX 30 IN 1673H

## (undated) DEVICE — LIGHT HANDLE COVER SLEEVE DISP BLUE STELLAR

## (undated) DEVICE — SURGICEL 4 X 8

## (undated) DEVICE — HALF SHEET: Brand: CONVERTORS

## (undated) DEVICE — SUT PROLENE 4-0 PS-2 18 IN 8682G

## (undated) DEVICE — CUFF TOURNIQUET 18 X 4 IN QUICK CONNECT DISP 1 BLADDER

## (undated) DEVICE — ANTIBACTERIAL VIOLET BRAIDED (POLYGLACTIN 910), SYNTHETIC ABSORBABLE SUTURE: Brand: COATED VICRYL

## (undated) DEVICE — ELECTRODE BLADE MOD E-Z CLEAN 2.5IN 6.4CM -0012M

## (undated) DEVICE — DRAPE EQUIPMENT RF WAND

## (undated) DEVICE — INTENDED FOR TISSUE SEPARATION, AND OTHER PROCEDURES THAT REQUIRE A SHARP SURGICAL BLADE TO PUNCTURE OR CUT.: Brand: BARD-PARKER ® CARBON RIB-BACK BLADES

## (undated) DEVICE — BITE BLOCK ENDO 60FR ADLT MAXI  DISP W/STRAP

## (undated) DEVICE — PERCUTANEOUS ENDOSCOPIC GASTROSTOMY KIT: Brand: ENDOVIVE SAFETY PEG KIT

## (undated) DEVICE — FLOSEAL HEMOSTATIC MATRIX, 5 ML: Brand: FLOSEAL

## (undated) DEVICE — COTTON BALLS-STRUNG 1": Brand: COTTON BALLS

## (undated) DEVICE — UNDYED BRAIDED (POLYGLACTIN 910), SYNTHETIC ABSORBABLE SUTURE: Brand: COATED VICRYL

## (undated) DEVICE — 3M™ IOBAN™ 2 ANTIMICROBIAL INCISE DRAPE 6640EZ: Brand: IOBAN™ 2

## (undated) DEVICE — MARKER SPOT EX  BOWEL TATTOO SYRINGE

## (undated) DEVICE — GROUNDING PAD UNIVERSAL SLW

## (undated) DEVICE — GAUZE SPONGES,16 PLY: Brand: CURITY

## (undated) DEVICE — GLOVE INDICATOR PI UNDERGLOVE SZ 7 BLUE

## (undated) DEVICE — PACK CRANIOTOMY PBDS RF

## (undated) DEVICE — SUT VICRYL 3-0 SH 27 IN J416H

## (undated) DEVICE — SPONGE LAP 18 X 18 IN

## (undated) DEVICE — ASTOUND STANDARD SURGICAL GOWN, XL: Brand: CONVERTORS

## (undated) DEVICE — CHLORHEXIDINE 4PCT 4 OZ

## (undated) DEVICE — PLUMEPEN PRO 10FT

## (undated) DEVICE — DRAPE SHEET THREE QUARTER

## (undated) DEVICE — TUBING SUCTION 5MM X 12 FT

## (undated) DEVICE — AIRLIFE™  ADULT CUSHION NASAL CANNULA WITH 7 FOOT (2.1 M) CRUSH-RESISTANT OXYGEN TUBING, AND U/CONNECT-IT ADAPTER: Brand: AIRLIFE™

## (undated) DEVICE — SUT VICRYL 3-0 PS-2 18 IN J497G

## (undated) DEVICE — BETHLEHEM UNIVERSAL OUTPATIENT: Brand: CARDINAL HEALTH

## (undated) DEVICE — SPONGE GAUZE 4 X 9

## (undated) DEVICE — DRAPE INTESTINAL ISOLATION BAG

## (undated) DEVICE — PROXIMATE PLUS MD MULTI-DIRECTIONAL RELEASE SKIN STAPLERS CONTAINS 35 STAINLESS STEEL STAPLES APPROXIMATE CLOSED DIMENSIONS: 6.9MM X 3.9MM WIDE: Brand: PROXIMATE

## (undated) DEVICE — POV-IOD SOLUTION 4OZ BT

## (undated) DEVICE — SUT SILK 2-0 SH CR/8 18 IN C012D

## (undated) DEVICE — TIBURON EXTREMITY SHEET: Brand: CONVERTORS

## (undated) DEVICE — KERLIX BANDAGE ROLL: Brand: KERLIX

## (undated) DEVICE — BANDAGE, ESMARK LF STR 6"X9' (20/CS): Brand: CYPRESS

## (undated) DEVICE — NEEDLE 25G X 1 1/2

## (undated) DEVICE — "MAJ-901 WATER CONTAINER SET CV-160/140": Brand: WATER CONTAINER

## (undated) DEVICE — ACE WRAP 2 IN UNSTERILE

## (undated) DEVICE — DRAPE C-ARM X-RAY

## (undated) DEVICE — SURGIFOAM 8.5 X 12.5

## (undated) DEVICE — PERFORATOR CRANIAL 14MM

## (undated) DEVICE — BASIC DOUBLE BASIN 2-LF: Brand: MEDLINE INDUSTRIES, INC.

## (undated) DEVICE — "MH-438 A/W VLVE F/140 EVIS-140": Brand: AIR/WATER VALVE

## (undated) DEVICE — GLOVE SRG BIOGEL 8

## (undated) DEVICE — INTENDED FOR TISSUE SEPARATION, AND OTHER PROCEDURES THAT REQUIRE A SHARP SURGICAL BLADE TO PUNCTURE OR CUT.: Brand: BARD-PARKER SAFETY BLADES SIZE 11, STERILE

## (undated) DEVICE — Device: Brand: OLYMPUS

## (undated) DEVICE — DRAIN SPONGES,6 PLY: Brand: EXCILON

## (undated) DEVICE — ADHESIVE SKN CLSR HISTOACRYL FLEX 0.5ML LF

## (undated) DEVICE — SOLIDIFIER FLUID WASTE CONTROL 1500ML

## (undated) DEVICE — LUBRICANT SURGILUBE TUBE 4 OZ  FLIP TOP

## (undated) DEVICE — BRUSH ENDO CLEANING DBL-HEADER

## (undated) DEVICE — PACK GENERAL LF

## (undated) DEVICE — ANTIBACTERIAL UNDYED BRAIDED (POLYGLACTIN 910), SYNTHETIC ABSORBABLE SUTURE: Brand: COATED VICRYL

## (undated) DEVICE — INTENDED FOR TISSUE SEPARATION, AND OTHER PROCEDURES THAT REQUIRE A SHARP SURGICAL BLADE TO PUNCTURE OR CUT.: Brand: BARD-PARKER SAFETY BLADES SIZE 10, STERILE

## (undated) DEVICE — CURITY STRETCH BANDAGE: Brand: CURITY

## (undated) DEVICE — SUT NUROLON 4-0 TF CR/8 18 IN C584D

## (undated) DEVICE — TUBING AUX CHANNEL

## (undated) DEVICE — PADDING CAST 4 IN  COTTON STRL

## (undated) DEVICE — BRUSH CYTOLOGY 3 MM 240 CM

## (undated) DEVICE — GLOVE PI ULTRA TOUCH SZ.6.5

## (undated) DEVICE — SYRINGE 10ML LL CONTROL TOP

## (undated) DEVICE — "MH-443 SUCTION VALVE F/EVIS140 EVIS160": Brand: SUCTION VALVE

## (undated) DEVICE — DISPOSABLE BIOPSY VALVE MAJ-1555: Brand: SINGLE USE BIOPSY VALVE (STERILE)

## (undated) DEVICE — STOCKINETTE REGULAR

## (undated) DEVICE — SPONGE GAUZE 4 X 8 12 PLY STRL LF

## (undated) DEVICE — JACKSON-PRATT 100CC BULB RESERVOIR: Brand: CARDINAL HEALTH

## (undated) DEVICE — AIR AND WATER TUBING/CAP SET FOR OLYMPUS® SCOPES: Brand: ERBE

## (undated) DEVICE — Device: Brand: IQ SYSTEM

## (undated) DEVICE — PRECISION OFFSET (9.0 X 0.64 X 25.0MM)

## (undated) DEVICE — CAST PADDING 3 IN UNSTERILE

## (undated) DEVICE — "MB-142 MOUTHPIECE": Brand: MOUTHPIECE

## (undated) DEVICE — ROSEBUD DISSECTORS: Brand: DEROYAL

## (undated) DEVICE — FORCEP ELECSURG RADIAL JAW4 2.2 X 240CM  HOT BX

## (undated) DEVICE — INTENDED FOR TISSUE SEPARATION, AND OTHER PROCEDURES THAT REQUIRE A SHARP SURGICAL BLADE TO PUNCTURE OR CUT.: Brand: BARD-PARKER SAFETY BLADES SIZE 15, STERILE

## (undated) DEVICE — Device: Brand: DEFENDO AIR/WATER/SUCTION AND BIOPSY VALVE

## (undated) DEVICE — 60 ML SYRINGE,REGULAR TIP: Brand: MONOJECT

## (undated) DEVICE — TIBURON SPLIT SHEET: Brand: CONVERTORS

## (undated) DEVICE — BAG SPECIMEN BIOHAZARD 10 X 10 ADHESIVE

## (undated) DEVICE — EXTREMITY DRAPE W/ARMBOARD COVERS: Brand: CONVERTORS

## (undated) DEVICE — SUT ETHILON 2-0 FSLX 30 IN 1674H

## (undated) DEVICE — SUT ETHIBOND 2 V-37 30 IN MX69G

## (undated) DEVICE — ACE WRAP 3 IN UNSTERILE